# Patient Record
Sex: MALE | Race: WHITE | NOT HISPANIC OR LATINO | Employment: FULL TIME | URBAN - METROPOLITAN AREA
[De-identification: names, ages, dates, MRNs, and addresses within clinical notes are randomized per-mention and may not be internally consistent; named-entity substitution may affect disease eponyms.]

---

## 2017-02-22 ENCOUNTER — ALLSCRIPTS OFFICE VISIT (OUTPATIENT)
Dept: OTHER | Facility: OTHER | Age: 62
End: 2017-02-22

## 2017-02-22 DIAGNOSIS — I10 ESSENTIAL (PRIMARY) HYPERTENSION: ICD-10-CM

## 2017-02-22 DIAGNOSIS — Z12.11 ENCOUNTER FOR SCREENING FOR MALIGNANT NEOPLASM OF COLON: ICD-10-CM

## 2018-01-09 NOTE — PROGRESS NOTES
Assessment    1  Encounter for preventive health examination (V70 0) (Z00 00)   2  Encounter for FIT (fecal immunochemical test) screening (V76 51) (Z12 11)   3  Chronic obstructive lung disease (496) (J44 9)   · Moderate 2016   4  Aortic valve stenosis (424 1) (I35 0)   · mild-mod Echo 7/14   5  Coronary artery calcification (414 00,414 4) (I25 10,I25 84)   · on CT chest   6  Hypertension (401 9) (I10)   7  Shortness of breath on exertion (786 05) (R06 02)   8  Current smoker (305 1) (F17 200)   · 1 PPD   9  Pulmonary nodule seen on imaging study (793 11) (R91 1)   10  Hyperlipidemia (272 4) (E78 5)   11  Rotator cuff tendinitis (726 10) (M75 80)   12  Need for Tdap vaccination (V06 1) (Z23)    Plan  Aortic valve stenosis, Coronary artery calcification, Shortness of breath on exertion    · * NM MYOCARDIAL PERFUSION SPECT (STRESS AND/OR REST); Status:Need  Information - Financial Authorization; Requested for:[id number];   Chronic obstructive lung disease    · Anoro Ellipta 62 5-25 MCG/INH Inhalation Aerosol Powder Breath Activated; 1  PUFF DAILY  Encounter for FIT (fecal immunochemical test) screening    · (1) OCCULT BLOOD, FECAL IMMUNOCHEMICAL TEST; Status:Active - Retrospective By  Protocol Authorization; Requested for:[id number];   Hypertension    · (1) COMPREHENSIVE METABOLIC PANEL; Status:Active; Requested for:82Tjq6162;    · (1) LDL,DIRECT; Status:Active; Requested for:24Pnf1640;   Need for Tdap vaccination    · Adacel 5-2-15 5 LF-MCG/0 5 Intramuscular Suspension  Pulmonary nodule seen on imaging study    · * CT CHEST WO CONTRAST; Status:Need Information - Financial Authorization; Requested for:[id number];   Rotator cuff tendinitis    · 1 - Abby Arias MD, [facility] ([facility]th[facility]) Physician Referral  Consult Only: the  expectation is that the referring provider will communicate back to the patient on  treatment options   Evaluation and Treatment: the expectation is that the referred to  provider will communicate back to the patient on treatment options  Status: Active   Requested for: 32Guh4770  Care Summary provided  : Yes  SocHx: Current smoker    · We recommend you quit smoking  Time spent counseling today was greater than 3  minutes ; Status:Complete;   Done: 67JUX4109    Discussion/Summary    Patient presented today for an annual checkup  Overall, he is feeling relatively well  He does continue to smoke upwards of a pack a day  He was encouraged to quit smoking  He will be having a CT of his chest is here to follow-up on some previous small nodules  He declines Chantix or bupropion for the time being, but encouraged him to consider these or any other smoking cessation aids to help quit smoking  He has some coronary calcifications as well as some exertional shortness of breath which may be an anginal equivalent  He has chronic knee pain and would not be able to walk on a treadmill, so I'll set him up for perfusion study  Consider cardiology consultation as well  Continue with aggressive lipid and blood pressure management  Continue with his daily aspirin  He has very obvious bilateral rotator cuff issues and I'm going to have him see Dr Patricia Burgos may also be able to assess his knee as well  I will have him get some nonfasting lab work today as he finds fasting lab work for difficult with shift work  He will have an Adacel vaccine today as he is due for tetanus shot  He has had a flu shot as well as a Pneumovax  I'm going to try him on aoro one puff daily for his COPD and exertional shortness of breath  Chief Complaint  Physical  Fit test given today      History of Present Illness  HPI: Patient presents today for an annual physical  He has several concerns  He has been having some exertional shortness of breath  He has COPD by PFTs previously  He uses pro-air intermittently as needed and this has been somewhat helpful  He denies any chest of coughing or wheezing   He had significant coronary calcifications on his previous CAT scan as well and some aortic stenosis on previous echo  He has history of hypertension as well as hyperlipidemia  He has been tolerating his medications and has had no syncope excessive palpitations or chest pain  Has a history of erectile dysfunction which is controlled Cialis  He has a history of hyperlipidemia which is controlled with atorvastatin  Review of Systems    Constitutional: no fever, not feeling poorly, no recent weight gain, no chills, not feeling tired and no recent weight loss  Eyes: no eyesight problems  ENT: no earache, no nosebleeds, no nasal discharge and no hoarseness  Cardiovascular: the heart rate was not slow, no chest pain, no intermittent leg claudication, the heart rate was not fast, no palpitations and no extremity edema  Respiratory: shortness of breath during exertion, but no shortness of breath, no cough, no orthopnea and no wheezing  Gastrointestinal: no abdominal pain, no nausea, no vomiting, no constipation and no diarrhea  Genitourinary: no dysuria, no urinary hesitancy and no nocturia  Musculoskeletal: no arthralgias, no joint swelling and no myalgias  Integumentary: no rashes  Neurological: no headache, no numbness, no dizziness and no fainting  Psychiatric: no anxiety and no depression  Endocrine: erectile dysfunction  Hematologic/Lymphatic: no tendency for easy bleeding and no tendency for easy bruising  Active Problems    1  Aortic valve stenosis (424 1) (I35 0)   2  Chronic obstructive lung disease (496) (J44 9)   3  Coronary artery calcification (414 00,414 4) (I25 10,I25 84)   4  Current smoker (305 1) (F17 200)   5  Encounter for screening colonoscopy (V76 51) (Z12 11)   6  Erectile dysfunction of non-organic origin (302 72) (F52 21)   7  Hyperlipidemia (272 4) (E78 5)   8  Hypertension (401 9) (I10)   9  Left ventricular hypertrophy (429 3) (I51 7)   10   Need for pneumococcal vaccination (V03 82) (Z23)   11  Nephrolithiasis (592 0) (N20 0)   12  Right knee pain (719 46) (M25 561)    Family History  Father    · Family history of Prostate cancer    Social History    · Current smoker (305 1) (F17 200)   · 1 PPD   · Full-time employment   · 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   ·    · Social alcohol use (Z78 9)   · 1-2 /week    Current Meds   1  AmLODIPine Besylate 10 MG Oral Tablet; take 1 tablet by mouth every day; Therapy: 74AGK7668 to (Evaluate:38Amp3524)  Requested for: 37AWN4020; Last   Rx:02Jan2017 Ordered   2  Aspirin 81 MG Oral Tablet Delayed Release; TAKE 1 TABLET DAILY AS DIRECTED; Therapy: 99LGI7261 to (Evaluate:04Jbp2787); Last Rx:72Ots5524 Ordered   3  Atorvastatin Calcium 20 MG Oral Tablet; take 1 tablet by mouth every day; Therapy: 77XSV4620 to (Evaluate:99Ado6200)  Requested for: 43Qrl0633; Last   Rx:19Gfc2109 Ordered   4  Cialis 20 MG Oral Tablet; TAKE 1 TABLET  AS NEEDED; Therapy: 67FZU4370 to (Evaluate:18Mar2016)  Requested for: 78UCG0955; Last   Rx:65Cdv3568 Ordered   5  Losartan Potassium 100 MG Oral Tablet; TAKE 1 TABLET DAILY; Therapy: 12DLN0504 to (Evaluate:66Bzc4777)  Requested for: 48XIG7604; Last   Rx:01Nov2016 Ordered   6  Multivital TABS; TAKE 1 TABLET DAILY; Therapy: 39DEY4338 to Recorded   7  ProAir  (90 Base) MCG/ACT Inhalation Aerosol Solution; INHALE 1 TO 2 PUFFS   EVERY 4 TO 6 HOURS AS NEEDED; Therapy: 25MNK9061 to (Evaluate:10Mar2016)  Requested for: 51JTN0931; Last   Rx:68Cdx5697 Ordered    Allergies    1  No Known Drug Allergies    Vitals   Recorded: 20Dcq2166 11:01AM   Heart Rate 84   Respiration 16   Systolic 996   Diastolic 80   Height 6 ft 2 in   Weight 242 lb    BMI Calculated 31 07   BSA Calculated 2 36     Physical Exam    Constitutional   General appearance: No acute distress, well appearing and well nourished  Head and Face   Head and face: Normal     Palpation of the face and sinuses: No sinus tenderness      Eyes   Conjunctiva and lids: No erythema, swelling or discharge  Pupils and irises: Equal, round, reactive to light  Ophthalmoscopic examination: Normal fundi and optic discs  Ears, Nose, Mouth, and Throat   External inspection of ears and nose: Normal     Lips, teeth, and gums: Normal, good dentition  Oropharynx: Normal with no erythema, edema, exudate or lesions  Neck   Neck: Supple, symmetric, trachea midline, no masses  Thyroid: Normal, no thyromegaly  Pulmonary   Respiratory effort: No increased work of breathing or signs of respiratory distress  Auscultation of lungs: Clear to auscultation  Cardiovascular   Auscultation of heart: Normal rate and rhythm, normal S1 and S2, no murmurs  Carotid pulses: 2+ bilaterally  Examination of extremities for edema and/or varicosities: Normal     Abdomen   Abdomen: Non-tender, no masses  Liver and spleen: No hepatomegaly or splenomegaly  Anus, perineum, and rectum: Abnormal     Genitourinary   Digital rectal exam of prostate: Abnormal   pt declines  Lymphatic   Palpation of lymph nodes in neck: No lymphadenopathy  Musculoskeletal   Gait and station: Normal     Skin   Skin and subcutaneous tissue: Normal without rashes or lesions  Palpation of skin and subcutaneous tissue: Normal turgor  Neurologic   Cranial nerves: Cranial nerves 2-12 intact  Cortical function: Normal mental status  Psychiatric   Judgment and insight: Normal     Orientation to person, place and time: Normal     Recent and remote memory: Intact      Mood and affect: Normal        Future Appointments    Date/Time Provider Specialty Site   08/21/2017 11:15 AM Jillian Raines [place] [facility] [place] [id number]     Signatures   Electronically signed by : SAAD Hernández ; Feb 22 2017 12:37PM EST                       (Author)

## 2018-01-15 VITALS
WEIGHT: 242 LBS | BODY MASS INDEX: 31.06 KG/M2 | DIASTOLIC BLOOD PRESSURE: 80 MMHG | RESPIRATION RATE: 16 BRPM | SYSTOLIC BLOOD PRESSURE: 110 MMHG | HEIGHT: 74 IN | HEART RATE: 84 BPM

## 2018-01-17 NOTE — MISCELLANEOUS
Message   Recorded as Task   Date: 02/10/2016 02:10 PM, Created By: Gina Araujo   Task Name: Med Renewal Request   Assigned To: Gina Araujo   Regarding Patient: Janey Alfonso, Status: Active   Comment:    Gina Araujo - 10 Feb 2016 2:10 PM     TASK CREATED  PER CAREMARK VIAGRA NEEDS AUTH, PER CAREMARK CIALIS IS ACCEPTED ALTERNATIVE, MAY WE SWITCH TO CIALIS? Jose Grover Jr - 10 Feb 2016 6:00 PM     TASK REPLIED TO: Previously Assigned To Jose Grover Jr  cialis 20 mg QD PRN # 9/3   Nessa Kruger - 11 Feb 2016 1:02 PM     TASK EDITED  L/m for pt to call office  Nessa Kruger - 11 Feb 2016 1:03 PM     TASK EDITED  medlist updated   Gina Araujo - 11 Feb 2016 1:14 PM     TASK EDITED  Pt notified  Active Problems    1  Aortic stenosis (424 1) (I35 0)   2  Current smoker (305 1) (Z72 0)   3  Encounter for screening for malignant neoplasm of prostate (V76 44) (Z12 5)   4  Erectile dysfunction of non-organic origin (302 72) (F52 21)   5  Hyperlipidemia (272 4) (E78 5)   6  Hypertension (401 9) (I10)   7  Left ventricular hypertrophy (429 3) (I51 7)   8  Nephrolithiasis (592 0) (N20 0)   9  Right knee pain (719 46) (M25 561)   10  Shortness of breath (786 05) (R06 02)    Current Meds   1  AmLODIPine Besylate 5 MG Oral Tablet; TAKE 1 TABLET DAILY; Therapy: 06VBU2100 to (Evaluate:71Dcd2028)  Requested for: 46BBK7418; Last   Rx:53Oru8625 Ordered   2  Atorvastatin Calcium 10 MG Oral Tablet; TAKE ONE TABLET BY MOUTH ONCE DAILY; Therapy: 55TNO1191 to (Roosevelt Trevino)  Requested for: 03QLA9198; Last   Rx:81Jov0996 Ordered   3  Losartan Potassium 100 MG Oral Tablet; TAKE 1 TABLET DAILY; Therapy: 30OMY6138 to (Evaluate:08Jun2016)  Requested for: 71YJE0037; Last   Rx:00Uxc1870 Ordered   4  Multivital Oral Tablet; TAKE 1 TABLET DAILY; Therapy: 44QHK2848 to Recorded   5   ProAir  (90 Base) MCG/ACT Inhalation Aerosol Solution; INHALE 1 TO 2 PUFFS   EVERY 4 TO 6 HOURS AS NEEDED; Therapy: 59ZGI7550 to (Evaluate:10Mar2016)  Requested for: 36RZA4227; Last   Rx:08Pyo7281 Ordered   6  Viagra 100 MG Oral Tablet; Use 1/2 to 1 pill 1 hr before sexual activity as need; Therapy: 62RCM8162 to (Mecca Bassett)  Requested for: 95OMC9105; Last   Rx:54Kef7828 Ordered    Allergies    1   No Known Drug Allergies    Plan  Erectile dysfunction of non-organic origin    · Viagra 100 MG Oral Tablet   · Cialis 20 MG Oral Tablet; TAKE 1 TABLET  AS NEEDED    Signatures   Electronically signed by : Jevon Ellis, ; Feb 11 2016  1:14PM EST                       (Author)

## 2018-01-17 NOTE — RESULT NOTES
Verified Results  * CT LUNG SCREENING PROGRAM 70ZHF9973 12:02PM [facility]     Test Name Result Flag Reference   CT LUNG SCREENING PROGRAM (Report)     CT CHEST LUNG CANCER SCREENING WITHOUT IV CONTRAST     INDICATION: Long term smoking history  COMPARISON: CT chest from July 11, 2014  TECHNIQUE: Unenhanced CT examination of the chest was performed utilizing a low dose protocol  Axial, sagittal and coronal reformatted images were submitted for interpretation  This examination, like all CT scans performed in the [facility], was performed utilizing techniques to minimize radiation dose exposure, including the use of iterative reconstruction and automated exposure control  FINDINGS:     LUNGS: Two adjacent small nodules in the apex of the right upper lobe, each measuring 4 mm, demonstrated best on coronal MIP images (series 602, image 28), unchanged since the last study  These can be presumed to be benign  Calcified granuloma right    lower lobe  No new pulmonary nodules or masses  PLEURA: Unremarkable  HEART/GREAT VESSELS: Heart top normal in size  Extensive coronary artery calcification  Stable small pericardial effusion  Aorta and central pulmonary arteries normal in caliber  MEDIASTINUM AND MARCI: No lymphadenopathy or other mass  Calcified right hilar lymph nodes  Esophagus, trachea and mainstem bronchi unremarkable  CHEST WALL AND LOWER NECK: Unremarkable  VISUALIZED STRUCTURES IN THE UPPER ABDOMEN: Unremarkable  OSSEOUS STRUCTURES: Degenerative disease in the spine  No fracture or destructive lesion  IMPRESSION:     No suspicious pulmonary nodule or consolidation  Lung-RADS: Lung-RADS2, benign appearance or behavior  Continue annual screening with LDCT in 12 months         Workstation performed: [id number]     Signed by:   Ruby Loaiza MD   2/20/16

## 2018-02-20 DIAGNOSIS — I10 ESSENTIAL HYPERTENSION: Primary | ICD-10-CM

## 2018-02-20 RX ORDER — AMLODIPINE BESYLATE 10 MG/1
TABLET ORAL
Qty: 30 TABLET | Refills: 0 | Status: SHIPPED | OUTPATIENT
Start: 2018-02-20 | End: 2018-03-23 | Stop reason: ALTCHOICE

## 2018-02-20 NOTE — TELEPHONE ENCOUNTER
Call patient, I did give 1 month of amlodipine but he should set up  appointment with us as he is due

## 2018-03-23 RX ORDER — AMLODIPINE BESYLATE 10 MG/1
1 TABLET ORAL DAILY
COMMUNITY
Start: 2016-02-09 | End: 2018-03-26 | Stop reason: SDUPTHER

## 2018-03-23 RX ORDER — ASPIRIN 81 MG/1
1 TABLET ORAL DAILY
COMMUNITY
Start: 2016-05-24 | End: 2018-03-26

## 2018-03-23 RX ORDER — ALBUTEROL SULFATE 90 UG/1
1-2 AEROSOL, METERED RESPIRATORY (INHALATION)
COMMUNITY
Start: 2014-07-01 | End: 2020-03-31 | Stop reason: SDUPTHER

## 2018-03-23 RX ORDER — LOSARTAN POTASSIUM 100 MG/1
1 TABLET ORAL DAILY
COMMUNITY
Start: 2014-08-15 | End: 2018-07-23 | Stop reason: SDUPTHER

## 2018-03-23 RX ORDER — TADALAFIL 20 MG/1
1 TABLET ORAL AS NEEDED
COMMUNITY
Start: 2016-02-11 | End: 2019-04-16 | Stop reason: ALTCHOICE

## 2018-03-23 RX ORDER — ATORVASTATIN CALCIUM 20 MG/1
1 TABLET, FILM COATED ORAL DAILY
COMMUNITY
Start: 2014-08-15 | End: 2018-07-12 | Stop reason: SDUPTHER

## 2018-03-25 PROBLEM — M75.80 ROTATOR CUFF TENDINITIS: Status: ACTIVE | Noted: 2017-02-22

## 2018-03-25 RX ORDER — LEVOFLOXACIN 500 MG/1
TABLET, FILM COATED ORAL
COMMUNITY
Start: 2018-02-05 | End: 2018-03-26

## 2018-03-25 RX ORDER — PREDNISONE 20 MG/1
TABLET ORAL
COMMUNITY
Start: 2018-02-05 | End: 2018-03-26

## 2018-03-26 ENCOUNTER — OFFICE VISIT (OUTPATIENT)
Dept: FAMILY MEDICINE CLINIC | Facility: CLINIC | Age: 63
End: 2018-03-26
Payer: COMMERCIAL

## 2018-03-26 VITALS
DIASTOLIC BLOOD PRESSURE: 76 MMHG | BODY MASS INDEX: 31.57 KG/M2 | SYSTOLIC BLOOD PRESSURE: 112 MMHG | WEIGHT: 246 LBS | HEIGHT: 74 IN

## 2018-03-26 DIAGNOSIS — I35.0 NONRHEUMATIC AORTIC VALVE STENOSIS: ICD-10-CM

## 2018-03-26 DIAGNOSIS — J43.9 PULMONARY EMPHYSEMA, UNSPECIFIED EMPHYSEMA TYPE (HCC): ICD-10-CM

## 2018-03-26 DIAGNOSIS — Z00.00 WELL ADULT HEALTH CHECK: Primary | ICD-10-CM

## 2018-03-26 DIAGNOSIS — Z12.5 PROSTATE CANCER SCREENING: ICD-10-CM

## 2018-03-26 DIAGNOSIS — I25.84 CORONARY ARTERY CALCIFICATION: ICD-10-CM

## 2018-03-26 DIAGNOSIS — Z11.59 ENCOUNTER FOR HEPATITIS C SCREENING TEST FOR LOW RISK PATIENT: ICD-10-CM

## 2018-03-26 DIAGNOSIS — Z12.11 SCREENING FOR COLON CANCER: ICD-10-CM

## 2018-03-26 DIAGNOSIS — F17.200 CURRENT SMOKER: ICD-10-CM

## 2018-03-26 DIAGNOSIS — I10 ESSENTIAL HYPERTENSION: ICD-10-CM

## 2018-03-26 DIAGNOSIS — I25.10 CORONARY ARTERY CALCIFICATION: ICD-10-CM

## 2018-03-26 PROCEDURE — 99396 PREV VISIT EST AGE 40-64: CPT | Performed by: FAMILY MEDICINE

## 2018-03-26 RX ORDER — ASPIRIN 81 MG/1
81 TABLET ORAL DAILY
Refills: 0
Start: 2018-03-26 | End: 2021-05-26 | Stop reason: HOSPADM

## 2018-03-26 RX ORDER — AMLODIPINE BESYLATE 10 MG/1
10 TABLET ORAL DAILY
Qty: 30 TABLET | Refills: 6 | Status: SHIPPED | OUTPATIENT
Start: 2018-03-26 | End: 2018-10-22 | Stop reason: SDUPTHER

## 2018-03-26 NOTE — PROGRESS NOTES
FAMILY PRACTICE OFFICE VISIT  [facility]  [address]  [address]  [place], [place], [address]      NAME: Dakota Mcneil  AGE: 58 y o  SEX: male  : 1955   MRN: [id number]    DATE: 3/26/2018  TIME: 10:02 AM    Assessment and Plan     Problem List Items Addressed This Visit        Respiratory    Chronic obstructive lung disease (HCC)    Relevant Medications    albuterol (PROAIR HFA) 90 mcg/act inhaler    Umeclidinium-Vilanterol (ANORO ELLIPTA) 62 5-25 MCG/INH AEPB    Other Relevant Orders    CBC    Comprehensive metabolic panel       Cardiovascular and Mediastinum    Aortic valve stenosis    Relevant Medications    tadalafil (CIALIS) 20 MG tablet    amLODIPine (NORVASC) 10 mg tablet    Other Relevant Orders    Echo complete with contrast if indicated    Coronary artery calcification    Relevant Medications    tadalafil (CIALIS) 20 MG tablet    amLODIPine (NORVASC) 10 mg tablet    Other Relevant Orders    Lipid panel    Essential hypertension    Relevant Medications    losartan (COZAAR) 100 MG tablet    amLODIPine (NORVASC) 10 mg tablet    Other Relevant Orders    Comprehensive metabolic panel    Lipid panel    Urinalysis with microscopic       Other    Current smoker    Relevant Orders    CT lung screening program      Other Visit Diagnoses     Well adult health check    -  Primary    Relevant Medications    aspirin (ECOTRIN LOW STRENGTH) 81 mg EC tablet    Prostate cancer screening        Relevant Orders    PSA    Screening for colon cancer        Relevant Orders    Cologuard    Encounter for hepatitis C screening test for low risk patient        Relevant Orders    Hepatitis C antibody            Patient Instructions     He is in today for a physical exam/ regular check  his blood pressure is well controlled, he should continue on amlodipine as is along with losartan    I did give slip to do blood work along with urinalysis fasting  we did review his visits at Urgent Care, as per their reports chest x-ray showed early pneumonia left lower lobe, he is clinically improved but does have at least moderate emphysema, last PFT 2016  (  Future repeat pulmonary function testing )   I would like him to restart Anoro  Inhaler 1 puff every day, rinse after use, can use ProAir inhaler as needed for cough or wheezing  He has decreased from 1 pack per day to 1/2 pack per day, continue to try to quit  He is well aware of risks associated with smoking  I would like him to do screening CT of chest   Prior CT screening had shown coronary artery calcifications, I would like him to use aspirin at least 81 mg daily, stay on atorvastatin as is  If he would develop chest pain he needs seen at emergency room ASAP  He does have mild to moderate aortic stenosis, last echocardiogram 2014, slip given to redo echocardiogram also  if he is still noting significant chest congestion in 3-4 weeks I would like to see him back in the office sooner than planned  Immunization History   Administered Date(s) Administered    Influenza 10/13/2017    Influenza TIV (IM) 10/27/2014, 10/27/2015, 10/28/2016    Pneumococcal Polysaccharide PPV23 08/30/2016    Tdap 02/22/2017       He does do yearly Flu shot  Tdap/tetanus shot is up to date  (done every 10 yrs for superficial cuts, every 5 yrs for deep wounds)   Can also look into coverage for new shingles shot, Shingrix (indicated if over 48years of age ) Can do that at pharmacy  Is a current smoker  (1 ppd x 25 plus yrs)     Down to 1/2 PPD recently     [address] screening, we discussed Colonoscopy vs ColoGuard is indicated starting at age 48  Will do ColoGuard,  Screening is up to date      Discussed Prostate Cancer screening pros/cons starting at age 48  PSA blood test  ordered  Should do monthly testicular exam at home      Hepatis C Screening indicated for 'baby boomers' -  after discussion he will do Hepatitis C screen  low risk    We did review previous blood work,   He is not up to date with Lipid screening  He is not up to date with Diabetes screening  Discussed importance of routine exercise, healthy diet       Also - should see Dentist routinely, and also should see Eye Dr if any vision changes  We will see him again in 6 months, sooner as needed                Chief Complaint     Chief Complaint   Patient presents with    Physical Exam       History of Present Illness   Suzan Lundberg is a 58y o -year-old male who is in for reg check/ Pe  He continues to work full-time  He was seen [facility] in [facility] at [facility] with bronchitis, eventually diagnosed with left lower lobe pneumonia, was treated with prednisone along with Levaquin, is improving but still with chest congestion  Has not required ProAir rescue inhaler recently, is not on other inhalers, previous use he had noted no difference  Long-term 1 pack per day smoker, relates is down to 1/2 pack per day  He is using his blood pressure meds as directed  Never redid echocardiogram       Review of Systems   Review of Systems    Active Problem List     Patient Active Problem List   Diagnosis    Aortic valve stenosis    Chronic obstructive lung disease ([address] )    Coronary artery calcification    Erectile dysfunction of non-organic origin    Hyperlipidemia    Essential hypertension    Left ventricular hypertrophy    Nephrolithiasis    Right knee pain    Rotator cuff tendinitis    Current smoker       Past Medical History:  No past medical history on file  Past Surgical History:  No past surgical history on file      Family History:  Family History   Problem Relation Age of Onset    Prostate cancer Father        Social History:  Social History     Social History    Marital status: /Civil Union     Spouse name: N/A    Number of children: N/A    Years of education: N/A     Occupational History    Machinery Repair      packaging [employer]  - full time employment     Social History Main Topics    Smoking status: Current Every Day Smoker     Packs/day: 0 50    Smokeless tobacco: Never Used    Alcohol use Yes      Comment: 1-2/wk    Drug use: No    Sexual activity: Not on file     Other Topics Concern    Not on file     Social History Narrative    No narrative on file       Objective     Vitals:    03/26/18 0911   BP: 112/76   BP Location: Left arm   Patient Position: Sitting   Cuff Size: Large   Weight: 112 kg (246 lb)   Height: 6' 2" (1 88 m)     Body mass index is 31 58 kg/m²      BP Readings from Last 3 Encounters:   03/26/18 112/76   02/22/17 110/80   08/30/16 126/86       Wt Readings from Last 3 Encounters:   03/26/18 112 kg (246 lb)   02/22/17 110 kg (242 lb)   08/30/16 108 kg (239 lb 2 oz)       Physical Exam    Pertinent Laboratory/Diagnostic Studies:  Results for orders placed or performed during the hospital encounter of 02/23/16   Complete pulmonary function test   Result Value Ref Range    FEV1  liters    FVC  liters    FEV1/FVC  %    TLC  liters    DLCO  ml/mmHg sec         ALLERGIES:  No Known Allergies    Current Medications     Current Outpatient Prescriptions   Medication Sig Dispense Refill    albuterol (PROAIR HFA) 90 mcg/act inhaler Inhale 1-2 puffs Every 4 to 6 hours as needed      amLODIPine (NORVASC) 10 mg tablet Take 1 tablet (10 mg total) by mouth daily 30 tablet 6    aspirin (ECOTRIN LOW STRENGTH) 81 mg EC tablet Take 1 tablet (81 mg total) by mouth daily  0    atorvastatin (LIPITOR) 20 mg tablet Take 1 tablet by mouth daily      losartan (COZAAR) 100 MG tablet Take 1 tablet by mouth daily      Multiple Vitamin (MULTI-VITAMIN DAILY PO) Take 1 tablet by mouth daily      tadalafil (CIALIS) 20 MG tablet Take 1 tablet by mouth as needed      Umeclidinium-Vilanterol (ANORO ELLIPTA) 62 5-25 MCG/INH AEPB Inhale 1 puff daily Use every day 1 each 6     No current facility-administered medications for this visit            Health Maintenance     Orders Placed This Encounter   Procedures    Cologuard    CT lung screening program    CBC    Comprehensive metabolic panel    Lipid panel    Hepatitis C antibody    PSA    Urinalysis with microscopic    Echo complete with contrast if indicated         Jaylen Jackson DO

## 2018-03-26 NOTE — PATIENT INSTRUCTIONS
He is in today for a physical exam/ regular check  his blood pressure is well controlled, he should continue on amlodipine as is along with losartan  I did give slip to do blood work along with urinalysis fasting  we did review his visits at Urgent Care, as per their reports chest x-ray showed early pneumonia left lower lobe, he is clinically improved but does have at least moderate emphysema, last PFT 2016  (  Future repeat pulmonary function testing )   I would like him to restart Anoro  Inhaler 1 puff every day, rinse after use, can use ProAir inhaler as needed for cough or wheezing  He has decreased from 1 pack per day to 1/2 pack per day, continue to try to quit  He is well aware of risks associated with smoking  I would like him to do screening CT of chest   Prior CT screening had shown coronary artery calcifications, I would like him to use aspirin at least 81 mg daily, stay on atorvastatin as is  If he would develop chest pain he needs seen at emergency room ASAP  He does have mild to moderate aortic stenosis, last echocardiogram 2014, slip given to redo echocardiogram also  if he is still noting significant chest congestion in 3-4 weeks I would like to see him back in the office sooner than planned  Immunization History   Administered Date(s) Administered    Influenza 10/13/2017    Influenza TIV (IM) 10/27/2014, 10/27/2015, 10/28/2016    Pneumococcal Polysaccharide PPV23 08/30/2016    Tdap 02/22/2017       He does do yearly Flu shot  Tdap/tetanus shot is up to date  (done every 10 yrs for superficial cuts, every 5 yrs for deep wounds)   Can also look into coverage for new shingles shot, Shingrix (indicated if over 48years of age ) Can do that at pharmacy  Is a current smoker  (1 ppd x 25 plus yrs)     Down to 1/2 PPD recently     [address] screening, we discussed Colonoscopy vs ColoGuard is indicated starting at age 48     Will do ColoGuard,  Screening is up to date      Discussed Prostate Cancer screening pros/cons starting at age 48  PSA blood test  ordered  Should do monthly testicular exam at home  Hepatis C Screening indicated for 'baby boomers' -  after discussion he will do Hepatitis C screen  low risk    We did review previous blood work,   He is not up to date with Lipid screening  He is not up to date with Diabetes screening  Discussed importance of routine exercise, healthy diet       Also - should see Dentist routinely, and also should see Eye Dr if any vision changes      We will see him again in 6 months, sooner as needed

## 2018-07-12 DIAGNOSIS — E78.5 HYPERLIPIDEMIA, UNSPECIFIED HYPERLIPIDEMIA TYPE: Primary | ICD-10-CM

## 2018-07-12 RX ORDER — ATORVASTATIN CALCIUM 20 MG/1
TABLET, FILM COATED ORAL
Qty: 30 TABLET | Refills: 6 | Status: SHIPPED | OUTPATIENT
Start: 2018-07-12 | End: 2019-02-10 | Stop reason: SDUPTHER

## 2018-07-12 RX ORDER — PREDNISONE 20 MG/1
TABLET ORAL
COMMUNITY
Start: 2018-05-25 | End: 2018-05-16 | Stop reason: ALTCHOICE

## 2018-07-23 DIAGNOSIS — I10 ESSENTIAL HYPERTENSION: Primary | ICD-10-CM

## 2018-07-23 RX ORDER — LOSARTAN POTASSIUM 100 MG/1
TABLET ORAL
Qty: 30 TABLET | Refills: 6 | Status: SHIPPED | OUTPATIENT
Start: 2018-07-23 | End: 2019-03-11 | Stop reason: SDUPTHER

## 2018-10-22 DIAGNOSIS — I10 ESSENTIAL HYPERTENSION: ICD-10-CM

## 2018-10-22 RX ORDER — AMLODIPINE BESYLATE 10 MG/1
10 TABLET ORAL DAILY
Qty: 30 TABLET | Refills: 6 | Status: SHIPPED | OUTPATIENT
Start: 2018-10-22 | End: 2019-05-22 | Stop reason: SDUPTHER

## 2018-12-26 DIAGNOSIS — J43.9 PULMONARY EMPHYSEMA, UNSPECIFIED EMPHYSEMA TYPE (HCC): ICD-10-CM

## 2018-12-27 RX ORDER — UMECLIDINIUM BROMIDE AND VILANTEROL TRIFENATATE 62.5; 25 UG/1; UG/1
POWDER RESPIRATORY (INHALATION)
Qty: 1 INHALER | Refills: 6 | Status: SHIPPED | OUTPATIENT
Start: 2018-12-27 | End: 2019-10-31 | Stop reason: SDUPTHER

## 2019-02-10 DIAGNOSIS — E78.5 HYPERLIPIDEMIA, UNSPECIFIED HYPERLIPIDEMIA TYPE: ICD-10-CM

## 2019-02-10 RX ORDER — ATORVASTATIN CALCIUM 20 MG/1
TABLET, FILM COATED ORAL
Qty: 30 TABLET | Refills: 6 | Status: SHIPPED | OUTPATIENT
Start: 2019-02-10 | End: 2019-09-06 | Stop reason: SDUPTHER

## 2019-03-11 DIAGNOSIS — I10 ESSENTIAL HYPERTENSION: ICD-10-CM

## 2019-03-11 RX ORDER — LOSARTAN POTASSIUM 100 MG/1
TABLET ORAL
Qty: 30 TABLET | Refills: 1 | Status: SHIPPED | OUTPATIENT
Start: 2019-03-11 | End: 2019-05-09 | Stop reason: SDUPTHER

## 2019-04-16 ENCOUNTER — OFFICE VISIT (OUTPATIENT)
Dept: FAMILY MEDICINE CLINIC | Facility: CLINIC | Age: 64
End: 2019-04-16
Payer: COMMERCIAL

## 2019-04-16 VITALS
BODY MASS INDEX: 30.57 KG/M2 | DIASTOLIC BLOOD PRESSURE: 82 MMHG | OXYGEN SATURATION: 97 % | WEIGHT: 238.2 LBS | HEIGHT: 74 IN | HEART RATE: 94 BPM | SYSTOLIC BLOOD PRESSURE: 132 MMHG | RESPIRATION RATE: 20 BRPM | TEMPERATURE: 98 F

## 2019-04-16 DIAGNOSIS — J43.9 PULMONARY EMPHYSEMA, UNSPECIFIED EMPHYSEMA TYPE (HCC): ICD-10-CM

## 2019-04-16 DIAGNOSIS — Z23 NEED FOR PNEUMOCOCCAL VACCINATION: ICD-10-CM

## 2019-04-16 DIAGNOSIS — Z11.59 ENCOUNTER FOR HEPATITIS C SCREENING TEST FOR LOW RISK PATIENT: ICD-10-CM

## 2019-04-16 DIAGNOSIS — Z12.5 SCREENING PSA (PROSTATE SPECIFIC ANTIGEN): ICD-10-CM

## 2019-04-16 DIAGNOSIS — Z00.00 WELL ADULT HEALTH CHECK: Primary | ICD-10-CM

## 2019-04-16 DIAGNOSIS — I25.84 CORONARY ARTERY CALCIFICATION: ICD-10-CM

## 2019-04-16 DIAGNOSIS — I25.10 CORONARY ARTERY CALCIFICATION: ICD-10-CM

## 2019-04-16 DIAGNOSIS — I35.0 NONRHEUMATIC AORTIC VALVE STENOSIS: ICD-10-CM

## 2019-04-16 DIAGNOSIS — I10 ESSENTIAL HYPERTENSION: ICD-10-CM

## 2019-04-16 DIAGNOSIS — F52.21 ERECTILE DYSFUNCTION OF NON-ORGANIC ORIGIN: ICD-10-CM

## 2019-04-16 DIAGNOSIS — F17.200 CURRENT SMOKER: ICD-10-CM

## 2019-04-16 PROBLEM — S42.309A FRACTURE, HUMERUS: Status: ACTIVE | Noted: 2019-04-16

## 2019-04-16 PROCEDURE — 99397 PER PM REEVAL EST PAT 65+ YR: CPT | Performed by: FAMILY MEDICINE

## 2019-04-16 PROCEDURE — 90471 IMMUNIZATION ADMIN: CPT

## 2019-04-16 PROCEDURE — 90670 PCV13 VACCINE IM: CPT

## 2019-04-16 RX ORDER — AMOXICILLIN AND CLAVULANATE POTASSIUM 875; 125 MG/1; MG/1
1 TABLET, FILM COATED ORAL EVERY 12 HOURS
Refills: 0 | COMMUNITY
Start: 2019-03-15 | End: 2019-04-16 | Stop reason: ALTCHOICE

## 2019-04-16 RX ORDER — SILDENAFIL CITRATE 20 MG/1
TABLET ORAL
Qty: 30 TABLET | Refills: 2 | Status: ON HOLD | OUTPATIENT
Start: 2019-04-16 | End: 2021-05-11 | Stop reason: CLARIF

## 2019-04-16 RX ORDER — DOXYCYCLINE HYCLATE 100 MG/1
CAPSULE ORAL
COMMUNITY
Start: 2019-01-08 | End: 2019-04-16 | Stop reason: ALTCHOICE

## 2019-05-09 DIAGNOSIS — I10 ESSENTIAL HYPERTENSION: ICD-10-CM

## 2019-05-09 RX ORDER — LOSARTAN POTASSIUM 100 MG/1
TABLET ORAL
Qty: 30 TABLET | Refills: 5 | Status: SHIPPED | OUTPATIENT
Start: 2019-05-09 | End: 2019-11-05 | Stop reason: SDUPTHER

## 2019-05-22 DIAGNOSIS — I10 ESSENTIAL HYPERTENSION: ICD-10-CM

## 2019-05-22 RX ORDER — AMLODIPINE BESYLATE 10 MG/1
10 TABLET ORAL DAILY
Qty: 30 TABLET | Refills: 6 | Status: SHIPPED | OUTPATIENT
Start: 2019-05-22 | End: 2019-12-16 | Stop reason: SDUPTHER

## 2019-06-20 ENCOUNTER — TELEPHONE (OUTPATIENT)
Dept: PULMONOLOGY | Facility: CLINIC | Age: 64
End: 2019-06-20

## 2019-06-21 ENCOUNTER — HOSPITAL ENCOUNTER (OUTPATIENT)
Dept: NON INVASIVE DIAGNOSTICS | Facility: HOSPITAL | Age: 64
Discharge: HOME | End: 2019-06-21
Payer: COMMERCIAL

## 2019-06-21 DIAGNOSIS — I35.0 NONRHEUMATIC AORTIC VALVE STENOSIS: ICD-10-CM

## 2019-06-21 PROCEDURE — 93306 TTE W/DOPPLER COMPLETE: CPT

## 2019-06-24 PROCEDURE — 93306 TTE W/DOPPLER COMPLETE: CPT | Performed by: INTERNAL MEDICINE

## 2019-06-26 ENCOUNTER — TELEPHONE (OUTPATIENT)
Dept: FAMILY MEDICINE CLINIC | Facility: CLINIC | Age: 64
End: 2019-06-26

## 2019-07-12 ENCOUNTER — OFFICE VISIT (OUTPATIENT)
Dept: PULMONOLOGY | Facility: CLINIC | Age: 64
End: 2019-07-12

## 2019-07-12 VITALS
SYSTOLIC BLOOD PRESSURE: 102 MMHG | TEMPERATURE: 97.4 F | BODY MASS INDEX: 30.42 KG/M2 | HEART RATE: 78 BPM | OXYGEN SATURATION: 93 % | DIASTOLIC BLOOD PRESSURE: 60 MMHG | HEIGHT: 74 IN | WEIGHT: 237 LBS

## 2019-07-12 DIAGNOSIS — J43.9 PULMONARY EMPHYSEMA, UNSPECIFIED EMPHYSEMA TYPE (HCC): ICD-10-CM

## 2019-07-12 PROCEDURE — 99244 OFF/OP CNSLTJ NEW/EST MOD 40: CPT | Performed by: INTERNAL MEDICINE

## 2019-07-12 NOTE — PROGRESS NOTES
Pulmonary outpatient note   Mumtaz Tanner 71 y o  male MRN: [id number]  7/12/2019      Assessment and plan:  Mumtaz Tanner has the gold stage a/B COPD  He is taking Anoro on a daily basis with very good clinical response  He is not taking her rescue inhalers at home  His blood plan of cough or phlegm  He is reporting two events of bronchitis per year  He had screening of lung cancer by chest CT in 2016 that showed to 4 mm lung volumes  He still smokes half pack a day  I gave the patient nicotine patch edema try to use it  He wants to stop smoking  Is not interested in Chantix or Wellbutrin due to side effects  I also referred him to chest CT for screening of lung cancer given the previous findings in 2016  I offered pulmonary function tests compare to the previous 1 from 2016 with FEV1 61% predicted but is not interested at this point  Follow-up in 3 months with chest CT and smoking status  Ramirez Barrera MD/PhD,  [facility]      Return in about 3 months (around 10/12/2019)  History of Present Illness  This is 71y o  year old male with previous medical history of heavy smoking, 30 pack years who was diagnosed with COPD in taking Anoro on daily basis  He is complaining of 2 flare ups per year with bronchitis symptoms  He still smokes half pack a day  He is not complaining of cough or phlegm outside the flare ups  He is not complaining of significant shortness of breath only in significant physical exercise is working full-time job  He had screening of lung cancer by chest CT in 2016 that was normal besides two 4 mm nodules  Social history:   Smokes 30 years a pack a day  Recently dropped to half pack a day  Drinks alcohol socially  Occupational history:   Works as a machine     Review of Systems   Constitutional: Negative  HENT: Negative  Eyes: Negative  Respiratory: Positive for shortness of breath (Mild)  Cardiovascular: Negative  Gastrointestinal: Negative  Endocrine: Negative  Genitourinary: Negative  Musculoskeletal: Positive for arthralgias  Skin: Negative  Allergic/Immunologic: Negative  Neurological: Negative  Hematological: Negative  Psychiatric/Behavioral: Negative  Historical Information   Past Medical History:   Diagnosis Date    Broken humerus     COPD (chronic obstructive pulmonary disease) ([facility] [address] )     Pneumonia      History reviewed  No pertinent surgical history  Family History   Problem Relation Age of Onset    Prostate cancer Father        Meds/Allergies     Current Outpatient Medications:     albuterol (PROAIR HFA) 90 mcg/act inhaler, Inhale 1-2 puffs Every 4 to 6 hours as needed, Disp: , Rfl:     amLODIPine (NORVASC) 10 mg tablet, TAKE 1 TABLET (10 MG TOTAL) BY MOUTH DAILY, Disp: 30 tablet, Rfl: 6    ANORO ELLIPTA 62 5-25 MCG/INH inhaler, INHALE 1 PUFF BY MOUTH EVERY DAY, Disp: 1 Inhaler, Rfl: 6    aspirin (ECOTRIN LOW STRENGTH) 81 mg EC tablet, Take 1 tablet (81 mg total) by mouth daily, Disp: , Rfl: 0    atorvastatin (LIPITOR) 20 mg tablet, TAKE 1 TABLET BY MOUTH EVERY DAY, Disp: 30 tablet, Rfl: 6    losartan (COZAAR) 100 MG tablet, TAKE 1 TABLET DAILY  , Disp: 30 tablet, Rfl: 5    Multiple Vitamin (MULTI-VITAMIN DAILY PO), Take 1 tablet by mouth daily, Disp: , Rfl:     sildenafil (REVATIO) 20 mg tablet, 3-5 pills 1 hr before need, Disp: 30 tablet, Rfl: 2  No Known Allergies    Vitals: Blood pressure 102/60, pulse 78, temperature (!) 97 4 °F (36 3 °C), height 6' 1 5" (1 867 m), weight 108 kg (237 lb), SpO2 93 %  Body mass index is 30 84 kg/m²  Oxygen Therapy  SpO2: 93 %  Oxygen Therapy: None (Room air)    Physical Exam  Physical Exam   Constitutional: He is oriented to person, place, and time  He appears well-developed and well-nourished  No distress  HENT:   Head: Normocephalic and atraumatic  Eyes: Pupils are equal, round, and reactive to light  EOM are normal    Neck: Normal range of motion  Neck supple  No JVD present  Cardiovascular: Normal rate, regular rhythm and normal heart sounds  Exam reveals no friction rub  No murmur heard  Pulmonary/Chest: Effort normal  No stridor  No respiratory distress  He has no wheezes  He has no rales  Abdominal: Soft  He exhibits no distension  Musculoskeletal: Normal range of motion  He exhibits no edema or deformity  Neurological: He is alert and oriented to person, place, and time  Skin: Skin is warm  He is not diaphoretic  Psychiatric: He has a normal mood and affect  Labs: I have personally reviewed pertinent lab results  No results found for: WBC, HGB, HCT, MCV, PLT  Lab Results   Component Value Date    GLUCOSE 101 08/07/2014    CALCIUM 9 4 08/07/2014     08/07/2014    K 4 4 08/07/2014    CO2 28 08/07/2014     08/07/2014    BUN 17 08/07/2014    CREATININE 0 79 08/07/2014     No results found for: IGE  Lab Results   Component Value Date    ALT 26 08/07/2014    AST 18 08/07/2014    ALKPHOS 87 08/07/2014    BILITOT 0 75 08/07/2014       Imaging and other studies:   I have personally reviewed pertinent imaging studies in PACS  CT 2016  LUNGS: Two adjacent small nodules in the apex of the right upper lobe, each measuring 4 mm, demonstrated best on coronal MIP images (series 602, image 28), unchanged since the last study  These can be presumed to be benign  Calcified granuloma right lower lobe  No new pulmonary nodules or masses  Pulmonary function testing:   PFT 02/2016  Spirometry Interpretation:  Spirometry shows an obstructive ventilatory defect with reduced FEV1/FVC ratio of 68 % and reduced FEV1 of 61 % predicted  Flow Volume Loops:  Flow-Volume loops are normal   Lung Volumes:  Air trapping suggest by elevated RV at 128% predicted  Diffusing Capacity:  Diffusing capacity of the lung is reduced at 58 % predicted

## 2019-07-15 ENCOUNTER — OFFICE VISIT (OUTPATIENT)
Dept: FAMILY MEDICINE CLINIC | Facility: CLINIC | Age: 64
End: 2019-07-15
Payer: COMMERCIAL

## 2019-07-15 VITALS
DIASTOLIC BLOOD PRESSURE: 80 MMHG | WEIGHT: 238 LBS | SYSTOLIC BLOOD PRESSURE: 122 MMHG | BODY MASS INDEX: 30.54 KG/M2 | HEART RATE: 92 BPM | OXYGEN SATURATION: 97 % | HEIGHT: 74 IN

## 2019-07-15 DIAGNOSIS — I35.0 NONRHEUMATIC AORTIC VALVE STENOSIS: Primary | ICD-10-CM

## 2019-07-15 DIAGNOSIS — Z12.11 COLON CANCER SCREENING: ICD-10-CM

## 2019-07-15 PROCEDURE — 99212 OFFICE O/P EST SF 10 MIN: CPT | Performed by: FAMILY MEDICINE

## 2019-07-15 PROCEDURE — 3008F BODY MASS INDEX DOCD: CPT | Performed by: FAMILY MEDICINE

## 2019-07-15 NOTE — PATIENT INSTRUCTIONS
Reviewed recent echocardiogram, progression at aortic valve, reading severe stenosis, we will set him up with Cardiology for consultation  He will be doing CT scan for pulmonary, has follow-up set up with them  Also, as before he declines colonoscopy screening, he will consider doing Cologuard

## 2019-07-15 NOTE — PROGRESS NOTES
FAMILY PRACTICE OFFICE VISIT  [address]  [address], [address]      NAME: Damaris Barajas  AGE: 71 y o  SEX: male  : 1955   MRN: [id number]    DATE: 7/15/2019  TIME: 1:17 PM    Assessment and Plan     Problem List Items Addressed This Visit        Cardiovascular and Mediastinum    Aortic valve stenosis - severe - Primary    Relevant Orders    Ambulatory referral to Cardiology      Other Visit Diagnoses     Colon cancer screening        Relevant Orders    Cologuard          Patient Instructions   Reviewed recent echocardiogram, progression at aortic valve, reading severe stenosis, we will set him up with Cardiology for consultation  He will be doing CT scan for pulmonary, has follow-up set up with them  Also, as before he declines colonoscopy screening, he will consider doing Cologuard  Chief Complaint     Chief Complaint   Patient presents with    Follow-up     review ECHO results       History of Present Illness   Damaris Barajas is a 71y o -year-old male who is in today to review his recent echocardiogram, I had seen him with a full physical back in April  Echo shows significant progression aortic valve, now with severe aortic stenosis  He relates he has been feeling ok overall -  is able to do 1 flight of stairs without issue, at work does 3 flights in notes significant shortness of breath  Doing therapy, does treadmill there, is winded with treadmill  No syncope, angina     Did see Pulmonary earlier this month, smokes 1/2 pack per day  Anoro helps -  no recent need for rescue inhaler  Review of Systems   Review of Systems   Constitutional: Negative for chills and fever  Respiratory:        See HPI/ Pulm consult   Cardiovascular: Negative for chest pain, palpitations and leg swelling  Gastrointestinal: Negative for abdominal pain  Neurological: Negative for syncope  Active Problem List     Patient Active Problem List   Diagnosis    Aortic valve stenosis - severe    Chronic obstructive lung disease ([address] )    Coronary artery calcification    Erectile dysfunction of non-organic origin    Hyperlipidemia    Essential hypertension    Left ventricular hypertrophy    Nephrolithiasis    Right knee pain    Rotator cuff tendinitis Left    Current smoker    Fracture, humerus Right       Past Medical History:  Reviewed    Past Surgical History:  Reviewed    Family History:  Reviewed    Social History:  Reviewed    Objective     Vitals:    07/15/19 1258   BP: 122/80   BP Location: Left arm   Patient Position: Sitting   Cuff Size: Large   Pulse: 92   SpO2: 97%   Weight: 108 kg (238 lb)   Height: 6' 1 5" (1 867 m)     Body mass index is 30 97 kg/m²  BP Readings from Last 3 Encounters:   07/15/19 122/80   07/12/19 102/60   04/16/19 132/82       Wt Readings from Last 3 Encounters:   07/15/19 108 kg (238 lb)   07/12/19 108 kg (237 lb)   04/16/19 108 kg (238 lb 3 2 oz)       Physical Exam   Constitutional: He appears well-developed and well-nourished  No distress  Cardiovascular: Normal rate and regular rhythm  Murmur (3/6 TERESA R2ics) heard  Pulmonary/Chest: Effort normal and breath sounds normal    Musculoskeletal: He exhibits no edema  ALLERGIES:  No Known Allergies    Current Medications     Current Outpatient Medications   Medication Sig Dispense Refill    albuterol (PROAIR HFA) 90 mcg/act inhaler Inhale 1-2 puffs Every 4 to 6 hours as needed      amLODIPine (NORVASC) 10 mg tablet TAKE 1 TABLET (10 MG TOTAL) BY MOUTH DAILY 30 tablet 6    ANORO ELLIPTA 62 5-25 MCG/INH inhaler INHALE 1 PUFF BY MOUTH EVERY DAY 1 Inhaler 6    aspirin (ECOTRIN LOW STRENGTH) 81 mg EC tablet Take 1 tablet (81 mg total) by mouth daily  0    atorvastatin (LIPITOR) 20 mg tablet TAKE 1 TABLET BY MOUTH EVERY DAY 30 tablet 6    losartan (COZAAR) 100 MG tablet TAKE 1 TABLET DAILY   27 tablet 5    Multiple Vitamin (MULTI-VITAMIN DAILY PO) Take 1 tablet by mouth daily      sildenafil (REVATIO) 20 mg tablet 3-5 pills 1 hr before need 30 tablet 2     No current facility-administered medications for this visit               Orders Placed This Encounter   Procedures    Cologuard    Ambulatory referral to Cardiology         Terrea Goldmann, DO

## 2019-07-19 ENCOUNTER — HOSPITAL ENCOUNTER (OUTPATIENT)
Dept: CT IMAGING | Facility: HOSPITAL | Age: 64
Discharge: HOME | End: 2019-07-19
Attending: INTERNAL MEDICINE
Payer: COMMERCIAL

## 2019-07-19 DIAGNOSIS — J43.9 PULMONARY EMPHYSEMA, UNSPECIFIED EMPHYSEMA TYPE (HCC): ICD-10-CM

## 2019-08-16 ENCOUNTER — CONSULT (OUTPATIENT)
Dept: CARDIOLOGY CLINIC | Facility: CLINIC | Age: 64
End: 2019-08-16
Payer: COMMERCIAL

## 2019-08-16 VITALS
HEART RATE: 79 BPM | WEIGHT: 236 LBS | OXYGEN SATURATION: 94 % | HEIGHT: 74 IN | SYSTOLIC BLOOD PRESSURE: 112 MMHG | RESPIRATION RATE: 18 BRPM | DIASTOLIC BLOOD PRESSURE: 90 MMHG | BODY MASS INDEX: 30.29 KG/M2

## 2019-08-16 DIAGNOSIS — I51.7 LEFT VENTRICULAR HYPERTROPHY: ICD-10-CM

## 2019-08-16 DIAGNOSIS — I35.0 NONRHEUMATIC AORTIC VALVE STENOSIS: Primary | ICD-10-CM

## 2019-08-16 DIAGNOSIS — I10 ESSENTIAL HYPERTENSION: ICD-10-CM

## 2019-08-16 PROCEDURE — 93000 ELECTROCARDIOGRAM COMPLETE: CPT | Performed by: INTERNAL MEDICINE

## 2019-08-16 PROCEDURE — 99244 OFF/OP CNSLTJ NEW/EST MOD 40: CPT | Performed by: INTERNAL MEDICINE

## 2019-08-16 NOTE — PROGRESS NOTES
Cardiology Consultation     Eric Du  [id number]  1955  [address]  [address] [facility]      1  Nonrheumatic aortic valve stenosis  Ambulatory referral to Cardiology    POCT ECG    Echo complete with contrast if indicated   2  Essential hypertension     3  Left ventricular hypertrophy         Discussion/Summary:    71-year-old man with severe aortic stenosis  Progression was discovered on follow-up echocardiogram done after murmur was detected about 5 years ago  He currently reports minimal symptoms to me  We had a long discussion about aortic stenosis, natural history, symptomatology and treatment  Per patient's report, he is not very symptomatic, able to walk up 3 flights although he does get winded at the top at work  Currently doing rehab and exercising on the treadmill  LV function is preserved  He does have severe LVH, but his blood pressure is currently well controlled on his current regimen  We agreed to repeat echocardiogram in the short term  I reviewed indications for proceeding with surgical repair including development of symptoms, or any change in LV function on his echo, evidence of congestive heart failure, or if there was a significant increase in his gradients  He understands the surgical repair will become necessary at some point in his life, the timing to be determined by the above  He will contact me in the interim if he develops any new symptoms or changes  We discussed avoiding isometric exercise or heavy lifting  He says this is pretty well monitored at work as well  He is able to do lifting of lighter weights with repetitions  Aerobic activity is encouraged      He does not follow with a dentist, advised him to do so and reviewed risks of infection with any prosthetic valve in the future      History of Present Illness:    New patient, referred to the office by   Katja Kim for evaluation of severe aortic stenosis  He had an echocardiogram performed a few years back in July of 2014 where he had mild-to-moderate aortic stenosis  This was when a murmur was discovered  Patient denies ever being told he had a murmur before, and knowing of any other cardiac diagnoses other than hypertension  He had follow-up echocardiogram performed in June which showed progression of his AS to severe with a calculated valve area of 0 6  Peak gradient was 4 m/sec  He had severe LVH, but function was preserved  Otherwise no significant valvular disease  Indirect PA pressures were normal     Patient discussed these findings with his primary care physician, but is referred for further evaluation  He is currently out of work because of orthopedic issue after humerus fracture, but is doing work conditioning which is exercise to be able to get back to doing his job more regularly  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[address] [employer]  Currently, tells me he does 12 minutes on the treadmill four at an incline of 5%, four flat, four in between  He gets winded with this, but is able to carry on a conversation  No chest pain, no edema, no PND, no orthopnea, no presyncope or syncope  No family history of any valve disease  Patient does not have any children  Cutting back on smoking  Recent CT also with some coronary calcification      Patient Active Problem List   Diagnosis    Aortic valve stenosis - severe    Chronic obstructive lung disease ([address] )    Coronary artery calcification    Erectile dysfunction of non-organic origin    Hyperlipidemia    Essential hypertension    Left ventricular hypertrophy    Nephrolithiasis    Right knee pain    Rotator cuff tendinitis Left    Current smoker    Fracture, humerus Right     Past Medical History:   Diagnosis Date    Broken humerus     COPD (chronic obstructive pulmonary disease) ([address] )     Pneumonia      Social History Tobacco Use    Smoking status: Current Every Day Smoker     Packs/day: 0 50     Types: Cigarettes     Start date: 1989    Smokeless tobacco: Never Used   Substance Use Topics    Alcohol use: Yes     Frequency: 2-4 times a month     Comment: 1-2/wk    Drug use: No      Family History   Problem Relation Age of Onset    Prostate cancer Father      History reviewed  No pertinent surgical history  Current Outpatient Medications:     albuterol (PROAIR HFA) 90 mcg/act inhaler, Inhale 1-2 puffs Every 4 to 6 hours as needed, Disp: , Rfl:     amLODIPine (NORVASC) 10 mg tablet, TAKE 1 TABLET (10 MG TOTAL) BY MOUTH DAILY, Disp: 30 tablet, Rfl: 6    ANORO ELLIPTA 62 5-25 MCG/INH inhaler, INHALE 1 PUFF BY MOUTH EVERY DAY, Disp: 1 Inhaler, Rfl: 6    Aspirin-Acetaminophen-Caffeine (EXCEDRIN MIGRAINE PO), Take by mouth, Disp: , Rfl:     atorvastatin (LIPITOR) 20 mg tablet, TAKE 1 TABLET BY MOUTH EVERY DAY, Disp: 30 tablet, Rfl: 6    losartan (COZAAR) 100 MG tablet, TAKE 1 TABLET DAILY  , Disp: 30 tablet, Rfl: 5    Multiple Vitamin (MULTI-VITAMIN DAILY PO), Take 1 tablet by mouth daily, Disp: , Rfl:     sildenafil (REVATIO) 20 mg tablet, 3-5 pills 1 hr before need, Disp: 30 tablet, Rfl: 2    aspirin (ECOTRIN LOW STRENGTH) 81 mg EC tablet, Take 1 tablet (81 mg total) by mouth daily (Patient not taking: Reported on 8/16/2019), Disp: , Rfl: 0  No Known Allergies    Vitals:    08/16/19 0937   BP: 112/90   BP Location: Right arm   Patient Position: Sitting   Cuff Size: Standard   Pulse: 79   Resp: 18   SpO2: 94%   Weight: 107 kg (236 lb)   Height: 6' 1 5" (1 867 m)     Vitals:    08/16/19 0937   Weight: 107 kg (236 lb)      Height: 6' 1 5" (186 7 cm)   Body mass index is 30 71 kg/m²  Physical Exam:  GENERAL: Alert, well appearing, and in no distress  HEENT:  PERRL, EOMI, no scleral icterus, no conjunctival pallor  NECK:  Radiation of aortic murmur bilaterally  HEART:  Regular rate, 3/6 TERESA   Decreased S2  LUNGS: Clear to auscultation bilaterally  ABDOMEN:  Soft, non-tender, positive bowel sounds, no rebound or guarding  EXTREMITIES:  No edema  VASCULAR:  Normal pedal pulses   NEURO: No focal deficits,  SKIN: Normal without suspicious lesions on exposed skin      ROS:  Positive for shoulder pain, knee pain, difficulty sleeping, heartburn, arthritis, back pain, shortness of breath, hearing problems, kidney stones, erectile dysfunction  Except as noted in HPI, is otherwise reviewed in detail and a 12 point review of systems is negative  Labs:  Lab Results   Component Value Date     08/07/2014    K 4 4 08/07/2014     08/07/2014    CREATININE 0 79 08/07/2014    BUN 17 08/07/2014    CO2 28 08/07/2014    ALT 26 08/07/2014    AST 18 08/07/2014       Lab Results   Component Value Date    CHOL 209 08/07/2014     Lab Results   Component Value Date    HDL 39 08/07/2014     Lab Results   Component Value Date    LDLCALC 126 (H) 08/07/2014     Lab Results   Component Value Date    TRIG 220 08/07/2014       Testing:  Echo 6/2019:  1  Severe concentric left ventricular hypertrophy, normal left ventricular systolic function, grade 1 diastolic dysfunction  EF around 60 percent  2  Mild left atrial cavity enlargement  3  Severe aortic valve stenosis  Peak transaortic velocity is 4 2 meters/seconds, mean gradient is 45 mmHg and calculated aortic valve area is 0 0 6 cm2  No aortic valve regurgitation noted  4  Mitral valve leaflet sclerosis, trace to mild mitral valve regurgitation noted  5  Trace tricuspid valve regurgitation  6  No obvious pulmonary hypertension  7  No pericardial effusion  EKG:  Sinus rhythm with isolated PVC  HR 73 BPM  Borderline criteria for LVH    Nonspecific ST T wave abnormality

## 2019-09-06 DIAGNOSIS — E78.5 HYPERLIPIDEMIA, UNSPECIFIED HYPERLIPIDEMIA TYPE: ICD-10-CM

## 2019-09-06 RX ORDER — ATORVASTATIN CALCIUM 20 MG/1
TABLET, FILM COATED ORAL
Qty: 30 TABLET | Refills: 6 | Status: SHIPPED | OUTPATIENT
Start: 2019-09-06 | End: 2020-02-05

## 2019-10-15 ENCOUNTER — OFFICE VISIT (OUTPATIENT)
Dept: PULMONOLOGY | Facility: CLINIC | Age: 64
End: 2019-10-15
Payer: COMMERCIAL

## 2019-10-15 VITALS
OXYGEN SATURATION: 94 % | TEMPERATURE: 96.9 F | RESPIRATION RATE: 16 BRPM | HEIGHT: 74 IN | DIASTOLIC BLOOD PRESSURE: 80 MMHG | SYSTOLIC BLOOD PRESSURE: 140 MMHG | HEART RATE: 86 BPM | WEIGHT: 238.8 LBS | BODY MASS INDEX: 30.65 KG/M2

## 2019-10-15 DIAGNOSIS — J43.2 CENTRILOBULAR EMPHYSEMA (HCC): Primary | ICD-10-CM

## 2019-10-15 PROCEDURE — 99214 OFFICE O/P EST MOD 30 MIN: CPT | Performed by: INTERNAL MEDICINE

## 2019-10-15 NOTE — PROGRESS NOTES
Pulmonary outpatient note   Vinay Almaguer 71 y o  male MRN: [id number]  10/15/2019      Assessment and plan:  Vinay Almaguer has gold stage B COPD  He is taking Anoro on a daily basis with very good clinical response  He had screening of lung cancer by chest CT in 2016 that showed to 4 mm lung nodule and another chest CT in July 2019 that was normal without suspicious nodules  He still smokes half pack a day  He is not interested in Chantix or Wellbutrin due to side effects  I offered flu shot today but he will get it in his work place  Since he is stable we can continue with follow-up every 6 months  Nadeen Spain MD/PhD,  [facility]      No follow-ups on file  History of Present Illness  This is 71y o  year old male with previous medical history of heavy smoking, 30 pack years who was diagnosed with COPD and taking Anoro on daily basis  Since starting taking the Anoro he feels better, no flares and no need for albuterol besides few times in the summer  Decreased cough and phlegm and less short of breath  The he is trying to stop smoking but unsuccessfully so far  He had screening of lung cancer by chest CT in 2016 that was normal besides two 4 mm nodules  I referred the patient to another chest CT that he did on July 2019 and did not show any suspicious nodules  Social history:   Smokes 30 years a pack a day  Recently dropped to half pack a day  Drinks alcohol socially  Occupational history:   Works as a machine     Review of Systems   Constitutional: Negative  HENT: Negative  Eyes: Negative  Respiratory: Positive for shortness of breath (Mild improved)  Cardiovascular: Negative  Gastrointestinal: Negative  Endocrine: Negative  Genitourinary: Negative  Musculoskeletal: Positive for arthralgias  Skin: Negative  Allergic/Immunologic: Negative  Neurological: Negative  Hematological: Negative  Psychiatric/Behavioral: Negative  Historical Information   Past Medical History:   Diagnosis Date    Broken humerus     COPD (chronic obstructive pulmonary disease) ([facility] [address] )     Pneumonia      No past surgical history on file  Family History   Problem Relation Age of Onset    Prostate cancer Father        Meds/Allergies     Current Outpatient Medications:     albuterol (PROAIR HFA) 90 mcg/act inhaler, Inhale 1-2 puffs Every 4 to 6 hours as needed, Disp: , Rfl:     amLODIPine (NORVASC) 10 mg tablet, TAKE 1 TABLET (10 MG TOTAL) BY MOUTH DAILY, Disp: 30 tablet, Rfl: 6    ANORO ELLIPTA 62 5-25 MCG/INH inhaler, INHALE 1 PUFF BY MOUTH EVERY DAY, Disp: 1 Inhaler, Rfl: 6    Aspirin-Acetaminophen-Caffeine (EXCEDRIN MIGRAINE PO), Take by mouth, Disp: , Rfl:     atorvastatin (LIPITOR) 20 mg tablet, TAKE 1 TABLET BY MOUTH EVERY DAY, Disp: 30 tablet, Rfl: 6    losartan (COZAAR) 100 MG tablet, TAKE 1 TABLET DAILY  , Disp: 30 tablet, Rfl: 5    Multiple Vitamin (MULTI-VITAMIN DAILY PO), Take 1 tablet by mouth daily, Disp: , Rfl:     sildenafil (REVATIO) 20 mg tablet, 3-5 pills 1 hr before need, Disp: 30 tablet, Rfl: 2    aspirin (ECOTRIN LOW STRENGTH) 81 mg EC tablet, Take 1 tablet (81 mg total) by mouth daily (Patient not taking: Reported on 8/16/2019), Disp: , Rfl: 0  No Known Allergies    Vitals: Blood pressure 140/80, pulse 86, temperature (!) 96 9 °F (36 1 °C), resp  rate 16, height 6' 1 5" (1 867 m), weight 108 kg (238 lb 12 8 oz), SpO2 94 %  Body mass index is 31 08 kg/m²  Oxygen Therapy  SpO2: 94 %  Oxygen Therapy: None (Room air)    Physical Exam  Physical Exam   Constitutional: He is oriented to person, place, and time  He appears well-developed and well-nourished  No distress  HENT:   Head: Normocephalic and atraumatic  Eyes: Pupils are equal, round, and reactive to light  EOM are normal    Neck: Normal range of motion  Neck supple  No JVD present     Cardiovascular: Normal rate, regular rhythm and normal heart sounds  Exam reveals no friction rub  No murmur heard  Pulmonary/Chest: Effort normal  No stridor  No respiratory distress  He has no wheezes  He has no rales  Abdominal: Soft  He exhibits no distension  Musculoskeletal: Normal range of motion  He exhibits no edema or deformity  Neurological: He is alert and oriented to person, place, and time  Skin: Skin is warm  He is not diaphoretic  Psychiatric: He has a normal mood and affect  Labs: I have personally reviewed pertinent lab results  No results found for: WBC, HGB, HCT, MCV, PLT  Lab Results   Component Value Date    GLUCOSE 101 08/07/2014    CALCIUM 9 4 08/07/2014     08/07/2014    K 4 4 08/07/2014    CO2 28 08/07/2014     08/07/2014    BUN 17 08/07/2014    CREATININE 0 79 08/07/2014     No results found for: IGE  Lab Results   Component Value Date    ALT 26 08/07/2014    AST 18 08/07/2014    ALKPHOS 87 08/07/2014    BILITOT 0 75 08/07/2014       Imaging and other studies:   I have personally reviewed pertinent imaging studies in PACS  CT 2016  LUNGS: Two adjacent small nodules in the apex of the right upper lobe, each measuring 4 mm, demonstrated best on coronal MIP images (series 602, image 28), unchanged since the last study  These can be presumed to be benign  Calcified granuloma right lower lobe  No new pulmonary nodules or masses  Chest CT July 2019  LUNGS:  Lungs are clear  There is no tracheal or endobronchial lesion      PLEURA:  Unremarkable      HEART/GREAT VESSELS:  Normal heart size  Dense aortic valvular calcification again seen  Coronary artery calcifications also again noted  Normal caliber thoracic aorta with mild atherosclerotic calcifications      MEDIASTINUM AND MARCI:  Unremarkable      CHEST WALL AND LOWER NECK:   Unremarkable      VISUALIZED STRUCTURES IN THE UPPER ABDOMEN:  Unremarkable      OSSEOUS STRUCTURES:  No acute fracture or destructive osseous lesion    Pulmonary function testing:   PFT 02/2016  Spirometry Interpretation:  Spirometry shows an obstructive ventilatory defect with reduced FEV1/FVC ratio of 68 % and reduced FEV1 of 61 % predicted  Flow Volume Loops:  Flow-Volume loops are normal   Lung Volumes:  Air trapping suggest by elevated RV at 128% predicted  Diffusing Capacity:  Diffusing capacity of the lung is reduced at 58 % predicted

## 2019-10-31 DIAGNOSIS — J43.9 PULMONARY EMPHYSEMA, UNSPECIFIED EMPHYSEMA TYPE (HCC): ICD-10-CM

## 2019-10-31 RX ORDER — UMECLIDINIUM BROMIDE AND VILANTEROL TRIFENATATE 62.5; 25 UG/1; UG/1
POWDER RESPIRATORY (INHALATION)
Qty: 1 INHALER | Refills: 6 | Status: SHIPPED | OUTPATIENT
Start: 2019-10-31 | End: 2020-06-12

## 2019-11-05 DIAGNOSIS — I10 ESSENTIAL HYPERTENSION: ICD-10-CM

## 2019-11-05 RX ORDER — LOSARTAN POTASSIUM 100 MG/1
TABLET ORAL
Qty: 30 TABLET | Refills: 5 | Status: SHIPPED | OUTPATIENT
Start: 2019-11-05 | End: 2020-05-04

## 2019-12-16 DIAGNOSIS — I10 ESSENTIAL HYPERTENSION: ICD-10-CM

## 2019-12-16 RX ORDER — AMLODIPINE BESYLATE 10 MG/1
10 TABLET ORAL DAILY
Qty: 30 TABLET | Refills: 6 | Status: SHIPPED | OUTPATIENT
Start: 2019-12-16 | End: 2020-07-09

## 2020-02-05 DIAGNOSIS — E78.5 HYPERLIPIDEMIA, UNSPECIFIED HYPERLIPIDEMIA TYPE: ICD-10-CM

## 2020-02-05 RX ORDER — ATORVASTATIN CALCIUM 20 MG/1
TABLET, FILM COATED ORAL
Qty: 30 TABLET | Refills: 6 | Status: SHIPPED | OUTPATIENT
Start: 2020-02-05 | End: 2020-06-01

## 2020-03-31 ENCOUNTER — TELEMEDICINE (OUTPATIENT)
Dept: FAMILY MEDICINE CLINIC | Facility: CLINIC | Age: 65
End: 2020-03-31
Payer: COMMERCIAL

## 2020-03-31 VITALS — TEMPERATURE: 96.9 F | WEIGHT: 240 LBS | BODY MASS INDEX: 30.8 KG/M2 | HEIGHT: 74 IN

## 2020-03-31 DIAGNOSIS — J43.9 PULMONARY EMPHYSEMA, UNSPECIFIED EMPHYSEMA TYPE (HCC): Primary | ICD-10-CM

## 2020-03-31 DIAGNOSIS — J40 BRONCHITIS: ICD-10-CM

## 2020-03-31 PROCEDURE — 99213 OFFICE O/P EST LOW 20 MIN: CPT | Performed by: FAMILY MEDICINE

## 2020-03-31 RX ORDER — ALBUTEROL SULFATE 90 UG/1
1-2 AEROSOL, METERED RESPIRATORY (INHALATION) EVERY 4 HOURS PRN
Qty: 1 INHALER | Refills: 0 | Status: SHIPPED | OUTPATIENT
Start: 2020-03-31 | End: 2020-10-20

## 2020-03-31 RX ORDER — DOXYCYCLINE HYCLATE 100 MG/1
100 CAPSULE ORAL EVERY 12 HOURS SCHEDULED
Qty: 20 CAPSULE | Refills: 0 | Status: SHIPPED | OUTPATIENT
Start: 2020-03-31 | End: 2020-04-10

## 2020-03-31 NOTE — LETTER
March 31, 2020     Patient: Vinay Almaguer   YOB: 1955   Date of Visit: 3/31/2020       To Whom it May Concern:    Rishi Banks is under my professional care  He was seen via telemedicine in my office on 3/31/2020  He may return to work on Monday April 6th, 2020       If you have any questions or concerns, please don't hesitate to call           Sincerely,          Christina Ibarra,         CC: No Recipients

## 2020-03-31 NOTE — PROGRESS NOTES
Virtual Regular Visit    Problem List Items Addressed This Visit        Respiratory    Chronic obstructive lung disease ([facility] [address] ) - Primary    Relevant Medications    albuterol (ProAir HFA) 90 mcg/act inhaler      Other Visit Diagnoses     Bronchitis        Relevant Medications    doxycycline hyclate (VIBRAMYCIN) 100 mg capsule    albuterol (ProAir HFA) 90 mcg/act inhaler        Patient Instructions   Chronic bronchitis, ongoing cough with discolored mucus, increased symptoms past few days  We did review his visits with patient [address] [place] [address] January 24th along with February 23rd, had required prednisone/Medrol/azithromycin  Relates had chest x-ray there  Last CT scanning was back in July  I sent in prescription for doxycycline which he has tolerated in the past, 100 mg twice daily for 10 days  Can use albuterol inhaler as needed  Continue on Anoro inhaler  Still smoking, well aware of risks associated with same  If not improving in the next week or so I would like him to touch base with his lung doctor, last saw them in October  We also reviewed his cardiology notes, cardiology had wanted him to do another echocardiogram in February, he plans to set this up  Does have significant aortic stenosis, no chest pain, lightheadedness, palpitations  I did give out of work slip March 31st through now, return to work April 6th  Reason for visit is cough/congestion    Encounter provider Mahnaz Briceño DO    Provider located at [address] [place] [address] [place] [phone]      Recent Visits  No visits were found meeting these conditions     Showing recent visits within past 7 days and meeting all other requirements     Today's Visits  Date Type Provider Dept   03/31/20 Telemedicine Mahnaz Briceño DO [place] [address] [facility]   Showing today's visits and meeting all other requirements     Future Appointments  Date Type Provider Dept   03/31/20 Telemedicine DO [facility]   Showing future appointments within next 150 days and meeting all other requirements        The patient was identified by name and date of birth  Francie Ruiz was informed that this is a telemedicine visit and that the visit is being conducted through [employer] and patient was informed that this is not a secure, HIPAA-complaint platform  he agrees to proceed     My office door was closed  No one else was in the room  He acknowledged consent and understanding of privacy and security of the video platform  The patient has agreed to participate and understands they can discontinue the visit at any time  Patient is aware this is a billable service  Subjective  Francie Ruiz is a 76 y o  male who has coughing congestion, see above regarding history         Past Medical History:   Diagnosis Date    Broken humerus     COPD [facility]chronic obstructive pulmonary disease) ([facility] [address] )     Pneumonia        History reviewed  No pertinent surgical history  Current Outpatient Medications   Medication Sig Dispense Refill    amLODIPine (NORVASC) 10 mg tablet TAKE 1 TABLET (10 MG TOTAL) BY MOUTH DAILY 30 tablet 6    ANORO ELLIPTA 62 5-25 MCG/INH inhaler INHALE 1 PUFF BY MOUTH EVERY DAY 1 Inhaler 6    aspirin (ECOTRIN LOW STRENGTH) 81 mg EC tablet Take 1 tablet (81 mg total) by mouth daily  0    atorvastatin (LIPITOR) 20 mg tablet TAKE 1 TABLET BY MOUTH EVERY DAY 30 tablet 6    losartan (COZAAR) 100 MG tablet TAKE 1 TABLET DAILY   30 tablet 5    Multiple Vitamin (MULTI-VITAMIN DAILY PO) Take 1 tablet by mouth daily      albuterol (ProAir HFA) 90 mcg/act inhaler Inhale 1-2 puffs every 4 (four) hours as needed for wheezing 1 Inhaler 0    Aspirin-Acetaminophen-Caffeine (EXCEDRIN MIGRAINE PO) Take by mouth      doxycycline hyclate (VIBRAMYCIN) 100 mg capsule Take 1 capsule (100 mg total) by mouth every 12 (twelve) hours for 10 days 20 capsule 0    sildenafil (REVATIO) 20 mg tablet 3-5 pills 1 hr before need 30 tablet 2     No current facility-administered medications for this visit  No Known Allergies    Review of Systems   Constitutional: Negative for chills, diaphoresis, fatigue and fever  HENT: Positive for congestion, postnasal drip and sore throat (Mild)  Negative for ear pain, facial swelling, hearing loss, mouth sores, nosebleeds and trouble swallowing  Eyes: Negative for pain and discharge  Respiratory: Positive for cough and wheezing  Negative for shortness of breath  Cardiovascular: Negative for chest pain and palpitations  Gastrointestinal: Negative for abdominal pain, nausea and vomiting  Genitourinary: Negative for difficulty urinating  Skin: Negative for rash  Neurological: Negative for dizziness, syncope, weakness, light-headedness and headaches  Hematological: Negative for adenopathy  Does not bruise/bleed easily  Video Exam    Vitals:    03/31/20 1508   Temp: (!) 96 9 °F (36 1 °C)   Weight: 109 kg (240 lb)   Height: 6' 2" (1 88 m)       Physical Exam   Constitutional:   Appears well, no respiratory distress, afebrile        I spent 12 minutes with the patient during this visit

## 2020-03-31 NOTE — PATIENT INSTRUCTIONS
Chronic bronchitis, ongoing cough with discolored mucus, increased symptoms past few days  We did review his visits with [facility] January 24th along with February 23rd, had required prednisone/Medrol/azithromycin  Relates had chest x-ray there  Last CT scanning was back in July  I sent in prescription for doxycycline which he has tolerated in the past, 100 mg twice daily for 10 days  Can use albuterol inhaler as needed  Continue on Anoro inhaler  Still smoking, well aware of risks associated with same  If not improving in the next week or so I would like him to touch base with his lung doctor, last saw them in October  We also reviewed his cardiology notes, cardiology had wanted him to do another echocardiogram in February, he plans to set this up  Does have significant aortic stenosis, no chest pain, lightheadedness, palpitations  I did give out of work slip March 31st through now, return to work April 6th

## 2020-05-04 DIAGNOSIS — I10 ESSENTIAL HYPERTENSION: ICD-10-CM

## 2020-05-04 RX ORDER — LOSARTAN POTASSIUM 100 MG/1
TABLET ORAL
Qty: 90 TABLET | Refills: 1 | Status: SHIPPED | OUTPATIENT
Start: 2020-05-04 | End: 2020-10-20

## 2020-06-01 DIAGNOSIS — E78.5 HYPERLIPIDEMIA, UNSPECIFIED HYPERLIPIDEMIA TYPE: ICD-10-CM

## 2020-06-01 RX ORDER — ATORVASTATIN CALCIUM 20 MG/1
TABLET, FILM COATED ORAL
Qty: 90 TABLET | Refills: 2 | Status: SHIPPED | OUTPATIENT
Start: 2020-06-01 | End: 2021-03-25

## 2020-06-11 DIAGNOSIS — J43.9 PULMONARY EMPHYSEMA, UNSPECIFIED EMPHYSEMA TYPE (HCC): ICD-10-CM

## 2020-06-12 RX ORDER — UMECLIDINIUM BROMIDE AND VILANTEROL TRIFENATATE 62.5; 25 UG/1; UG/1
POWDER RESPIRATORY (INHALATION)
Qty: 1 INHALER | Refills: 6 | Status: SHIPPED | OUTPATIENT
Start: 2020-06-12 | End: 2021-01-19

## 2020-07-09 DIAGNOSIS — I10 ESSENTIAL HYPERTENSION: ICD-10-CM

## 2020-07-09 RX ORDER — AMLODIPINE BESYLATE 10 MG/1
10 TABLET ORAL DAILY
Qty: 90 TABLET | Refills: 3 | Status: SHIPPED | OUTPATIENT
Start: 2020-07-09 | End: 2021-05-26 | Stop reason: HOSPADM

## 2020-07-24 ENCOUNTER — OFFICE VISIT (OUTPATIENT)
Dept: FAMILY MEDICINE CLINIC | Facility: CLINIC | Age: 65
End: 2020-07-24
Payer: COMMERCIAL

## 2020-07-24 VITALS
HEART RATE: 78 BPM | BODY MASS INDEX: 30.93 KG/M2 | OXYGEN SATURATION: 96 % | DIASTOLIC BLOOD PRESSURE: 60 MMHG | TEMPERATURE: 97.8 F | HEIGHT: 74 IN | SYSTOLIC BLOOD PRESSURE: 90 MMHG | WEIGHT: 241 LBS

## 2020-07-24 DIAGNOSIS — F17.200 CURRENT SMOKER: ICD-10-CM

## 2020-07-24 DIAGNOSIS — K21.9 GASTROESOPHAGEAL REFLUX DISEASE WITHOUT ESOPHAGITIS: ICD-10-CM

## 2020-07-24 DIAGNOSIS — Z12.5 SCREENING PSA (PROSTATE SPECIFIC ANTIGEN): ICD-10-CM

## 2020-07-24 DIAGNOSIS — Z00.00 WELL ADULT HEALTH CHECK: Primary | ICD-10-CM

## 2020-07-24 DIAGNOSIS — Z12.11 COLON CANCER SCREENING: ICD-10-CM

## 2020-07-24 DIAGNOSIS — Z11.59 ENCOUNTER FOR HEPATITIS C SCREENING TEST FOR LOW RISK PATIENT: ICD-10-CM

## 2020-07-24 DIAGNOSIS — J43.9 PULMONARY EMPHYSEMA, UNSPECIFIED EMPHYSEMA TYPE (HCC): ICD-10-CM

## 2020-07-24 DIAGNOSIS — E78.5 HYPERLIPIDEMIA, UNSPECIFIED HYPERLIPIDEMIA TYPE: ICD-10-CM

## 2020-07-24 DIAGNOSIS — I35.0 NONRHEUMATIC AORTIC VALVE STENOSIS: ICD-10-CM

## 2020-07-24 PROCEDURE — 3078F DIAST BP <80 MM HG: CPT | Performed by: FAMILY MEDICINE

## 2020-07-24 PROCEDURE — 3008F BODY MASS INDEX DOCD: CPT | Performed by: FAMILY MEDICINE

## 2020-07-24 PROCEDURE — 1160F RVW MEDS BY RX/DR IN RCRD: CPT | Performed by: FAMILY MEDICINE

## 2020-07-24 PROCEDURE — 99397 PER PM REEVAL EST PAT 65+ YR: CPT | Performed by: FAMILY MEDICINE

## 2020-07-24 PROCEDURE — 3074F SYST BP LT 130 MM HG: CPT | Performed by: FAMILY MEDICINE

## 2020-07-24 RX ORDER — OMEPRAZOLE 20 MG/1
CAPSULE, DELAYED RELEASE ORAL
COMMUNITY
Start: 2020-05-27 | End: 2020-07-24 | Stop reason: ALTCHOICE

## 2020-07-24 RX ORDER — PANTOPRAZOLE SODIUM 40 MG/1
TABLET, DELAYED RELEASE ORAL
COMMUNITY
Start: 2020-07-19 | End: 2020-07-24 | Stop reason: SDUPTHER

## 2020-07-24 RX ORDER — PANTOPRAZOLE SODIUM 40 MG/1
40 TABLET, DELAYED RELEASE ORAL DAILY
Qty: 30 TABLET | Refills: 1
Start: 2020-07-24 | End: 2020-10-29 | Stop reason: SDUPTHER

## 2020-07-24 NOTE — PATIENT INSTRUCTIONS
Reviewed health history, medications  Blood pressure is low here today, overall has been feeling okay  He is overdue to redo echocardiogram, has known aortic stenosis, severe  I did reprint slip to do echocardiogram, placed order to follow-up with Cardiology  He does plan to follow-up with Pulmonary, has no increased shortness of breath, does continue to smoke, 1 pack per day  His last CT scanning was back in July of 2019, I did place order to redo screening CT of chest     He was seen twice at urgent care center with acid reflux, they placed him on pantoprazole 40 mg daily, since he is on this he has had no issues, he can continue on this, if develops any breakthrough symptoms he should do an EGD  We did reprinted order to do Cologuard regarding colon cancer screening, had not done previously  I placed order to do fasting blood work along with urinalysis, has not done blood work since 2018  Immunization History   Administered Date(s) Administered    INFLUENZA 10/13/2017    Influenza Quadrivalent 3 years and older 10/15/2018    Influenza TIV (IM) 10/27/2014, 10/27/2015, 10/28/2016    Pneumococcal Conjugate 13-Valent 04/16/2019    Pneumococcal Polysaccharide PPV23 08/30/2016    Tdap 02/22/2017       He does do yearly Flu shot  He has received both pneumococcal vaccines  Tdap/tetanus shot is up to date  (done every 10 yrs for superficial cuts, every 5 yrs for deep wounds)   Can also look into coverage for new shingles shot, Shingrix (indicated if over 48years of age ) Can do that at pharmacy  Discussed Prostate Cancer screening pros/cons starting at age 48  PSA blood test  ordered  Hepatitis C Screening indicated for 'baby boomers' -  after discussion he will do Hepatitis C screen  low risk    Continue to try to watch healthy diet, exercise routinely  We will see him again in 6 months, sooner as needed

## 2020-07-24 NOTE — PROGRESS NOTES
BMI Counseling: Body mass index is 30 94 kg/m²  The BMI is above normal  Nutrition recommendations include decreasing portion sizes, encouraging healthy choices of fruits and vegetables and moderation in carbohydrate intake  Exercise recommendations include exercising 3-5 times per week  Depression Screening and Follow-up Plan: Patient's depression screening was positive with a PHQ-2 score of 0  Clincally patient does not have depression  No treatment is required  Tobacco Cessation Counseling: Tobacco cessation counseling was provided  The patient is sincerely urged to quit consumption of tobacco  He is not ready to quit tobacco      FAMILY PRACTICE OFFICE VISIT  [facility]  [address] [facility] [address]  [place], [place], [facility]      NAME: Nory Goldman  AGE: 76 y o   SEX: male  : 1955   MRN: [id number]    DATE: 2020  TIME: 12:41 PM    Assessment and Plan     Problem List Items Addressed This Visit        Respiratory    Chronic obstructive lung disease ([address] )    Relevant Orders    CBC    Comprehensive metabolic panel       Cardiovascular and Mediastinum    Aortic valve stenosis - severe    Relevant Orders    CBC    Comprehensive metabolic panel    Urinalysis with microscopic    Ambulatory referral to Cardiology       Other    Hyperlipidemia    Relevant Orders    Lipid panel    Current smoker    Relevant Orders    CT lung screening program      Other Visit Diagnoses     Well adult health check    -  Primary    Encounter for hepatitis C screening test for low risk patient        Relevant Orders    Hepatitis C antibody    Colon cancer screening        Relevant Orders    Cologuard    Gastroesophageal reflux disease without esophagitis        Relevant Medications    pantoprazole (PROTONIX) 40 mg tablet    Screening PSA (prostate specific antigen)        Relevant Orders    PSA, Total Screen          Patient Instructions     Reviewed health history, medications  Blood pressure is low here today, overall has been feeling okay  He is overdue to redo echocardiogram, has known aortic stenosis, severe  I did reprint slip to do echocardiogram, placed order to follow-up with Cardiology  He does plan to follow-up with Pulmonary, has no increased shortness of breath, does continue to smoke, 1 pack per day  His last CT scanning was back in July of 2019, I did place order to redo screening CT of chest     He was seen twice at urgent care center with acid reflux, they placed him on pantoprazole 40 mg daily, since he is on this he has had no issues, he can continue on this, if develops any breakthrough symptoms he should do an EGD  We did reprinted order to do Cologuard regarding colon cancer screening, had not done previously  I placed order to do fasting blood work along with urinalysis, has not done blood work since 2018  Immunization History   Administered Date(s) Administered    INFLUENZA 10/13/2017    Influenza Quadrivalent 3 years and older 10/15/2018    Influenza TIV (IM) 10/27/2014, 10/27/2015, 10/28/2016    Pneumococcal Conjugate 13-Valent 04/16/2019    Pneumococcal Polysaccharide PPV23 08/30/2016    Tdap 02/22/2017       He does do yearly Flu shot  He has received both pneumococcal vaccines  Tdap/tetanus shot is up to date  (done every 10 yrs for superficial cuts, every 5 yrs for deep wounds)   Can also look into coverage for new shingles shot, Shingrix (indicated if over 48years of age ) Can do that at pharmacy  Discussed Prostate Cancer screening pros/cons starting at age 48  PSA blood test  ordered  Hepatitis C Screening indicated for 'baby boomers' -  after discussion he will do Hepatitis C screen  low risk    Continue to try to watch healthy diet, exercise routinely  We will see him again in 6 months, sooner as needed               Chief Complaint     Chief Complaint   Patient presents with    Physical Exam       History of Present Illness   Suzan Lundberg is a 76y o -year-old male who is in today for a checkup, he relates he has been feeling okay recently  He has had multiple visits with patient [facility], most recently had been going there in May/June with acid reflux symptoms, received prescription for Protonix  He relates since he started this he has had no reflux symptoms  Has known severe aortic stenosis, he was to redo echocardiogram back in February, has not redone echocardiogram   Denies chest pain, syncope, lightheadedness, increased palpitations  Does have chronic shortness of breath  Chronic bronchitis, had seen Pulmonary back in October, last CT of chest was in July 2019  No recent increased cough  He does smoke -currently 1pack per day    Has not done blood work since 2018, had slip for blood work but never did it  He also never did Cologuard  Review of Systems   Review of Systems   Constitutional: Negative for appetite change, fatigue, fever and unexpected weight change  HENT: Negative for sore throat and trouble swallowing  Respiratory: Negative for chest tightness  Some chronic dyspnea on exertion, that worsened baseline  No hemoptysis  Recently no increased cough versus baseline   Cardiovascular: Negative for chest pain, palpitations and leg swelling  Gastrointestinal: Negative for abdominal pain, blood in stool, nausea and vomiting  No acid reflux  since he was placed on pantoprazole by urgent care center   No change in bowel   Genitourinary: Negative for dysuria and hematuria  Neurological: Negative for dizziness, syncope, light-headedness and headaches  Psychiatric/Behavioral: Negative for behavioral problems and confusion         Active Problem List     Patient Active Problem List   Diagnosis    Aortic valve stenosis - severe    Chronic obstructive lung disease ([facility] [address] [facility]    Coronary artery calcification    Erectile dysfunction of non-organic origin    Hyperlipidemia    Essential hypertension    Left ventricular hypertrophy    Nephrolithiasis    Right knee pain    Rotator cuff tendinitis Left    Current smoker    Fracture, humerus Right       Past Medical History:  Reviewed    Past Surgical History:  Reviewed    Family History:  Reviewed    Social History:  Reviewed    Objective     Vitals:    07/24/20 1125   BP: 90/60   BP Location: Left arm   Patient Position: Sitting   Cuff Size: Large   Pulse: 78   Temp: 97 8 °F (36 6 °C)   SpO2: 96%   Weight: 109 kg (241 lb)   Height: 6' 2" (1 88 m)     Body mass index is 30 94 kg/m²  BP Readings from Last 3 Encounters:   07/24/20 90/60   10/15/19 140/80   08/16/19 112/90       Wt Readings from Last 3 Encounters:   07/24/20 109 kg (241 lb)   03/31/20 109 kg (240 lb)   10/15/19 108 kg (238 lb 12 8 oz)       Physical Exam   Constitutional: He is oriented to person, place, and time  He appears well-developed and well-nourished  No distress  HENT:   TM clear   Eyes: No scleral icterus  Cardiovascular: Normal rate and regular rhythm  Murmur (3/6 systolic ejection murmur right 2nd intercostal space) heard  No carotid bruit   Pulmonary/Chest: Effort normal  No respiratory distress  He has no wheezes  He has no rales  Abdominal: Soft  There is no tenderness  Musculoskeletal: He exhibits no edema  Lymphadenopathy:     He has no cervical adenopathy  Neurological: He is alert and oriented to person, place, and time  Psychiatric: He has a normal mood and affect   His behavior is normal        ALLERGIES:  No Known Allergies    Current Medications     Current Outpatient Medications   Medication Sig Dispense Refill    amLODIPine (NORVASC) 10 mg tablet TAKE 1 TABLET (10 MG TOTAL) BY MOUTH DAILY 90 tablet 3    ANORO ELLIPTA 62 5-25 MCG/INH inhaler INHALE 1 PUFF BY MOUTH EVERY DAY 1 Inhaler 6    aspirin (ECOTRIN LOW STRENGTH) 81 mg EC tablet Take 1 tablet (81 mg total) by mouth daily  0    Aspirin-Acetaminophen-Caffeine (EXCEDRIN MIGRAINE PO) Take by mouth      atorvastatin (LIPITOR) 20 mg tablet TAKE 1 TABLET BY MOUTH EVERY DAY 90 tablet 2    losartan (COZAAR) 100 MG tablet TAKE 1 TABLET DAILY  90 tablet 1    Multiple Vitamin (MULTI-VITAMIN DAILY PO) Take 1 tablet by mouth daily      albuterol (ProAir HFA) 90 mcg/act inhaler Inhale 1-2 puffs every 4 (four) hours as needed for wheezing (Patient not taking: Reported on 7/24/2020) 1 Inhaler 0    pantoprazole (PROTONIX) 40 mg tablet Take 1 tablet (40 mg total) by mouth daily 30 tablet 1    sildenafil (REVATIO) 20 mg tablet 3-5 pills 1 hr before need 30 tablet 2     No current facility-administered medications for this visit               Orders Placed This Encounter   Procedures    Cologuard    CT lung screening program    CBC    Comprehensive metabolic panel    Lipid panel    Urinalysis with microscopic    Hepatitis C antibody    PSA, Total Screen    Ambulatory referral to Cardiology         Jack Gomez DO

## 2020-10-20 DIAGNOSIS — J40 BRONCHITIS: ICD-10-CM

## 2020-10-20 DIAGNOSIS — I10 ESSENTIAL HYPERTENSION: ICD-10-CM

## 2020-10-20 DIAGNOSIS — J43.9 PULMONARY EMPHYSEMA, UNSPECIFIED EMPHYSEMA TYPE (HCC): ICD-10-CM

## 2020-10-20 RX ORDER — ALBUTEROL SULFATE 90 UG/1
1-2 AEROSOL, METERED RESPIRATORY (INHALATION) EVERY 4 HOURS PRN
Qty: 18 INHALER | Refills: 0 | Status: SHIPPED | OUTPATIENT
Start: 2020-10-20 | End: 2021-01-12

## 2020-10-20 RX ORDER — LOSARTAN POTASSIUM 100 MG/1
TABLET ORAL
Qty: 90 TABLET | Refills: 3 | Status: SHIPPED | OUTPATIENT
Start: 2020-10-20 | End: 2021-05-26 | Stop reason: HOSPADM

## 2020-10-29 DIAGNOSIS — K21.9 GASTROESOPHAGEAL REFLUX DISEASE WITHOUT ESOPHAGITIS: ICD-10-CM

## 2020-10-29 RX ORDER — PANTOPRAZOLE SODIUM 40 MG/1
40 TABLET, DELAYED RELEASE ORAL DAILY
Qty: 90 TABLET | Refills: 0 | Status: SHIPPED | OUTPATIENT
Start: 2020-10-29 | End: 2021-02-16

## 2021-01-12 DIAGNOSIS — J40 BRONCHITIS: ICD-10-CM

## 2021-01-12 DIAGNOSIS — J43.9 PULMONARY EMPHYSEMA, UNSPECIFIED EMPHYSEMA TYPE (HCC): ICD-10-CM

## 2021-01-12 RX ORDER — ALBUTEROL SULFATE 90 UG/1
1-2 AEROSOL, METERED RESPIRATORY (INHALATION) EVERY 4 HOURS PRN
Qty: 18 INHALER | Refills: 0 | Status: SHIPPED | OUTPATIENT
Start: 2021-01-12 | End: 2021-02-07

## 2021-01-19 DIAGNOSIS — J43.9 PULMONARY EMPHYSEMA, UNSPECIFIED EMPHYSEMA TYPE (HCC): ICD-10-CM

## 2021-01-19 RX ORDER — UMECLIDINIUM BROMIDE AND VILANTEROL TRIFENATATE 62.5; 25 UG/1; UG/1
POWDER RESPIRATORY (INHALATION)
Qty: 1 INHALER | Refills: 5 | Status: SHIPPED | OUTPATIENT
Start: 2021-01-19 | End: 2021-10-04

## 2021-01-22 ENCOUNTER — TELEMEDICINE (OUTPATIENT)
Dept: FAMILY MEDICINE CLINIC | Facility: CLINIC | Age: 66
End: 2021-01-22
Payer: COMMERCIAL

## 2021-01-22 DIAGNOSIS — B34.9 VIRAL INFECTION, UNSPECIFIED: ICD-10-CM

## 2021-01-22 DIAGNOSIS — B34.9 VIRAL INFECTION, UNSPECIFIED: Primary | ICD-10-CM

## 2021-01-22 DIAGNOSIS — F17.200 CURRENT SMOKER: ICD-10-CM

## 2021-01-22 DIAGNOSIS — J43.9 PULMONARY EMPHYSEMA, UNSPECIFIED EMPHYSEMA TYPE (HCC): ICD-10-CM

## 2021-01-22 PROCEDURE — U0003 INFECTIOUS AGENT DETECTION BY NUCLEIC ACID (DNA OR RNA); SEVERE ACUTE RESPIRATORY SYNDROME CORONAVIRUS 2 (SARS-COV-2) (CORONAVIRUS DISEASE [COVID-19]), AMPLIFIED PROBE TECHNIQUE, MAKING USE OF HIGH THROUGHPUT TECHNOLOGIES AS DESCRIBED BY CMS-2020-01-R: HCPCS | Performed by: FAMILY MEDICINE

## 2021-01-22 PROCEDURE — 99213 OFFICE O/P EST LOW 20 MIN: CPT | Performed by: FAMILY MEDICINE

## 2021-01-22 PROCEDURE — U0005 INFEC AGEN DETEC AMPLI PROBE: HCPCS | Performed by: FAMILY MEDICINE

## 2021-01-22 RX ORDER — METHYLPREDNISOLONE 4 MG/1
TABLET ORAL
Qty: 21 TABLET | Refills: 0 | Status: SHIPPED | OUTPATIENT
Start: 2021-01-22 | End: 2021-02-01 | Stop reason: ALTCHOICE

## 2021-01-22 NOTE — ASSESSMENT & PLAN NOTE
He appears to be having a COPD exacerbation  I placed him on a Medrol Dosepak  He will continue with his other inhalers  I will also have him tested for COVID  He will contact our office immediately for any worsening shortness of breath  Karen

## 2021-01-22 NOTE — PROGRESS NOTES
COVID-19 Virtual Visit     Assessment/Plan:    Problem List Items Addressed This Visit        Respiratory    Chronic obstructive lung disease ([address] )     He appears to be having a COPD exacerbation  I placed him on a Medrol Dosepak  He will continue with his other inhalers  I will also have him tested for COVID  He will contact our office immediately for any worsening shortness of breath  Relevant Medications    methylPREDNISolone 4 MG tablet therapy pack       Other    Current smoker     I recommend quitting smoking  1         Relevant Medications    methylPREDNISolone 4 MG tablet therapy pack      Other Visit Diagnoses     Viral infection, unspecified    -  Primary    Relevant Medications    methylPREDNISolone 4 MG tablet therapy pack    Other Relevant Orders    Novel Coronavirus (Covid-19),PCR [facility] - [facility]   [facility] or [facility]         Disposition:     I referred patient to one of our centralized sites for a COVID-19 swab  I have spent 15 minutes directly with the patient  Encounter provider Kyaw Corona MD    Provider located at [address] [place] [address] Dr [place] [id number]    Recent Visits  No visits were found meeting these conditions  Showing recent visits within past 7 days and meeting all other requirements     Today's Visits  Date Type Provider Dept   01/22/21 Telemedicine Kyaw Corona MD [facility]   Showing today's visits and meeting all other requirements     Future Appointments  No visits were found meeting these conditions  Showing future appointments within next 150 days and meeting all other requirements      This virtual check-in was done via [employer] Du[employer] and patient was informed that this is not a secure, HIPAA-compliant platform  He agrees to proceed      Patient agrees to participate in a virtual check in via telephone or video visit instead of presenting to the office to address urgent/immediate medical needs  Patient is aware this is a billable service  After connecting through [facility], the patient was identified by name and date of birth  Ilir Valdez was informed that this was a telemedicine visit and that the exam was being conducted confidentially over secure lines  No one else was in the room  Ilir Valdez acknowledged consent and understanding of privacy and security of the telemedicine visit  I informed the patient that I have reviewed his record in Epic and presented the opportunity for him to ask any questions regarding the visit today  The patient agreed to participate  Subjective:   Ilir Valdez is a 76 y o  male who is concerned about COVID-19  Patient's symptoms include fatigue, cough, shortness of breath, chest tightness and myalgias (chronic but worse today)  Patient denies fever, chills, malaise, congestion, rhinorrhea, sore throat, anosmia, loss of taste, abdominal pain, nausea, vomiting, diarrhea and headaches  Date of symptom onset: 1/8/2021    Exposure:   Contact with a person who is under investigation (PUI) for or who is positive for COVID-19 within the last 14 days?: No    Hospitalized recently for fever and/or lower respiratory symptoms?: No      Currently a healthcare worker that is involved in direct patient care?: No      Works in a special setting where the risk of COVID-19 transmission may be high? (this may include long-term care, correctional and FPC facilities; homeless shelters; assisted-living facilities and group homes ): No      Resident in a special setting where the risk of COVID-19 transmission may be high? (this may include long-term care, correctional and FPC facilities; homeless shelters; assisted-living facilities and group homes ): No      The patient notes 2 weeks of increasing cough  Over the past 48 hours he has had some increased phlegm and some shortness of breath  He denies fever or chills    He has some chronic shortness of breath which has been slightly worse over the past 48 hours  He has no fever or chills  He denies anosmia  He does admit to some increasing fatigue  Unfortunately, he still smokes at least 1/2 pack cigarettes per day  No results found for: Baldo Primrose, [address], [facility]  Past Medical History:   Diagnosis Date    Broken humerus     COPD (chronic obstructive pulmonary disease) ([facility] [address] )     Pneumonia      No past surgical history on file  Current Outpatient Medications   Medication Sig Dispense Refill    albuterol (PROVENTIL HFA,VENTOLIN HFA) 90 mcg/act inhaler INHALE 1-2 PUFFS EVERY 4 (FOUR) HOURS AS NEEDED FOR WHEEZING 18 Inhaler 0    amLODIPine (NORVASC) 10 mg tablet TAKE 1 TABLET (10 MG TOTAL) BY MOUTH DAILY 90 tablet 3    Anoro Ellipta 62 5-25 MCG/INH inhaler INHALE 1 PUFF BY MOUTH EVERY DAY 1 Inhaler 5    aspirin (ECOTRIN LOW STRENGTH) 81 mg EC tablet Take 1 tablet (81 mg total) by mouth daily  0    Aspirin-Acetaminophen-Caffeine (EXCEDRIN MIGRAINE PO) Take by mouth      atorvastatin (LIPITOR) 20 mg tablet TAKE 1 TABLET BY MOUTH EVERY DAY 90 tablet 2    losartan (COZAAR) 100 MG tablet TAKE 1 TABLET BY MOUTH EVERY DAY 90 tablet 3    methylPREDNISolone 4 MG tablet therapy pack Use as directed on package 21 tablet 0    Multiple Vitamin (MULTI-VITAMIN DAILY PO) Take 1 tablet by mouth daily      pantoprazole (PROTONIX) 40 mg tablet Take 1 tablet (40 mg total) by mouth daily 90 tablet 0    sildenafil (REVATIO) 20 mg tablet 3-5 pills 1 hr before need 30 tablet 2     No current facility-administered medications for this visit  No Known Allergies    Review of Systems   Constitutional: Positive for fatigue  Negative for chills and fever  HENT: Negative for congestion, rhinorrhea and sore throat  Respiratory: Positive for cough, chest tightness, shortness of breath and wheezing      Cardiovascular: Negative for chest pain, palpitations and leg swelling  Gastrointestinal: Negative for abdominal pain, diarrhea, nausea and vomiting  Musculoskeletal: Positive for myalgias (chronic but worse today)  Neurological: Negative for headaches  Objective: There were no vitals filed for this visit  Physical Exam  Constitutional:       Appearance: He is well-developed  HENT:      Head: Normocephalic  Eyes:      Pupils: Pupils are equal, round, and reactive to light  Pulmonary:      Effort: Pulmonary effort is normal    Skin:     Findings: No rash  Neurological:      Mental Status: He is alert  VIRTUAL VISIT DISCLAIMER    Bronson Santiago acknowledges that he has consented to an online visit or consultation  He understands that the online visit is based solely on information provided by him, and that, in the absence of a face-to-face physical evaluation by the physician, the diagnosis he receives is both limited and provisional in terms of accuracy and completeness  This is not intended to replace a full medical face-to-face evaluation by the physician  Bronson Santiago understands and accepts these terms

## 2021-01-23 LAB — SARS-COV-2 RNA RESP QL NAA+PROBE: NEGATIVE

## 2021-01-25 ENCOUNTER — TELEPHONE (OUTPATIENT)
Dept: FAMILY MEDICINE CLINIC | Facility: CLINIC | Age: 66
End: 2021-01-25

## 2021-01-25 NOTE — TELEPHONE ENCOUNTER
Pt called as per dr Osmani Faustin wanted him to let hm know how he is doing   Gala Cindy he feels a little better however his breathing is still not good  Cant sleep good at night  would like a call back

## 2021-01-26 ENCOUNTER — TELEPHONE (OUTPATIENT)
Dept: FAMILY MEDICINE CLINIC | Facility: CLINIC | Age: 66
End: 2021-01-26

## 2021-01-26 ENCOUNTER — OFFICE VISIT (OUTPATIENT)
Dept: FAMILY MEDICINE CLINIC | Facility: CLINIC | Age: 66
End: 2021-01-26
Payer: COMMERCIAL

## 2021-01-26 VITALS
OXYGEN SATURATION: 94 % | WEIGHT: 244 LBS | HEIGHT: 74 IN | BODY MASS INDEX: 31.32 KG/M2 | DIASTOLIC BLOOD PRESSURE: 80 MMHG | HEART RATE: 94 BPM | RESPIRATION RATE: 18 BRPM | SYSTOLIC BLOOD PRESSURE: 112 MMHG

## 2021-01-26 DIAGNOSIS — I35.0 NONRHEUMATIC AORTIC VALVE STENOSIS: ICD-10-CM

## 2021-01-26 DIAGNOSIS — I10 ESSENTIAL HYPERTENSION: ICD-10-CM

## 2021-01-26 DIAGNOSIS — Z11.59 ENCOUNTER FOR HEPATITIS C SCREENING TEST FOR LOW RISK PATIENT: ICD-10-CM

## 2021-01-26 DIAGNOSIS — I25.84 CORONARY ARTERY CALCIFICATION: ICD-10-CM

## 2021-01-26 DIAGNOSIS — R05.9 COUGH: ICD-10-CM

## 2021-01-26 DIAGNOSIS — E78.5 HYPERLIPIDEMIA, UNSPECIFIED HYPERLIPIDEMIA TYPE: ICD-10-CM

## 2021-01-26 DIAGNOSIS — R93.89 ABNORMAL CHEST X-RAY: ICD-10-CM

## 2021-01-26 DIAGNOSIS — Z12.5 SCREENING PSA (PROSTATE SPECIFIC ANTIGEN): ICD-10-CM

## 2021-01-26 DIAGNOSIS — F17.200 CURRENT SMOKER: ICD-10-CM

## 2021-01-26 DIAGNOSIS — I25.10 CORONARY ARTERY CALCIFICATION: ICD-10-CM

## 2021-01-26 DIAGNOSIS — J44.0 CHRONIC OBSTRUCTIVE PULMONARY DISEASE WITH ACUTE LOWER RESPIRATORY INFECTION (HCC): Primary | ICD-10-CM

## 2021-01-26 PROCEDURE — 1160F RVW MEDS BY RX/DR IN RCRD: CPT | Performed by: FAMILY MEDICINE

## 2021-01-26 PROCEDURE — 3725F SCREEN DEPRESSION PERFORMED: CPT | Performed by: FAMILY MEDICINE

## 2021-01-26 PROCEDURE — 3008F BODY MASS INDEX DOCD: CPT | Performed by: FAMILY MEDICINE

## 2021-01-26 PROCEDURE — 1101F PT FALLS ASSESS-DOCD LE1/YR: CPT | Performed by: FAMILY MEDICINE

## 2021-01-26 PROCEDURE — 3079F DIAST BP 80-89 MM HG: CPT | Performed by: FAMILY MEDICINE

## 2021-01-26 PROCEDURE — 4004F PT TOBACCO SCREEN RCVD TLK: CPT | Performed by: FAMILY MEDICINE

## 2021-01-26 PROCEDURE — 99214 OFFICE O/P EST MOD 30 MIN: CPT | Performed by: FAMILY MEDICINE

## 2021-01-26 PROCEDURE — 3074F SYST BP LT 130 MM HG: CPT | Performed by: FAMILY MEDICINE

## 2021-01-26 PROCEDURE — 3288F FALL RISK ASSESSMENT DOCD: CPT | Performed by: FAMILY MEDICINE

## 2021-01-26 RX ORDER — DOXYCYCLINE HYCLATE 100 MG/1
100 CAPSULE ORAL 2 TIMES DAILY
Qty: 20 CAPSULE | Refills: 0 | Status: SHIPPED | OUTPATIENT
Start: 2021-01-26 | End: 2021-02-05

## 2021-01-26 RX ORDER — DOXYCYCLINE HYCLATE 100 MG/1
1 CAPSULE ORAL 2 TIMES DAILY
COMMUNITY
Start: 2020-11-30 | End: 2021-01-26 | Stop reason: SDUPTHER

## 2021-01-26 NOTE — PATIENT INSTRUCTIONS
Has used few courses of steroid, used Prednisone in December, Medrol pack recently with Dr Deric Vallejo  Has noted some improvement but still has thick green mucus, he will use doxycycline 100 mg twice daily for 10 days  Can continue on Anoro inhaler, can use albuterol inhaler as needed  Consider future nebulizer  Out of work January 22nd, return to work February 1st   Call if worsens  I had seen him back in July with a full physical, he has not done any of the testing I had ordered yet  Has known severe aortic stenosis, I reordered echocardiogram, needs to see Cardiology in follow-up  I also ordered consultation with Pulmonology, had seen them in the past, has ongoing cough with wheezing  Had chest x-ray at [facility] back in early December, changes of chronic bronchitis, he needs a full CT scan of chest without dye  Still smokes -  Keep trying to cut down and quit  I did place order to do fasting blood work along with urinalysis  He will cancel his January 29th visit, I will see him again in 1 month    Sooner if needed

## 2021-01-26 NOTE — LETTER
January 26, 2021     Patient: John Record   YOB: 1955   Date of Visit: 1/26/2021       To Whom it May Concern:    Ramila Louis is under my professional care  He was seen in my office on 1/26/2021  He has been out of work since January 22nd, he will remain out of work, can return to full duty as of Monday February 1st     If you have any questions or concerns, please don't hesitate to call           Sincerely,          Vale Presley,         CC: No Recipients

## 2021-01-26 NOTE — PROGRESS NOTES
FAMILY PRACTICE OFFICE VISIT  [facility]  [address] [facility] [place], [place], [address]      NAME: Mary Malone  AGE: 76 y o  SEX: male  : 1955   MRN: [id number]    DATE: 2021  TIME: 2:27 PM    Assessment and Plan     Problem List Items Addressed This Visit        Respiratory    Chronic obstructive lung disease ([address] ) - Primary    Relevant Medications    doxycycline hyclate (VIBRAMYCIN) 100 mg capsule    Other Relevant Orders    CBC    CT chest wo contrast    Ambulatory referral to Pulmonology       Cardiovascular and Mediastinum    Aortic valve stenosis - severe    Relevant Orders    Comprehensive metabolic panel    CBC    Echo complete with contrast if indicated    Ambulatory referral to Cardiology    Coronary artery calcification    Relevant Orders    Lipid panel    Essential hypertension    Relevant Orders    Comprehensive metabolic panel    Urinalysis with microscopic       Other    Hyperlipidemia    Relevant Orders    Lipid panel    Current smoker    Relevant Orders    CT chest wo contrast      Other Visit Diagnoses     Cough        Relevant Orders    CT chest wo contrast    Ambulatory referral to Pulmonology    Abnormal chest x-ray        Relevant Orders    CT chest wo contrast    Screening PSA (prostate specific antigen)        Relevant Orders    PSA, Total Screen    Encounter for hepatitis C screening test for low risk patient        Relevant Orders    Hepatitis C antibody          Patient Instructions   Has used few courses of steroid, used Prednisone in December, Medrol pack recently with Dr Garcia Body  Has noted some improvement but still has thick green mucus, he will use doxycycline 100 mg twice daily for 10 days  Can continue on Anoro inhaler, can use albuterol inhaler as needed  Consider future nebulizer  Out of work , return to work    Call if worsens      I had seen him back in July with a full physical, he has not done any of the testing I had ordered yet  Has known severe aortic stenosis, I reordered echocardiogram, needs to see Cardiology in follow-up  I also ordered consultation with Pulmonology, had seen them in the past, has ongoing cough with wheezing  Had chest x-ray at [facility] back in early December, changes of chronic bronchitis, he needs a full CT scan of chest without dye  Still smokes -  Keep trying to cut down and quit  I did place order to do fasting blood work along with urinalysis  He will cancel his January 29th visit, I will see him again in 1 month  Sooner if needed      Chief Complaint     Chief Complaint   Patient presents with    Cough       History of Present Illness   Mayo Gutierrez is a 76y o -year-old male who is in today to re-evaluate ongoing cough with congestion, had telemedicine visit with Dr Deana Sutton the other day, is on Medrol, is anxious with this, keeping him awake but has noted some improvement  Does have thick green mucus yet  No fever, does have night sweats past few nights  Has been using albuterol rescue inhaler maximum once daily with work, other days none  Review of Systems   Review of Systems   Constitutional: Positive for chills (w/ night sweat past few nights)  Negative for appetite change, fatigue, fever and unexpected weight change  HENT: Positive for congestion  Negative for sore throat and trouble swallowing  Respiratory: Positive for cough, shortness of breath and wheezing  Negative for chest tightness  Cardiovascular: Negative for chest pain, palpitations and leg swelling  Gastrointestinal: Negative for abdominal pain, blood in stool, nausea and vomiting  No acid reflux     No change in bowel   Genitourinary: Negative for dysuria and hematuria  Neurological: Negative for dizziness, syncope, light-headedness and headaches     Psychiatric/Behavioral: Negative for behavioral problems and confusion  Anxious w steroid       Active Problem List     Patient Active Problem List   Diagnosis    Aortic valve stenosis - severe    Chronic obstructive lung disease ([facility] [address] )    Coronary artery calcification    Erectile dysfunction of non-organic origin    Hyperlipidemia    Essential hypertension    Left ventricular hypertrophy    Nephrolithiasis    Right knee pain    Rotator cuff tendinitis Left    Current smoker    Fracture, humerus Right       Past Medical History:  Reviewed    Past Surgical History:  Reviewed    Family History:  Reviewed    Social History:  Reviewed    Objective     Vitals:    01/26/21 1405   BP: 112/80   Pulse: 94   Resp: 18   SpO2: 94%   Weight: 111 kg (244 lb)   Height: 6' 2" (1 88 m)     Body mass index is 31 33 kg/m²      BP Readings from Last 3 Encounters:   01/26/21 112/80   07/24/20 90/60   10/15/19 140/80       Wt Readings from Last 3 Encounters:   01/26/21 111 kg (244 lb)   07/24/20 109 kg (241 lb)   03/31/20 109 kg (240 lb)       Physical Exam    ALLERGIES:  No Known Allergies    Current Medications     Current Outpatient Medications   Medication Sig Dispense Refill    albuterol (PROVENTIL HFA,VENTOLIN HFA) 90 mcg/act inhaler INHALE 1-2 PUFFS EVERY 4 (FOUR) HOURS AS NEEDED FOR WHEEZING 18 Inhaler 0    amLODIPine (NORVASC) 10 mg tablet TAKE 1 TABLET (10 MG TOTAL) BY MOUTH DAILY 90 tablet 3    Anoro Ellipta 62 5-25 MCG/INH inhaler INHALE 1 PUFF BY MOUTH EVERY DAY 1 Inhaler 5    aspirin (ECOTRIN LOW STRENGTH) 81 mg EC tablet Take 1 tablet (81 mg total) by mouth daily  0    Aspirin-Acetaminophen-Caffeine (EXCEDRIN MIGRAINE PO) Take by mouth      atorvastatin (LIPITOR) 20 mg tablet TAKE 1 TABLET BY MOUTH EVERY DAY 90 tablet 2    doxycycline hyclate (VIBRAMYCIN) 100 mg capsule Take 1 capsule (100 mg total) by mouth 2 (two) times a day for 10 days 20 capsule 0    losartan (COZAAR) 100 MG tablet TAKE 1 TABLET BY MOUTH EVERY DAY 90 tablet 3    methylPREDNISolone 4 MG tablet therapy pack Use as directed on package 21 tablet 0    Multiple Vitamin (MULTI-VITAMIN DAILY PO) Take 1 tablet by mouth daily      pantoprazole (PROTONIX) 40 mg tablet Take 1 tablet (40 mg total) by mouth daily 90 tablet 0    sildenafil (REVATIO) 20 mg tablet 3-5 pills 1 hr before need 30 tablet 2     No current facility-administered medications for this visit               Orders Placed This Encounter   Procedures    CT chest wo contrast    Comprehensive metabolic panel    CBC    Lipid panel    Urinalysis with microscopic    PSA, Total Screen    Hepatitis C antibody    Ambulatory referral to Pulmonology    Ambulatory referral to Cardiology    Echo complete with contrast if indicated         Ana María Friends, DO

## 2021-02-07 DIAGNOSIS — J43.9 PULMONARY EMPHYSEMA, UNSPECIFIED EMPHYSEMA TYPE (HCC): ICD-10-CM

## 2021-02-07 DIAGNOSIS — J40 BRONCHITIS: ICD-10-CM

## 2021-02-07 RX ORDER — ALBUTEROL SULFATE 90 UG/1
1-2 AEROSOL, METERED RESPIRATORY (INHALATION) EVERY 4 HOURS PRN
Qty: 18 INHALER | Refills: 0 | Status: SHIPPED | OUTPATIENT
Start: 2021-02-07 | End: 2021-03-08

## 2021-02-16 DIAGNOSIS — K21.9 GASTROESOPHAGEAL REFLUX DISEASE WITHOUT ESOPHAGITIS: ICD-10-CM

## 2021-02-16 RX ORDER — PANTOPRAZOLE SODIUM 40 MG/1
TABLET, DELAYED RELEASE ORAL
Qty: 90 TABLET | Refills: 0 | Status: SHIPPED | OUTPATIENT
Start: 2021-02-16 | End: 2021-05-30 | Stop reason: SDUPTHER

## 2021-02-23 ENCOUNTER — OFFICE VISIT (OUTPATIENT)
Dept: FAMILY MEDICINE CLINIC | Facility: CLINIC | Age: 66
End: 2021-02-23
Payer: COMMERCIAL

## 2021-02-23 VITALS
TEMPERATURE: 97.3 F | HEIGHT: 74 IN | WEIGHT: 248 LBS | HEART RATE: 60 BPM | BODY MASS INDEX: 31.83 KG/M2 | OXYGEN SATURATION: 93 % | SYSTOLIC BLOOD PRESSURE: 110 MMHG | DIASTOLIC BLOOD PRESSURE: 84 MMHG

## 2021-02-23 DIAGNOSIS — I35.0 NONRHEUMATIC AORTIC VALVE STENOSIS: Primary | ICD-10-CM

## 2021-02-23 DIAGNOSIS — I10 ESSENTIAL HYPERTENSION: ICD-10-CM

## 2021-02-23 DIAGNOSIS — F17.200 CURRENT SMOKER: ICD-10-CM

## 2021-02-23 DIAGNOSIS — J44.0 CHRONIC OBSTRUCTIVE PULMONARY DISEASE WITH ACUTE LOWER RESPIRATORY INFECTION (HCC): ICD-10-CM

## 2021-02-23 PROCEDURE — 1160F RVW MEDS BY RX/DR IN RCRD: CPT | Performed by: FAMILY MEDICINE

## 2021-02-23 PROCEDURE — 3008F BODY MASS INDEX DOCD: CPT | Performed by: FAMILY MEDICINE

## 2021-02-23 PROCEDURE — 3079F DIAST BP 80-89 MM HG: CPT | Performed by: FAMILY MEDICINE

## 2021-02-23 PROCEDURE — 99214 OFFICE O/P EST MOD 30 MIN: CPT | Performed by: FAMILY MEDICINE

## 2021-02-23 PROCEDURE — 3074F SYST BP LT 130 MM HG: CPT | Performed by: FAMILY MEDICINE

## 2021-02-23 PROCEDURE — 4004F PT TOBACCO SCREEN RCVD TLK: CPT | Performed by: FAMILY MEDICINE

## 2021-02-23 RX ORDER — AMOXICILLIN AND CLAVULANATE POTASSIUM 875; 125 MG/1; MG/1
1 TABLET, FILM COATED ORAL EVERY 12 HOURS
Qty: 14 TABLET | Refills: 0 | Status: SHIPPED | OUTPATIENT
Start: 2021-02-23 | End: 2021-03-02

## 2021-02-23 NOTE — PATIENT INSTRUCTIONS
He continues with lower respiratory symptoms, does have COPD, still with discolored mucus  He has not yet done CT scan of chest, he will set that up  Does plan to set up with Pulmonary for re-consultation  Still smoking but has cut down from 1 to 1/2 pack per day  Keep trying to cut down, quit  He will continue on Anoro, can use Albuterol as needed, he will use 1 week Augmentin  We did again discuss severe aortic stenosis and risks associated with this progressing, he has slip to set up again with Cardiology along with redo echocardiogram, he will do that  Also has slip to recheck blood work, urinalysis  Has required steroids multiple times, the last was in January  Try to avoid if can  Swelling right lower extremity appears related to wrap he uses on knees, observe  Use Aspirin daily as is

## 2021-02-23 NOTE — PROGRESS NOTES
FAMILY PRACTICE OFFICE VISIT  [facility]  [address], [address]      NAME: Mary Malone  AGE: 76 y o  SEX: male  : 1955   MRN: [id number]    DATE: 2021  TIME: 12:36 PM    Assessment and Plan     Problem List Items Addressed This Visit        Respiratory    Chronic obstructive lung disease ([address] )    Relevant Medications    amoxicillin-clavulanate (AUGMENTIN) 875-125 mg per tablet       Cardiovascular and Mediastinum    Aortic valve stenosis - severe - Primary    Essential hypertension       Other    Current smoker          Patient Instructions   He continues with lower respiratory symptoms, does have COPD, still with discolored mucus  He has not yet done CT scan of chest, he will set that up  Does plan to set up with Pulmonary for re-consultation  Still smoking but has cut down from 1 to 1/2 pack per day  Keep trying to cut down, quit  He will continue on Anoro, can use Albuterol as needed, he will use 1 week Augmentin  We did again discuss severe aortic stenosis and risks associated with this progressing, he has slip to set up again with Cardiology along with redo echocardiogram, he will do that  Also has slip to recheck blood work, urinalysis  Has required steroids multiple times, the last was in January  Try to avoid if can  Swelling right lower extremity appears related to wrap he uses on knees, observe  Use Aspirin daily as is  Chief Complaint     Chief Complaint   Patient presents with    Nasal Congestion     patient have  and his symptoms are worsening    Shortness of Breath       History of Present Illness   Mary Malone is a 76y o -year-old male who   Is in today for a re-evaluation, he is still complaining of chest congestion with discolored mucus, ongoing cough along with shortness of breath    I had seen him , unfortunately he still has not set up echocardiogram, CT of chest, consultation with Cardiology or consultation with Pulmonary  He does feel doxycycline helped a bit  Had used Medrol pack last month, has used multiple courses of prednisone  Continues to smoke but has cut down to 1/2 pack per day  Denies chest pain, lightheadedness or increased palpitations  Review of Systems   Review of Systems   Constitutional: Positive for fatigue  Negative for appetite change, fever and unexpected weight change  HENT: Negative for sore throat and trouble swallowing  Respiratory: Positive for cough, shortness of breath and wheezing  Negative for chest tightness  Cardiovascular: Positive for leg swelling (Right lower extremity, he relates to wearing tight wrap for knee arthritis)  Negative for chest pain and palpitations  Gastrointestinal: Negative for abdominal pain, blood in stool, nausea and vomiting  No acid reflux     No change in bowel   Genitourinary: Negative for dysuria and hematuria  Neurological: Negative for dizziness, syncope, light-headedness and headaches  Psychiatric/Behavioral: Negative for behavioral problems and confusion         Active Problem List     Patient Active Problem List   Diagnosis    Aortic valve stenosis - severe    Chronic obstructive lung disease ([facility] [address] )    Coronary artery calcification    Erectile dysfunction of non-organic origin    Hyperlipidemia    Essential hypertension    Left ventricular hypertrophy    Nephrolithiasis    Right knee pain    Rotator cuff tendinitis Left    Current smoker    Fracture, humerus Right       Past Medical History:  Reviewed    Past Surgical History:  Reviewed    Family History:  Reviewed    Social History:  Reviewed    Objective     Vitals:    02/23/21 1134   BP: 110/84   BP Location: Right arm   Patient Position: Sitting   Cuff Size: Standard   Pulse: 60   Temp: (!) 97 3 °F (36 3 °C)   SpO2: 93%   Weight: 112 kg (248 lb)   Height: 6' 2" (1 88 m)     Body mass index is 31 84 kg/m²  BP Readings from Last 3 Encounters:   02/23/21 110/84   01/26/21 112/80   07/24/20 90/60       Wt Readings from Last 3 Encounters:   02/23/21 112 kg (248 lb)   01/26/21 111 kg (244 lb)   07/24/20 109 kg (241 lb)       Physical Exam  Constitutional:       General: He is not in acute distress  Appearance: Normal appearance  He is not ill-appearing  Comments:  Seated on table, occasional cough, no respiratory distress  Eyes:      General: No scleral icterus  Cardiovascular:      Rate and Rhythm: Normal rate and regular rhythm  Heart sounds: Murmur ( 3/6 systolic ejection murmur right 2nd intercostal space) present  Pulmonary:      Effort: Pulmonary effort is normal  No respiratory distress  Comments: Diffuse rhonchi, some clearing with cough, no rales  Abdominal:      Palpations: Abdomen is soft  Tenderness: There is no abdominal tenderness  There is no guarding  Musculoskeletal:      Right lower leg: No edema (Slightly pitting edema right lower extremity at ankle, appears related to wrap on right knee  No calf tenderness)  Left lower leg: No edema  Lymphadenopathy:      Cervical: No cervical adenopathy  Skin:     Coloration: Skin is not jaundiced  Neurological:      Mental Status: He is oriented to person, place, and time     Psychiatric:         Mood and Affect: Mood normal          Behavior: Behavior normal          ALLERGIES:  No Known Allergies    Current Medications     Current Outpatient Medications   Medication Sig Dispense Refill    albuterol (PROVENTIL HFA,VENTOLIN HFA) 90 mcg/act inhaler INHALE 1-2 PUFFS EVERY 4 (FOUR) HOURS AS NEEDED FOR WHEEZING 18 Inhaler 0    amLODIPine (NORVASC) 10 mg tablet TAKE 1 TABLET (10 MG TOTAL) BY MOUTH DAILY 90 tablet 3    Anoro Ellipta 62 5-25 MCG/INH inhaler INHALE 1 PUFF BY MOUTH EVERY DAY 1 Inhaler 5    aspirin (ECOTRIN LOW STRENGTH) 81 mg EC tablet Take 1 tablet (81 mg total) by mouth daily  0    Aspirin-Acetaminophen-Caffeine (EXCEDRIN MIGRAINE PO) Take by mouth      atorvastatin (LIPITOR) 20 mg tablet TAKE 1 TABLET BY MOUTH EVERY DAY 90 tablet 2    losartan (COZAAR) 100 MG tablet TAKE 1 TABLET BY MOUTH EVERY DAY 90 tablet 3    Multiple Vitamin (MULTI-VITAMIN DAILY PO) Take 1 tablet by mouth daily      pantoprazole (PROTONIX) 40 mg tablet TAKE 1 TABLET BY MOUTH EVERY DAY 90 tablet 0    sildenafil (REVATIO) 20 mg tablet 3-5 pills 1 hr before need 30 tablet 2    amoxicillin-clavulanate (AUGMENTIN) 875-125 mg per tablet Take 1 tablet by mouth every 12 (twelve) hours for 7 days 14 tablet 0     No current facility-administered medications for this visit  No orders of the defined types were placed in this encounter          Chavez oFy DO

## 2021-03-08 DIAGNOSIS — J40 BRONCHITIS: ICD-10-CM

## 2021-03-08 DIAGNOSIS — J43.9 PULMONARY EMPHYSEMA, UNSPECIFIED EMPHYSEMA TYPE (HCC): ICD-10-CM

## 2021-03-08 RX ORDER — ALBUTEROL SULFATE 90 UG/1
1-2 AEROSOL, METERED RESPIRATORY (INHALATION) EVERY 4 HOURS PRN
Qty: 18 INHALER | Refills: 0 | Status: SHIPPED | OUTPATIENT
Start: 2021-03-08 | End: 2022-05-17 | Stop reason: SDUPTHER

## 2021-03-10 DIAGNOSIS — Z23 ENCOUNTER FOR IMMUNIZATION: ICD-10-CM

## 2021-03-25 DIAGNOSIS — E78.5 HYPERLIPIDEMIA, UNSPECIFIED HYPERLIPIDEMIA TYPE: ICD-10-CM

## 2021-03-25 RX ORDER — ATORVASTATIN CALCIUM 20 MG/1
TABLET, FILM COATED ORAL
Qty: 90 TABLET | Refills: 3 | Status: SHIPPED | OUTPATIENT
Start: 2021-03-25 | End: 2021-05-26 | Stop reason: HOSPADM

## 2021-03-29 ENCOUNTER — IMMUNIZATIONS (OUTPATIENT)
Dept: FAMILY MEDICINE CLINIC | Facility: HOSPITAL | Age: 66
End: 2021-03-29

## 2021-03-29 DIAGNOSIS — Z23 ENCOUNTER FOR IMMUNIZATION: Primary | ICD-10-CM

## 2021-03-29 PROCEDURE — 0011A SARS-COV-2 / COVID-19 MRNA VACCINE (MODERNA) 100 MCG: CPT

## 2021-03-29 PROCEDURE — 91301 SARS-COV-2 / COVID-19 MRNA VACCINE (MODERNA) 100 MCG: CPT

## 2021-04-05 ENCOUNTER — TELEMEDICINE (OUTPATIENT)
Dept: FAMILY MEDICINE CLINIC | Facility: CLINIC | Age: 66
End: 2021-04-05
Payer: COMMERCIAL

## 2021-04-05 DIAGNOSIS — B34.9 VIRAL INFECTION, UNSPECIFIED: ICD-10-CM

## 2021-04-05 DIAGNOSIS — B34.9 VIRAL INFECTION, UNSPECIFIED: Primary | ICD-10-CM

## 2021-04-05 PROCEDURE — U0003 INFECTIOUS AGENT DETECTION BY NUCLEIC ACID (DNA OR RNA); SEVERE ACUTE RESPIRATORY SYNDROME CORONAVIRUS 2 (SARS-COV-2) (CORONAVIRUS DISEASE [COVID-19]), AMPLIFIED PROBE TECHNIQUE, MAKING USE OF HIGH THROUGHPUT TECHNOLOGIES AS DESCRIBED BY CMS-2020-01-R: HCPCS | Performed by: INTERNAL MEDICINE

## 2021-04-05 PROCEDURE — U0005 INFEC AGEN DETEC AMPLI PROBE: HCPCS | Performed by: INTERNAL MEDICINE

## 2021-04-05 PROCEDURE — 99213 OFFICE O/P EST LOW 20 MIN: CPT | Performed by: INTERNAL MEDICINE

## 2021-04-05 NOTE — PROGRESS NOTES
COVID-19 Outpatient Progress Note    Assessment/Plan:    Problem List Items Addressed This Visit     None      Visit Diagnoses     Viral infection, unspecified    -  Primary    Relevant Orders    Novel Coronavirus (Covid-19),PCR [facility] - Collected at Mobile [facility] or [facility]         Disposition:     Patient called today with complaints of chills, fevers, body aches, congestion and shortness of breath  He got his COVID-19 vaccine on 03/29/2021  He started to develop symptoms on 03/31/2021  He will be referred for COVID-19 testing  He will be on isolation  I have spent 10 minutes directly with the patient  Encounter provider Chucho Ramos MD    Provider located at [address]  [facility] [address] [place] [id number] [phone]    Recent Visits  No visits were found meeting these conditions  Showing recent visits within past 7 days and meeting all other requirements     Today's Visits  Date Type Provider Dept   04/05/21 Nguyễn Hameed MD [place] [address] Primary Care   Showing today's visits and meeting all other requirements     Future Appointments  No visits were found meeting these conditions  Showing future appointments within next 150 days and meeting all other requirements        Patient agrees to participate in a virtual check in via telephone or video visit instead of presenting to the office to address urgent/immediate medical needs  Patient is aware this is a billable service  After connecting through Telephone, the patient was identified by name and date of birth  Serina Bills was informed that this was a telemedicine visit and that the exam was being conducted confidentially over secure lines  My office door was closed  Serina Bills acknowledged consent and understanding of privacy and security of the telemedicine visit   I informed the patient that I have reviewed his record in Epic and presented the opportunity for him to ask any questions regarding the visit today  The patient agreed to participate  Subjective:   Coretta Moody is a 78 y o  male who is concerned about COVID-19  Patient's symptoms include fever, chills, fatigue, nasal congestion, shortness of breath and myalgias  Patient denies rhinorrhea, sore throat, cough, chest tightness, nausea, vomiting and diarrhea  Lab Results   Component Value Date    SARSCOV2 Negative 01/22/2021     Past Medical History:   Diagnosis Date    Broken humerus     COPD (chronic obstructive pulmonary disease) ([facility] [address] )     Pneumonia      No past surgical history on file  Current Outpatient Medications   Medication Sig Dispense Refill    albuterol (PROVENTIL HFA,VENTOLIN HFA) 90 mcg/act inhaler INHALE 1-2 PUFFS EVERY 4 (FOUR) HOURS AS NEEDED FOR WHEEZING 18 Inhaler 0    amLODIPine (NORVASC) 10 mg tablet TAKE 1 TABLET (10 MG TOTAL) BY MOUTH DAILY 90 tablet 3    Anoro Ellipta 62 5-25 MCG/INH inhaler INHALE 1 PUFF BY MOUTH EVERY DAY 1 Inhaler 5    aspirin (ECOTRIN LOW STRENGTH) 81 mg EC tablet Take 1 tablet (81 mg total) by mouth daily  0    Aspirin-Acetaminophen-Caffeine (EXCEDRIN MIGRAINE PO) Take by mouth      atorvastatin (LIPITOR) 20 mg tablet TAKE 1 TABLET BY MOUTH EVERY DAY 90 tablet 3    losartan (COZAAR) 100 MG tablet TAKE 1 TABLET BY MOUTH EVERY DAY 90 tablet 3    Multiple Vitamin (MULTI-VITAMIN DAILY PO) Take 1 tablet by mouth daily      pantoprazole (PROTONIX) 40 mg tablet TAKE 1 TABLET BY MOUTH EVERY DAY 90 tablet 0    sildenafil (REVATIO) 20 mg tablet 3-5 pills 1 hr before need 30 tablet 2     No current facility-administered medications for this visit  No Known Allergies    Review of Systems   Constitutional: Positive for chills, fatigue and fever  Negative for appetite change  HENT: Positive for congestion  Negative for rhinorrhea, sinus pain and sore throat  Respiratory: Positive for shortness of breath   Negative for cough and chest tightness  Cardiovascular: Negative for chest pain  Gastrointestinal: Negative for diarrhea, nausea and vomiting  Musculoskeletal: Positive for arthralgias and myalgias  Objective: There were no vitals filed for this visit  Physical Exam  Neurological:      Mental Status: He is alert  VIRTUAL VISIT DISCLAIMER    Jan Winchester acknowledges that he has consented to an online visit or consultation  He understands that the online visit is based solely on information provided by him, and that, in the absence of a face-to-face physical evaluation by the physician, the diagnosis he receives is both limited and provisional in terms of accuracy and completeness  This is not intended to replace a full medical face-to-face evaluation by the physician  Jan Winchester understands and accepts these terms

## 2021-04-06 LAB — SARS-COV-2 RNA RESP QL NAA+PROBE: NEGATIVE

## 2021-04-07 ENCOUNTER — APPOINTMENT (OUTPATIENT)
Dept: RADIOLOGY | Facility: MEDICAL CENTER | Age: 66
End: 2021-04-07
Payer: COMMERCIAL

## 2021-04-07 ENCOUNTER — OFFICE VISIT (OUTPATIENT)
Dept: FAMILY MEDICINE CLINIC | Facility: CLINIC | Age: 66
End: 2021-04-07
Payer: COMMERCIAL

## 2021-04-07 VITALS
HEIGHT: 74 IN | HEART RATE: 103 BPM | SYSTOLIC BLOOD PRESSURE: 110 MMHG | BODY MASS INDEX: 31.13 KG/M2 | WEIGHT: 242.6 LBS | DIASTOLIC BLOOD PRESSURE: 84 MMHG | TEMPERATURE: 99.6 F | RESPIRATION RATE: 12 BRPM | OXYGEN SATURATION: 94 %

## 2021-04-07 DIAGNOSIS — R05.9 COUGH: ICD-10-CM

## 2021-04-07 DIAGNOSIS — R09.89 CHEST CONGESTION: ICD-10-CM

## 2021-04-07 DIAGNOSIS — Z20.822 LAB TEST NEGATIVE FOR COVID-19 VIRUS: Primary | ICD-10-CM

## 2021-04-07 PROCEDURE — 99213 OFFICE O/P EST LOW 20 MIN: CPT | Performed by: INTERNAL MEDICINE

## 2021-04-07 PROCEDURE — 71046 X-RAY EXAM CHEST 2 VIEWS: CPT

## 2021-04-07 RX ORDER — PREDNISONE 10 MG/1
TABLET ORAL
Qty: 42 TABLET | Refills: 0 | Status: SHIPPED | OUTPATIENT
Start: 2021-04-07 | End: 2021-04-29 | Stop reason: ALTCHOICE

## 2021-04-07 RX ORDER — AMOXICILLIN AND CLAVULANATE POTASSIUM 875; 125 MG/1; MG/1
1 TABLET, FILM COATED ORAL EVERY 12 HOURS SCHEDULED
Qty: 14 TABLET | Refills: 0 | Status: SHIPPED | OUTPATIENT
Start: 2021-04-07 | End: 2021-04-14

## 2021-04-07 NOTE — PROGRESS NOTES
Assessment/Plan:    No problem-specific Assessment & Plan notes found for this encounter  Diagnoses and all orders for this visit:    Lab test negative for COVID-19 virus    Cough  -     XR chest pa & lateral; Future  -     predniSONE 10 mg tablet; Please use 6 pills on day 1 and 2, 5 pills on day 3 and 4, 4 pills on day 5, 6 and 7, 3 pills on day 8 and 9, 2 pills on day 10  -     amoxicillin-clavulanate (Augmentin) 875-125 mg per tablet; Take 1 tablet by mouth every 12 (twelve) hours for 7 days  -     dextromethorphan-guaifenesin (MUCINEX DM)  MG per 12 hr tablet; Take 1 tablet by mouth every 12 (twelve) hours    Chest congestion          Clinically patient looks like COPD exacerbation most likely trigger was the viral infection  Negative for COVID-19 PCR  Will start him on prednisone taper and Augmentin  Will use Mucinex DM for cough  Will also do x-ray of the chest   He was instructed to call us in few days with his progress  Subjective:      Patient ID: August Irwin is a 78 y o  male  Patient came today with complaints of a chest congestion and cough that started about 10 days ago, he was tested for COVID-19 and the PCR was negative  He does not report any chills or fevers  He has history of COPD, he continues to smoke  He does not have any significant shortness of breath which is out of his baseline  He does not report any recent sick contacts  He said that it is all started after he got his COVID-19 shot  The following portions of the patient's history were reviewed and updated as appropriate: allergies, current medications, past family history, past medical history, past social history, past surgical history, and problem list     Review of Systems   Constitutional: Negative for appetite change, chills, fatigue and fever  HENT: Positive for congestion  Negative for rhinorrhea, sinus pain and sore throat  Respiratory: Positive for cough and chest tightness   Negative for shortness of breath  Cardiovascular: Negative for chest pain  Gastrointestinal: Negative for diarrhea, nausea and vomiting  Musculoskeletal: Negative for arthralgias and myalgias  Objective:      /84 (BP Location: Right arm, Patient Position: Sitting, Cuff Size: Standard)   Pulse 103   Temp 99 6 °F (37 6 °C)   Resp 12   Ht 6' 2" (1 88 m)   Wt 110 kg (242 lb 9 6 oz)   SpO2 94%   BMI 31 15 kg/m²          Physical Exam  Vitals signs reviewed  Constitutional:       General: He is not in acute distress  Appearance: He is not ill-appearing or toxic-appearing  HENT:      Head: Normocephalic  Cardiovascular:      Rate and Rhythm: Normal rate  Heart sounds: Murmur present  Pulmonary:      Effort: No respiratory distress  Breath sounds: No stridor  Wheezing and rales present  No rhonchi  Chest:      Chest wall: No tenderness  Skin:     General: Skin is warm  Neurological:      Mental Status: He is alert     Psychiatric:         Mood and Affect: Mood normal          Behavior: Behavior normal                Current Outpatient Medications:     albuterol (PROVENTIL HFA,VENTOLIN HFA) 90 mcg/act inhaler, INHALE 1-2 PUFFS EVERY 4 (FOUR) HOURS AS NEEDED FOR WHEEZING, Disp: 18 Inhaler, Rfl: 0    amLODIPine (NORVASC) 10 mg tablet, TAKE 1 TABLET (10 MG TOTAL) BY MOUTH DAILY, Disp: 90 tablet, Rfl: 3    Anoro Ellipta 62 5-25 MCG/INH inhaler, INHALE 1 PUFF BY MOUTH EVERY DAY, Disp: 1 Inhaler, Rfl: 5    aspirin (ECOTRIN LOW STRENGTH) 81 mg EC tablet, Take 1 tablet (81 mg total) by mouth daily, Disp: , Rfl: 0    Aspirin-Acetaminophen-Caffeine (EXCEDRIN MIGRAINE PO), Take by mouth, Disp: , Rfl:     atorvastatin (LIPITOR) 20 mg tablet, TAKE 1 TABLET BY MOUTH EVERY DAY, Disp: 90 tablet, Rfl: 3    losartan (COZAAR) 100 MG tablet, TAKE 1 TABLET BY MOUTH EVERY DAY, Disp: 90 tablet, Rfl: 3    Multiple Vitamin (MULTI-VITAMIN DAILY PO), Take 1 tablet by mouth daily, Disp: , Rfl:    pantoprazole (PROTONIX) 40 mg tablet, TAKE 1 TABLET BY MOUTH EVERY DAY, Disp: 90 tablet, Rfl: 0    sildenafil (REVATIO) 20 mg tablet, 3-5 pills 1 hr before need, Disp: 30 tablet, Rfl: 2    amoxicillin-clavulanate (Augmentin) 875-125 mg per tablet, Take 1 tablet by mouth every 12 (twelve) hours for 7 days, Disp: 14 tablet, Rfl: 0    dextromethorphan-guaifenesin (MUCINEX DM)  MG per 12 hr tablet, Take 1 tablet by mouth every 12 (twelve) hours, Disp: 20 tablet, Rfl: 0    predniSONE 10 mg tablet, Please use 6 pills on day 1 and 2, 5 pills on day 3 and 4, 4 pills on day 5, 6 and 7, 3 pills on day 8 and 9, 2 pills on day 10, Disp: 42 tablet, Rfl: 0

## 2021-04-07 NOTE — PATIENT INSTRUCTIONS
Please take prednisone as described on your bottle  Start to take Augmentin 1 pill twice a day  Please do x-ray of the chest today

## 2021-04-08 ENCOUNTER — TELEPHONE (OUTPATIENT)
Dept: FAMILY MEDICINE CLINIC | Facility: CLINIC | Age: 66
End: 2021-04-08

## 2021-04-08 NOTE — TELEPHONE ENCOUNTER
ERA FROM [facility] CALLED TO LET DR Chen Horan KNOW THAT THE CHEST X-RAY PATIENT HAD HAS FINDINGS

## 2021-04-12 ENCOUNTER — OFFICE VISIT (OUTPATIENT)
Dept: FAMILY MEDICINE CLINIC | Facility: CLINIC | Age: 66
End: 2021-04-12
Payer: COMMERCIAL

## 2021-04-12 VITALS
HEIGHT: 74 IN | BODY MASS INDEX: 31.32 KG/M2 | TEMPERATURE: 97.5 F | OXYGEN SATURATION: 93 % | WEIGHT: 244 LBS | DIASTOLIC BLOOD PRESSURE: 60 MMHG | HEART RATE: 93 BPM | RESPIRATION RATE: 18 BRPM | SYSTOLIC BLOOD PRESSURE: 96 MMHG

## 2021-04-12 DIAGNOSIS — J18.9 PNEUMONIA OF RIGHT MIDDLE LOBE DUE TO INFECTIOUS ORGANISM: Primary | ICD-10-CM

## 2021-04-12 PROCEDURE — 99213 OFFICE O/P EST LOW 20 MIN: CPT | Performed by: INTERNAL MEDICINE

## 2021-04-12 RX ORDER — AMOXICILLIN AND CLAVULANATE POTASSIUM 875; 125 MG/1; MG/1
1 TABLET, FILM COATED ORAL EVERY 12 HOURS SCHEDULED
Qty: 6 TABLET | Refills: 0 | Status: SHIPPED | OUTPATIENT
Start: 2021-04-12 | End: 2021-04-15

## 2021-04-12 NOTE — PROGRESS NOTES
Assessment/Plan:    No problem-specific Assessment & Plan notes found for this encounter  Diagnoses and all orders for this visit:    Pneumonia of right middle lobe due to infectious organism  -     amoxicillin-clavulanate (Augmentin) 875-125 mg per tablet; Take 1 tablet by mouth every 12 (twelve) hours for 3 days          Symptoms improved significantly  Will finish 10 days of Augmentin  Patient will follow-up with us p r n     Work excuse was given  Subjective:      Patient ID: Serina Bills is a 78 y o  male  Patient came today for follow-up on his shortness of breath that she developed recently, he was diagnosed with segmental right middle lobe pneumonia that was evident by chest x-ray  He was treated with Augmentin, today is his 6th day, he reported that his symptoms are improved significantly  He said that his shortness of breath is much better, he reports improvement of the cough  He does not report any chills or fevers  He was tested for COVID-19 and his PCR was negative  The following portions of the patient's history were reviewed and updated as appropriate: allergies, current medications, past family history, past medical history, past social history, past surgical history, and problem list     Review of Systems   Constitutional: Negative for appetite change, chills, fatigue and fever  HENT: Positive for congestion  Negative for rhinorrhea, sinus pain and sore throat  Respiratory: Positive for cough (Improved)  Negative for chest tightness and shortness of breath  Cardiovascular: Negative for chest pain  Gastrointestinal: Negative for diarrhea, nausea and vomiting  Musculoskeletal: Negative for arthralgias and myalgias           Objective:      BP 96/60 (BP Location: Left arm, Patient Position: Sitting, Cuff Size: Adult)   Pulse 93   Temp 97 5 °F (36 4 °C) (Temporal)   Resp 18   Ht 6' 2" (1 88 m)   Wt 111 kg (244 lb)   SpO2 93%   BMI 31 33 kg/m²          Physical Exam  Vitals signs reviewed  Constitutional:       General: He is not in acute distress  Appearance: He is not ill-appearing or toxic-appearing  HENT:      Head: Normocephalic  Cardiovascular:      Rate and Rhythm: Normal rate  Heart sounds: Murmur present  Pulmonary:      Effort: No respiratory distress  Breath sounds: No stridor  No wheezing, rhonchi or rales  Chest:      Chest wall: No tenderness  Skin:     General: Skin is warm  Neurological:      Mental Status: He is alert     Psychiatric:         Mood and Affect: Mood normal          Behavior: Behavior normal                Current Outpatient Medications:     albuterol (PROVENTIL HFA,VENTOLIN HFA) 90 mcg/act inhaler, INHALE 1-2 PUFFS EVERY 4 (FOUR) HOURS AS NEEDED FOR WHEEZING, Disp: 18 Inhaler, Rfl: 0    amLODIPine (NORVASC) 10 mg tablet, TAKE 1 TABLET (10 MG TOTAL) BY MOUTH DAILY, Disp: 90 tablet, Rfl: 3    amoxicillin-clavulanate (Augmentin) 875-125 mg per tablet, Take 1 tablet by mouth every 12 (twelve) hours for 7 days, Disp: 14 tablet, Rfl: 0    Anoro Ellipta 62 5-25 MCG/INH inhaler, INHALE 1 PUFF BY MOUTH EVERY DAY, Disp: 1 Inhaler, Rfl: 5    aspirin (ECOTRIN LOW STRENGTH) 81 mg EC tablet, Take 1 tablet (81 mg total) by mouth daily, Disp: , Rfl: 0    Aspirin-Acetaminophen-Caffeine (EXCEDRIN MIGRAINE PO), Take by mouth, Disp: , Rfl:     atorvastatin (LIPITOR) 20 mg tablet, TAKE 1 TABLET BY MOUTH EVERY DAY, Disp: 90 tablet, Rfl: 3    dextromethorphan-guaifenesin (MUCINEX DM)  MG per 12 hr tablet, Take 1 tablet by mouth every 12 (twelve) hours, Disp: 20 tablet, Rfl: 0    losartan (COZAAR) 100 MG tablet, TAKE 1 TABLET BY MOUTH EVERY DAY, Disp: 90 tablet, Rfl: 3    Multiple Vitamin (MULTI-VITAMIN DAILY PO), Take 1 tablet by mouth daily, Disp: , Rfl:     pantoprazole (PROTONIX) 40 mg tablet, TAKE 1 TABLET BY MOUTH EVERY DAY, Disp: 90 tablet, Rfl: 0    predniSONE 10 mg tablet, Please use 6 pills on day 1 and 2, 5 pills on day 3 and 4, 4 pills on day 5, 6 and 7, 3 pills on day 8 and 9, 2 pills on day 10, Disp: 42 tablet, Rfl: 0    sildenafil (REVATIO) 20 mg tablet, 3-5 pills 1 hr before need, Disp: 30 tablet, Rfl: 2    amoxicillin-clavulanate (Augmentin) 875-125 mg per tablet, Take 1 tablet by mouth every 12 (twelve) hours for 3 days, Disp: 6 tablet, Rfl: 0

## 2021-04-12 NOTE — LETTER
April 12, 2021     Patient: Velma Walls   YOB: 1955   Date of Visit: 4/12/2021       To Whom it May Concern:    Favian Guzman is under my professional care  He was seen in my office on 4/12/2021  He Will be excused from work until 04/18/2021  He may return back to his duties on 04/19/2021  If you have any questions or concerns, please don't hesitate to call           Sincerely,          Lionel Ramírez MD        CC: No Recipients

## 2021-04-19 ENCOUNTER — TELEPHONE (OUTPATIENT)
Dept: FAMILY MEDICINE CLINIC | Facility: CLINIC | Age: 66
End: 2021-04-19

## 2021-04-19 NOTE — TELEPHONE ENCOUNTER
Pt called in stating that he is now having diarrhea starting today  and is not sure if its the antibiotic because it ended Friday  Pt states hes in the bathroom every hour

## 2021-04-20 ENCOUNTER — TELEMEDICINE (OUTPATIENT)
Dept: FAMILY MEDICINE CLINIC | Facility: CLINIC | Age: 66
End: 2021-04-20
Payer: COMMERCIAL

## 2021-04-20 DIAGNOSIS — R19.7 DIARRHEA, UNSPECIFIED TYPE: Primary | ICD-10-CM

## 2021-04-20 PROCEDURE — 99213 OFFICE O/P EST LOW 20 MIN: CPT | Performed by: INTERNAL MEDICINE

## 2021-04-20 RX ORDER — SACCHAROMYCES BOULARDII 250 MG
250 CAPSULE ORAL 2 TIMES DAILY
Qty: 30 CAPSULE | Refills: 0 | Status: ON HOLD | OUTPATIENT
Start: 2021-04-20 | End: 2021-05-11 | Stop reason: CLARIF

## 2021-04-20 NOTE — PROGRESS NOTES
Virtual Brief Visit    Assessment/Plan:    Problem List Items Addressed This Visit     None      Visit Diagnoses     Diarrhea, unspecified type    -  Primary    Relevant Medications    saccharomyces boulardii (FLORASTOR) 250 mg capsule                Reason for visit is   Chief Complaint   Patient presents with    Virtual Brief Visit        Encounter provider Lux Valverde MD    Provider located at [address] [id number] [phone]    Recent Visits  Date Type Provider Dept   04/19/21 Telephone 1155 [facility] Primary Care   Showing recent visits within past 7 days and meeting all other requirements     Today's Visits  Date Type Provider Dept   04/20/21 Mike Oh MD [place] [id number] Primary Care   Showing today's visits and meeting all other requirements     Future Appointments  No visits were found meeting these conditions  Showing future appointments within next 150 days and meeting all other requirements        After connecting through telephone, the patient was identified by name and date of birth  Amy Bergman was informed that this is a telemedicine visit and that the visit is being conducted through telephone  My office door was closed  No one else was in the room  He acknowledged consent and understanding of privacy and security of the platform  The patient has agreed to participate and understands he can discontinue the visit at any time  Patient is aware this is a billable service  Subjective    Amy Bergman is a 78 y o  male  Patient called today with complaints of diarrhea that started few days after he finished antibiotics  He does not report any nausea or vomiting  No abdominal pain  He said that he was going every hour, he took Imodium today and looks like is getting better  Most likely these diarrhea is due to recent antibiotic use    He finished course already  He does not report that the stool is greenish, it is not foul smelling so the suspicion for C diff is very low  Will manage it for now with Imodium and probiotics  Patient was instructed to call us in few days with the update  Past Medical History:   Diagnosis Date    Broken humerus     COPD (chronic obstructive pulmonary disease) ([facility] [address] )     Pneumonia        No past surgical history on file  Current Outpatient Medications   Medication Sig Dispense Refill    albuterol (PROVENTIL HFA,VENTOLIN HFA) 90 mcg/act inhaler INHALE 1-2 PUFFS EVERY 4 (FOUR) HOURS AS NEEDED FOR WHEEZING 18 Inhaler 0    amLODIPine (NORVASC) 10 mg tablet TAKE 1 TABLET (10 MG TOTAL) BY MOUTH DAILY 90 tablet 3    Anoro Ellipta 62 5-25 MCG/INH inhaler INHALE 1 PUFF BY MOUTH EVERY DAY 1 Inhaler 5    aspirin (ECOTRIN LOW STRENGTH) 81 mg EC tablet Take 1 tablet (81 mg total) by mouth daily  0    Aspirin-Acetaminophen-Caffeine (EXCEDRIN MIGRAINE PO) Take by mouth      atorvastatin (LIPITOR) 20 mg tablet TAKE 1 TABLET BY MOUTH EVERY DAY 90 tablet 3    dextromethorphan-guaifenesin (MUCINEX DM)  MG per 12 hr tablet Take 1 tablet by mouth every 12 (twelve) hours 20 tablet 0    losartan (COZAAR) 100 MG tablet TAKE 1 TABLET BY MOUTH EVERY DAY 90 tablet 3    Multiple Vitamin (MULTI-VITAMIN DAILY PO) Take 1 tablet by mouth daily      pantoprazole (PROTONIX) 40 mg tablet TAKE 1 TABLET BY MOUTH EVERY DAY 90 tablet 0    predniSONE 10 mg tablet Please use 6 pills on day 1 and 2, 5 pills on day 3 and 4, 4 pills on day 5, 6 and 7, 3 pills on day 8 and 9, 2 pills on day 10 42 tablet 0    saccharomyces boulardii (FLORASTOR) 250 mg capsule Take 1 capsule (250 mg total) by mouth 2 (two) times a day 30 capsule 0    sildenafil (REVATIO) 20 mg tablet 3-5 pills 1 hr before need 30 tablet 2     No current facility-administered medications for this visit           No Known Allergies    Review of Systems   Constitutional: Negative for chills and fever  Gastrointestinal: Positive for diarrhea  Negative for abdominal pain, nausea and vomiting  Psychiatric/Behavioral: Negative for confusion  There were no vitals filed for this visit  I spent 10 minutes directly with the patient during this visit    VIRTUAL VISIT Lissett Ames [address] acknowledges that he has consented to an online visit or consultation  He understands that the online visit is based solely on information provided by him, and that, in the absence of a face-to-face physical evaluation by the physician, the diagnosis he receives is both limited and provisional in terms of accuracy and completeness  This is not intended to replace a full medical face-to-face evaluation by the physician  Osmin Merritt understands and accepts these terms

## 2021-04-20 NOTE — LETTER
April 20, 2021     Patient: Akshat Barahona   YOB: 1955   Date of Visit: 4/20/2021       To Whom it May Concern:    Dylon Giacomo is under my professional care  He was seen in my office on 4/20/2021  He will be excused from work from 04/19/2021 until 04/21/2021  Good to go back to work on 04/22/2021  If you have any questions or concerns, please don't hesitate to call           Sincerely,          Layne Hester MD        CC: No Recipients

## 2021-04-21 ENCOUNTER — PATIENT MESSAGE (OUTPATIENT)
Dept: FAMILY MEDICINE CLINIC | Facility: CLINIC | Age: 66
End: 2021-04-21

## 2021-04-21 NOTE — TELEPHONE ENCOUNTER
Spoke with patient who confirms that it is OK to complete paperwork as noted in his MyChart message  Will mail an official CLARISSE for his chart

## 2021-04-21 NOTE — TELEPHONE ENCOUNTER
Patient notified that rx is on hold at the pharmacy as his insurance does not cover it  Patient can pay cash price, but insurance considers it an OTC  Advised he could call around to see who might have it cheaper  He was ok with that

## 2021-04-21 NOTE — TELEPHONE ENCOUNTER
From: Veda Nieves  To: Adela Luciano MD  Sent: 4/21/2021 1:18 PM EDT  Subject: Visit Follow-Up Question    Some paperwork for short term disability was sent to office from [employer] [facility]  I give my permission for them to see pertinant medical records

## 2021-04-28 ENCOUNTER — APPOINTMENT (OUTPATIENT)
Dept: LAB | Facility: HOSPITAL | Age: 66
End: 2021-04-28
Payer: COMMERCIAL

## 2021-04-28 ENCOUNTER — OFFICE VISIT (OUTPATIENT)
Dept: FAMILY MEDICINE CLINIC | Facility: CLINIC | Age: 66
End: 2021-04-28
Payer: COMMERCIAL

## 2021-04-28 ENCOUNTER — HOSPITAL ENCOUNTER (OUTPATIENT)
Dept: CT IMAGING | Facility: HOSPITAL | Age: 66
Discharge: HOME | End: 2021-04-28
Payer: COMMERCIAL

## 2021-04-28 ENCOUNTER — APPOINTMENT (OUTPATIENT)
Dept: LAB | Facility: MEDICAL CENTER | Age: 66
End: 2021-04-28
Payer: COMMERCIAL

## 2021-04-28 VITALS
DIASTOLIC BLOOD PRESSURE: 90 MMHG | OXYGEN SATURATION: 95 % | HEART RATE: 91 BPM | RESPIRATION RATE: 12 BRPM | SYSTOLIC BLOOD PRESSURE: 118 MMHG | TEMPERATURE: 99.1 F | BODY MASS INDEX: 31.32 KG/M2 | HEIGHT: 74 IN | WEIGHT: 244 LBS

## 2021-04-28 DIAGNOSIS — J18.9 PNEUMONIA OF RIGHT MIDDLE LOBE DUE TO INFECTIOUS ORGANISM: Primary | ICD-10-CM

## 2021-04-28 DIAGNOSIS — J18.9 PNEUMONIA OF RIGHT MIDDLE LOBE DUE TO INFECTIOUS ORGANISM: ICD-10-CM

## 2021-04-28 LAB
BASOPHILS # BLD AUTO: 0 THOUSANDS/ΜL (ref 0–0.1)
BASOPHILS NFR BLD AUTO: 1 % (ref 0–1)
EOSINOPHIL # BLD AUTO: 0.2 THOUSAND/ΜL (ref 0–0.4)
EOSINOPHIL NFR BLD AUTO: 2 % (ref 0–6)
ERYTHROCYTE [DISTWIDTH] IN BLOOD BY AUTOMATED COUNT: 14.3 %
HCT VFR BLD AUTO: 40.9 % (ref 41–53)
HGB BLD-MCNC: 13.1 G/DL (ref 13.5–17.5)
LYMPHOCYTES # BLD AUTO: 1.5 THOUSANDS/ΜL (ref 0.5–4)
LYMPHOCYTES NFR BLD AUTO: 18 % (ref 25–45)
MCH RBC QN AUTO: 30.5 PG (ref 26–34)
MCHC RBC AUTO-ENTMCNC: 32.1 G/DL (ref 31–36)
MCV RBC AUTO: 95 FL (ref 80–100)
MONOCYTES # BLD AUTO: 0.8 THOUSAND/ΜL (ref 0.2–0.9)
MONOCYTES NFR BLD AUTO: 10 % (ref 1–10)
NEUTROPHILS # BLD AUTO: 5.8 THOUSANDS/ΜL (ref 1.8–7.8)
NEUTS SEG NFR BLD AUTO: 70 % (ref 45–65)
PLATELET # BLD AUTO: 267 THOUSANDS/UL (ref 150–450)
PMV BLD AUTO: 7.5 FL (ref 8.9–12.7)
PROCALCITONIN SERPL-MCNC: <0.05 NG/ML
RBC # BLD AUTO: 4.3 MILLION/UL (ref 4.5–5.9)
WBC # BLD AUTO: 8.3 THOUSAND/UL (ref 4.5–11)

## 2021-04-28 PROCEDURE — 84145 PROCALCITONIN (PCT): CPT

## 2021-04-28 PROCEDURE — 85025 COMPLETE CBC W/AUTO DIFF WBC: CPT

## 2021-04-28 PROCEDURE — 99213 OFFICE O/P EST LOW 20 MIN: CPT | Performed by: INTERNAL MEDICINE

## 2021-04-28 PROCEDURE — 3008F BODY MASS INDEX DOCD: CPT | Performed by: INTERNAL MEDICINE

## 2021-04-28 PROCEDURE — 3074F SYST BP LT 130 MM HG: CPT | Performed by: INTERNAL MEDICINE

## 2021-04-28 PROCEDURE — 36415 COLL VENOUS BLD VENIPUNCTURE: CPT

## 2021-04-28 PROCEDURE — 71250 CT THORAX DX C-: CPT

## 2021-04-28 PROCEDURE — 3080F DIAST BP >= 90 MM HG: CPT | Performed by: INTERNAL MEDICINE

## 2021-04-28 PROCEDURE — G1004 CDSM NDSC: HCPCS

## 2021-04-28 NOTE — LETTER
April 28, 2021     Patient: Veda Nieves   YOB: 1955   Date of Visit: 4/28/2021       To Whom it May Concern:    Isabelle Neville is under my professional care  He was seen in my office on 4/28/2021  He may return to work on 5/3/2021  If you have any questions or concerns, please don't hesitate to call           Sincerely,          Adela Luciano MD

## 2021-04-28 NOTE — PROGRESS NOTES
Assessment/Plan:    No problem-specific Assessment & Plan notes found for this encounter  Diagnoses and all orders for this visit:    Pneumonia of right middle lobe due to infectious organism  -     CBC and differential; Future  -     Procalcitonin; Future  -     CT chest wo contrast; Future        Patient with recent history of right middle lobe opacity likely due to pneumonia  Treated with 10 days of Augmentin having persistent cough and chills  Will do CBC and procalcitonin  Will send for CT scan of the chest without the contrast   He was recently tested for COVID-19 and test was negative  Will hold off on continuing antibiotics right now  Subjective:      Patient ID: Aliya Willard is a 78 y o  male  Patient came today for follow-up on his right middle lobe pneumonia that was found recently on the x-ray  He finished course of augmenting for 10 days, he said that he started to have an improvement and then his cough recurred again he said that right now he is producing green sputum  He reports that he feels some chills but he did not have a chance to check his temperature at home  He was tested for COVID-19 and his test was negative  He denies any significant shortness of breath  The following portions of the patient's history were reviewed and updated as appropriate: allergies, current medications, past family history, past medical history, past social history, past surgical history, and problem list     Review of Systems   Constitutional: Positive for chills  Negative for fever  Respiratory: Positive for cough and shortness of breath  Negative for wheezing  Cardiovascular: Negative for chest pain  Gastrointestinal: Negative for abdominal pain, diarrhea, nausea and vomiting  Psychiatric/Behavioral: Negative for confusion           Objective:      /90 (BP Location: Left arm, Patient Position: Sitting, Cuff Size: Standard)   Pulse 91   Temp 99 1 °F (37 3 °C)   Resp 12   Ht 6' 2" (1 88 m)   Wt 111 kg (244 lb)   SpO2 95%   BMI 31 33 kg/m²          Physical Exam  Vitals signs reviewed  Constitutional:       General: He is not in acute distress  Appearance: He is not toxic-appearing  Cardiovascular:      Pulses: Normal pulses  Heart sounds: No murmur  Pulmonary:      Effort: Pulmonary effort is normal  No respiratory distress  Breath sounds: No wheezing or rales  Abdominal:      General: Abdomen is flat  Skin:     General: Skin is warm  Neurological:      General: No focal deficit present  Mental Status: He is alert     Psychiatric:         Mood and Affect: Mood normal                Current Outpatient Medications:     albuterol (PROVENTIL HFA,VENTOLIN HFA) 90 mcg/act inhaler, INHALE 1-2 PUFFS EVERY 4 (FOUR) HOURS AS NEEDED FOR WHEEZING, Disp: 18 Inhaler, Rfl: 0    amLODIPine (NORVASC) 10 mg tablet, TAKE 1 TABLET (10 MG TOTAL) BY MOUTH DAILY, Disp: 90 tablet, Rfl: 3    Anoro Ellipta 62 5-25 MCG/INH inhaler, INHALE 1 PUFF BY MOUTH EVERY DAY, Disp: 1 Inhaler, Rfl: 5    aspirin (ECOTRIN LOW STRENGTH) 81 mg EC tablet, Take 1 tablet (81 mg total) by mouth daily, Disp: , Rfl: 0    Aspirin-Acetaminophen-Caffeine (EXCEDRIN MIGRAINE PO), Take by mouth, Disp: , Rfl:     atorvastatin (LIPITOR) 20 mg tablet, TAKE 1 TABLET BY MOUTH EVERY DAY, Disp: 90 tablet, Rfl: 3    dextromethorphan-guaifenesin (MUCINEX DM)  MG per 12 hr tablet, Take 1 tablet by mouth every 12 (twelve) hours, Disp: 20 tablet, Rfl: 0    losartan (COZAAR) 100 MG tablet, TAKE 1 TABLET BY MOUTH EVERY DAY, Disp: 90 tablet, Rfl: 3    Multiple Vitamin (MULTI-VITAMIN DAILY PO), Take 1 tablet by mouth daily, Disp: , Rfl:     pantoprazole (PROTONIX) 40 mg tablet, TAKE 1 TABLET BY MOUTH EVERY DAY, Disp: 90 tablet, Rfl: 0    predniSONE 10 mg tablet, Please use 6 pills on day 1 and 2, 5 pills on day 3 and 4, 4 pills on day 5, 6 and 7, 3 pills on day 8 and 9, 2 pills on day 10, Disp: 42 tablet, Rfl: 0    saccharomyces boulardii (FLORASTOR) 250 mg capsule, Take 1 capsule (250 mg total) by mouth 2 (two) times a day, Disp: 30 capsule, Rfl: 0    sildenafil (REVATIO) 20 mg tablet, 3-5 pills 1 hr before need, Disp: 30 tablet, Rfl: 2

## 2021-04-29 ENCOUNTER — IMMUNIZATIONS (OUTPATIENT)
Dept: FAMILY MEDICINE CLINIC | Facility: HOSPITAL | Age: 66
End: 2021-04-29

## 2021-04-29 DIAGNOSIS — Z23 ENCOUNTER FOR IMMUNIZATION: Primary | ICD-10-CM

## 2021-04-29 DIAGNOSIS — R05.9 COUGH: Primary | ICD-10-CM

## 2021-04-29 PROCEDURE — 91301 SARS-COV-2 / COVID-19 MRNA VACCINE (MODERNA) 100 MCG: CPT

## 2021-04-29 PROCEDURE — 0012A SARS-COV-2 / COVID-19 MRNA VACCINE (MODERNA) 100 MCG: CPT

## 2021-04-29 RX ORDER — LEVOFLOXACIN 500 MG/1
500 TABLET, FILM COATED ORAL EVERY 24 HOURS
Qty: 7 TABLET | Refills: 0 | Status: SHIPPED | OUTPATIENT
Start: 2021-04-29 | End: 2021-05-06

## 2021-04-29 NOTE — PROGRESS NOTES
CT scan revealed interstitial changes, can be a typical pneumonia,  procalcitonin less than 0 5  Patient was treated with Augmentin but I think it is reasonable to give 7 days of levofloxacin to cover for atypical etiology  Side effects discussed

## 2021-04-30 ENCOUNTER — TELEMEDICINE (OUTPATIENT)
Dept: FAMILY MEDICINE CLINIC | Facility: CLINIC | Age: 66
End: 2021-04-30
Payer: COMMERCIAL

## 2021-04-30 DIAGNOSIS — J18.9 PNEUMONIA OF RIGHT MIDDLE LOBE DUE TO INFECTIOUS ORGANISM: Primary | ICD-10-CM

## 2021-04-30 PROCEDURE — 4004F PT TOBACCO SCREEN RCVD TLK: CPT | Performed by: INTERNAL MEDICINE

## 2021-04-30 PROCEDURE — 1160F RVW MEDS BY RX/DR IN RCRD: CPT | Performed by: INTERNAL MEDICINE

## 2021-04-30 PROCEDURE — 99213 OFFICE O/P EST LOW 20 MIN: CPT | Performed by: INTERNAL MEDICINE

## 2021-04-30 NOTE — PROGRESS NOTES
Virtual Brief Visit    Assessment/Plan:    Problem List Items Addressed This Visit     None      Visit Diagnoses     Pneumonia of right middle lobe due to infectious organism    -  Primary        Finish course of antibiotics  May return back to work on 05/03/2021  Reason for visit is   Chief Complaint   Patient presents with    Virtual Brief Visit        Encounter provider Vanesa Roman MD    Provider located at [place] [address]  [address] [id number] [phone]    Recent Visits  Date Type Provider Dept   04/28/21 Office Visit MD Ver[facility]   Showing recent visits within past 7 days and meeting all other requirements     Today's Visits  Date Type Provider Dept   04/30/21 MD Veronica Barton [facility]   Showing today's visits and meeting all other requirements     Future Appointments  No visits were found meeting these conditions  Showing future appointments within next 150 days and meeting all other requirements        After connecting through telephone, the patient was identified by name and date of birth  Coretta Moody was informed that this is a telemedicine visit and that the visit is being conducted through telephone  My office door was closed  No one else was in the room  He acknowledged consent and understanding of privacy and security of the platform  The patient has agreed to participate and understands he can discontinue the visit at any time  Patient is aware this is a billable service  Subjective    Coretta Moody is a 78 y o  male  That was a fall call to follow-up on patient well-being after he was diagnosed with pneumonia, he is currently was started on levofloxacin because CT scan of the chest revealed finding consistent with a typical pneumonia, his procalcitonin was slightly on the higher side  Will finish total 7 days    Also worked on his forms for his employer  Past Medical History:   Diagnosis Date    Broken humerus     COPD (chronic obstructive pulmonary disease) ([facility] [address] )     Pneumonia        No past surgical history on file  Current Outpatient Medications   Medication Sig Dispense Refill    albuterol (PROVENTIL HFA,VENTOLIN HFA) 90 mcg/act inhaler INHALE 1-2 PUFFS EVERY 4 (FOUR) HOURS AS NEEDED FOR WHEEZING 18 Inhaler 0    amLODIPine (NORVASC) 10 mg tablet TAKE 1 TABLET (10 MG TOTAL) BY MOUTH DAILY 90 tablet 3    Anoro Ellipta 62 5-25 MCG/INH inhaler INHALE 1 PUFF BY MOUTH EVERY DAY 1 Inhaler 5    aspirin (ECOTRIN LOW STRENGTH) 81 mg EC tablet Take 1 tablet (81 mg total) by mouth daily  0    Aspirin-Acetaminophen-Caffeine (EXCEDRIN MIGRAINE PO) Take by mouth      atorvastatin (LIPITOR) 20 mg tablet TAKE 1 TABLET BY MOUTH EVERY DAY 90 tablet 3    dextromethorphan-guaifenesin (MUCINEX DM)  MG per 12 hr tablet Take 1 tablet by mouth every 12 (twelve) hours 20 tablet 0    levofloxacin (LEVAQUIN) 500 mg tablet Take 1 tablet (500 mg total) by mouth every 24 hours for 7 days 7 tablet 0    losartan (COZAAR) 100 MG tablet TAKE 1 TABLET BY MOUTH EVERY DAY 90 tablet 3    Multiple Vitamin (MULTI-VITAMIN DAILY PO) Take 1 tablet by mouth daily      pantoprazole (PROTONIX) 40 mg tablet TAKE 1 TABLET BY MOUTH EVERY DAY 90 tablet 0    saccharomyces boulardii (FLORASTOR) 250 mg capsule Take 1 capsule (250 mg total) by mouth 2 (two) times a day 30 capsule 0    sildenafil (REVATIO) 20 mg tablet 3-5 pills 1 hr before need 30 tablet 2     No current facility-administered medications for this visit  No Known Allergies    Review of Systems   Constitutional: Negative for chills and fever  Respiratory: Positive for cough  Negative for shortness of breath and wheezing  Cardiovascular: Negative for chest pain  Gastrointestinal: Negative for abdominal pain, diarrhea, nausea and vomiting     Psychiatric/Behavioral: Negative for confusion  There were no vitals filed for this visit  I spent 10 minutes directly with the patient during this visit    VIRTUAL VISIT Lissett Ames 1824 acknowledges that he has consented to an online visit or consultation  He understands that the online visit is based solely on information provided by him, and that, in the absence of a face-to-face physical evaluation by the physician, the diagnosis he receives is both limited and provisional in terms of accuracy and completeness  This is not intended to replace a full medical face-to-face evaluation by the physician  Jag Phipps understands and accepts these terms

## 2021-05-03 ENCOUNTER — PATIENT MESSAGE (OUTPATIENT)
Dept: FAMILY MEDICINE CLINIC | Facility: CLINIC | Age: 66
End: 2021-05-03

## 2021-05-10 ENCOUNTER — LAB (OUTPATIENT)
Dept: LAB | Facility: MEDICAL CENTER | Age: 66
End: 2021-05-10
Payer: COMMERCIAL

## 2021-05-10 ENCOUNTER — OFFICE VISIT (OUTPATIENT)
Dept: FAMILY MEDICINE CLINIC | Facility: CLINIC | Age: 66
End: 2021-05-10
Payer: COMMERCIAL

## 2021-05-10 VITALS
TEMPERATURE: 98.6 F | DIASTOLIC BLOOD PRESSURE: 68 MMHG | WEIGHT: 246.2 LBS | OXYGEN SATURATION: 93 % | RESPIRATION RATE: 18 BRPM | HEIGHT: 74 IN | SYSTOLIC BLOOD PRESSURE: 110 MMHG | HEART RATE: 92 BPM | BODY MASS INDEX: 31.6 KG/M2

## 2021-05-10 DIAGNOSIS — R06.02 SOB (SHORTNESS OF BREATH): ICD-10-CM

## 2021-05-10 DIAGNOSIS — R06.02 SOB (SHORTNESS OF BREATH): Primary | ICD-10-CM

## 2021-05-10 LAB — NT-PROBNP SERPL-MCNC: 3228 PG/ML

## 2021-05-10 PROCEDURE — 83880 ASSAY OF NATRIURETIC PEPTIDE: CPT

## 2021-05-10 PROCEDURE — 99213 OFFICE O/P EST LOW 20 MIN: CPT | Performed by: INTERNAL MEDICINE

## 2021-05-10 PROCEDURE — 36415 COLL VENOUS BLD VENIPUNCTURE: CPT

## 2021-05-10 RX ORDER — PREDNISONE 20 MG/1
TABLET ORAL
Qty: 16 TABLET | Refills: 0 | Status: SHIPPED | OUTPATIENT
Start: 2021-05-10 | End: 2021-05-26 | Stop reason: HOSPADM

## 2021-05-10 NOTE — PROGRESS NOTES
Assessment/Plan:    No problem-specific Assessment & Plan notes found for this encounter  Diagnoses and all orders for this visit:    SOB (shortness of breath)  -     Echo complete with contrast if indicated; Future  -     predniSONE 20 mg tablet; Please take 3 pills on day 1, 2, 3,  Then 2 pills on day 4, 5 and 6, 1 pill on day 7  -     NT-BNP PRO; Future          Is hard to tell for me right now in terms of the etiology of his shortness of breath  Recently he had URI, he was treated with levofloxacin with some improvement of his symptoms, CT scan of the chest revealed interstitial changes that might be due to pulmonary edema versus atypical pneumonia  On physical exam he has bilateral rales  He does not look volume overloaded  He has history of COPD, severe aortic stenosis  Cannot exclude that etiology can be cardiac  I will put order for stat echo of the heart  Will run proBNP today  Will start prednisone today as well  Will hold off on diuretics as for now will consider initiation of diuretics if proBNP will be elevated  Subjective:      Patient ID: Estrella North is a 78 y o  male  Patient came today again with worsening of shortness of breath, he said that he was doing okay for the few days but after he went to work his shortness of breath returned, he was checking his saturation at home and once it showed 90  He has history of COPD, during his exacerbated shin this time he was treated with prednisone with some relief of his symptoms  He also was treated with levofloxacin because of slightly elevated procalcitonin  CT scan of the chest was done that showed possible pulmonary edema  He has history of severe aortic stenosis  Echo of the heart was ordered for him in the few months  He does not report any substernal chest pain or palpitations        The following portions of the patient's history were reviewed and updated as appropriate: allergies, current medications, past family history, past medical history, past social history, past surgical history, and problem list     Review of Systems   Constitutional: Negative for chills and fever  Respiratory: Positive for cough (Improved) and shortness of breath  Negative for wheezing  Cardiovascular: Negative for chest pain  Gastrointestinal: Negative for abdominal pain, diarrhea, nausea and vomiting  Psychiatric/Behavioral: Negative for confusion  Objective:      /68 (BP Location: Left arm, Patient Position: Sitting, Cuff Size: Large)   Pulse 92   Temp 98 6 °F (37 °C) (Tympanic)   Resp 18   Ht 6' 2" (1 88 m)   Wt 112 kg (246 lb 3 2 oz)   SpO2 93%   BMI 31 61 kg/m²          Physical Exam  Vitals signs reviewed  Constitutional:       General: He is not in acute distress  Appearance: He is not ill-appearing or toxic-appearing  HENT:      Head: Normocephalic  Cardiovascular:      Rate and Rhythm: Normal rate  Heart sounds: Murmur present  Pulmonary:      Effort: No respiratory distress  Breath sounds: No stridor  Rhonchi and rales present  No wheezing  Chest:      Chest wall: No tenderness  Skin:     General: Skin is warm  Neurological:      Mental Status: He is alert     Psychiatric:         Mood and Affect: Mood normal          Behavior: Behavior normal                Current Outpatient Medications:     albuterol (PROVENTIL HFA,VENTOLIN HFA) 90 mcg/act inhaler, INHALE 1-2 PUFFS EVERY 4 (FOUR) HOURS AS NEEDED FOR WHEEZING, Disp: 18 Inhaler, Rfl: 0    amLODIPine (NORVASC) 10 mg tablet, TAKE 1 TABLET (10 MG TOTAL) BY MOUTH DAILY, Disp: 90 tablet, Rfl: 3    Anoro Ellipta 62 5-25 MCG/INH inhaler, INHALE 1 PUFF BY MOUTH EVERY DAY, Disp: 1 Inhaler, Rfl: 5    aspirin (ECOTRIN LOW STRENGTH) 81 mg EC tablet, Take 1 tablet (81 mg total) by mouth daily, Disp: , Rfl: 0    Aspirin-Acetaminophen-Caffeine (EXCEDRIN MIGRAINE PO), Take by mouth, Disp: , Rfl:     atorvastatin (LIPITOR) 20 mg tablet, TAKE 1 TABLET BY MOUTH EVERY DAY, Disp: 90 tablet, Rfl: 3    dextromethorphan-guaifenesin (MUCINEX DM)  MG per 12 hr tablet, Take 1 tablet by mouth every 12 (twelve) hours, Disp: 20 tablet, Rfl: 0    losartan (COZAAR) 100 MG tablet, TAKE 1 TABLET BY MOUTH EVERY DAY, Disp: 90 tablet, Rfl: 3    Multiple Vitamin (MULTI-VITAMIN DAILY PO), Take 1 tablet by mouth daily, Disp: , Rfl:     pantoprazole (PROTONIX) 40 mg tablet, TAKE 1 TABLET BY MOUTH EVERY DAY, Disp: 90 tablet, Rfl: 0    saccharomyces boulardii (FLORASTOR) 250 mg capsule, Take 1 capsule (250 mg total) by mouth 2 (two) times a day, Disp: 30 capsule, Rfl: 0    sildenafil (REVATIO) 20 mg tablet, 3-5 pills 1 hr before need, Disp: 30 tablet, Rfl: 2    predniSONE 20 mg tablet, Please take 3 pills on day 1, 2, 3,  Then 2 pills on day 4, 5 and 6, 1 pill on day 7, Disp: 16 tablet, Rfl: 0

## 2021-05-11 ENCOUNTER — OFFICE VISIT (OUTPATIENT)
Dept: FAMILY MEDICINE CLINIC | Facility: CLINIC | Age: 66
End: 2021-05-11
Payer: COMMERCIAL

## 2021-05-11 ENCOUNTER — APPOINTMENT (EMERGENCY)
Dept: RADIOLOGY | Facility: HOSPITAL | Age: 66
End: 2021-05-11
Payer: COMMERCIAL

## 2021-05-11 ENCOUNTER — HOSPITAL ENCOUNTER (OUTPATIENT)
Facility: HOSPITAL | Age: 66
Setting detail: OBSERVATION
End: 2021-05-12
Attending: EMERGENCY MEDICINE | Admitting: INTERNAL MEDICINE
Payer: COMMERCIAL

## 2021-05-11 VITALS
TEMPERATURE: 97.6 F | SYSTOLIC BLOOD PRESSURE: 102 MMHG | DIASTOLIC BLOOD PRESSURE: 70 MMHG | OXYGEN SATURATION: 92 % | HEART RATE: 108 BPM | HEIGHT: 74 IN | WEIGHT: 250 LBS | BODY MASS INDEX: 32.08 KG/M2 | RESPIRATION RATE: 20 BRPM

## 2021-05-11 DIAGNOSIS — R06.02 SHORTNESS OF BREATH: ICD-10-CM

## 2021-05-11 DIAGNOSIS — R77.8 ELEVATED TROPONIN: ICD-10-CM

## 2021-05-11 DIAGNOSIS — R06.00 DYSPNEA: ICD-10-CM

## 2021-05-11 DIAGNOSIS — R07.9 CHEST PAIN, UNSPECIFIED TYPE: Primary | ICD-10-CM

## 2021-05-11 DIAGNOSIS — R79.89 ELEVATED BRAIN NATRIURETIC PEPTIDE (BNP) LEVEL: ICD-10-CM

## 2021-05-11 DIAGNOSIS — R07.9 CHEST PAIN: Primary | ICD-10-CM

## 2021-05-11 DIAGNOSIS — I35.0 AORTIC STENOSIS: ICD-10-CM

## 2021-05-11 PROBLEM — K21.9 GERD (GASTROESOPHAGEAL REFLUX DISEASE): Status: ACTIVE | Noted: 2021-05-11

## 2021-05-11 LAB
ALBUMIN SERPL BCP-MCNC: 3.9 G/DL (ref 3.5–5)
ALP SERPL-CCNC: 73 U/L (ref 46–116)
ALT SERPL W P-5'-P-CCNC: 23 U/L (ref 12–78)
ANION GAP SERPL CALCULATED.3IONS-SCNC: 7 MMOL/L (ref 4–13)
ANISOCYTOSIS BLD QL SMEAR: PRESENT
APTT PPP: 28 SECONDS (ref 23–37)
AST SERPL W P-5'-P-CCNC: 14 U/L (ref 5–45)
BASOPHILS # BLD MANUAL: 0 THOUSAND/UL (ref 0–0.1)
BASOPHILS NFR MAR MANUAL: 0 % (ref 0–1)
BILIRUB SERPL-MCNC: 0.78 MG/DL (ref 0.2–1)
BUN SERPL-MCNC: 14 MG/DL (ref 5–25)
CALCIUM SERPL-MCNC: 9.4 MG/DL (ref 8.3–10.1)
CHLORIDE SERPL-SCNC: 106 MMOL/L (ref 100–108)
CO2 SERPL-SCNC: 27 MMOL/L (ref 21–32)
CREAT SERPL-MCNC: 0.84 MG/DL (ref 0.6–1.3)
EOSINOPHIL # BLD MANUAL: 0 THOUSAND/UL (ref 0–0.4)
EOSINOPHIL NFR BLD MANUAL: 0 % (ref 0–6)
ERYTHROCYTE [DISTWIDTH] IN BLOOD BY AUTOMATED COUNT: 13.3 % (ref 11.6–15.1)
GFR SERPL CREATININE-BSD FRML MDRD: 91 ML/MIN/1.73SQ M
GLUCOSE SERPL-MCNC: 131 MG/DL (ref 65–140)
HCT VFR BLD AUTO: 41.5 % (ref 36.5–49.3)
HGB BLD-MCNC: 13.8 G/DL (ref 12–17)
INR PPP: 1.05 (ref 0.84–1.19)
LYMPHOCYTES # BLD AUTO: 0.51 THOUSAND/UL (ref 0.6–4.47)
LYMPHOCYTES # BLD AUTO: 4 % (ref 14–44)
MCH RBC QN AUTO: 31.5 PG (ref 26.8–34.3)
MCHC RBC AUTO-ENTMCNC: 33.3 G/DL (ref 31.4–37.4)
MCV RBC AUTO: 95 FL (ref 82–98)
MONOCYTES # BLD AUTO: 0.51 THOUSAND/UL (ref 0–1.22)
MONOCYTES NFR BLD: 4 % (ref 4–12)
NEUTROPHILS # BLD MANUAL: 11.74 THOUSAND/UL (ref 1.85–7.62)
NEUTS SEG NFR BLD AUTO: 92 % (ref 43–75)
NRBC BLD AUTO-RTO: 0 /100 WBCS
NT-PROBNP SERPL-MCNC: 4493 PG/ML
PLATELET # BLD AUTO: 252 THOUSANDS/UL (ref 149–390)
PLATELET BLD QL SMEAR: ADEQUATE
PMV BLD AUTO: 9.1 FL (ref 8.9–12.7)
POTASSIUM SERPL-SCNC: 4.3 MMOL/L (ref 3.5–5.3)
PROT SERPL-MCNC: 7.1 G/DL (ref 6.4–8.2)
PROTHROMBIN TIME: 13.5 SECONDS (ref 11.6–14.5)
RBC # BLD AUTO: 4.38 MILLION/UL (ref 3.88–5.62)
SODIUM SERPL-SCNC: 140 MMOL/L (ref 136–145)
TOTAL CELLS COUNTED SPEC: 100
TROPONIN I SERPL-MCNC: 0.06 NG/ML
TROPONIN I SERPL-MCNC: 0.16 NG/ML
WBC # BLD AUTO: 12.76 THOUSAND/UL (ref 4.31–10.16)

## 2021-05-11 PROCEDURE — 84484 ASSAY OF TROPONIN QUANT: CPT | Performed by: EMERGENCY MEDICINE

## 2021-05-11 PROCEDURE — 93005 ELECTROCARDIOGRAM TRACING: CPT

## 2021-05-11 PROCEDURE — 85610 PROTHROMBIN TIME: CPT | Performed by: EMERGENCY MEDICINE

## 2021-05-11 PROCEDURE — 99285 EMERGENCY DEPT VISIT HI MDM: CPT

## 2021-05-11 PROCEDURE — 99285 EMERGENCY DEPT VISIT HI MDM: CPT | Performed by: EMERGENCY MEDICINE

## 2021-05-11 PROCEDURE — 84484 ASSAY OF TROPONIN QUANT: CPT | Performed by: INTERNAL MEDICINE

## 2021-05-11 PROCEDURE — 85007 BL SMEAR W/DIFF WBC COUNT: CPT | Performed by: EMERGENCY MEDICINE

## 2021-05-11 PROCEDURE — 99220 PR INITIAL OBSERVATION CARE/DAY 70 MINUTES: CPT | Performed by: INTERNAL MEDICINE

## 2021-05-11 PROCEDURE — 83880 ASSAY OF NATRIURETIC PEPTIDE: CPT | Performed by: EMERGENCY MEDICINE

## 2021-05-11 PROCEDURE — 85730 THROMBOPLASTIN TIME PARTIAL: CPT | Performed by: EMERGENCY MEDICINE

## 2021-05-11 PROCEDURE — 36415 COLL VENOUS BLD VENIPUNCTURE: CPT | Performed by: EMERGENCY MEDICINE

## 2021-05-11 PROCEDURE — 80053 COMPREHEN METABOLIC PANEL: CPT | Performed by: EMERGENCY MEDICINE

## 2021-05-11 PROCEDURE — 71046 X-RAY EXAM CHEST 2 VIEWS: CPT

## 2021-05-11 PROCEDURE — 85027 COMPLETE CBC AUTOMATED: CPT | Performed by: EMERGENCY MEDICINE

## 2021-05-11 PROCEDURE — 99215 OFFICE O/P EST HI 40 MIN: CPT | Performed by: FAMILY MEDICINE

## 2021-05-11 RX ORDER — PANTOPRAZOLE SODIUM 40 MG/1
40 TABLET, DELAYED RELEASE ORAL
Status: DISCONTINUED | OUTPATIENT
Start: 2021-05-12 | End: 2021-05-12 | Stop reason: HOSPADM

## 2021-05-11 RX ORDER — NITROGLYCERIN 0.4 MG/1
0.4 TABLET SUBLINGUAL
Status: DISCONTINUED | OUTPATIENT
Start: 2021-05-11 | End: 2021-05-12 | Stop reason: HOSPADM

## 2021-05-11 RX ORDER — ASPIRIN 81 MG/1
81 TABLET ORAL DAILY
Status: DISCONTINUED | OUTPATIENT
Start: 2021-05-12 | End: 2021-05-12 | Stop reason: HOSPADM

## 2021-05-11 RX ORDER — HEPARIN SODIUM 1000 [USP'U]/ML
2000 INJECTION, SOLUTION INTRAVENOUS; SUBCUTANEOUS
Status: DISCONTINUED | OUTPATIENT
Start: 2021-05-11 | End: 2021-05-12 | Stop reason: HOSPADM

## 2021-05-11 RX ORDER — FUROSEMIDE 10 MG/ML
20 INJECTION INTRAMUSCULAR; INTRAVENOUS ONCE
Status: DISCONTINUED | OUTPATIENT
Start: 2021-05-11 | End: 2021-05-12

## 2021-05-11 RX ORDER — HEPARIN SODIUM 1000 [USP'U]/ML
4000 INJECTION, SOLUTION INTRAVENOUS; SUBCUTANEOUS ONCE
Status: COMPLETED | OUTPATIENT
Start: 2021-05-11 | End: 2021-05-11

## 2021-05-11 RX ORDER — HEPARIN SODIUM 10000 [USP'U]/100ML
3-20 INJECTION, SOLUTION INTRAVENOUS
Status: DISCONTINUED | OUTPATIENT
Start: 2021-05-11 | End: 2021-05-12 | Stop reason: HOSPADM

## 2021-05-11 RX ORDER — NITROGLYCERIN 0.4 MG/1
0.4 TABLET SUBLINGUAL ONCE
Status: DISCONTINUED | OUTPATIENT
Start: 2021-05-11 | End: 2021-05-11 | Stop reason: HOSPADM

## 2021-05-11 RX ORDER — FLUTICASONE FUROATE AND VILANTEROL 100; 25 UG/1; UG/1
1 POWDER RESPIRATORY (INHALATION) DAILY
Status: DISCONTINUED | OUTPATIENT
Start: 2021-05-12 | End: 2021-05-12 | Stop reason: HOSPADM

## 2021-05-11 RX ORDER — NICOTINE 21 MG/24HR
1 PATCH, TRANSDERMAL 24 HOURS TRANSDERMAL DAILY
Status: DISCONTINUED | OUTPATIENT
Start: 2021-05-12 | End: 2021-05-12 | Stop reason: HOSPADM

## 2021-05-11 RX ORDER — ALBUTEROL SULFATE 90 UG/1
2 AEROSOL, METERED RESPIRATORY (INHALATION) EVERY 4 HOURS PRN
Status: DISCONTINUED | OUTPATIENT
Start: 2021-05-11 | End: 2021-05-12 | Stop reason: HOSPADM

## 2021-05-11 RX ORDER — ONDANSETRON 2 MG/ML
4 INJECTION INTRAMUSCULAR; INTRAVENOUS EVERY 6 HOURS PRN
Status: DISCONTINUED | OUTPATIENT
Start: 2021-05-11 | End: 2021-05-12 | Stop reason: HOSPADM

## 2021-05-11 RX ORDER — ATORVASTATIN CALCIUM 40 MG/1
40 TABLET, FILM COATED ORAL EVERY EVENING
Status: DISCONTINUED | OUTPATIENT
Start: 2021-05-11 | End: 2021-05-12 | Stop reason: HOSPADM

## 2021-05-11 RX ORDER — HEPARIN SODIUM 1000 [USP'U]/ML
4000 INJECTION, SOLUTION INTRAVENOUS; SUBCUTANEOUS
Status: DISCONTINUED | OUTPATIENT
Start: 2021-05-11 | End: 2021-05-12 | Stop reason: HOSPADM

## 2021-05-11 RX ADMIN — NITROGLYCERIN 0.4 MG: 0.4 TABLET SUBLINGUAL at 15:14

## 2021-05-11 RX ADMIN — HEPARIN SODIUM 4000 UNITS: 1000 INJECTION INTRAVENOUS; SUBCUTANEOUS at 20:50

## 2021-05-11 RX ADMIN — HEPARIN SODIUM 11.1 UNITS/KG/HR: 10000 INJECTION, SOLUTION INTRAVENOUS at 20:55

## 2021-05-11 NOTE — ASSESSMENT & PLAN NOTE
Continue with Anoro ellipta daily  Has been using Albuterol frequently since end of March  Will continue with Albuterol PRN  Currently on Prednisone for recent bronchitis  Last Pulm visit was 10/2019  Current smoker - less than half a pack a day

## 2021-05-11 NOTE — ASSESSMENT & PLAN NOTE
Pt presented to [facility] via EMS from his PCP office  Pt notes 2 episodes of substernal burning non-radiating CP, episodes last 5 minutes each and then CP subsided  Associated SOB and frontal HA that also subside after 5 minutes  Given aspirin and nitro during event unclear if resolved due to this or spontaneously per patient  Currently asymptomatic and without chest pain  Prior diagnosis of coronary artery disease  Initial Troponin elevated at 0 06   BNP - 4,439   EKG with ST depressions   ECHO 6/2019 reviewed - EF 60%, severe AS   XIN score - 6  Concerning for unstable angina verses symptomatic severe aortic stenosis  Continue aspirin, statin  Start heparin infusion  P r n   Nitro for chest pain  Consult Cardiology  Obtain echocardiogram  Will make NPO at midnight with plan for catheterization in the morning

## 2021-05-11 NOTE — H&P
Kaylene 47  H&P- Velma Walls 1955, 78 y o  male MRN: [id number]  Unit/Bed#: E4 -01 Encounter: [id number]  Primary Care Provider: Joseline Rojas DO   Date and time admitted to hospital: 5/11/2021  5:24 PM    * Chest pain  Assessment & Plan  Pt presented to [facility] via EMS from his PCP office  Pt notes 2 episodes of substernal burning non-radiating CP, episodes last 5 minutes each and then CP subsided  Associated SOB and frontal HA that also subside after 5 minutes  Given aspirin and nitro during event unclear if resolved due to this or spontaneously per patient  Currently asymptomatic and without chest pain  Prior diagnosis of coronary artery disease  Initial Troponin elevated at 0 06   BNP - 4,439   EKG with ST depressions   ECHO 6/2019 reviewed - EF 60%, severe AS   XIN score - 6  Concerning for unstable angina verses symptomatic severe aortic stenosis  Continue aspirin, statin  Start heparin infusion  P r n  Nitro for chest pain  Consult Cardiology  Obtain echocardiogram  Will make NPO at midnight as to not delay potential cardiac cath       GERD (gastroesophageal reflux disease)  Assessment & Plan  Continue protonix     SOB (shortness of breath)  Assessment & Plan  Pt with worsening SOB ongoing for months  Began with MATTHEW and leg swelling 6 months ago  Has had multiple episodes of bronchitis and PNA since 1/2021  Multiple ABX and steroid courses since January  Most recently dx with R middle lobe PNA on 4/12/2021 and 4/28/2021 - treated with Augmentin initially and then with Levaquin  Previously followed with Pulm for COPD  Last visit was 10/2019   BNP elevated at 4,439   ECHO 2019 - EF 60%, severe AS   ECHO ordered   Consult Cardiology   Will give low-dose Lasix x1    Current smoker  Assessment & Plan  Counseled on smoking cessation  Smokes 1/2 ppd     Nicotine patch ordered    Essential hypertension  Assessment & Plan  Blood pressure low normal   Will hold amlodipine and losartan      Hyperlipidemia  Assessment & Plan  Increase Lipitor to 40 mg    Chronic obstructive lung disease ([facility] [address] )  Assessment & Plan  Continue with Anoro ellipta daily  Has been using Albuterol frequently since end of March  Will continue with Albuterol PRN  Currently on Prednisone for recent bronchitis  Last Pulm visit was 10/2019  Current smoker - less than half a pack a day  Aortic valve stenosis - severe  Assessment & Plan  Pt with hx of severe AS   ECHO 6/2019 - Severe AS  EF - 60%   Previously followed with Cardiology - last visit was 8/2019   Consult Cardiology  ECHO ordered     VTE Prophylaxis:  Heparin drip  Code Status:  Level 1 full code    Anticipated Length of Stay:  Patient will be admitted on an Observation basis with an anticipated length of stay of  Less than 2 midnights  Justification for Hospital Stay: Need for IV medications, further monitoring of chest pain     Total Time for Visit, including Counseling / Coordination of Care: 60 minutes  Greater than 50% of this total time spent on direct patient counseling and coordination of care  Chief Complaint:   Chest pain    History of Present Illness:    Davon Puente is a 78 y o  male With past medical history of severe aortic stenosis, hypertension, COPD who presents to the hospital with chest pain  Patient was seen and PCP office is complaining of substernal burning nonradiating chest pain  This was associated with shortness of breath and frontal headache which will decide about after 5 minutes  No radiation  Patient does report he has had intermittent chest discomfort for some time  Typically associated with exertion  He an additional episode of chest pain on presentation emergency department was given aspirin and nitro  The chest pain again resolved after 5 minutes and it is unclear to him if the medications improved his symptoms    In ED initial troponin elevated 0 06, EKG with ST depressions, BNP elevated to 4400  Patient has XIN score of 6  Patient is half pack per day smoker, social alcohol use, no drug use  Symptoms of chest pain have currently resolved  Review of Systems:    Review of Systems   Constitutional: Negative for chills and fever  HENT: Negative for sore throat and trouble swallowing  Eyes: Negative for photophobia and visual disturbance  Respiratory: Negative for shortness of breath and wheezing  Cardiovascular: Positive for chest pain  Negative for palpitations  Gastrointestinal: Negative for constipation, diarrhea, nausea and vomiting  Genitourinary: Negative for difficulty urinating and dysuria  Musculoskeletal: Negative for arthralgias and myalgias  Skin: Negative for rash and wound  Neurological: Negative for dizziness and light-headedness  Past Medical and Surgical History:     Past Medical History:   Diagnosis Date    Broken humerus     COPD (chronic obstructive pulmonary disease) ([facility] )     Pneumonia        History reviewed  No pertinent surgical history  Meds/Allergies:    Prior to Admission medications    Medication Sig Start Date End Date Taking?  Authorizing Provider   albuterol (PROVENTIL HFA,VENTOLIN HFA) 90 mcg/act inhaler INHALE 1-2 PUFFS EVERY 4 (FOUR) HOURS AS NEEDED FOR WHEEZING 3/8/21  Yes Mayra Sotelo, DO   amLODIPine (NORVASC) 10 mg tablet TAKE 1 TABLET (10 MG TOTAL) BY MOUTH DAILY 7/9/20  Yes Mayra Sotelo DO   Anoro Ellipta 62 5-25 MCG/INH inhaler INHALE 1 PUFF BY MOUTH EVERY DAY 1/19/21  Yes Mayra Sotelo DO   aspirin (ECOTRIN LOW STRENGTH) 81 mg EC tablet Take 1 tablet (81 mg total) by mouth daily 3/26/18  Yes Mayra Sotelo DO   Aspirin-Acetaminophen-Caffeine (EXCEDRIN MIGRAINE PO) Take by mouth   Yes Historical Provider, MD   atorvastatin (LIPITOR) 20 mg tablet TAKE 1 TABLET BY MOUTH EVERY DAY 3/25/21  Yes Mayra Sotelo, DO   dextromethorphan-guaifenesin (MUCINEX DM)  MG per 12 hr tablet Take 1 tablet by mouth every 12 (twelve) hours 4/7/21  Yes Wood Foster MD   losartan (COZAAR) 100 MG tablet TAKE 1 TABLET BY MOUTH EVERY DAY 10/20/20  Yes Lary Xiao DO   Multiple Vitamin (MULTI-VITAMIN DAILY PO) Take 1 tablet by mouth daily 7/1/14  Yes Historical Provider, MD   pantoprazole (PROTONIX) 40 mg tablet TAKE 1 TABLET BY MOUTH EVERY DAY 2/16/21  Yes Lary Xiao DO   predniSONE 20 mg tablet Please take 3 pills on day 1, 2, 3,  Then 2 pills on day 4, 5 and 6, 1 pill on day 7 5/10/21  Yes Wood Foster MD   saccharomyces boulardii (FLORASTOR) 250 mg capsule Take 1 capsule (250 mg total) by mouth 2 (two) times a day  Patient not taking: Reported on 5/11/2021 4/20/21 5/11/21  Wood Foster MD   sildenafil (REVATIO) 20 mg tablet 3-5 pills 1 hr before need  Patient not taking: Reported on 5/11/2021 4/16/19 5/11/21  Lary Xiao DO       Allergies: No Known Allergies    Social History:     Marital Status: /Civil Union   Substance Use History:   Social History     Substance and Sexual Activity   Alcohol Use Yes    Frequency: 2-4 times a month    Comment: 1-2/wk     Social History     Tobacco Use   Smoking Status Current Some Day Smoker    Packs/day: 0 50    Types: Cigarettes    Start date: 1989   Smokeless Tobacco Never Used     Social History     Substance and Sexual Activity   Drug Use No       Family History:    Family History   Problem Relation Age of Onset    Prostate cancer Father        Physical Exam:     Vitals:   Blood Pressure: 97/53 (05/11/21 2343)  Pulse: 80 (05/11/21 2343)  Temperature: (!) 97 3 °F (36 3 °C) (05/11/21 2343)  Temp Source: Temporal (05/11/21 2343)  Respirations: 18 (05/11/21 2343)  Height: 6' 2" (188 cm) (05/11/21 1949)  Weight - Scale: 112 kg (246 lb 14 6 oz) (05/11/21 1949)  SpO2: 96 % (05/11/21 2343)    Physical Exam  Vitals signs reviewed  Constitutional:       General: He is not in acute distress  Appearance: He is well-developed  He is not ill-appearing, toxic-appearing or diaphoretic     HENT: Head: Normocephalic and atraumatic  Mouth/Throat:      Mouth: Mucous membranes are moist       Pharynx: No oropharyngeal exudate  Eyes:      General: No scleral icterus  Extraocular Movements: Extraocular movements intact  Conjunctiva/sclera: Conjunctivae normal    Neck:      Musculoskeletal: Normal range of motion  Cardiovascular:      Rate and Rhythm: Normal rate and regular rhythm  Heart sounds: Murmur present  Pulmonary:      Effort: Pulmonary effort is normal  No respiratory distress  Breath sounds: Rales present  No wheezing  Abdominal:      General: There is no distension  Palpations: Abdomen is soft  Tenderness: There is no abdominal tenderness  There is no guarding or rebound  Musculoskeletal:         General: No swelling, tenderness or deformity  Skin:     General: Skin is warm and dry  Neurological:      General: No focal deficit present  Mental Status: He is alert  Mental status is at baseline  Psychiatric:         Mood and Affect: Mood normal          Behavior: Behavior normal          Thought Content:  Thought content normal          Judgment: Judgment normal           Additional Data:     Lab Results: I have reviewed pertinent results     Results from last 7 days   Lab Units 05/11/21  1742   WBC Thousand/uL 12 76*   HEMOGLOBIN g/dL 13 8   HEMATOCRIT % 41 5   PLATELETS Thousands/uL 252   LYMPHO PCT % 4*   MONO PCT % 4   EOS PCT % 0     Results from last 7 days   Lab Units 05/11/21  1742   SODIUM mmol/L 140   POTASSIUM mmol/L 4 3   CHLORIDE mmol/L 106   CO2 mmol/L 27   BUN mg/dL 14   CREATININE mg/dL 0 84   ANION GAP mmol/L 7   CALCIUM mg/dL 9 4   ALBUMIN g/dL 3 9   TOTAL BILIRUBIN mg/dL 0 78   ALK PHOS U/L 73   ALT U/L 23   AST U/L 14   GLUCOSE RANDOM mg/dL 131     Results from last 7 days   Lab Units 05/11/21  1742   INR  1 05                   Imaging: I have reviewed pertinent imaging     XR chest 2 views    (Results Pending)       EKG, Pathology, and Other Studies Reviewed on Admission:   · EKG: Reviewed     Allscripts / Epic Records Reviewed    ** Please Note: This note has been constructed using a voice recognition system   **

## 2021-05-11 NOTE — ASSESSMENT & PLAN NOTE
Pt with hx of severe AS   ECHO 6/2019 - Severe AS   EF - 60%   Previously followed with Cardiology - last visit was 8/2019   Consult Cardiology  ECHO ordered

## 2021-05-11 NOTE — PROGRESS NOTES
FAMILY PRACTICE OFFICE VISIT    NAME: Amy Bergman    AGE: 78 y o  SEX: male  : 1955   MRN: [id number]    DATE: 2021  TIME: 4:24 PM    Assessment and Plan   1  Chest pain, unspecified type  Pt with active CP during encounter  1 SL nitro 0 4 mg administered  81 mg asa administered as well  2L via [place] oxygen placed  911 called  And pt stable for transport  ADT-21 completed  - POCT ECG  - Transfer to other facility  - nitroglycerin (NITROSTAT) SL tablet 0 4 mg    2  Shortness of breath  Cannot r/o as secondary to acute MI, CHF, aortic stenosis  Transport to hospital   Note - wife came into room and situation explained to her as well  - Transfer to other facility  - nitroglycerin (NITROSTAT) SL tablet 0 4 mg    Echo   Severe AS  Was seen by cardio but is overdue  Chief Complaint     Chief Complaint   Patient presents with    Results     Labs       History of Present Illness   Amy Bergman is a 78y o -year-old male who presents today to discuss abnormal labs ord'd by dr Mary oTrres  He was initially dx with pneumonia over a mo ago  And Is still feeling some SOB - unsure if related to pneumonia  Took 2 courses of antibiotics and prednisone  No known h/o CHF  Pt did not have CXR but did have pro-bnp yesterday  Has echo scheduled for tomorrow  Pt is a smoker but is cutting down  Having chest pains - can occur at rest; feels like heartburn (pt is taking meds for GERD)  Pain may last for 5-10 min  Pt does get SOB at time of Chest discomfort but no nausea/vomiting or sweating  No radiation of pain into left arm or jaw  Continues to have problems catching his breath  Also gets a headache  Review of Systems   Review of Systems   Constitutional: Negative for chills, diaphoresis and fever  Gained 4 # since yesterday  Respiratory: Positive for shortness of breath  Negative for cough  Cardiovascular: Positive for chest pain and leg swelling          No new leg swelling  Has knee problems and wears knee braces  Gastrointestinal: Negative for abdominal distention  Denies abdominal bloating  Genitourinary:        Urine output unchanged  Active Problem List     Patient Active Problem List   Diagnosis    Aortic valve stenosis - severe    [facility] obstructive lung disease ([facility] )    Coronary artery calcification    Erectile dysfunction of non-organic origin    Hyperlipidemia    Essential hypertension    Left ventricular hypertrophy    Nephrolithiasis    Right knee pain    Rotator cuff tendinitis Left    Current smoker    Fracture, humerus Right         Past Medical History:  Past Medical History:   Diagnosis Date    Broken humerus     COPD (chronic obstructive pulmonary disease) ([facility] )     Pneumonia        Past Surgical History:  History reviewed  No pertinent surgical history      Family History:  Family History   Problem Relation Age of Onset    Prostate cancer Father        Social History:  Social History     Socioeconomic History    Marital status: /Civil Union     Spouse name: Not on file    Number of children: Not on file    Years of education: Not on file    Highest education level: Not on file   Occupational History    Occupation: Machinery Repair     Comment: packaging raheel  - full time employment   Social Needs    Financial resource strain: Not on file    Food insecurity     Worry: Not on file     Inability: Not on file   [employer] needs     Medical: Not on file     Non-medical: Not on file   Tobacco Use    Smoking status: Current Some Day Smoker     Packs/day: 0 50     Types: Cigarettes     Start date: 1989    Smokeless tobacco: Never Used   Substance and Sexual Activity    Alcohol use: Yes     Frequency: 2-4 times a month     Comment: 1-2/wk    Drug use: No    Sexual activity: Not on file   Lifestyle    Physical activity     Days per week: Not on file     Minutes per session: Not on file    Stress: Not on file   Relationships    Social connections     Talks on phone: Not on file     Gets together: Not on file     Attends Hindu service: Not on file     Active member of club or organization: Not on file     Attends meetings of clubs or organizations: Not on file     Relationship status: Not on file    Intimate partner violence     Fear of current or ex partner: Not on file     Emotionally abused: Not on file     Physically abused: Not on file     Forced sexual activity: Not on file   Other Topics Concern    Not on file   Social History Narrative    Not on file       Objective     Vitals:    05/11/21 1613   BP: 102/70   Pulse: (!) 108   Resp: 20   Temp: 97 6 °F (36 4 °C)   SpO2: 92%     Wt Readings from Last 3 Encounters:   05/11/21 113 kg (250 lb)   05/10/21 112 kg (246 lb 3 2 oz)   04/28/21 111 kg (244 lb)       Physical Exam  Vitals signs and nursing note reviewed  Constitutional:       General: He is in acute distress  Appearance: He is ill-appearing and diaphoretic  Comments: Facial pallor  Is sweating during visit  Became more uncomfortable as encounter progressed  particuarly after walking short distance from bathroom       Eyes:      General: No scleral icterus  Cardiovascular:      Rate and Rhythm: Regular rhythm  Tachycardia present  Heart sounds: Murmur present  Comments: Systolic vs diastolic murmur  [place] across the precordium  Pt with known AS  No ectopy  tachycardia    Pulmonary:      Effort: Respiratory distress present  Breath sounds: No wheezing or rhonchi  Comments: Pt appaers to be breathing a little heavy and he agrees  Breathing became much more labored when he came back from a  Short walk from the bathroom  Decreased breath sounds bases - unsure if due to fluid vs body habitus  Abdominal:      General: There is no distension  Tenderness: There is no abdominal tenderness  Musculoskeletal:         General: Swelling present        Right lower leg: Edema present  Left lower leg: Edema present  Skin:     General: Skin is warm  Coloration: Skin is not jaundiced  Neurological:      General: No focal deficit present  Mental Status: He is alert and oriented to person, place, and time  Psychiatric:         Mood and Affect: Mood normal          Behavior: Behavior normal          Thought Content: Thought content normal          Judgment: Judgment normal          Pertinent Laboratory/Diagnostic Studies:  Lab Results   Component Value Date    GLUCOSE 101 08/07/2014    BUN 17 08/07/2014    CREATININE 0 79 08/07/2014    CALCIUM 9 4 08/07/2014     08/07/2014    K 4 4 08/07/2014    CO2 28 08/07/2014     08/07/2014     Lab Results   Component Value Date    ALT 26 08/07/2014    AST 18 08/07/2014    ALKPHOS 87 08/07/2014    BILITOT 0 75 08/07/2014       Lab Results   Component Value Date    WBC 8 30 04/28/2021    HGB 13 1 (L) 04/28/2021    HCT 40 9 (L) 04/28/2021    MCV 95 04/28/2021     04/28/2021       No results found for: TSH    Lab Results   Component Value Date    CHOL 209 08/07/2014     Lab Results   Component Value Date    TRIG 220 08/07/2014     Lab Results   Component Value Date    HDL 39 08/07/2014     Lab Results   Component Value Date    LDLCALC 126 (H) 08/07/2014     No results found for: HGBA1C    Results for orders placed or performed in visit on 05/10/21   NT-BNP PRO   Result Value Ref Range    NT-proBNP 3,228 (H) <125 pg/mL       No orders of the defined types were placed in this encounter        ALLERGIES:  No Known Allergies    Current Medications     Current Outpatient Medications   Medication Sig Dispense Refill    albuterol (PROVENTIL HFA,VENTOLIN HFA) 90 mcg/act inhaler INHALE 1-2 PUFFS EVERY 4 (FOUR) HOURS AS NEEDED FOR WHEEZING 18 Inhaler 0    amLODIPine (NORVASC) 10 mg tablet TAKE 1 TABLET (10 MG TOTAL) BY MOUTH DAILY 90 tablet 3    Anoro Ellipta 62 5-25 MCG/INH inhaler INHALE 1 PUFF BY MOUTH EVERY DAY 1 Inhaler 5    Aspirin-Acetaminophen-Caffeine (EXCEDRIN MIGRAINE PO) Take by mouth      atorvastatin (LIPITOR) 20 mg tablet TAKE 1 TABLET BY MOUTH EVERY DAY 90 tablet 3    dextromethorphan-guaifenesin (MUCINEX DM)  MG per 12 hr tablet Take 1 tablet by mouth every 12 (twelve) hours 20 tablet 0    losartan (COZAAR) 100 MG tablet TAKE 1 TABLET BY MOUTH EVERY DAY 90 tablet 3    Multiple Vitamin (MULTI-VITAMIN DAILY PO) Take 1 tablet by mouth daily      pantoprazole (PROTONIX) 40 mg tablet TAKE 1 TABLET BY MOUTH EVERY DAY 90 tablet 0    predniSONE 20 mg tablet Please take 3 pills on day 1, 2, 3,  Then 2 pills on day 4, 5 and 6, 1 pill on day 7 16 tablet 0    saccharomyces boulardii (FLORASTOR) 250 mg capsule Take 1 capsule (250 mg total) by mouth 2 (two) times a day 30 capsule 0    sildenafil (REVATIO) 20 mg tablet 3-5 pills 1 hr before need 30 tablet 2    aspirin (ECOTRIN LOW STRENGTH) 81 mg EC tablet Take 1 tablet (81 mg total) by mouth daily (Patient not taking: Reported on 5/11/2021)  0     No current facility-administered medications for this visit            Health Maintenance     Health Maintenance   Topic Date Due    Hepatitis C Screening  Never done    Colorectal Cancer Screening  Never done    BMI: Followup Plan  07/24/2021    Annual Physical  07/24/2021    Pneumococcal Vaccine: 65+ Years (2 of 2 - PPSV23) 08/30/2021    Fall Risk  01/26/2022    Depression Screening PHQ  01/26/2022    BMI: Adult  05/11/2022    DTaP,Tdap,and Td Vaccines (2 - Td) 02/22/2027    Influenza Vaccine  Completed    COVID-19 Vaccine  Completed    HIB Vaccine  Aged Out    Hepatitis B Vaccine  Aged Out    IPV Vaccine  Aged Out    Hepatitis A Vaccine  Aged Out    Meningococcal ACWY Vaccine  Aged Out    HPV Vaccine  Aged Out     Immunization History   Administered Date(s) Administered    INFLUENZA 10/13/2017, 10/14/2020    Influenza Quadrivalent 3 years and older 10/15/2018    Influenza, injectable, quadrivalent, preservative free 0 5 mL 10/25/2019    Influenza, seasonal, injectable 10/27/2014, 10/27/2015, 10/28/2016    Pneumococcal Conjugate 13-Valent 04/16/2019    Pneumococcal Polysaccharide PPV23 08/30/2016    SARS-CoV-2 / COVID-19 mRNA IM (Moderna) 03/29/2021, 04/29/2021    Tdap 02/22/2017          Mary Jane Puente DO

## 2021-05-11 NOTE — RESULT ENCOUNTER NOTE
Please call pt - his lab done yesterday is ABNORMAL  There is no history of CHF in chart  I would like to check pt personally to determine best course of action  Please schedule visit today

## 2021-05-11 NOTE — ASSESSMENT & PLAN NOTE
Pt with worsening SOB ongoing for months  Began with MATTHEW and leg swelling 6 months ago  Has had multiple episodes of bronchitis and PNA since 1/2021  Multiple ABX and steroid courses since January  Most recently dx with R middle lobe PNA on 4/12/2021 and 4/28/2021 - treated with Augmentin initially and then with Levaquin  Previously followed with Pulm for COPD   Last visit was 10/2019   BNP elevated at 4,439   ECHO 2019 - EF 60%, severe AS   ECHO ordered   Consult Cardiology   Will give low-dose Lasix x1

## 2021-05-11 NOTE — ED PROVIDER NOTES
History  Chief Complaint   Patient presents with    Chest Pain     Pt reports SOB for "couple days" reports went to PCP for evaluation and upon arrival experienced episode of chest pain  Pt reports lasted about 5 minutes  History provided by:  Patient   used: No    Chest Pain  Pain location:  Substernal area  Pain quality: pressure    Pain radiates to:  Does not radiate  Pain radiates to the back: no    Pain severity:  Moderate  Onset quality:  Gradual  Duration:  1 day  Timing:  Intermittent  Progression:  Waxing and waning  Chronicity:  New  Context: at rest    Relieved by:  Nothing  Worsened by:  Nothing tried  Ineffective treatments:  None tried  Associated symptoms: shortness of breath    Associated symptoms: no abdominal pain, no altered mental status, no back pain, no cough, no dizziness, no dysphagia, no fever, no headache, no nausea, no near-syncope, no syncope and not vomiting    Shortness of breath:     Severity:  Moderate    Onset quality:  Gradual    Duration:  2 weeks    Timing:  Intermittent    Progression:  Waxing and waning  Risk factors: hypertension and smoking    Risk factors comment:  Aortic stenosis      Prior to Admission Medications   Prescriptions Last Dose Informant Patient Reported? Taking?    Anoro Ellipta 62 5-25 MCG/INH inhaler 5/10/2021 at Unknown time Self No Yes   Sig: INHALE 1 PUFF BY MOUTH EVERY DAY   Aspirin-Acetaminophen-Caffeine (EXCEDRIN MIGRAINE PO) 5/11/2021 at Unknown time Self Yes Yes   Sig: Take by mouth   Multiple Vitamin (MULTI-VITAMIN DAILY PO) 5/11/2021 at Unknown time Self Yes Yes   Sig: Take 1 tablet by mouth daily   albuterol (PROVENTIL HFA,VENTOLIN HFA) 90 mcg/act inhaler 5/10/2021 at Unknown time Self No Yes   Sig: INHALE 1-2 PUFFS EVERY 4 (FOUR) HOURS AS NEEDED FOR WHEEZING   amLODIPine (NORVASC) 10 mg tablet 5/11/2021 at Unknown time Self No Yes   Sig: TAKE 1 TABLET (10 MG TOTAL) BY MOUTH DAILY   aspirin (ECOTRIN LOW STRENGTH) 81 mg EC tablet 5/11/2021 at Unknown time Self No Yes   Sig: Take 1 tablet (81 mg total) by mouth daily   atorvastatin (LIPITOR) 20 mg tablet 5/11/2021 at Unknown time Self No Yes   Sig: TAKE 1 TABLET BY MOUTH EVERY DAY   dextromethorphan-guaifenesin (MUCINEX DM)  MG per 12 hr tablet 5/10/2021 at Unknown time Self No Yes   Sig: Take 1 tablet by mouth every 12 (twelve) hours   losartan (COZAAR) 100 MG tablet 5/11/2021 at Unknown time Self No Yes   Sig: TAKE 1 TABLET BY MOUTH EVERY DAY   pantoprazole (PROTONIX) 40 mg tablet 5/11/2021 at Unknown time Self No Yes   Sig: TAKE 1 TABLET BY MOUTH EVERY DAY   predniSONE 20 mg tablet 5/11/2021 at Unknown time  No Yes   Sig: Please take 3 pills on day 1, 2, 3,  Then 2 pills on day 4, 5 and 6, 1 pill on day 7      Facility-Administered Medications Last Administration Doses Remaining   nitroglycerin (NITROSTAT) SL tablet 0 4 mg None recorded 1          Past Medical History:   Diagnosis Date    Broken humerus     COPD (chronic obstructive pulmonary disease) ([facility] [address] )     Pneumonia        History reviewed  No pertinent surgical history  Family History   Problem Relation Age of Onset    Prostate cancer Father      I have reviewed and agree with the history as documented  E-Cigarette/Vaping     E-Cigarette/Vaping Substances     Social History     Tobacco Use    Smoking status: Current Some Day Smoker     Packs/day: 0 50     Types: Cigarettes     Start date: 1989    Smokeless tobacco: Never Used   Substance Use Topics    Alcohol use: Yes     Frequency: 2-4 times a month     Comment: 1-2/wk    Drug use: No       Review of Systems   Constitutional: Negative for chills and fever  HENT: Negative for facial swelling, sore throat and trouble swallowing  Eyes: Negative for pain and visual disturbance  Respiratory: Positive for shortness of breath  Negative for cough  Cardiovascular: Positive for chest pain  Negative for leg swelling, syncope and near-syncope  Gastrointestinal: Negative for abdominal pain, diarrhea, nausea and vomiting  Genitourinary: Negative for dysuria and flank pain  Musculoskeletal: Negative for back pain, neck pain and neck stiffness  Skin: Negative for pallor and rash  Allergic/Immunologic: Negative for environmental allergies and immunocompromised state  Neurological: Negative for dizziness and headaches  Hematological: Negative for adenopathy  Does not bruise/bleed easily  Psychiatric/Behavioral: Negative for agitation and behavioral problems  All other systems reviewed and are negative  Physical Exam  Physical Exam  Vitals signs and nursing note reviewed  Constitutional:       General: He is not in acute distress  Appearance: He is well-developed  HENT:      Head: Normocephalic and atraumatic  Eyes:      Extraocular Movements: Extraocular movements intact  Neck:      Musculoskeletal: Normal range of motion and neck supple  Cardiovascular:      Rate and Rhythm: Normal rate and regular rhythm  Pulmonary:      Effort: Pulmonary effort is normal  No respiratory distress  Abdominal:      Palpations: Abdomen is soft  Tenderness: There is no abdominal tenderness  There is no guarding or rebound  Musculoskeletal: Normal range of motion  Skin:     General: Skin is warm and dry  Neurological:      General: No focal deficit present  Mental Status: He is alert and oriented to person, place, and time     Psychiatric:         Mood and Affect: Mood normal          Behavior: Behavior normal          Vital Signs  ED Triage Vitals   Temperature Pulse Respirations Blood Pressure SpO2   05/11/21 1735 05/11/21 1735 05/11/21 1735 05/11/21 1735 05/11/21 1735   98 4 °F (36 9 °C) 101 20 106/74 94 %      Temp Source Heart Rate Source Patient Position - Orthostatic VS BP Location FiO2 (%)   05/11/21 1735 05/11/21 1735 05/11/21 1735 05/11/21 1735 --   Oral Monitor Lying Right arm       Pain Score       05/11/21 8671 No Pain           Vitals:    05/11/21 1735 05/11/21 1830   BP: 106/74 116/82   Pulse: 101 100   Patient Position - Orthostatic VS: Lying Lying         Visual Acuity      ED Medications  Medications - No data to display    Diagnostic Studies  Results Reviewed     Procedure Component Value Units Date/Time    CBC and differential [[id number]]  (Abnormal) Collected: 05/11/21 1742    Lab Status: Final result Specimen: Blood from Arm, Left Updated: 05/11/21 1850     WBC 12 76 Thousand/uL      RBC 4 38 Million/uL      Hemoglobin 13 8 g/dL      Hematocrit 41 5 %      MCV 95 fL      MCH 31 5 pg      MCHC 33 3 g/dL      RDW 13 3 %      MPV 9 1 fL      Platelets 599 Thousands/uL      nRBC 0 /100 WBCs     Narrative: This is an appended report  These results have been appended to a previously verified report      Manual Differential(PHLEBS Do Not Order) [[id number]]  (Abnormal) Collected: 05/11/21 1742    Lab Status: Final result Specimen: Blood from Arm, Left Updated: 05/11/21 1850     Segmented % 92 %      Lymphocytes % 4 %      Monocytes % 4 %      Eosinophils, % 0 %      Basophils % 0 %      Absolute Neutrophils 11 74 Thousand/uL      Lymphocytes Absolute 0 51 Thousand/uL      Monocytes Absolute 0 51 Thousand/uL      Eosinophils Absolute 0 00 Thousand/uL      Basophils Absolute 0 00 Thousand/uL      Total Counted 100     Anisocytosis Present     Platelet Estimate Adequate    NT-BNP PRO [[id number]]  (Abnormal) Collected: 05/11/21 1742    Lab Status: Final result Specimen: Blood from Arm, Left Updated: 05/11/21 1819     NT-proBNP 4,493 pg/mL     Troponin I [[id number]]  (Abnormal) Collected: 05/11/21 1742    Lab Status: Final result Specimen: Blood from Arm, Left Updated: 05/11/21 1813     Troponin I 0 06 ng/mL     Protime-INR [[id number]]  (Normal) Collected: 05/11/21 1742    Lab Status: Final result Specimen: Blood from Arm, Left Updated: 05/11/21 1812     Protime 13 5 seconds      INR 1 05    APTT [[id number]]  (Normal) Collected: 05/11/21 1742    Lab Status: Final result Specimen: Blood from Arm, Left Updated: 05/11/21 1812     PTT 28 seconds     Comprehensive metabolic panel [[id number]] Collected: 05/11/21 1742    Lab Status: Final result Specimen: Blood from Arm, Left Updated: 05/11/21 1811     Sodium 140 mmol/L      Potassium 4 3 mmol/L      Chloride 106 mmol/L      CO2 27 mmol/L      ANION GAP 7 mmol/L      BUN 14 mg/dL      Creatinine 0 84 mg/dL      Glucose 131 mg/dL      Calcium 9 4 mg/dL      AST 14 U/L      ALT 23 U/L      Alkaline Phosphatase 73 U/L      Total Protein 7 1 g/dL      Albumin 3 9 g/dL      Total Bilirubin 0 78 mg/dL      eGFR 91 ml/min/1 73sq m     Narrative:      [place] guidelines for Chronic Kidney Disease (CKD):     Stage 1 with normal or high GFR (GFR > 90 mL/min/1 73 square meters)    Stage 2 Mild CKD (GFR = 60-89 mL/min/1 73 square meters)    Stage 3A Moderate CKD (GFR = 45-59 mL/min/1 73 square meters)    Stage 3B Moderate CKD (GFR = 30-44 mL/min/1 73 square meters)    Stage 4 Severe CKD (GFR = 15-29 mL/min/1 73 square meters)    Stage 5 End Stage CKD (GFR <15 mL/min/1 73 square meters)  Note: GFR calculation is accurate only with a steady state creatinine                 XR chest 2 views    (Results Pending)              Procedures  ECG 12 Lead Documentation Only    Date/Time: 5/11/2021 6:26 PM  Performed by: Noah Rowland MD  Authorized by: Noah Rowland MD     Indications / Diagnosis:  Chest pain, Dyspnea  ECG reviewed by me, the ED Provider: yes    Patient location:  ED  Interpretation:     Interpretation: normal    Rate:     ECG rate assessment: normal    Rhythm:     Rhythm: sinus rhythm    Ectopy:     Ectopy: none    QRS:     QRS axis:  Normal  Conduction:     Conduction: normal    ST segments:     ST segments:  Non-specific    Depression:  V5, V6 and V4  T waves:     T waves: normal               ED Course  ED Course as of May 11 1939   Tue May 11, 2021   1759 WBC(!): 12 76   1756 Hemoglobin: 13 8   1756 CBC noted  Platelet Count: 882   9900 Troponin I(!): 0 06   1829 Elevated troponin and BNP noted  NT-proBNP(!): 4,493   1829 Case discussed with Dr Katja Verduzco, will admit under observation/Tele, cardiology consult placed  MDM  Number of Diagnoses or Management Options  Aortic stenosis: new and requires workup  Chest pain: new and requires workup  Dyspnea: new and requires workup  Elevated brain natriuretic peptide (BNP) level: new and requires workup  Elevated troponin: new and requires workup  Diagnosis management comments: Patient is a 79-year-old male, history of hypertension, aortic stenosis, smoker, comes in with complaints of chest pain, dyspnea, states that he has been having shortness of breath for past couple of months, started with chest pain associated with shortness of breath and mild headache, was seen at outpatient facility, sent to the ER for evaluation  On exam, patient is conscious, alert, vital signs noted, requiring small amount nasal cannula oxygen, no acute respiratory distress, no significant peripheral edema, no calf tenderness swelling, abdomen soft nontender  Differential diagnosis:  ACS, congestive heart failure, worsening aortic stenosis, will check labs, EKG, chest x-ray, admit for further evaluation         Amount and/or Complexity of Data Reviewed  Clinical lab tests: reviewed and ordered  Tests in the radiology section of CPT®: ordered and reviewed  Tests in the medicine section of CPT®: ordered and reviewed  Discuss the patient with other providers: yes  Independent visualization of images, tracings, or specimens: yes        Disposition  Final diagnoses:   Chest pain   Dyspnea   Elevated troponin   Elevated brain natriuretic peptide (BNP) level   Aortic stenosis     Time reflects when diagnosis was documented in both MDM as applicable and the Disposition within this note     Time User Action Codes Description Comment    5/11/2021  6:27 PM Eri Singh Add [R07 9] Chest pain     5/11/2021  6:27 PM Eri Singh Add [R06 00] Dyspnea     5/11/2021  6:27 PM Eri Singh Add [R77 8] Elevated troponin     5/11/2021  6:27 PM Eri Singh Add [R79 89] Elevated brain natriuretic peptide (BNP) level     5/11/2021  6:30 PM Eri Singh Add [I35 0] Aortic stenosis       ED Disposition     ED Disposition Condition Date/Time Comment    Admit Stable Tue May 11, 2021  6:27 PM Case was discussed with Dr Amber Kennedy and the patient's admission status was agreed to be Admission Status: observation status to the service of Dr Amber Kennedy  Follow-up Information    None         Current Discharge Medication List      CONTINUE these medications which have NOT CHANGED    Details   albuterol (PROVENTIL HFA,VENTOLIN HFA) 90 mcg/act inhaler INHALE 1-2 PUFFS EVERY 4 (FOUR) HOURS AS NEEDED FOR WHEEZING  Qty: 18 Inhaler, Refills: 0    Associated Diagnoses: Pulmonary emphysema, unspecified emphysema type ([address] ); Bronchitis      amLODIPine (NORVASC) 10 mg tablet TAKE 1 TABLET (10 MG TOTAL) BY MOUTH DAILY  Qty: 90 tablet, Refills: 3    Associated Diagnoses: Essential hypertension      Anoro Ellipta 62 5-25 MCG/INH inhaler INHALE 1 PUFF BY MOUTH EVERY DAY  Qty: 1 Inhaler, Refills: 5    Associated Diagnoses: Pulmonary emphysema, unspecified emphysema type (H[facility])      aspirin (ECOTRIN LOW STRENGTH) 81 mg EC tablet Take 1 tablet (81 mg total) by mouth daily  Refills: 0    Associated Diagnoses:  Well adult health check      Aspirin-Acetaminophen-Caffeine (EXCEDRIN MIGRAINE PO) Take by mouth      atorvastatin (LIPITOR) 20 mg tablet TAKE 1 TABLET BY MOUTH EVERY DAY  Qty: 90 tablet, Refills: 3    Associated Diagnoses: Hyperlipidemia, unspecified hyperlipidemia type      dextromethorphan-guaifenesin (MUCINEX DM)  MG per 12 hr tablet Take 1 tablet by mouth every 12 (twelve) hours  Qty: 20 tablet, Refills: 0    Associated Diagnoses: Cough      losartan (COZAAR) 100 MG tablet TAKE 1 TABLET BY MOUTH EVERY DAY  Qty: 90 tablet, Refills: 3    Associated Diagnoses: Essential hypertension      Multiple Vitamin (MULTI-VITAMIN DAILY PO) Take 1 tablet by mouth daily      pantoprazole (PROTONIX) 40 mg tablet TAKE 1 TABLET BY MOUTH EVERY DAY  Qty: 90 tablet, Refills: 0    Associated Diagnoses: Gastroesophageal reflux disease without esophagitis      predniSONE 20 mg tablet Please take 3 pills on day 1, 2, 3,  Then 2 pills on day 4, 5 and 6, 1 pill on day 7  Qty: 16 tablet, Refills: 0    Associated Diagnoses: SOB (shortness of breath)           No discharge procedures on file      PDMP Review     None          ED Provider  Electronically Signed by           Manoj Bahena MD  05/11/21 9112

## 2021-05-12 ENCOUNTER — APPOINTMENT (OUTPATIENT)
Dept: NON INVASIVE DIAGNOSTICS | Facility: HOSPITAL | Age: 66
End: 2021-05-12
Payer: COMMERCIAL

## 2021-05-12 ENCOUNTER — HOSPITAL ENCOUNTER (INPATIENT)
Facility: HOSPITAL | Age: 66
LOS: 14 days | Discharge: HOME HEALTH | DRG: 216 | End: 2021-05-26
Attending: HOSPITALIST | Admitting: THORACIC SURGERY (CARDIOTHORACIC VASCULAR SURGERY)
Payer: COMMERCIAL

## 2021-05-12 VITALS
OXYGEN SATURATION: 95 % | RESPIRATION RATE: 18 BRPM | HEART RATE: 96 BPM | SYSTOLIC BLOOD PRESSURE: 100 MMHG | BODY MASS INDEX: 31.69 KG/M2 | DIASTOLIC BLOOD PRESSURE: 79 MMHG | HEIGHT: 74 IN | TEMPERATURE: 97.6 F | WEIGHT: 246.91 LBS

## 2021-05-12 DIAGNOSIS — I35.0 AORTIC VALVE STENOSIS, ETIOLOGY OF CARDIAC VALVE DISEASE UNSPECIFIED: ICD-10-CM

## 2021-05-12 DIAGNOSIS — K08.9 POOR DENTITION: ICD-10-CM

## 2021-05-12 DIAGNOSIS — I25.84 CORONARY ARTERY CALCIFICATION: ICD-10-CM

## 2021-05-12 DIAGNOSIS — Q21.1 PFO (PATENT FORAMEN OVALE): ICD-10-CM

## 2021-05-12 DIAGNOSIS — I25.10 CORONARY ARTERY CALCIFICATION: ICD-10-CM

## 2021-05-12 DIAGNOSIS — I35.0 NONRHEUMATIC AORTIC VALVE STENOSIS: Primary | ICD-10-CM

## 2021-05-12 DIAGNOSIS — Z95.2 S/P MVR (MITRAL VALVE REPLACEMENT): ICD-10-CM

## 2021-05-12 DIAGNOSIS — Z95.2 S/P AVR: ICD-10-CM

## 2021-05-12 PROBLEM — I50.41 ACUTE COMBINED SYSTOLIC AND DIASTOLIC CHF, NYHA CLASS 1 (HCC): Status: ACTIVE | Noted: 2021-05-12

## 2021-05-12 PROBLEM — I21.4 NSTEMI (NON-ST ELEVATED MYOCARDIAL INFARCTION) (HCC): Status: ACTIVE | Noted: 2021-05-11

## 2021-05-12 LAB
ANION GAP SERPL CALCULATED.3IONS-SCNC: 6 MMOL/L (ref 4–13)
APTT PPP: 37 SECONDS (ref 23–37)
APTT PPP: 47 SECONDS (ref 23–37)
APTT PPP: 49 SECONDS (ref 23–37)
ATRIAL RATE: 100 BPM
ATRIAL RATE: 115 BPM
ATRIAL RATE: 83 BPM
BUN SERPL-MCNC: 13 MG/DL (ref 5–25)
CALCIUM SERPL-MCNC: 8.9 MG/DL (ref 8.3–10.1)
CHLORIDE SERPL-SCNC: 108 MMOL/L (ref 100–108)
CO2 SERPL-SCNC: 28 MMOL/L (ref 21–32)
CREAT SERPL-MCNC: 0.8 MG/DL (ref 0.6–1.3)
ERYTHROCYTE [DISTWIDTH] IN BLOOD BY AUTOMATED COUNT: 13.7 % (ref 11.6–15.1)
GFR SERPL CREATININE-BSD FRML MDRD: 92 ML/MIN/1.73SQ M
GLUCOSE P FAST SERPL-MCNC: 116 MG/DL (ref 65–99)
GLUCOSE SERPL-MCNC: 116 MG/DL (ref 65–140)
HCT VFR BLD AUTO: 40.3 % (ref 36.5–49.3)
HGB BLD-MCNC: 12.7 G/DL (ref 12–17)
INR PPP: 1.13 (ref 0.84–1.19)
MCH RBC QN AUTO: 31.4 PG (ref 26.8–34.3)
MCHC RBC AUTO-ENTMCNC: 31.5 G/DL (ref 31.4–37.4)
MCV RBC AUTO: 100 FL (ref 82–98)
P AXIS: 14 DEGREES
P AXIS: 66 DEGREES
P AXIS: 67 DEGREES
PLATELET # BLD AUTO: 223 THOUSANDS/UL (ref 149–390)
PMV BLD AUTO: 9.4 FL (ref 8.9–12.7)
POTASSIUM SERPL-SCNC: 4.4 MMOL/L (ref 3.5–5.3)
PR INTERVAL: 164 MS
PR INTERVAL: 168 MS
PR INTERVAL: 168 MS
PROTHROMBIN TIME: 14.3 SECONDS (ref 11.6–14.5)
QRS AXIS: -49 DEGREES
QRS AXIS: -57 DEGREES
QRS AXIS: -78 DEGREES
QRSD INTERVAL: 100 MS
QRSD INTERVAL: 100 MS
QRSD INTERVAL: 96 MS
QT INTERVAL: 312 MS
QT INTERVAL: 314 MS
QT INTERVAL: 386 MS
QTC INTERVAL: 402 MS
QTC INTERVAL: 434 MS
QTC INTERVAL: 453 MS
RBC # BLD AUTO: 4.05 MILLION/UL (ref 3.88–5.62)
SODIUM SERPL-SCNC: 142 MMOL/L (ref 136–145)
T WAVE AXIS: 147 DEGREES
T WAVE AXIS: 156 DEGREES
T WAVE AXIS: 174 DEGREES
TROPONIN I SERPL-MCNC: 0.24 NG/ML
TROPONIN I SERPL-MCNC: 0.39 NG/ML
VENTRICULAR RATE: 100 BPM
VENTRICULAR RATE: 115 BPM
VENTRICULAR RATE: 83 BPM
WBC # BLD AUTO: 12.66 THOUSAND/UL (ref 4.31–10.16)

## 2021-05-12 PROCEDURE — 93306 TTE W/DOPPLER COMPLETE: CPT | Performed by: INTERNAL MEDICINE

## 2021-05-12 PROCEDURE — 99255 IP/OBS CONSLTJ NEW/EST HI 80: CPT | Performed by: INTERNAL MEDICINE

## 2021-05-12 PROCEDURE — 85027 COMPLETE CBC AUTOMATED: CPT | Performed by: INTERNAL MEDICINE

## 2021-05-12 PROCEDURE — 85730 THROMBOPLASTIN TIME PARTIAL: CPT | Performed by: INTERNAL MEDICINE

## 2021-05-12 PROCEDURE — 93306 TTE W/DOPPLER COMPLETE: CPT

## 2021-05-12 PROCEDURE — 99217 PR OBSERVATION CARE DISCHARGE MANAGEMENT: CPT | Performed by: INTERNAL MEDICINE

## 2021-05-12 PROCEDURE — 84484 ASSAY OF TROPONIN QUANT: CPT | Performed by: INTERNAL MEDICINE

## 2021-05-12 PROCEDURE — 93010 ELECTROCARDIOGRAM REPORT: CPT | Performed by: INTERNAL MEDICINE

## 2021-05-12 PROCEDURE — 99223 1ST HOSP IP/OBS HIGH 75: CPT | Performed by: INTERNAL MEDICINE

## 2021-05-12 PROCEDURE — 93005 ELECTROCARDIOGRAM TRACING: CPT

## 2021-05-12 PROCEDURE — 93356 MYOCRD STRAIN IMG SPCKL TRCK: CPT

## 2021-05-12 PROCEDURE — 80048 BASIC METABOLIC PNL TOTAL CA: CPT | Performed by: INTERNAL MEDICINE

## 2021-05-12 PROCEDURE — 85610 PROTHROMBIN TIME: CPT | Performed by: INTERNAL MEDICINE

## 2021-05-12 RX ORDER — FUROSEMIDE 10 MG/ML
20 INJECTION INTRAMUSCULAR; INTRAVENOUS ONCE
Status: COMPLETED | OUTPATIENT
Start: 2021-05-12 | End: 2021-05-12

## 2021-05-12 RX ORDER — HEPARIN SODIUM 1000 [USP'U]/ML
4000 INJECTION, SOLUTION INTRAVENOUS; SUBCUTANEOUS
Status: CANCELLED | OUTPATIENT
Start: 2021-05-12

## 2021-05-12 RX ORDER — HEPARIN SODIUM 1000 [USP'U]/ML
2000 INJECTION, SOLUTION INTRAVENOUS; SUBCUTANEOUS
Status: DISCONTINUED | OUTPATIENT
Start: 2021-05-12 | End: 2021-05-13

## 2021-05-12 RX ORDER — ALBUTEROL SULFATE 90 UG/1
2 AEROSOL, METERED RESPIRATORY (INHALATION) EVERY 4 HOURS PRN
Status: DISCONTINUED | OUTPATIENT
Start: 2021-05-12 | End: 2021-05-26 | Stop reason: HOSPADM

## 2021-05-12 RX ORDER — ASPIRIN 81 MG/1
81 TABLET ORAL DAILY
Status: CANCELLED | OUTPATIENT
Start: 2021-05-13

## 2021-05-12 RX ORDER — FLUTICASONE FUROATE AND VILANTEROL 100; 25 UG/1; UG/1
1 POWDER RESPIRATORY (INHALATION) DAILY
Status: CANCELLED | OUTPATIENT
Start: 2021-05-13

## 2021-05-12 RX ORDER — NICOTINE 21 MG/24HR
1 PATCH, TRANSDERMAL 24 HOURS TRANSDERMAL DAILY
Status: DISCONTINUED | OUTPATIENT
Start: 2021-05-13 | End: 2021-05-20

## 2021-05-12 RX ORDER — ATORVASTATIN CALCIUM 40 MG/1
40 TABLET, FILM COATED ORAL EVERY EVENING
Status: DISCONTINUED | OUTPATIENT
Start: 2021-05-13 | End: 2021-05-26 | Stop reason: HOSPADM

## 2021-05-12 RX ORDER — ONDANSETRON 2 MG/ML
4 INJECTION INTRAMUSCULAR; INTRAVENOUS EVERY 6 HOURS PRN
Status: DISCONTINUED | OUTPATIENT
Start: 2021-05-12 | End: 2021-05-20

## 2021-05-12 RX ORDER — ONDANSETRON 2 MG/ML
4 INJECTION INTRAMUSCULAR; INTRAVENOUS EVERY 6 HOURS PRN
Status: CANCELLED | OUTPATIENT
Start: 2021-05-12

## 2021-05-12 RX ORDER — NICOTINE 21 MG/24HR
1 PATCH, TRANSDERMAL 24 HOURS TRANSDERMAL DAILY
Status: CANCELLED | OUTPATIENT
Start: 2021-05-13

## 2021-05-12 RX ORDER — PANTOPRAZOLE SODIUM 40 MG/1
40 TABLET, DELAYED RELEASE ORAL
Status: CANCELLED | OUTPATIENT
Start: 2021-05-13

## 2021-05-12 RX ORDER — ASPIRIN 81 MG/1
81 TABLET ORAL DAILY
Status: DISCONTINUED | OUTPATIENT
Start: 2021-05-13 | End: 2021-05-20

## 2021-05-12 RX ORDER — FLUTICASONE FUROATE AND VILANTEROL 100; 25 UG/1; UG/1
1 POWDER RESPIRATORY (INHALATION) DAILY
Status: DISCONTINUED | OUTPATIENT
Start: 2021-05-13 | End: 2021-05-26 | Stop reason: HOSPADM

## 2021-05-12 RX ORDER — HEPARIN SODIUM 1000 [USP'U]/ML
2000 INJECTION, SOLUTION INTRAVENOUS; SUBCUTANEOUS
Status: CANCELLED | OUTPATIENT
Start: 2021-05-12

## 2021-05-12 RX ORDER — HEPARIN SODIUM 10000 [USP'U]/100ML
3-20 INJECTION, SOLUTION INTRAVENOUS
Status: CANCELLED | OUTPATIENT
Start: 2021-05-12

## 2021-05-12 RX ORDER — NITROGLYCERIN 0.4 MG/1
0.4 TABLET SUBLINGUAL
Status: CANCELLED | OUTPATIENT
Start: 2021-05-12

## 2021-05-12 RX ORDER — HEPARIN SODIUM 10000 [USP'U]/100ML
3-20 INJECTION, SOLUTION INTRAVENOUS
Status: DISCONTINUED | OUTPATIENT
Start: 2021-05-12 | End: 2021-05-13

## 2021-05-12 RX ORDER — ALBUTEROL SULFATE 90 UG/1
2 AEROSOL, METERED RESPIRATORY (INHALATION) EVERY 4 HOURS PRN
Status: CANCELLED | OUTPATIENT
Start: 2021-05-12

## 2021-05-12 RX ORDER — PANTOPRAZOLE SODIUM 40 MG/1
40 TABLET, DELAYED RELEASE ORAL
Status: DISCONTINUED | OUTPATIENT
Start: 2021-05-13 | End: 2021-05-20

## 2021-05-12 RX ORDER — ATORVASTATIN CALCIUM 40 MG/1
40 TABLET, FILM COATED ORAL EVERY EVENING
Status: CANCELLED | OUTPATIENT
Start: 2021-05-12

## 2021-05-12 RX ORDER — NITROGLYCERIN 0.4 MG/1
0.4 TABLET SUBLINGUAL
Status: DISCONTINUED | OUTPATIENT
Start: 2021-05-12 | End: 2021-05-20

## 2021-05-12 RX ORDER — HEPARIN SODIUM 1000 [USP'U]/ML
4000 INJECTION, SOLUTION INTRAVENOUS; SUBCUTANEOUS
Status: DISCONTINUED | OUTPATIENT
Start: 2021-05-12 | End: 2021-05-13

## 2021-05-12 RX ADMIN — METOPROLOL TARTRATE 25 MG: 25 TABLET, FILM COATED ORAL at 23:19

## 2021-05-12 RX ADMIN — PANTOPRAZOLE SODIUM 40 MG: 40 TABLET, DELAYED RELEASE ORAL at 05:32

## 2021-05-12 RX ADMIN — FUROSEMIDE 20 MG: 10 INJECTION, SOLUTION INTRAMUSCULAR; INTRAVENOUS at 23:19

## 2021-05-12 RX ADMIN — HEPARIN SODIUM 17.1 UNITS/KG/HR: 10000 INJECTION, SOLUTION INTRAVENOUS at 17:23

## 2021-05-12 RX ADMIN — HEPARIN SODIUM 4000 UNITS: 1000 INJECTION INTRAVENOUS; SUBCUTANEOUS at 04:20

## 2021-05-12 RX ADMIN — FLUTICASONE FUROATE AND VILANTEROL TRIFENATATE 1 PUFF: 100; 25 POWDER RESPIRATORY (INHALATION) at 10:32

## 2021-05-12 RX ADMIN — ATORVASTATIN CALCIUM 40 MG: 40 TABLET, FILM COATED ORAL at 17:23

## 2021-05-12 RX ADMIN — ASPIRIN 81 MG: 81 TABLET ORAL at 10:32

## 2021-05-12 RX ADMIN — HEPARIN SODIUM 2000 UNITS: 1000 INJECTION INTRAVENOUS; SUBCUTANEOUS at 13:47

## 2021-05-12 NOTE — TRANSPORTATION MEDICAL NECESSITY
Section I - General Information    Name of Patient: Osmin Merritt                 : 1955    Medicare #: [id number]  Transport Date: 21 (PCS is valid for round trips on this date and for all repetitive trips in the 60-day range as noted below )  Origin: [address]                                                         Destination: [facility]  Is the pt's stay covered under Medicare Part A (PPS/DRG)   []     Closest appropriate facility? If no, why is transport to more distant facility required? Yes  If hospice pt, is this transport related to pt's terminal illness? NA       Section II - Medical Necessity Questionnaire  Ambulance transportation is medically necessary only if other means of transport are contraindicated or would be potentially harmful to the patient  To meet this requirement, the patient must either be "bed confined" or suffer from a condition such that transport by means other than ambulance is contraindicated by the patient's condition  The following questions must be answered by the medical professional signing below for this form to be valid:    1)  Describe the MEDICAL CONDITION (physical and/or mental) of this patient AT [address] [place] [address] that requires the patient to be transported in an ambulance and why transport by other means is contraindicated by the patient's condition:  Patient needs to be transfer to higher level of care for cardiac cath and CT surgery evaluation  that can not be completed at this hospital  Patient is also on 2 L oxygen, HT 6' 2" and  lbs  Patient will cardiac monitor and heparin drip  2) Is the patient "bed confined" as defined below? No  To be "be confined" the patient must satisfy all three of the following conditions: (1) unable to get up from bed without Assistance; AND (2) unable to ambulate; AND (3) unable to sit in a chair or wheelchair      3) Can this patient safely be transported by car or wheelchair van (i e , seated during transport without a medical attendant or monitoring)? No    4) In addition to completing questions 1-3 above, please check any of the following conditions that apply*:   *Note: supporting documentation for any boxes checked must be maintained in the patient's medical records  If hosp-hosp transfer, describe services needed at 2nd facility not available at 1st facility? Cardiac cath & CT surgery evaluation  IV meds/fluids required   Medical attendant required   Requires oxygen-unable to self administer  Unable to tolerate seated position for time needed to transport   Cardiac monitoring required en route       Section III - Signature of Physician or Healthcare Professional  I certify that the above information is true and correct based on my evaluation of this patient, and represent that the patient requires transport by ambulance and that other forms of transport are contraindicated  I understand that this information will be used by the Centers for Medicare and Medicaid Services (CMS) to support the determination of medical necessity for ambulance services, and I represent that I have personal knowledge of the patient's condition at time of transport  []  If this box is checked, I also certify that the patient is physically or mentally incapable of signing the ambulance service's claim and that the institution with which I am affiliated has furnished care, services, or assistance to the patient  My signature below is made on behalf of the patient pursuant to 42 CFR §424 36(b)(4)  In accordance with 42 CFR §424 37, the specific reason(s) that the patient is physically or mentally incapable of signing the claim form is as follows        Signature of Physician* or Healthcare Professional__MAGO Funk, MYRIAM____________________________________________________________  Signature Date 05/12/21 (For scheduled repetitive transports, this form is not valid for transports performed more than 60 days after this date)    Printed Name & Credentials of Physician or Healthcare Professional (MD, DO, RN, etc )__Carolin Martinez, MAGO ,POLLOW______________________________  *Form must be signed by patient's attending physician for scheduled, repetitive transports   For non-repetitive, unscheduled ambulance transports, if unable to obtain the signature of the attending physician, any of the following may sign (choose appropriate option below)  [] Physician Assistant []  Clinical Nurse Specialist []  Registered Nurse  []  Nurse Practitioner  [x] Discharge Planner

## 2021-05-12 NOTE — DISCHARGE SUMMARY
[address] [facility]  Discharge- Jc Better 1955, 78 y o  male MRN: [id number]  Unit/Bed#: E4 -01 Encounter: [id number]  Primary Care Provider: Megan Gonsalez DO   Date and time admitted to hospital: 5/11/2021  5:24 PM        Admission Date: 5/11/2021       Discharge Date:  05/12/2021    Primary Diagnoses  Principal Problem:    NSTEMI (non-ST elevated myocardial infarction) [facility])  Active Problems: Aortic valve stenosis - severe    SOB (shortness of breath)    Chronic obstructive lung disease (HCC)    Hyperlipidemia    Essential hypertension    Current smoker    GERD (gastroesophageal reflux disease)  Resolved Problems:    * No resolved hospital problems  [facility] CL[facility] Course by problem:  * NSTEMI (non-ST elevated myocardial infarction) [facility])  Assessment & Plan  -Pt presented to [facility] via EMS from his PCP office  Pt noted 2 episodes of substernal burning non-radiating CP, episodes last 5 minutes each and then CP subsided  Associated SOB and frontal HA that also subside after 5 minutes  Given aspirin and nitro in ER  -patient was admitted to the hospital   -was noted to have positive troponin that peaked at 0 39  -associated EKG with anterior lateral ST depressions  -see patient was started on aspirin, IV heparin ACS protocol    Due to relative hypotension did not tolerate beta-blocker  -patient seen in consultation by the cardiology team who noted patient's presentation was consistent with a non STEMI possibly type 1, however more probably type 2 due to severe aortic stenosis  -cardiology team recommends transfer to [facility] for cardiac catheterization as well as concurrent CT surgery evaluation for aortic valve replacement  -patient currently hemodynamically stable, and chest pain-free  -he is in agreement for transfer to [facility]    SOB (shortness of breath)  Assessment & Plan  -Pt has been evaluated as an outpatient with worsening dyspnea, progressive over the past several months     -Was noted to have concurrent dyspnea on exertion as well as lower extremity edema   -Has had multiple outpatient encounters where he was diagnosed with bronchitis and PNA since 1/2021  -Multiple ABX and steroid courses since January  Most recently dx with R middle lobe PNA on 4/12/2021 and 4/28/2021 - treated with Augmentin initially and then with Levaquin    -outpt CT without infiltrate and procal 4/28 neg  -Previously followed with Pulm for COPD  Last visit was 10/2019   -patient's dyspnea is likely multifactorial, secondary to his severe aortic stenosis, as well as non STEMI  -not appear to have acute COPD exacerbation  -initially there were concerns for possible acute diastolic congestive heart failure as his BMP was elevated at 4,439:  Patient was given 1 dose of Lasix 20 mg IV, does not appear volume overloaded will add hold additional diuretics, especially due to borderline hypotension   -repeat echocardiogram pending  -patient's case was discussed with the cardiology team:  They recommend transfer to [facility] for cardiac catheterization and CT surgery evaluation for aortic valve replacement      Aortic valve stenosis - severe  Assessment & Plan  -Patient has a history of severe aortic stenosis  -Most recent 2D echocardiogram 06/2019 with severe aortic valvular stenosis  Peak transaortic velocity 4 2, gradient 45, aortic valve area 0 6  -Patient presented with significant dyspnea on exertion, as well as non STEMI  -Was evaluated by the cardiology team who recommended transfer to [facility] for cardiac catheterization and CT surgery evaluation    GERD (gastroesophageal reflux disease)  Assessment & Plan  Continue protonix     Current smoker  Assessment & Plan  Counseled on smoking cessation  Smokes 1/2 ppd     Nicotine patch ordered    Essential hypertension  Assessment & Plan  Patient has a history of essential hypertension  Was noted to have borderline hypotension on admission and home amlodipine and losartan are currently on hold  Systolic blood pressure currently ranging 90s-100s  Continue to hold medications and monitor  Hold additional diuresis      Hyperlipidemia  Assessment & Plan  Continue statin    Chronic obstructive lung disease ([address] )  Assessment & Plan  Pt has a h/o COPD  No acute exacerbation  Continue with Anoro ellipta daily, and prn albuterol  Has been recently on outpt prednisone taper for cough and dyspnea      Service:  [facility], [facility]  Consulting Providers   Cardiology: Dr Primo Gamble    Procedures Performed   None    Hospital Studies:  5/11 chest x-ray:  Cardiomegaly and mild pulmonary vascular congestion  Lingular subsegmental atelectasis  Results from last 7 days   Lab Units 05/12/21  0335 05/11/21  1742   WBC Thousand/uL 12 66* 12 76*   HEMOGLOBIN g/dL 12 7 13 8   HEMATOCRIT % 40 3 41 5   PLATELETS Thousands/uL 223 252     Results from last 7 days   Lab Units 05/12/21  0327 05/11/21  1742   POTASSIUM mmol/L 4 4 4 3   CHLORIDE mmol/L 108 106   CO2 mmol/L 28 27   BUN mg/dL 13 14   CREATININE mg/dL 0 80 0 84   CALCIUM mg/dL 8 9 9 4       History and Physical Exam:  Please refer to the Admission H&P note    Discharge Condition: Improved  Discharge Disposition:  [facility]    Discharge Note and Physical Exam:   Patient denies any current chest pain or chest discomfort at this time  He denies any shortness of breath at rest   He denies any orthopnea  He notes dyspnea with minimal exertion  He denies any abdominal pain, nausea, vomiting, diarrhea  He is tolerating p o  Rukhsana Cruel Vitals:    05/12/21 1142   BP: 102/52   Pulse: 86   Resp: 18   Temp: 97 9 °F (36 6 °C)   SpO2: 95%     General:  Pleasant, non-tachypnic, non-dyspnic  Conversant  Able to speak in complete sentences without having to stop for dyspnea  Heart: Regular rate and rhythm, S1S2 present   + 2/6 systolic ejection murmur    Loudest at the right upper sternal border  Noted across the precordium  No  rub or gallop  Lungs:  Coarse breath sounds bilaterally  No wheezes, crackles, rhonchi  Decreased air movement  No accessory muscle use or respiratory distress  Abdomen: soft, non-tender, non-distended, NABS  No rebound or guarding  No mass or peritoneal signs  Extremities: no clubbing, cyanosis or edema  2+ pedal pulses bilaterally  Neurologic:  Awake and alert  Fluent and goal directed speech  Interactive  Moving all 4 extremities  Skin: warm and dry  No petechiae, purpura or rash  Discharge Medications   Please see Medical Reconciliation Discharge Form    Discharge Follow Up Appointments: Will be arranged at discharge from [facility]          Discussed with patient's nurse  Discussed with   Reuben/ Dr Flakita Alvarez and Umair Wiseman PA-C, cardiology team: Recommend transfer to [facility]  Discussed with transfer center and accepting service SLIM at [facility]        Family:  Called pts' wife Kadi Ernst and LM to call my cell phone for update    This note has been constructed using a voice recognition system

## 2021-05-12 NOTE — ASSESSMENT & PLAN NOTE
-Pt has been evaluated as an outpatient with worsening dyspnea, progressive over the past several months   -Was noted to have concurrent dyspnea on exertion as well as lower extremity edema   -Has had multiple outpatient encounters where he was diagnosed with bronchitis and PNA since 1/2021  -Multiple ABX and steroid courses since January  Most recently dx with R middle lobe PNA on 4/12/2021 and 4/28/2021 - treated with Augmentin initially and then with Levaquin    -Previously followed with Pulm for COPD   Last visit was 10/2019   -patient's dyspnea is likely multifactorial, secondary to his severe aortic stenosis, as well as non STEMI  -not appear to have acute COPD exacerbation  -initially there were concerns for possible acute diastolic congestive heart failure as his BMP was elevated at 4,439:  Patient was given 1 dose of Lasix 20 mg IV, does not appear volume overloaded will add hold additional diuretics, especially due to borderline hypotension   -repeat echocardiogram pending  -patient's case was discussed with the cardiology team:  They recommend transfer to [facility] for cardiac catheterization and CT surgery evaluation for aortic valve replacement

## 2021-05-12 NOTE — ASSESSMENT & PLAN NOTE
Patient has a history of essential hypertension  Was noted to have borderline hypotension on admission and home amlodipine and losartan are currently on hold  Systolic blood pressure currently ranging 90s-100s  Continue to hold medications and monitor  Hold additional diuresis

## 2021-05-12 NOTE — UTILIZATION REVIEW
Initial Clinical Review    Admission: Date/Time/Statement:   Admission Orders (From admission, onward)     Ordered        05/11/21 1828  Place in Observation  Once                   Orders Placed This Encounter   Procedures    Place in Observation     Standing Status:   Standing     Number of Occurrences:   1     Order Specific Question:   Level of Care     Answer:   Med Surg [16]     ED Arrival Information     Expected Arrival [address] Dasilva of Arrival Escorted By Service Admission Type    5/11/2021 5/11/2021 17:24 [facility]    Arrival Complaint    chest pain        Chief Complaint   Patient presents with    Chest Pain     Pt reports SOB for "couple days" reports went to PCP for evaluation and upon arrival experienced episode of chest pain  Pt reports lasted about 5 minutes  Initial Presentation: 78 y o  male w/ PMH of severe aortic stenosis, HTN, COPD who presents to ED from PCP office with substernal, non radiating chest pain associated w/ SOB &  frontal headache  - lasted about5 minutes  He reports he has had intermittent chest discomfort for some time, typically associated with exertion  In ED initial troponin elevated 0 06, EKG with ST depressions, BNP elevated to 4493  XIN score of 6  WBC's 12 76    Admitted 5/11 to Observation with Chest Pain Concerning for unstable angina verses symptomatic severe aortic stenosis, SOB  Plan is for Telemetry, Cardio consult, continue asa, statin, start Heparin drip, prn ntg, ECHO, Lasix x 1      Day 2: Pending    ED Triage Vitals   Temperature Pulse Respirations Blood Pressure SpO2   05/11/21 1735 05/11/21 1735 05/11/21 1735 05/11/21 1735 05/11/21 1735   98 4 °F (36 9 °C) 101 20 106/74 94 %      Temp Source Heart Rate Source Patient Position - Orthostatic VS BP Location FiO2 (%)   05/11/21 1735 05/11/21 1735 05/11/21 1735 05/11/21 1735 --   Oral Monitor Lying Right arm       Pain Score       05/11/21 6535 No Pain          Wt Readings from Last 1 Encounters:   05/11/21 112 kg (246 lb 14 6 oz)     Additional Vital Signs:   05/12/21 0745  97 6 °F (36 4 °C)  73  18  100/68  80  96 %  --  --  Nasal cannula  Lying   05/12/21 0541  --  --  --  102/70  --  --  --  --  --  Lying - Orthostatic VS   05/11/21 2343  97 3 °F (36 3 °C)Abnormal   80  18  97/53  71  96 %  --  --  Nasal cannula  Lying   05/11/21 2049  --  --  --  96/62  --  --  --  --  --  Lying   05/11/21 1949  97 5 °F (36 4 °C)  86  18  102/78  --  94 %  --  --  Nasal cannula  Lying   05/11/21 1830  --  100  16  116/82  95  94 %  28  2 L/min  Nasal cannula  Lying       Pertinent Labs/Diagnostic Test Results:   Results from last 7 days   Lab Units 05/12/21  0335 05/11/21  1742   WBC Thousand/uL 12 66* 12 76*   HEMOGLOBIN g/dL 12 7 13 8   HEMATOCRIT % 40 3 41 5   PLATELETS Thousands/uL 223 252     Results from last 7 days   Lab Units 05/12/21  0327 05/11/21  1742   SODIUM mmol/L 142 140   POTASSIUM mmol/L 4 4 4 3   CHLORIDE mmol/L 108 106   CO2 mmol/L 28 27   ANION GAP mmol/L 6 7   BUN mg/dL 13 14   CREATININE mg/dL 0 80 0 84   EGFR ml/min/1 73sq m 92 91   CALCIUM mg/dL 8 9 9 4     Results from last 7 days   Lab Units 05/11/21  1742   AST U/L 14   ALT U/L 23   ALK PHOS U/L 73   TOTAL PROTEIN g/dL 7 1   ALBUMIN g/dL 3 9   TOTAL BILIRUBIN mg/dL 0 78     Results from last 7 days   Lab Units 05/12/21  0327 05/11/21  1742   GLUCOSE RANDOM mg/dL 116 131     Results from last 7 days   Lab Units 05/12/21  0327 05/12/21  0016 05/11/21 2059 05/11/21  1742   TROPONIN I ng/mL 0 39* 0 24* 0 16* 0 06*     Results from last 7 days   Lab Units 05/12/21  0327 05/11/21  1742   PROTIME seconds 14 3 13 5   INR  1 13 1 05   PTT seconds 37 28     Results from last 7 days   Lab Units 05/11/21  1742 05/10/21  1622   NT-PRO BNP pg/mL 4,493* 3,228*     5/11 EKG: ECG rate assessment: normal      Rhythm: sinus rhythm      Ectopy: none      QRS axis:  Normal    Conduction: normal      ST segments:  Non-specific    Depression:  V5, V6 and V4    T waves: normal    5/11 CXR:  Cardiomegaly and mild pulmonary vascular congestion   Lingular subsegmental atelectasis    ECHO READ Pending    ED Treatment:   Medication Administration from 05/11/2021 1651 to 05/11/2021 1914     None        Past Medical History:   Diagnosis Date    Broken humerus     COPD (chronic obstructive pulmonary disease) ([facility] [address] )     Pneumonia      Present on Admission:   Hyperlipidemia   Aortic valve stenosis - severe   Chronic obstructive lung disease (HCC)   Current smoker   Essential hypertension      Admitting Diagnosis: Aortic stenosis [I35 0]  Chest pain [R07 9]  Dyspnea [R06 00]  Elevated troponin [R77 8]  Elevated brain natriuretic peptide (BNP) level [R79 89]  Age/Sex: 78 y o  male     Admission Orders:  Scheduled Medications:  aspirin, 81 mg, Oral, Daily  atorvastatin, 40 mg, Oral, QPM  fluticasone-vilanterol, 1 puff, Inhalation, Daily  furosemide, 20 mg, Intravenous, Once  nicotine, 1 patch, Transdermal, Daily  pantoprazole, 40 mg, Oral, Early Morning    Continuous IV Infusions:  heparin (porcine), 3-20 Units/kg/hr (Order-Specific), Intravenous, Titrated    PRN Meds:  albuterol, 2 puff, Inhalation, Q4H PRN  heparin (porcine), 2,000 Units, Intravenous, Q1H PRN  heparin (porcine), 4,000 Units, Intravenous, Q1H PRN  nitroglycerin, 0 4 mg, Sublingual, Q5 Min PRN  ondansetron, 4 mg, Intravenous, Q6H PRN    Telemetry  SCDs  IP CONSULT TO CARDIOLOGY  IP CONSULT TO CASE MANAGEMENT    Network Utilization Review Department  ATTENTION: Please call with any questions or concerns to [phone] and carefully listen to the prompts so that you are directed to the right person  All voicemails are confidential   Candia Siemens all requests for admission clinical reviews, approved or denied determinations and any other requests to dedicated fax number below belonging to the campus where the patient is receiving treatment   List of dedicated fax numbers for the Facilities:  [address] DENIALS (Administrative/Medical Necessity) [phone]   [address]  (Maternity/NICU/Pediatrics) 261 [facility] Floor [facility] [address] [place] [address] Dr Danny Gonzalez [phone] [address] [place] [address] [address] [facility] [address] [facility] [phone]

## 2021-05-12 NOTE — ASSESSMENT & PLAN NOTE
Pt has a h/o COPD  No acute exacerbation  Continue with Anoro ellipta daily, and prn albuterol  Has been recently on outpt prednisone taper for cough and dyspnea

## 2021-05-12 NOTE — ASSESSMENT & PLAN NOTE
-Pt presented to [facility] via EMS from his PCP office  Pt noted 2 episodes of substernal burning non-radiating CP, episodes last 5 minutes each and then CP subsided  Associated SOB and frontal HA that also subside after 5 minutes  Given aspirin and nitro in ER  -patient was admitted to the hospital   -was noted to have positive troponin that peaked at 0 39  -associated EKG with anterior lateral ST depressions  -see patient was started on aspirin, IV heparin ACS protocol  Due to relative hypotension did not tolerate beta-blocker  -patient seen in consultation by the cardiology team who noted patient's presentation was consistent with a non STEMI possibly type 1, however more probably type 2 due to severe aortic stenosis  -cardiology team recommends transfer to [facility] for cardiac catheterization as well as concurrent CT surgery evaluation for aortic valve replacement  -patient currently hemodynamically stable, and chest pain-free  -he is in agreement for transfer to [place]        Currently asymptomatic and without chest pain  Prior diagnosis of coronary artery disease  Initial Troponin elevated at 0 06   BNP - 4,439   EKG with ST depressions   ECHO 6/2019 reviewed - EF 60%, severe AS   XIN score - 6  Concerning for unstable angina verses symptomatic severe aortic stenosis  Continue aspirin, statin  Start heparin infusion  P r n   Nitro for chest pain  Consult Cardiology  Obtain echocardiogram  Will make NPO at midnight as to not delay potential cardiac cath

## 2021-05-12 NOTE — CONSULTS
Consult - Cardiology   Neris Weiss 78 y o  male MRN: [id number]  Unit/Bed#: E4 -01 Encounter: [id number]        Reason For Consult:  Dyspnea, chest pain with abnormal troponin and proBNP                 Assessment:  Abnormal troponin - probable non-STEMI:  Possible type 1 versus more probable type 2 due to severe AS  Severe symptomatic aortic stenosis  Possible mild congestive heart failure-unspecified type  Dyslipidemia:   Lipitor 20 mg  Hypertension:  Controlled with low normal SBP trend   O/p Rx:  Norvasc 10 mg, Cozaar 100 mg  Active tobacco abuse, COPD    Discussion / Plan:  #  patient with known severe aortic stenosis (HUMBERTO 0 6 cm2 -2019) with approximately a 1 year history of waning function capacities now symptomatic with intermittent recurrent MATTHEW and chest pain suspecting current presentation of chest pain and dyspnea may be due to progression of valvular heart disease though a component of coronary insufficiency is also plausible    #  suspect this patient probably presented with some component of mild CHF as suggested by radiograph, proBNP, and initial exam which in the aftermath of diuretic seems improved at the time of my examination    · Echocardiogram completed this morning with review forthcoming with expectation of progression of aortic stenosis and LVH with concern for the possibility of new cardiomyopathy  · The patient is currently oxygenating well with exam that does not endorse significant volume overload ~~> given his severe AS and concerns for low EF with low normal BP and preload dependence will not give further IV diuretic at this point  · With near future need for aortic valvular intervention as well as the possibility of intercurrent obstructive CAD and his current symptoms, catheterization was advised and agreed to by the patient ~~> in light of his current issues this may be best completed at the [facility] (will feed patient)  · For now continue heparin infusion, statin, and aspirin  · Check lipid profile                History Of Present Illness:  Fanny Narayan is a 79-year-old patient of Dr Ariel Samson (PCP)  In 2014 because of her murmur the patient was sent for an echocardiogram which at that time reported mild-moderate aortic stenosis  He was not routinely cared for by a cardiologist though in 2019, after another echocardiogram reported progression of disease with then severe AS (HUMBERTO 0 6 centimeters squared with LVH and normal EF), he was referred for cardiology consultation and seen by Dr Shena Hanson in August of that year  At that time the patient suggested lack of symptoms indicating that he could ascend 3 flight of steps with only mild dyspnea at the top with notation of good blood pressure control  It was planned that the patient would have a repeat echocardiogram in the near future with the understanding that he should be vigilant for, and report any change in his functional abilities or other symptoms - this did not happen and he did not return for follow-up  Other known problems include hypertension, dyslipidemia, and ongoing tobacco abuse  During the last year the patient has noticed worsening effort tolerance with progressive MATTHEW and some feelings of chest discomfort  In the last few months he began to experience effort related chest pain which he states occurs only a few times per month and is described as a left precordial pressure occurring with effort and resolving within 5 minutes of rest     In early April this patient developed a cough with expectoration and worsened dyspnea with chest x-ray 4/7/2021 reporting probable right middle lobe infiltrate  He was initiated on level FLOX of some by his PCP with improvement  Though patient's cough and expectoration improved he continued to be bothered by symptoms of dyspnea with a CT scan on 4/28/2021 reporting signs of mild pulmonary edema    He returned to the office of his PCP on 5/10 with continued complaints of dyspnea with exam reporting bilateral rales without other signs of peripheral edema  With uncertainty as to the root cause of the patient's symptoms he was initiated on prednisone with order placed for proBNP and echocardiogram which was scheduled for 5/12  He returned to the office in next day (5/11) for re-evaluation noting that proBNP the day prior was abnormal at 3228  After walking through the office the patient experienced dyspnea and chest pain which she reported as a precordial pressure  In light of his cardiac history and recent problems he was brought to the hospital by ambulance further evaluation  Patient presented with normal O2 saturation on 2 L laser cannula with low normal SBP  ProBNP was repeated and was 4493 with chest x-ray reporting mild pulmonary vascular congestion  With these and some peripheral edema he was given Lasix  Initial troponin was 0 06 and he was initiated on a heparin drip  Subsequent levels have been 0 16, 0 24, and 0 39  At the time my visit the patient is comfortable at rest denying any recurrent chest pain, or recent symptoms of orthopnea or PND  Past Medical History:        Past Medical History:   Diagnosis Date    Broken humerus     COPD (chronic obstructive pulmonary disease) ([facility] [address] )     Pneumonia     History reviewed  No pertinent surgical history  Allergy:        No Known Allergies    Medications:       Prior to Admission medications    Medication Sig Start Date End Date Taking?  Authorizing Provider   albuterol (PROVENTIL HFA,VENTOLIN HFA) 90 mcg/act inhaler INHALE 1-2 PUFFS EVERY 4 (FOUR) HOURS AS NEEDED FOR WHEEZING 3/8/21  Yes Megan Gonsalez DO   amLODIPine (NORVASC) 10 mg tablet TAKE 1 TABLET (10 MG TOTAL) BY MOUTH DAILY 7/9/20  Yes Megan Gonsalez DO   Anoro Ellipta 62 5-25 MCG/INH inhaler INHALE 1 PUFF BY MOUTH EVERY DAY 1/19/21  Yes Megan Gonsalez DO   aspirin (ECOTRIN LOW STRENGTH) 81 mg EC tablet Take 1 tablet (81 mg total) by mouth daily 3/26/18  Yes Marlon Austin DO   Aspirin-Acetaminophen-Caffeine (EXCEDRIN MIGRAINE PO) Take by mouth   Yes Historical Provider, MD   atorvastatin (LIPITOR) 20 mg tablet TAKE 1 TABLET BY MOUTH EVERY DAY 3/25/21  Yes Marlon Austin DO   dextromethorphan-guaifenesin [facility] DM)  MG per 12 hr tablet Take 1 tablet by mouth every 12 (twelve) hours 4/7/21  Yes Michell Arizmendi MD   losartan (COZAAR) 100 MG tablet TAKE 1 TABLET BY MOUTH EVERY DAY 10/20/20  Yes Marlon Austin DO   Multiple Vitamin (MULTI-VITAMIN DAILY PO) Take 1 tablet by mouth daily 7/1/14  Yes Historical Provider, MD   pantoprazole (PROTONIX) 40 mg tablet TAKE 1 TABLET BY MOUTH EVERY DAY 2/16/21  Yes Marlon Austin DO   predniSONE 20 mg tablet Please take 3 pills on day 1, 2, 3,  Then 2 pills on day 4, 5 and 6, 1 pill on day 7 5/10/21  Yes Michell Arizmendi MD       Family History:     Family History   Problem Relation Age of Onset    Prostate cancer Father         Social History:       Social History     Socioeconomic History    Marital status: /Civil Union     Spouse name: None    Number of children: None    Years of education: None    Highest education level: None   Occupational History    Occupation: Machinery Repair     Comment: packaging raheel  - full time employment   Social Needs    Financial resource strain: None    Food insecurity     Worry: None     Inability: None    Transportation needs     Medical: None     Non-medical: None   Tobacco Use    Smoking status: Current Some Day Smoker     Packs/day: 0 50     Types: Cigarettes     Start date: 1989    Smokeless tobacco: Never Used   Substance and Sexual Activity    Alcohol use: Yes     Frequency: 2-4 times a month     Comment: 1-2/wk    Drug use: No    Sexual activity: None   Lifestyle    Physical activity     Days per week: None     Minutes per session: None    Stress: None   Relationships    Social connections     Talks on phone: None     Gets together: None     Attends Mosque service: None     Active member of club or organization: None     Attends meetings of clubs or organizations: None     Relationship status: None    Intimate partner violence     Fear of current or ex partner: None     Emotionally abused: None     Physically abused: None     Forced sexual activity: None   Other Topics Concern    None   Social History Narrative    None       ROS:  Symptoms per HPI  Currently smoking approximately 1/2 pack of cigarettes daily  Patient has for long time slept on 2 pillows denying any symptoms of orthopnea or PND  Possible insidious and unquantified weight gain over the last year with no acute worsening  The remainder of the review of systems is negative    Exam:  General:  Alert, normally conversant, comfortable appearing  Head: Normocephalic, atraumatic  Eyes:  EOMI  Pupils - equal, round, reactive to accomodation  No icterus  Normal Conjunctiva  Oropharynx: Moist without lesion  Neck:  No JVD, thyromegaly, or lymphadenopathy  Heart:  Regular with controlled rate  Basilar TERESA transmitted over the precordium with absent S2  Lungs:  Few scattered end-expiratory wheezes and few coarse rhonchi without rales  Abdomen:  Soft and nontender with normal bowel sounds  No organomegaly or mass  Lower Limbs:  Trivial edema of the distal pretibial region  Pulses[de-identified]  RLE - DP:  1-2+                 LLE - DP:  1-2+  Musculoskeletal: Independent movement of limbs observed, Formal ROM and strength eval not performed  Neurologic:    Oriented to: person, place, situation  Cranial Nerves: grossly intact - vision, smell, taste, and hearing were not tested       Motor function: grossly normal, symmetric   Sensation: Was not tested    Vitals:    05/11/21 2049 05/11/21 2343 05/12/21 0541 05/12/21 0745   BP: 96/62 97/53 102/70 100/68   BP Location: Right arm Right arm Right arm Right arm   Pulse:  80  73   Resp:  18  18   Temp:  (!) 97 3 °F (36 3 °C)  97 6 °F (36 4 °C)   TempSrc:  Temporal Temporal   SpO2:  96%  96%   Weight:       Height:               DATA:      ECG:   Normal sinus rhythm  Possible Left atrial enlargement  Left axis deviation  Incomplete right bundle branch block  Septal infarct , age undetermined  ST & T wave abnormality, consider inferolateral ischemia  Abnormal ECG  No previous ECGs available  Confirmed by Priscilla Maldonado ([id number]) on 5/12/2021 8:54:36 AM                  -----------------------------------------------------------------------------------------------------------------------------------------------  Telemetry:   Normal sinus rhythm with ventricular rate        -----------------------------------------------------------------------------------------------------------------------------------------------  Weights: Wt Readings from Last 20 Encounters:   05/11/21 112 kg (246 lb 14 6 oz)   05/11/21 113 kg (250 lb)   05/10/21 112 kg (246 lb 3 2 oz)   04/28/21 111 kg (244 lb)   04/12/21 111 kg (244 lb)   04/07/21 110 kg (242 lb 9 6 oz)   02/23/21 112 kg (248 lb)   01/26/21 111 kg (244 lb)   07/24/20 109 kg (241 lb)   03/31/20 109 kg (240 lb)   10/15/19 108 kg (238 lb 12 8 oz)   08/16/19 107 kg (236 lb)   07/15/19 108 kg (238 lb)   07/12/19 108 kg (237 lb)   04/16/19 108 kg (238 lb 3 2 oz)   03/26/18 112 kg (246 lb)   02/22/17 110 kg (242 lb)   08/30/16 108 kg (239 lb 2 oz)   05/24/16 110 kg (242 lb)   02/09/16 111 kg (244 lb 2 oz)   , Body mass index is 31 7 kg/m²           Lab Studies:    Results from last 7 days   Lab Units 05/12/21  0327 05/12/21  0016 05/11/21  2059   TROPONIN I ng/mL 0 39* 0 24* 0 16*            Results from last 7 days   Lab Units 05/12/21  0335 05/11/21  1742   WBC Thousand/uL 12 66* 12 76*   HEMOGLOBIN g/dL 12 7 13 8   HEMATOCRIT % 40 3 41 5   PLATELETS Thousands/uL 223 252   ,   Results from last 7 days   Lab Units 05/12/21  0327 05/11/21  1742   POTASSIUM mmol/L 4 4 4 3   CHLORIDE mmol/L 108 106   CO2 mmol/L 28 27   BUN mg/dL 13 14 CREATININE mg/dL 0 80 0 84   CALCIUM mg/dL 8 9 9 4   ALK PHOS U/L  --  73   ALT U/L  --  23   AST U/L  --  14

## 2021-05-12 NOTE — CASE MANAGEMENT
Patient needs to be transfer to higher level of care for cardiac cath and CT surgery evaluation that can not be completed at this hospital  Completed Medical Necessity form, copy was sent to [place] and on put in binder

## 2021-05-12 NOTE — ASSESSMENT & PLAN NOTE
-Patient has a history of severe aortic stenosis  -Most recent 2D echocardiogram 06/2019 with severe aortic valvular stenosis    Peak transaortic velocity 4 2, gradient 45, aortic valve area 0 6  -Patient presented with significant dyspnea on exertion, as well as non STEMI  -Was evaluated by the cardiology team who recommended transfer to [facility] for cardiac catheterization and CT surgery evaluation

## 2021-05-12 NOTE — PLAN OF CARE
Problem: Potential for Falls  Goal: Patient will remain free of falls  Description: INTERVENTIONS:  - Assess patient frequently for physical needs  -  Identify cognitive and physical deficits and behaviors that affect risk of falls    -  [place] fall precautions as indicated by assessment   - Educate patient/family on patient safety including physical limitations  - Instruct patient to call for assistance with activity based on assessment  - Modify environment to reduce risk of injury  - Consider OT/PT consult to assist with strengthening/mobility  Outcome: Progressing     Problem: CARDIOVASCULAR - ADULT  Goal: Maintains optimal cardiac output and hemodynamic stability  Description: INTERVENTIONS:  - Monitor I/O, vital signs and rhythm  - Monitor for S/S and trends of decreased cardiac output  - Administer and titrate ordered vasoactive medications to optimize hemodynamic stability  - Assess quality of pulses, skin color and temperature  - Assess for signs of decreased coronary artery perfusion  - Instruct patient to report change in severity of symptoms  Outcome: Progressing  Goal: Absence of cardiac dysrhythmias or at baseline rhythm  Description: INTERVENTIONS:  - Continuous cardiac monitoring, vital signs, obtain 12 lead EKG if ordered  - Administer antiarrhythmic and heart rate control medications as ordered  - Monitor electrolytes and administer replacement therapy as ordered  Outcome: Progressing     Problem: RESPIRATORY - ADULT  Goal: Achieves optimal ventilation and oxygenation  Description: INTERVENTIONS:  - Assess for changes in respiratory status  - Assess for changes in mentation and behavior  - Position to facilitate oxygenation and minimize respiratory effort  - Oxygen administered by appropriate delivery if ordered  - Initiate smoking cessation education as indicated  - Encourage broncho-pulmonary hygiene including cough, deep breathe, Incentive Spirometry  - Assess the need for suctioning and aspirate as needed  - Assess and instruct to report SOB or any respiratory difficulty  - Respiratory Therapy support as indicated  Outcome: Progressing     Problem: PAIN - ADULT  Goal: Verbalizes/displays adequate comfort level or baseline comfort level  Description: Interventions:  - Encourage patient to monitor pain and request assistance  - Assess pain using appropriate pain scale  - Administer analgesics based on type and severity of pain and evaluate response  - Implement non-pharmacological measures as appropriate and evaluate response  - Consider cultural and social influences on pain and pain management  - Notify physician/advanced practitioner if interventions unsuccessful or patient reports new pain  Outcome: Progressing     Problem: INFECTION - ADULT  Goal: Absence or prevention of progression during hospitalization  Description: INTERVENTIONS:  - Assess and monitor for signs and symptoms of infection  - Monitor lab/diagnostic results  - Monitor all insertion sites, i e  indwelling lines, tubes, and drains  - Monitor endotracheal if appropriate and nasal secretions for changes in amount and color  - [place] appropriate cooling/warming therapies per order  - Administer medications as ordered  - Instruct and encourage patient and family to use good hand hygiene technique  - Identify and instruct in appropriate isolation precautions for identified infection/condition  Outcome: Progressing  Goal: Absence of fever/infection during neutropenic period  Description: INTERVENTIONS:  - Monitor WBC    Outcome: Progressing     Problem: SAFETY ADULT  Goal: Patient will remain free of falls  Description: INTERVENTIONS:  - Assess patient frequently for physical needs  -  Identify cognitive and physical deficits and behaviors that affect risk of falls    -  [place] fall precautions as indicated by assessment   - Educate patient/family on patient safety including physical limitations  - Instruct patient to call for assistance with activity based on assessment  - Modify environment to reduce risk of injury  - Consider OT/PT consult to assist with strengthening/mobility  Outcome: Progressing  Goal: Maintain or return to baseline ADL function  Description: INTERVENTIONS:  -  Assess patient's ability to carry out ADLs; assess patient's baseline for ADL function and identify physical deficits which impact ability to perform ADLs (bathing, care of mouth/teeth, toileting, grooming, dressing, etc )  - Assess/evaluate cause of self-care deficits   - Assess range of motion  - Assess patient's mobility; develop plan if impaired  - Assess patient's need for assistive devices and provide as appropriate  - Encourage maximum independence but intervene and supervise when necessary  - Involve family in performance of ADLs  - Assess for home care needs following discharge   - Consider OT consult to assist with ADL evaluation and planning for discharge  - Provide patient education as appropriate  Outcome: Progressing  Goal: Maintain or return mobility status to optimal level  Description: INTERVENTIONS:  - Assess patient's baseline mobility status (ambulation, transfers, stairs, etc )    - Identify cognitive and physical deficits and behaviors that affect mobility  - Identify mobility aids required to assist with transfers and/or ambulation (gait belt, sit-to-stand, lift, walker, cane, etc )  - [place] fall precautions as indicated by assessment  - Record patient progress and toleration of activity level on Mobility SBAR; progress patient to next Phase/Stage  - Instruct patient to call for assistance with activity based on assessment  - Consider rehabilitation consult to assist with strengthening/weightbearing, etc   Outcome: Progressing     Problem: DISCHARGE PLANNING  Goal: Discharge to home or other facility with appropriate resources  Description: INTERVENTIONS:  - Identify barriers to discharge w/patient and caregiver  - Arrange for needed discharge resources and transportation as appropriate  - Identify discharge learning needs (meds, wound care, etc )  - Arrange for interpretive services to assist at discharge as needed  - Refer to Case Management Department for coordinating discharge planning if the patient needs post-hospital services based on physician/advanced practitioner order or complex needs related to functional status, cognitive ability, or social support system  Outcome: Progressing     Problem: Knowledge Deficit  Goal: Patient/family/caregiver demonstrates understanding of disease process, treatment plan, medications, and discharge instructions  Description: Complete learning assessment and assess knowledge base    Interventions:  - Provide teaching at level of understanding  - Provide teaching via preferred learning methods  Outcome: Progressing

## 2021-05-13 ENCOUNTER — APPOINTMENT (INPATIENT)
Dept: NON INVASIVE DIAGNOSTICS | Facility: HOSPITAL | Age: 66
DRG: 216 | End: 2021-05-13
Attending: INTERNAL MEDICINE
Payer: COMMERCIAL

## 2021-05-13 ENCOUNTER — APPOINTMENT (INPATIENT)
Dept: RADIOLOGY | Facility: HOSPITAL | Age: 66
DRG: 216 | End: 2021-05-13
Payer: COMMERCIAL

## 2021-05-13 ENCOUNTER — APPOINTMENT (INPATIENT)
Dept: NON INVASIVE DIAGNOSTICS | Facility: HOSPITAL | Age: 66
DRG: 216 | End: 2021-05-13
Payer: COMMERCIAL

## 2021-05-13 LAB
ANION GAP SERPL CALCULATED.3IONS-SCNC: 5 MMOL/L (ref 4–13)
APTT PPP: 53 SECONDS (ref 23–37)
APTT PPP: 68 SECONDS (ref 23–37)
BASOPHILS # BLD AUTO: 0.04 THOUSANDS/ΜL (ref 0–0.1)
BASOPHILS NFR BLD AUTO: 0 % (ref 0–1)
BILIRUB UR QL STRIP: NEGATIVE
BUN SERPL-MCNC: 16 MG/DL (ref 5–25)
CALCIUM SERPL-MCNC: 8.8 MG/DL (ref 8.3–10.1)
CHLORIDE SERPL-SCNC: 108 MMOL/L (ref 100–108)
CHOLEST SERPL-MCNC: 103 MG/DL (ref 50–200)
CLARITY UR: CLEAR
CO2 SERPL-SCNC: 26 MMOL/L (ref 21–32)
COLOR UR: ABNORMAL
CREAT SERPL-MCNC: 0.76 MG/DL (ref 0.6–1.3)
EOSINOPHIL # BLD AUTO: 0.19 THOUSAND/ΜL (ref 0–0.61)
EOSINOPHIL NFR BLD AUTO: 2 % (ref 0–6)
ERYTHROCYTE [DISTWIDTH] IN BLOOD BY AUTOMATED COUNT: 13.9 % (ref 11.6–15.1)
EST. AVERAGE GLUCOSE BLD GHB EST-MCNC: 120 MG/DL
GFR SERPL CREATININE-BSD FRML MDRD: 94 ML/MIN/1.73SQ M
GLUCOSE SERPL-MCNC: 86 MG/DL (ref 65–140)
GLUCOSE UR STRIP-MCNC: NEGATIVE MG/DL
HBA1C MFR BLD: 5.8 %
HCT VFR BLD AUTO: 41.3 % (ref 36.5–49.3)
HDLC SERPL-MCNC: 49 MG/DL
HGB BLD-MCNC: 13 G/DL (ref 12–17)
HGB UR QL STRIP.AUTO: NEGATIVE
IMM GRANULOCYTES # BLD AUTO: 0.03 THOUSAND/UL (ref 0–0.2)
IMM GRANULOCYTES NFR BLD AUTO: 0 % (ref 0–2)
KETONES UR STRIP-MCNC: NEGATIVE MG/DL
LDLC SERPL CALC-MCNC: 28 MG/DL (ref 0–100)
LEUKOCYTE ESTERASE UR QL STRIP: NEGATIVE
LYMPHOCYTES # BLD AUTO: 3.03 THOUSANDS/ΜL (ref 0.6–4.47)
LYMPHOCYTES NFR BLD AUTO: 33 % (ref 14–44)
MCH RBC QN AUTO: 30.3 PG (ref 26.8–34.3)
MCHC RBC AUTO-ENTMCNC: 31.5 G/DL (ref 31.4–37.4)
MCV RBC AUTO: 96 FL (ref 82–98)
MONOCYTES # BLD AUTO: 1.12 THOUSAND/ΜL (ref 0.17–1.22)
MONOCYTES NFR BLD AUTO: 12 % (ref 4–12)
NEUTROPHILS # BLD AUTO: 4.92 THOUSANDS/ΜL (ref 1.85–7.62)
NEUTS SEG NFR BLD AUTO: 53 % (ref 43–75)
NITRITE UR QL STRIP: NEGATIVE
NONHDLC SERPL-MCNC: 54 MG/DL
NRBC BLD AUTO-RTO: 0 /100 WBCS
PH UR STRIP.AUTO: 6 [PH]
PLATELET # BLD AUTO: 227 THOUSANDS/UL (ref 149–390)
PMV BLD AUTO: 9.1 FL (ref 8.9–12.7)
POTASSIUM SERPL-SCNC: 4.1 MMOL/L (ref 3.5–5.3)
PROT UR STRIP-MCNC: NEGATIVE MG/DL
RBC # BLD AUTO: 4.29 MILLION/UL (ref 3.88–5.62)
SARS-COV-2 RNA RESP QL NAA+PROBE: NEGATIVE
SODIUM SERPL-SCNC: 139 MMOL/L (ref 136–145)
SP GR UR STRIP.AUTO: 1.04 (ref 1–1.03)
TRIGL SERPL-MCNC: 129 MG/DL
UROBILINOGEN UR QL STRIP.AUTO: 0.2 E.U./DL
WBC # BLD AUTO: 9.33 THOUSAND/UL (ref 4.31–10.16)

## 2021-05-13 PROCEDURE — C1894 INTRO/SHEATH, NON-LASER: HCPCS | Performed by: INTERNAL MEDICINE

## 2021-05-13 PROCEDURE — 93454 CORONARY ARTERY ANGIO S&I: CPT | Performed by: INTERNAL MEDICINE

## 2021-05-13 PROCEDURE — 99152 MOD SED SAME PHYS/QHP 5/>YRS: CPT | Performed by: INTERNAL MEDICINE

## 2021-05-13 PROCEDURE — C1769 GUIDE WIRE: HCPCS | Performed by: INTERNAL MEDICINE

## 2021-05-13 PROCEDURE — G1004 CDSM NDSC: HCPCS

## 2021-05-13 PROCEDURE — 93880 EXTRACRANIAL BILAT STUDY: CPT | Performed by: SURGERY

## 2021-05-13 PROCEDURE — 81003 URINALYSIS AUTO W/O SCOPE: CPT | Performed by: PHYSICIAN ASSISTANT

## 2021-05-13 PROCEDURE — 80048 BASIC METABOLIC PNL TOTAL CA: CPT | Performed by: INTERNAL MEDICINE

## 2021-05-13 PROCEDURE — 85730 THROMBOPLASTIN TIME PARTIAL: CPT | Performed by: HOSPITALIST

## 2021-05-13 PROCEDURE — 99232 SBSQ HOSP IP/OBS MODERATE 35: CPT | Performed by: PHYSICIAN ASSISTANT

## 2021-05-13 PROCEDURE — 99153 MOD SED SAME PHYS/QHP EA: CPT | Performed by: INTERNAL MEDICINE

## 2021-05-13 PROCEDURE — 70486 CT MAXILLOFACIAL W/O DYE: CPT

## 2021-05-13 PROCEDURE — 99232 SBSQ HOSP IP/OBS MODERATE 35: CPT | Performed by: INTERNAL MEDICINE

## 2021-05-13 PROCEDURE — 85025 COMPLETE CBC W/AUTO DIFF WBC: CPT | Performed by: INTERNAL MEDICINE

## 2021-05-13 PROCEDURE — 83036 HEMOGLOBIN GLYCOSYLATED A1C: CPT | Performed by: PHYSICIAN ASSISTANT

## 2021-05-13 PROCEDURE — U0003 INFECTIOUS AGENT DETECTION BY NUCLEIC ACID (DNA OR RNA); SEVERE ACUTE RESPIRATORY SYNDROME CORONAVIRUS 2 (SARS-COV-2) (CORONAVIRUS DISEASE [COVID-19]), AMPLIFIED PROBE TECHNIQUE, MAKING USE OF HIGH THROUGHPUT TECHNOLOGIES AS DESCRIBED BY CMS-2020-01-R: HCPCS | Performed by: PHYSICIAN ASSISTANT

## 2021-05-13 PROCEDURE — 93880 EXTRACRANIAL BILAT STUDY: CPT

## 2021-05-13 PROCEDURE — 80061 LIPID PANEL: CPT | Performed by: INTERNAL MEDICINE

## 2021-05-13 PROCEDURE — 99255 IP/OBS CONSLTJ NEW/EST HI 80: CPT | Performed by: THORACIC SURGERY (CARDIOTHORACIC VASCULAR SURGERY)

## 2021-05-13 PROCEDURE — 87081 CULTURE SCREEN ONLY: CPT | Performed by: PHYSICIAN ASSISTANT

## 2021-05-13 PROCEDURE — U0005 INFEC AGEN DETEC AMPLI PROBE: HCPCS | Performed by: PHYSICIAN ASSISTANT

## 2021-05-13 PROCEDURE — B2111ZZ FLUOROSCOPY OF MULTIPLE CORONARY ARTERIES USING LOW OSMOLAR CONTRAST: ICD-10-PCS | Performed by: INTERNAL MEDICINE

## 2021-05-13 RX ORDER — NITROGLYCERIN 20 MG/100ML
INJECTION INTRAVENOUS CODE/TRAUMA/SEDATION MEDICATION
Status: COMPLETED | OUTPATIENT
Start: 2021-05-13 | End: 2021-05-13

## 2021-05-13 RX ORDER — MIDAZOLAM HYDROCHLORIDE 2 MG/2ML
INJECTION, SOLUTION INTRAMUSCULAR; INTRAVENOUS CODE/TRAUMA/SEDATION MEDICATION
Status: COMPLETED | OUTPATIENT
Start: 2021-05-13 | End: 2021-05-13

## 2021-05-13 RX ORDER — VERAPAMIL HCL 2.5 MG/ML
AMPUL (ML) INTRAVENOUS CODE/TRAUMA/SEDATION MEDICATION
Status: COMPLETED | OUTPATIENT
Start: 2021-05-13 | End: 2021-05-13

## 2021-05-13 RX ORDER — HEPARIN SODIUM 5000 [USP'U]/ML
5000 INJECTION, SOLUTION INTRAVENOUS; SUBCUTANEOUS EVERY 8 HOURS SCHEDULED
Status: DISCONTINUED | OUTPATIENT
Start: 2021-05-13 | End: 2021-05-20

## 2021-05-13 RX ORDER — SODIUM CHLORIDE 9 MG/ML
50 INJECTION, SOLUTION INTRAVENOUS CONTINUOUS
Status: DISPENSED | OUTPATIENT
Start: 2021-05-13 | End: 2021-05-13

## 2021-05-13 RX ORDER — ACETAMINOPHEN 325 MG/1
650 TABLET ORAL EVERY 4 HOURS PRN
Status: DISCONTINUED | OUTPATIENT
Start: 2021-05-13 | End: 2021-05-16

## 2021-05-13 RX ORDER — CHLORHEXIDINE GLUCONATE 0.12 MG/ML
15 RINSE ORAL EVERY 12 HOURS SCHEDULED
Status: DISCONTINUED | OUTPATIENT
Start: 2021-05-13 | End: 2021-05-17 | Stop reason: HOSPADM

## 2021-05-13 RX ORDER — FENTANYL CITRATE 50 UG/ML
INJECTION, SOLUTION INTRAMUSCULAR; INTRAVENOUS CODE/TRAUMA/SEDATION MEDICATION
Status: COMPLETED | OUTPATIENT
Start: 2021-05-13 | End: 2021-05-13

## 2021-05-13 RX ORDER — LIDOCAINE HYDROCHLORIDE 10 MG/ML
INJECTION, SOLUTION EPIDURAL; INFILTRATION; INTRACAUDAL; PERINEURAL CODE/TRAUMA/SEDATION MEDICATION
Status: COMPLETED | OUTPATIENT
Start: 2021-05-13 | End: 2021-05-13

## 2021-05-13 RX ORDER — SODIUM CHLORIDE 9 MG/ML
50 INJECTION, SOLUTION INTRAVENOUS CONTINUOUS
Status: DISCONTINUED | OUTPATIENT
Start: 2021-05-13 | End: 2021-05-13

## 2021-05-13 RX ORDER — HEPARIN SODIUM 1000 [USP'U]/ML
INJECTION, SOLUTION INTRAVENOUS; SUBCUTANEOUS CODE/TRAUMA/SEDATION MEDICATION
Status: COMPLETED | OUTPATIENT
Start: 2021-05-13 | End: 2021-05-13

## 2021-05-13 RX ADMIN — FLUTICASONE FUROATE AND VILANTEROL TRIFENATATE 1 PUFF: 100; 25 POWDER RESPIRATORY (INHALATION) at 08:54

## 2021-05-13 RX ADMIN — VERAPAMIL HYDROCHLORIDE 1.25 MG: 2.5 INJECTION, SOLUTION INTRAVENOUS at 08:09

## 2021-05-13 RX ADMIN — METOPROLOL TARTRATE 25 MG: 25 TABLET, FILM COATED ORAL at 21:54

## 2021-05-13 RX ADMIN — LIDOCAINE HYDROCHLORIDE 0.1 ML: 10 INJECTION, SOLUTION EPIDURAL; INFILTRATION; INTRACAUDAL; PERINEURAL at 08:07

## 2021-05-13 RX ADMIN — FENTANYL CITRATE 50 MCG: 50 INJECTION INTRAMUSCULAR; INTRAVENOUS at 07:56

## 2021-05-13 RX ADMIN — CHLORHEXIDINE GLUCONATE 0.12% ORAL RINSE 15 ML: 1.2 LIQUID ORAL at 11:16

## 2021-05-13 RX ADMIN — IOHEXOL 80 ML: 350 INJECTION, SOLUTION INTRAVENOUS at 08:18

## 2021-05-13 RX ADMIN — HEPARIN SODIUM 5000 UNITS: 5000 INJECTION INTRAVENOUS; SUBCUTANEOUS at 17:07

## 2021-05-13 RX ADMIN — MUPIROCIN 1 APPLICATION: 20 OINTMENT TOPICAL at 21:54

## 2021-05-13 RX ADMIN — CHLORHEXIDINE GLUCONATE 0.12% ORAL RINSE 15 ML: 1.2 LIQUID ORAL at 21:54

## 2021-05-13 RX ADMIN — MIDAZOLAM 2 MG: 1 INJECTION INTRAMUSCULAR; INTRAVENOUS at 07:56

## 2021-05-13 RX ADMIN — HEPARIN SODIUM 4000 UNITS: 1000 INJECTION INTRAVENOUS; SUBCUTANEOUS at 08:09

## 2021-05-13 RX ADMIN — HEPARIN SODIUM 2000 UNITS: 1000 INJECTION INTRAVENOUS; SUBCUTANEOUS at 00:35

## 2021-05-13 RX ADMIN — PANTOPRAZOLE SODIUM 40 MG: 40 TABLET, DELAYED RELEASE ORAL at 06:06

## 2021-05-13 RX ADMIN — SODIUM CHLORIDE 50 ML/HR: 0.9 INJECTION, SOLUTION INTRAVENOUS at 08:53

## 2021-05-13 RX ADMIN — NITROGLYCERIN 200 MCG: 20 INJECTION INTRAVENOUS at 08:09

## 2021-05-13 RX ADMIN — MUPIROCIN 1 APPLICATION: 20 OINTMENT TOPICAL at 11:16

## 2021-05-13 RX ADMIN — ATORVASTATIN CALCIUM 40 MG: 40 TABLET, FILM COATED ORAL at 17:08

## 2021-05-13 RX ADMIN — ASPIRIN 81 MG: 81 TABLET, COATED ORAL at 07:40

## 2021-05-13 NOTE — ASSESSMENT & PLAN NOTE
· Severe-critical on current echo with valve area of 0 3cm2  · Consult with cardiac surgery for possible AVR

## 2021-05-13 NOTE — ASSESSMENT & PLAN NOTE
· Severe-critical AS on current echo with valve area of 0 3cm2  · Also with significant mitral valve disease  · Consult with cardiac surgery for possible TAVR vs double valve repair

## 2021-05-13 NOTE — PROGRESS NOTES
[address] [facility]  Progress Note - Layne Joseph 1955, 78 y o  male MRN: [id number]  Unit/Bed#: -01 Encounter: [id number]  Primary Care Provider: Marlis Nissen, DO   Date and time admitted to hospital: 5/12/2021  9:34 PM    * Aortic valve stenosis - severe  Assessment & Plan  · Severe-critical AS on current echo with valve area of 0 3cm2  · Also with significant mitral valve disease  · Consult with cardiac surgery for possible TAVR vs double valve repair    Acute combined systolic and diastolic CHF, NYHA class 1 (HCC)  Assessment & Plan  Wt Readings from Last 3 Encounters:   05/13/21 106 kg (234 lb 5 6 oz)   05/11/21 112 kg (246 lb 14 6 oz)   05/11/21 113 kg (250 lb)     Monitor intake and output  Monitor daily weights  EF of 42% with grade 3 diastolic dysfunction, severe AS and Mod-severe MR  IV lasix 20mg x1, with approximately 800 cc urine output  Supplemental oxygen weaned  Low Na diet        NSTEMI (non-ST elevated myocardial infarction) [facility])  Assessment & Plan  · Transferred from [facility] for a cardiac catheterization for presumed NSTEMI, also with severe valve stenosis  · Echocardiogram with EF of 42%  · Continue aspirin, atorvastatin  · Patient was started on metoprolol 25mg BID  · S/P cardiac catheterization which showed patent coronaries  D/C'd heparin gtt  Essential hypertension  Assessment & Plan  · bp is acceptable  · Losartan held for cardiac catheterization, check BMP tomorrow  · Continue metoprolol  · Restart amlodipine and losartan as BP warrants  Hyperlipidemia  Assessment & Plan  · FLP WNL  · Continuing patient's atorvastatin 40mg    Chronic obstructive lung disease ([address] )  Assessment & Plan  · No wheezing at this time  · Continue breo      VTE Pharmacologic Prophylaxis:   Pharmacologic: Heparin  Mechanical VTE Prophylaxis in Place: Yes    Patient Centered Rounds: I have performed bedside rounds with nursing staff today      Discussions with Specialists or Other Care Team Provider:  Nursing, case management, will d/w CT surg    Education and Discussions with Family / Patient:  I have offered to update patient's family however he declines the need for this  Time Spent for Care: 30 minutes  More than 50% of total time spent on counseling and coordination of care as described above  Current Length of Stay: 1 day(s)    Current Patient Status: Inpatient   Certification Statement: The patient will continue to require additional inpatient hospital stay due to S/P clean cath, awaiting CT surgery recs    Discharge Plan:  Pending, not ready    Code Status: Level 1 - Full Code      Subjective:   Patient without acute complaints or concerns  No changes overnight  Objective:     Vitals:   Temp (24hrs), Av 9 °F (36 6 °C), Min:97 6 °F (36 4 °C), Max:98 8 °F (37 1 °C)    Temp:  [97 6 °F (36 4 °C)-98 8 °F (37 1 °C)] 97 8 °F (36 6 °C)  HR:  [68-97] 68  Resp:  [16-20] 18  BP: ()/(52-79) 109/79  SpO2:  [94 %-96 %] 96 %  Body mass index is 30 09 kg/m²  Input and Output Summary (last 24 hours): Intake/Output Summary (Last 24 hours) at 2021 1006  Last data filed at 2021 0001  Gross per 24 hour   Intake --   Output 800 ml   Net -800 ml       Physical Exam:     Physical Exam  Vitals signs and nursing note reviewed  Exam conducted with a chaperone present  Constitutional:       Appearance: He is well-developed  HENT:      Head: Normocephalic and atraumatic  Eyes:      Conjunctiva/sclera: Conjunctivae normal    Neck:      Musculoskeletal: Neck supple  Cardiovascular:      Rate and Rhythm: Normal rate and regular rhythm  Heart sounds: No murmur  Pulmonary:      Effort: Pulmonary effort is normal  No respiratory distress  Breath sounds: Normal breath sounds  Abdominal:      Palpations: Abdomen is soft  Tenderness: There is no abdominal tenderness  Skin:     General: Skin is warm and dry     Neurological: Mental Status: He is alert  Additional Data:     Labs:    Results from last 7 days   Lab Units 05/13/21  0605   WBC Thousand/uL 9 33   HEMOGLOBIN g/dL 13 0   HEMATOCRIT % 41 3   PLATELETS Thousands/uL 227   NEUTROS PCT % 53   LYMPHS PCT % 33   MONOS PCT % 12   EOS PCT % 2     Results from last 7 days   Lab Units 05/13/21  0605  05/11/21  1742   SODIUM mmol/L 139   < > 140   POTASSIUM mmol/L 4 1   < > 4 3   CHLORIDE mmol/L 108   < > 106   CO2 mmol/L 26   < > 27   BUN mg/dL 16   < > 14   CREATININE mg/dL 0 76   < > 0 84   ANION GAP mmol/L 5   < > 7   CALCIUM mg/dL 8 8   < > 9 4   ALBUMIN g/dL  --   --  3 9   TOTAL BILIRUBIN mg/dL  --   --  0 78   ALK PHOS U/L  --   --  73   ALT U/L  --   --  23   AST U/L  --   --  14   GLUCOSE RANDOM mg/dL 86   < > 131    < > = values in this interval not displayed  Results from last 7 days   Lab Units 05/12/21  0327   INR  1 13                       * I Have Reviewed All Lab Data Listed Above  * Additional Pertinent Lab Tests Reviewed:  All Labs Within Last 24 Hours Reviewed      Recent Cultures (last 7 days):           Last 24 Hours Medication List:   Current Facility-Administered Medications   Medication Dose Route Frequency Provider Last Rate    acetaminophen  650 mg Oral Q4H PRN Levonia Money, CRNP      albuterol  2 puff Inhalation Q4H PRN Maribel Noel MD      aspirin  81 mg Oral Daily Maribel Noel MD      atorvastatin  40 mg Oral QPM Maribel Noel MD      fluticasone-vilanterol  1 puff Inhalation Daily Maribel Noel MD      metoprolol tartrate  25 mg Oral Q12H [address] Maribel Noel MD      nicotine  1 patch Transdermal Daily Maribel Noel MD      nitroglycerin  0 4 mg Sublingual Q5 Min PRN Maribel Noel MD      ondansetron  4 mg Intravenous Q6H PRN Maribel Noel MD      pantoprazole  40 mg Oral Early Morning Maribel Noel MD      sodium chloride  50 mL/hr Intravenous Continuous Levonia Money, CRNP 50 mL/hr (05/13/21 6358)        Today, Patient Was Seen By: David Medina PA-C    ** Please Note: Dictation voice to text software may have been used in the creation of this document   **

## 2021-05-13 NOTE — ASSESSMENT & PLAN NOTE
· Transferred from [address] for a cardiac catheterization for presumed NSTEMI, also with severe valve stenosis  · Echocardiogram with EF of 42%  · Continue aspirin, atorvastatin  · Patient was started on metoprolol 25mg BID  · S/P cardiac catheterization which showed patent coronaries  D/C'd heparin gtt

## 2021-05-13 NOTE — PLAN OF CARE
Problem: Potential for Falls  Goal: Patient will remain free of falls  Description: INTERVENTIONS:  - Assess patient frequently for physical needs  -  Identify cognitive and physical deficits and behaviors that affect risk of falls    -  [place] fall precautions as indicated by assessment   - Educate patient/family on patient safety including physical limitations  - Instruct patient to call for assistance with activity based on assessment  - Modify environment to reduce risk of injury  - Consider OT/PT consult to assist with strengthening/mobility  Outcome: Progressing     Problem: CARDIOVASCULAR - ADULT  Goal: Maintains optimal cardiac output and hemodynamic stability  Description: INTERVENTIONS:  - Monitor I/O, vital signs and rhythm  - Monitor for S/S and trends of decreased cardiac output  - Administer and titrate ordered vasoactive medications to optimize hemodynamic stability  - Assess quality of pulses, skin color and temperature  - Assess for signs of decreased coronary artery perfusion  - Instruct patient to report change in severity of symptoms  Outcome: Progressing  Goal: Absence of cardiac dysrhythmias or at baseline rhythm  Description: INTERVENTIONS:  - Continuous cardiac monitoring, vital signs, obtain 12 lead EKG if ordered  - Administer antiarrhythmic and heart rate control medications as ordered  - Monitor electrolytes and administer replacement therapy as ordered  Outcome: Progressing     Problem: RESPIRATORY - ADULT  Goal: Achieves optimal ventilation and oxygenation  Description: INTERVENTIONS:  - Assess for changes in respiratory status  - Assess for changes in mentation and behavior  - Position to facilitate oxygenation and minimize respiratory effort  - Oxygen administered by appropriate delivery if ordered  - Initiate smoking cessation education as indicated  - Encourage broncho-pulmonary hygiene including cough, deep breathe, Incentive Spirometry  - Assess the need for suctioning and aspirate as needed  - Assess and instruct to report SOB or any respiratory difficulty  - Respiratory Therapy support as indicated  Outcome: Progressing     Problem: PAIN - ADULT  Goal: Verbalizes/displays adequate comfort level or baseline comfort level  Description: Interventions:  - Encourage patient to monitor pain and request assistance  - Assess pain using appropriate pain scale  - Administer analgesics based on type and severity of pain and evaluate response  - Implement non-pharmacological measures as appropriate and evaluate response  - Consider cultural and social influences on pain and pain management  - Notify physician/advanced practitioner if interventions unsuccessful or patient reports new pain  Outcome: Progressing     Problem: DISCHARGE PLANNING  Goal: Discharge to home or other facility with appropriate resources  Description: INTERVENTIONS:  - Identify barriers to discharge w/patient and caregiver  - Arrange for needed discharge resources and transportation as appropriate  - Identify discharge learning needs (meds, wound care, etc )  - Arrange for interpretive services to assist at discharge as needed  - Refer to Case Management Department for coordinating discharge planning if the patient needs post-hospital services based on physician/advanced practitioner order or complex needs related to functional status, cognitive ability, or social support system  Outcome: Progressing     Problem: Knowledge Deficit  Goal: Patient/family/caregiver demonstrates understanding of disease process, treatment plan, medications, and discharge instructions  Description: Complete learning assessment and assess knowledge base    Interventions:  - Provide teaching at level of understanding  - Provide teaching via preferred learning methods  Outcome: Progressing

## 2021-05-13 NOTE — ASSESSMENT & PLAN NOTE
Wt Readings from Last 3 Encounters:   05/13/21 106 kg (234 lb 5 6 oz)   05/11/21 112 kg (246 lb 14 6 oz)   05/11/21 113 kg (250 lb)     Monitor intake and output   Monitor daily weights  EF of 42% with grade 3 diastolic dysfunction, severe AS and Mod-severe MR  IV lasix 20mg x1, with approximately 800 cc urine output  Supplemental oxygen weaned  Low Na diet

## 2021-05-13 NOTE — ASSESSMENT & PLAN NOTE
· Transferred from [address] for a cardiac catheterization    · Continue IV heparin  · Echocardiogram with EF of 42%  · Continue aspirin, atorvastatin  · Start metoprolol 25mg BID  · Check lipid panel in AM  · Consult cardiology for cardiac catheteriazion

## 2021-05-13 NOTE — H&P
[address] [facility]  H&P- Velma Walls 1955, 78 y o  male MRN: [id number]  Unit/Bed#: -01 Encounter: [id number]  Primary Care Provider: Joseline Rojas DO   Date and time admitted to hospital: 5/12/2021  9:34 PM    * NSTEMI (non-ST elevated myocardial infarction) [facility])  Assessment & Plan  · Transferred from [facility] for a cardiac catheterization  · Continue IV heparin  · Echocardiogram with EF of 42%  · Continue aspirin, atorvastatin  · Start metoprolol 25mg BID  · Check lipid panel in AM  · Consult cardiology for cardiac catheteriazion    Acute combined systolic and diastolic CHF, NYHA class 1 (HCC)  Assessment & Plan  Wt Readings from Last 3 Encounters:   05/11/21 112 kg (246 lb 14 6 oz)   05/11/21 113 kg (250 lb)   05/10/21 112 kg (246 lb 3 2 oz)      Monitor intake and output  Monitor daily weights  EF of 42% with grade 3 diastolic dysfunction, severe AS and Mod-severe MR  IV lasix 20mg x1, monitor for response  Supplemental oxygen  Low Na diet        Aortic valve stenosis - severe  Assessment & Plan  · Severe-critical on current echo with valve area of 0 3cm2  · Consult with cardiac surgery for possible AVR    Chronic obstructive lung disease ([facility] [address] )  Assessment & Plan  · No wheezing at this time  · Continue breo    Essential hypertension  Assessment & Plan  · bp is acceptable  · Hold losartan for cardiac cath  · Continue metoprolol  · Restart amlodipine and losartan as BP warrants  Hyperlipidemia  Assessment & Plan  · Awaiting lipid panel  · Continue atorvastatin 40mg for now        History of Present Illness     HPI:  Velma Walls is a 78 y o  male who presents with NSTEMI and severe AS  We was seen at [facility] for CP and shortness of breath  Was found to have a non STEMI with severe aortic stenosis decreased ejection fraction his echocardiogram   He was transferred to [facility] for a cardiac catheterization cardiac surgery evaluation    This time he does reports some chest congestion, denies any chest pain or pressure  Review of Systems   All other systems reviewed and are negative  Historical Information   Past Medical History:   Diagnosis Date    Broken humerus     COPD (chronic obstructive pulmonary disease) [address] [address]     Pneumonia      No past surgical history on file  Social History   Social History     Substance and Sexual Activity   Alcohol Use Yes    Frequency: 2-4 times a month    Comment: 1-2/wk     Social History     Substance and Sexual Activity   Drug Use No     Social History     Tobacco Use   Smoking Status Current Some Day Smoker    Packs/day: 0 50    Types: Cigarettes    Start date: 1989   Smokeless Tobacco Never Used     Family History: non-contributory    Meds/Allergies   all medications and allergies reviewed  No Known Allergies    Objective   Vitals: Blood pressure 128/79, temperature 98 8 °F (37 1 °C), resp  rate 20  No intake or output data in the 24 hours ending 05/12/21 2258    Invasive Devices     Peripheral Intravenous Line            Peripheral IV 05/11/21 Left Antecubital 1 day                Physical Exam  Constitutional:       General: He is not in acute distress  Appearance: Normal appearance  He is not toxic-appearing or diaphoretic  HENT:      Head: Normocephalic and atraumatic  Nose: Nose normal       Mouth/Throat:      Mouth: Mucous membranes are moist       Pharynx: Oropharynx is clear  Eyes:      Extraocular Movements: Extraocular movements intact  Neck:      Comments: JVD is present  Cardiovascular:      Rate and Rhythm: Normal rate and regular rhythm  Heart sounds: Murmur present  Pulmonary:      Breath sounds: Rales present  No wheezing  Abdominal:      General: There is no distension  Palpations: Abdomen is soft  Musculoskeletal:      Right lower leg: No edema  Left lower leg: No edema  Neurological:      General: No focal deficit present        Mental Status: He is alert and oriented to person, place, and time  Psychiatric:         Mood and Affect: Mood normal          Behavior: Behavior normal          Lab Results: I have personally reviewed pertinent reports  Imaging: I have personally reviewed pertinent films in PACS  EKG, Pathology, and Other Studies: I have personally reviewed pertinent reports  Code Status: Level 1 - Full Code  Advance Directive and Living Will:      Power of :    POLST:      Counseling / Coordination of Care  Total floor / unit time spent today 40 minutes  Greater than 50% of total time was spent with the patient and / or family counseling and / or coordination of care  A description of the counseling / coordination of care:  Discussed plan of care with the patient the bedside

## 2021-05-13 NOTE — CONSULTS
Consultation - Cardiothoracic Surgery   Estrella North 78 y o  male MRN: [id number]  Unit/Bed#: -01 Encounter: [id number]    Physician Requesting Consult: Rosanne Collins MD    Reason for Consult / Principal Problem: Severe AS    Inpatient consult to Cardiothoracic Surgery  Consult performed by: Keegan Keith PA-C  Consult ordered by: Oscar Orr MD        History of Present Illness: Estrella North is a 78y o  year old male with complex cardiovascular past medical history significant for severe aortic stenosis  This was initially identified in 2014  At that time it was in the moderate range  Repeat echocardiogram in 2019 did demonstrate progression into the severe range with the valve area estimated 0 6 centimeter squared  However, surgical workup was deferred as patient was not symptomatic  Committee now presents to [facility] with complaint of progressive exertional angina, dyspnea, and fatigability  Repeat echocardiogram was again performed  Severe aortic stenosis now is demonstrated with a valve area of 0 4 cm2 and a mean gradient of 81  Ejection fraction was also diminished at 42%  Also noted on study was significant mitral regurgitation  Patient was transferred to [facility] for cardiac catheterization cardiac surgery consultation  Cardiac catheterization was completed without complications  No significant obstructive coronary disease was identified  During interview today he does admit to progressive fatigability, dyspnea, and intermittent angina  Symptoms have abated since admission to the hospital   His past medical history is otherwise significant for COPD, tobacco abuse, and hypertension  Past Medical History:  Past Medical History:   Diagnosis Date    Broken humerus     COPD (chronic obstructive pulmonary disease) ([facility] [address] )     Pneumonia          Past Surgical History:   No past surgical history on file        Family History:  Family History Problem Relation Age of Onset    Prostate cancer Father          Social History:  Social History     Substance and Sexual Activity   Alcohol Use Yes    Frequency: 2-4 times a month    Comment: 1-2/wk     Social History     Substance and Sexual Activity   Drug Use No     Social History     Tobacco Use   Smoking Status Current Some Day Smoker    Packs/day: 0 50    Types: Cigarettes    Start date: 1989   Smokeless Tobacco Never Used     Marital Status: /Civil Union      Home Medications:   Prior to Admission medications    Medication Sig Start Date End Date Taking?  Authorizing Provider   amLODIPine (NORVASC) 10 mg tablet TAKE 1 TABLET (10 MG TOTAL) BY MOUTH DAILY 7/9/20  Yes Dave Cano DO   Anoro Ellipta 62 5-25 MCG/INH inhaler INHALE 1 PUFF BY MOUTH EVERY DAY 1/19/21  Yes Dave Cano DO   aspirin (ECOTRIN LOW STRENGTH) 81 mg EC tablet Take 1 tablet (81 mg total) by mouth daily 3/26/18  Yes Dave Cano DO   atorvastatin (LIPITOR) 20 mg tablet TAKE 1 TABLET BY MOUTH EVERY DAY 3/25/21  Yes Dave Cano DO   losartan (COZAAR) 100 MG tablet TAKE 1 TABLET BY MOUTH EVERY DAY 10/20/20  Yes Dave Cano DO   Multiple Vitamin (MULTI-VITAMIN DAILY PO) Take 1 tablet by mouth daily 7/1/14  Yes Historical Provider, MD   pantoprazole (PROTONIX) 40 mg tablet TAKE 1 TABLET BY MOUTH EVERY DAY 2/16/21  Yes Dave Cano DO   albuterol (PROVENTIL HFA,VENTOLIN HFA) 90 mcg/act inhaler INHALE 1-2 PUFFS EVERY 4 (FOUR) HOURS AS NEEDED FOR WHEEZING 3/8/21   Dave Cano DO   Aspirin-Acetaminophen-Caffeine (EXCEDRIN MIGRAINE PO) Take by mouth    Historical Provider, MD   dextromethorphan-guaifenesin (MUCINEX DM)  MG per 12 hr tablet Take 1 tablet by mouth every 12 (twelve) hours 4/7/21   Radhika Shearer MD   predniSONE 20 mg tablet Please take 3 pills on day 1, 2, 3,  Then 2 pills on day 4, 5 and 6, 1 pill on day 7  Patient not taking: Reported on 5/12/2021 5/10/21   Radhika Shearer MD       Inpatient Medications:  Scheduled Meds:   Current Facility-Administered Medications   Medication Dose Route Frequency Provider Last Rate    acetaminophen  650 mg Oral Q4H PRN OdeaBITA Mckenna      albuterol  2 puff Inhalation Q4H PRN Lonnie Lozoya MD      aspirin  81 mg Oral Daily Lonnie oLzoya MD      atorvastatin  40 mg Oral QPM Lonnie Lozoya MD      fluticasone-vilanterol  1 puff Inhalation Daily Lonnie Lozoya MD      heparin (porcine)  5,000 Units Subcutaneous [facility] Suzi Miller PA-C      metoprolol tartrate  25 mg Oral Q12H [facility] Lonnie Lozoya MD      nicotine  1 patch Transdermal Daily Lonnie Lozoya MD      nitroglycerin  0 4 mg Sublingual Q5 Min PRN Lonnie Lozoya MD      ondansetron  4 mg Intravenous Q6H PRN Lonnie Lozoya MD      pantoprazole  40 mg Oral Early Morning Lonnie Lozoya MD      sodium chloride  50 mL/hr Intravenous Continuous Odean HIGINIO MistryNP 50 mL/hr (05/13/21 0853)     Continuous Infusions: sodium chloride, 50 mL/hr, Last Rate: 50 mL/hr (05/13/21 0853)      PRN Meds:  acetaminophen, 650 mg, Q4H PRN  albuterol, 2 puff, Q4H PRN  nitroglycerin, 0 4 mg, Q5 Min PRN  ondansetron, 4 mg, Q6H PRN        Allergies:  No Known Allergies    Review of Systems:  Review of Systems   Constitutional: Positive for activity change and fatigue  HENT: Negative  Eyes: Negative  Respiratory: Positive for chest tightness and shortness of breath  Cardiovascular: Positive for chest pain, palpitations and leg swelling  Endocrine: Negative  Genitourinary: Negative  Musculoskeletal: Negative  Skin: Negative  Neurological: Negative  Psychiatric/Behavioral: Negative          Vital Signs:     Vitals:    05/12/21 2333 05/13/21 0600 05/13/21 0843 05/13/21 0926   BP: 120/77  (!) 83/58 109/79   BP Location: Right arm      Pulse: 97   68   Resp: 16  18    Temp: 97 8 °F (36 6 °C)  97 8 °F (36 6 °C)    TempSrc: Oral      SpO2: 96%      Weight:  106 kg (234 lb 5 6 oz)       Invasive Devices     Peripheral Intravenous Line            Peripheral IV 05/13/21 Left;Ventral (anterior) Hand less than 1 day                Physical Exam:  Physical Exam  Constitutional:       Appearance: Normal appearance  He is well-developed  HENT:      Head: Normocephalic and atraumatic  Eyes:      Conjunctiva/sclera: Conjunctivae normal    Neck:      Musculoskeletal: Full passive range of motion without pain and normal range of motion  Thyroid: No thyromegaly  Vascular: No carotid bruit or JVD  Trachea: No tracheal deviation  Cardiovascular:      Rate and Rhythm: Normal rate and regular rhythm  Pulses:           Carotid pulses are 2+ on the right side and 2+ on the left side  Dorsalis pedis pulses are 2+ on the right side and 2+ on the left side  Posterior tibial pulses are 2+ on the right side and 2+ on the left side  Heart sounds: S1 normal and S2 normal  Murmur present  Pulmonary:      Effort: No accessory muscle usage or respiratory distress  Breath sounds: No wheezing or rales  Chest:      Chest wall: No tenderness  Abdominal:      General: Bowel sounds are normal       Palpations: Abdomen is soft  Tenderness: There is no abdominal tenderness  Musculoskeletal: Normal range of motion  Skin:     General: Skin is warm and dry  Neurological:      Mental Status: He is alert and oriented to person, place, and time  Cranial Nerves: No cranial nerve deficit  Sensory: No sensory deficit     Psychiatric:         Speech: Speech normal          Behavior: Behavior normal          Lab Results:     Results from last 7 days   Lab Units 05/13/21  0605 05/12/21  0335 05/11/21  1742   WBC Thousand/uL 9 33 12 66* 12 76*   HEMOGLOBIN g/dL 13 0 12 7 13 8   HEMATOCRIT % 41 3 40 3 41 5   PLATELETS Thousands/uL 227 223 252     Results from last 7 days   Lab Units 05/13/21  0605 05/12/21  0327 05/11/21  1742   POTASSIUM mmol/L 4 1 4 4 4 3   CHLORIDE mmol/L 108 108 106   CO2 mmol/L 26 28 27   BUN mg/dL 16 13 14   CREATININE mg/dL 0 76 0 80 0 84   CALCIUM mg/dL 8 8 8 9 9 4     Results from last 7 days   Lab Units 05/13/21  0605 05/12/21  2302 05/12/21 2010 05/12/21 0327 05/11/21  1742   INR   --   --   --   --  1 13 1 05   PTT seconds 68* 49* 53*   < > 37 28    < > = values in this interval not displayed  No results found for: HGBA1C  Lab Results   Component Value Date    TROPONINI 0 39 (H) 05/12/2021       Imaging Studies:     Cardiac Catheterization: No significant obstructive CAD     Echocardiogram:  EF 42%  Severe AS with Marcello 0 4 cm2  mean gradient of 81 mm Hg  Moderate possibly severe mitral regurgitation  No mitral stenosis  Severe MAC  Mild TR  I have personally reviewed pertinent reports  and I have personally reviewed pertinent films in PACS    Assessment:  Principal Problem: Aortic valve stenosis - severe  Active Problems:    Chronic obstructive lung disease ([facility])    Hyperlipidemia    Essential hypertension    NSTEMI (non-ST elevated myocardial infarction) ([facility])    Acute combined systolic and diastolic CHF, NYHA class 1 ([facility])    Severe aortic stenosis; Ongoing AVR workup  Severe aortic stenosis; Ongoing TAVR workup  Severe mitral regurgitation; Ongoing MVR workup    Plan:  Risks and benefits of AVR/MVR vs TAVR were discussed in detail today with the patient  They understand and wish to proceed with further workup and ultimately surgical intervention  We have ordered routine preoperative laboratory and vascular studies  Pending the results of these tests, they will be scheduled for surgery this admisison with SAAD Neal was comfortable with our recommendations, and their questions were answered to their satisfaction  We will continue to evaluate the patient daily with further recommendations as work up is completed  Thank you for allowing us to participate in the care of this patient       SIGNATURE: Lg Carl TIFF Bedoya  DATE: May 13, 2021  TIME: 10:08 AM    * This note was completed in part utilizing Gogo fluency direct voice recognition software  Grammatical errors, random word insertion, spelling mistakes, and incomplete sentences may be an occasional consequence of the system secondary to software limitations, ambient noise and hardware issues  At the time of dictation, efforts were made to edit, clarify and /or correct errors  Please read the chart carefully and recognize, using context, where substitutions have occurred  If you have any questions or concerns about the context, text or information contained within the body of this dictation, please contact myself, the provider, for further clarification

## 2021-05-13 NOTE — UTILIZATION REVIEW
Initial Clinical Review    Admission: Date/Time/Statement:   Admission Orders (From admission, onward)     Ordered        05/12/21 2227  Inpatient Admission  Once                   Orders Placed This Encounter   Procedures    Inpatient Admission     Standing Status:   Standing     Number of Occurrences:   1     Order Specific Question:   Level of Care     Answer:   Med Surg [16]     Order Specific Question:   Estimated length of stay     Answer:   More than 2 Midnights     Order Specific Question:   Certification     Answer:   I certify that inpatient services are medically necessary for this patient for a duration of greater than two midnights  See H&P and MD Progress Notes for additional information about the patient's course of treatment  ED Arrival Information     Patient not seen in ED -- tx from [address] [place] [address] with NSTEMI for cardiac cath and need for CTSx consult                    Initial Presentation:  77 y/o male with PMhx of severe AS, COPD, HLD [facility] a tx from [address] [place] [address] where he presented with c/o cp and SOB,  found to have a NSTEMI with severe aortic stenosis with decreased EF on echo  Tx'd to [facility] for cardiac cath and CTSx eval   On exam pt with JVD, (+) heart murmur, rales present  Troponin 0 06-->0 16-->0 24-->0  39  Admit inpatient to M/S/Tele unit with NSTEMI, severe aortic stenosis, acute combined HF  -- Telem monitoring  Hep gtt  Asa, atorvastatin  Start metoprolol  Check lipids panel in AM  O2 prn  Low sodium diet  I/o's daily wts  Continue home po meds and inh  Hold losartan for cath  Restart amlodipine and losartan as BP warrants  Continue atorvastatin  Lasix IV  Cardiology and CTSx consulted  CTSx consult 5/13 -- A: Aortic valve stenosis - severe  Risks and benefits of AVR/MVR vs TAVR were discussed in detail today with the patient  They understand and wish to proceed with further workup and ultimately surgical intervention    Routine preoperative laboratory and vascular studies ordered  Pending the results of these tests, [facility]     Cardiology note 5/13 -- Good response to IV Lasix yesterday  Cardiac cath today- moderate non obstructive atherosclerosis of LAD, diffuse atherosclerosis of the branch vessels of the circumflex  Continue po meds  Vtal Signs:   Date/Time  Temp  Pulse  Resp  BP  MAP (mmHg)  SpO2  Calculated FIO2 (%) - Nasal Cannula  Nasal Cannula O2 Flow Rate (L/min)  O2 Device  Patient Position - Orthostatic VS   05/13/21 11:44:08  97 9 °F (36 6 °C)  --  18  87/59Abnormal   68  --  --  --  --  --   05/13/21 0926  --  68  --  109/79  --  --  --  --  --  --   05/13/21 08:43:40  97 8 °F (36 6 °C)  --  18  83/58Abnormal   66  --  --  --  --  --   05/13/21 0728  --  --  --  --  --  --  28  2 L/min  Nasal cannula  --   05/12/21 23:33:43  97 8 °F (36 6 °C)  97  16  120/77  91  96 %  --  --  Nasal cannula  Lying   05/12/21 2215  --  --  --  --  --  --  28  2 L/min  Nasal cannula  --   05/12/21 21:40:19  98 8 °F (37 1 °C)  --  20  128/79  95  --  --  --  --  --       Pertinent Labs/Diagnostic Test Results:   CXR 5/12 -- Cardiomegaly and mild pulmonary vascular congestion   Lingular subsegmental atelectasis  Cardiac cath 5/13 -- Summary: Moderate plaque  Diffuse disease of circumflex branch vessels  Plan: medical therapy of coronary atherosclerosis   Aortic valve workup      Results from last 7 days   Lab Units 05/13/21  0605 05/12/21  0335 05/11/21  1742   WBC Thousand/uL 9 33 12 66* 12 76*   HEMOGLOBIN g/dL 13 0 12 7 13 8   HEMATOCRIT % 41 3 40 3 41 5   PLATELETS Thousands/uL 227 223 252   NEUTROS ABS Thousands/µL 4 92  --   --      Results from last 7 days   Lab Units 05/13/21  0605 05/12/21  0327 05/11/21  1742   SODIUM mmol/L 139 142 140   POTASSIUM mmol/L 4 1 4 4 4 3   CHLORIDE mmol/L 108 108 106   CO2 mmol/L 26 28 27   ANION GAP mmol/L 5 6 7   BUN mg/dL 16 13 14   CREATININE mg/dL 0 76 0 80 0 84 EGFR ml/min/1 73sq m 94 92 91   CALCIUM mg/dL 8 8 8 9 9 4     Results from last 7 days   Lab Units 05/11/21  1742   AST U/L 14   ALT U/L 23   ALK PHOS U/L 73   TOTAL PROTEIN g/dL 7 1   ALBUMIN g/dL 3 9   TOTAL BILIRUBIN mg/dL 0 78     Results from last 7 days   Lab Units 05/13/21  0605 05/12/21  0327 05/11/21  1742   GLUCOSE RANDOM mg/dL 86 116 131     Results from last 7 days   Lab Units 05/12/21 0327 05/12/21  0016 05/11/21 2059 05/11/21  1742   TROPONIN I ng/mL 0 39* 0 24* 0 16* 0 06*     Results from last 7 days   Lab Units 05/13/21  0605 05/12/21  2302 05/12/21 2010 05/12/21 0327 05/11/21  1742   PROTIME seconds  --   --   --   --  14 3 13 5   INR   --   --   --   --  1 13 1 05   PTT seconds 68* 49* 53*   < > 37 28    < > = values in this interval not displayed       Results from last 7 days   Lab Units 05/11/21  1742 05/10/21  1622   NT-PRO BNP pg/mL 4,493* 3,228*     Results from last 7 days   Lab Units 05/11/21  1742   TOTAL COUNTED  100       Past Medical History:   Diagnosis Date    Broken humerus     [facility]ch[facility] ([facility] )     Pneumonia      Present on Admission:   Aortic valve stenosis - severe   [facility]   Essential hypertension   NSTEMI (non-ST elevated myocardial infarction) [facility]   Hyperlipidemia      Admitting Diagnosis: NSTEMI (non-ST elevated myocardial infarction) ([facility] ) [I21 4]  Age/Sex: 78 y o  male  Admission Orders:  Scheduled Medications:  aspirin, 81 mg, Oral, Daily  atorvastatin, 40 mg, Oral, QPM  chlorhexidine, 15 mL, Mouth/Throat, Q12H [facility]  fluticasone-vilanterol, 1 puff, Inhalation, Daily  heparin (porcine), 5,000 Units, Subcutaneous, Q8H [facility]  metoprolol tartrate, 25 mg, Oral, Q12H [facility]  mupirocin, 1 application, Nasal, V13B [facility]  nicotine, 1 patch, Transdermal, Daily  pantoprazole, 40 mg, Oral, Early Morning    Continuous IV Infusions:  sodium chloride, 50 mL/hr, Intravenous, Continuous    heparin (porcine) 25,000 units in 0 45% NaCl 250 mL infusion (premix)   Rate: 2 7-18 mL/hr Dose: 3-20 Units/kg/hr  Weight Dosing Info: 90 kg (Order-Specific)  Freq: Titrated Route: IV  Start: 05/12/21 2230 End: 05/13/21 0818    PRN Meds:  acetaminophen, 650 mg, Oral, Q4H PRN  albuterol, 2 puff, Inhalation, Q4H PRN  nitroglycerin, 0 4 mg, Sublingual, Q5 Min PRN 5/13 x1  ondansetron, 4 mg, Intravenous, Q6H PRN        Network Utilization Review Department  ATTENTION: Please call with any questions or concerns to [phone] and carefully listen to the prompts so that you are directed to the right person  All voicemails are confidential   [facility] all requests for admission clinical reviews, approved or denied determinations and any other requests to dedicated fax number below belonging to the campus where the patient is receiving treatment   List of dedicated fax numbers for the Facilities:  [address] DENIALS (Administrative/Medical Necessity) [phone]   [address] (Maternity/NICU/Pediatrics) [phone]   [address] [place] [address] [address] [place] [address] [address] [phone]

## 2021-05-13 NOTE — CASE MANAGEMENT
Pt is not a <30 day readmission or current bundle  Risk of unplanned readmission score is 10 (green)  Pt was transferred from [facility] for cardiac cath at CT surgery evaluation  Pt documented as alert and oriented x4  CM spoke with pt to introduce CM role and begin discharge planning  Pt lives with his wife, Idania Brown ([phone]) in a 2 story house that has 4 XAVIER with railing  Pt reports being independent with ADLs PTA, no use of DME  Pt reports no history of VNA, STR, inpatient MH treatment or D/A treatment  Pt drives and works  PCP is Dr Aman Izaguirre ([phone]) and pt uses [facility] pharmacy in [facility] for prescriptions  Pt's wife to assist with transportation at time of discharge  CM department to remain available for discharge concerns or needs  CM reviewed d/c planning process including the following: identifying help at home, patient preference for d/c planning needs, Discharge Lounge, [facility] Meds to Bed program, availability of treatment team to discuss questions or concerns patient and/or family may have regarding understanding medications and recognizing signs and symptoms once discharged  CM also encouraged patient to follow up with all recommended appointments after discharge  Patient advised of importance for patient and family to participate in managing patients medical well being

## 2021-05-13 NOTE — ASSESSMENT & PLAN NOTE
Wt Readings from Last 3 Encounters:   05/11/21 112 kg (246 lb 14 6 oz)   05/11/21 113 kg (250 lb)   05/10/21 112 kg (246 lb 3 2 oz)      Monitor intake and output  Monitor daily weights  EF of 42% with grade 3 diastolic dysfunction, severe AS and Mod-severe MR  IV lasix 20mg x1, monitor for response    Supplemental oxygen  Low Na diet

## 2021-05-13 NOTE — ASSESSMENT & PLAN NOTE
· bp is acceptable  · Hold losartan for cardiac cath  · Continue metoprolol  · Restart amlodipine and losartan as BP warrants

## 2021-05-13 NOTE — H&P (VIEW-ONLY)
Consultation - Cardiothoracic Surgery   Layne Joseph 78 y o  male MRN: [id number]  Unit/Bed#: -01 Encounter: [id number]    Physician Requesting Consult: John Amezcua MD    Reason for Consult / Principal Problem: Severe AS    Inpatient consult to Cardiothoracic Surgery  Consult performed by: Cody Schmidt PA-C  Consult ordered by: Alesia Ha MD        History of Present Illness: Layne Joseph is a 78y o  year old male with complex cardiovascular past medical history significant for severe aortic stenosis  This was initially identified in 2014  At that time it was in the moderate range  Repeat echocardiogram in 2019 did demonstrate progression into the severe range with the valve area estimated 0 6 centimeter squared  However, surgical workup was deferred as patient was not symptomatic  Committee now presents to [place] [id number] with complaint of progressive exertional angina, dyspnea, and fatigability  Repeat echocardiogram was again performed  Severe aortic stenosis now is demonstrated with a valve area of 0 4 cm2 and a mean gradient of 81  Ejection fraction was also diminished at 42%  Also noted on study was significant mitral regurgitation  Patient was transferred to [facility] for cardiac catheterization cardiac surgery consultation  Cardiac catheterization was completed without complications  No significant obstructive coronary disease was identified  During interview today he does admit to progressive fatigability, dyspnea, and intermittent angina  Symptoms have abated since admission to the hospital   His past medical history is otherwise significant for COPD, tobacco abuse, and hypertension  Past Medical History:  Past Medical History:   Diagnosis Date    Broken humerus     COPD (chronic obstructive pulmonary disease) ([facility] [address] )     Pneumonia          Past Surgical History:   No past surgical history on file        Family History:  Family History Problem Relation Age of Onset    Prostate cancer Father          Social History:  Social History     Substance and Sexual Activity   Alcohol Use Yes    Frequency: 2-4 times a month    Comment: 1-2/wk     Social History     Substance and Sexual Activity   Drug Use No     Social History     Tobacco Use   Smoking Status Current Some Day Smoker    Packs/day: 0 50    Types: Cigarettes    Start date: 1989   Smokeless Tobacco Never Used     Marital Status: /Civil Union      Home Medications:   Prior to Admission medications    Medication Sig Start Date End Date Taking?  Authorizing Provider   amLODIPine (NORVASC) 10 mg tablet TAKE 1 TABLET (10 MG TOTAL) BY MOUTH DAILY 7/9/20  Yes Dave Cano DO   Anoro Ellipta 62 5-25 MCG/INH inhaler INHALE 1 PUFF BY MOUTH EVERY DAY 1/19/21  Yes Dave Cano DO   aspirin (ECOTRIN LOW STRENGTH) 81 mg EC tablet Take 1 tablet (81 mg total) by mouth daily 3/26/18  Yes Dave aCno DO   atorvastatin (LIPITOR) 20 mg tablet TAKE 1 TABLET BY MOUTH EVERY DAY 3/25/21  Yes Dave Cano DO   losartan (COZAAR) 100 MG tablet TAKE 1 TABLET BY MOUTH EVERY DAY 10/20/20  Yes Dave Cano DO   Multiple Vitamin (MULTI-VITAMIN DAILY PO) Take 1 tablet by mouth daily 7/1/14  Yes Historical Provider, MD   pantoprazole (PROTONIX) 40 mg tablet TAKE 1 TABLET BY MOUTH EVERY DAY 2/16/21  Yes Dave Cano DO   albuterol (PROVENTIL HFA,VENTOLIN HFA) 90 mcg/act inhaler INHALE 1-2 PUFFS EVERY 4 (FOUR) HOURS AS NEEDED FOR WHEEZING 3/8/21   Dave Cano DO   Aspirin-Acetaminophen-Caffeine (EXCEDRIN MIGRAINE PO) Take by mouth    Historical Provider, MD   dextromethorphan-guaifenesin (MUCINEX DM)  MG per 12 hr tablet Take 1 tablet by mouth every 12 (twelve) hours 4/7/21   Radhika Shearer MD   predniSONE 20 mg tablet Please take 3 pills on day 1, 2, 3,  Then 2 pills on day 4, 5 and 6, 1 pill on day 7  Patient not taking: Reported on 5/12/2021 5/10/21   Radhika Shearer MD       Inpatient Medications:  Scheduled Meds:   Current Facility-Administered Medications   Medication Dose Route Frequency Provider Last Rate    acetaminophen  650 mg Oral Q4H PRN Leslye Goodell, CRNP      albuterol  2 puff Inhalation Q4H PRN Sarika Trotter MD      aspirin  81 mg Oral Daily Sarika Trotter MD      atorvastatin  40 mg Oral QPM Sarika Trotter MD      fluticasone-vilanterol  1 puff Inhalation Daily Sarika Trotter MD      heparin (porcine)  5,000 Units Subcutaneous [facility] Suzi Miller PA-C      metoprolol tartrate  25 mg Oral Q12H [address] Sarika Trotter MD      nicotine  1 patch Transdermal Daily Sarika Trotter MD      nitroglycerin  0 4 mg Sublingual Q5 Min PRN Sarika Trotter MD      ondansetron  4 mg Intravenous Q6H PRN Sarika Trotter MD      pantoprazole  40 mg Oral Early Morning Sarika Trotter MD      sodium chloride  50 mL/hr Intravenous Continuous Leslye Goodell, CRNP 50 mL/hr (05/13/21 0853)     Continuous Infusions: sodium chloride, 50 mL/hr, Last Rate: 50 mL/hr (05/13/21 0853)      PRN Meds:  acetaminophen, 650 mg, Q4H PRN  albuterol, 2 puff, Q4H PRN  nitroglycerin, 0 4 mg, Q5 Min PRN  ondansetron, 4 mg, Q6H PRN        Allergies:  No Known Allergies    Review of Systems:  Review of Systems   Constitutional: Positive for activity change and fatigue  HENT: Negative  Eyes: Negative  Respiratory: Positive for chest tightness and shortness of breath  Cardiovascular: Positive for chest pain, palpitations and leg swelling  Endocrine: Negative  Genitourinary: Negative  Musculoskeletal: Negative  Skin: Negative  Neurological: Negative  Psychiatric/Behavioral: Negative          Vital Signs:     Vitals:    05/12/21 2333 05/13/21 0600 05/13/21 0843 05/13/21 0926   BP: 120/77  (!) 83/58 109/79   BP Location: Right arm      Pulse: 97   68   Resp: 16  18    Temp: 97 8 °F (36 6 °C)  97 8 °F (36 6 °C)    TempSrc: Oral      SpO2: 96%      Weight:  106 kg (234 lb 5 6 oz)       Invasive Devices     Peripheral Intravenous Line            Peripheral IV 05/13/21 Left;Ventral (anterior) Hand less than 1 day                Physical Exam:  Physical Exam  Constitutional:       Appearance: Normal appearance  He is well-developed  HENT:      Head: Normocephalic and atraumatic  Eyes:      Conjunctiva/sclera: Conjunctivae normal    Neck:      Musculoskeletal: Full passive range of motion without pain and normal range of motion  Thyroid: No thyromegaly  Vascular: No carotid bruit or JVD  Trachea: No tracheal deviation  Cardiovascular:      Rate and Rhythm: Normal rate and regular rhythm  Pulses:           Carotid pulses are 2+ on the right side and 2+ on the left side  Dorsalis pedis pulses are 2+ on the right side and 2+ on the left side  Posterior tibial pulses are 2+ on the right side and 2+ on the left side  Heart sounds: S1 normal and S2 normal  Murmur present  Pulmonary:      Effort: No accessory muscle usage or respiratory distress  Breath sounds: No wheezing or rales  Chest:      Chest wall: No tenderness  Abdominal:      General: Bowel sounds are normal       Palpations: Abdomen is soft  Tenderness: There is no abdominal tenderness  Musculoskeletal: Normal range of motion  Skin:     General: Skin is warm and dry  Neurological:      Mental Status: He is alert and oriented to person, place, and time  Cranial Nerves: No cranial nerve deficit  Sensory: No sensory deficit     Psychiatric:         Speech: Speech normal          Behavior: Behavior normal          Lab Results:     Results from last 7 days   Lab Units 05/13/21  0605 05/12/21  0335 05/11/21  1742   WBC Thousand/uL 9 33 12 66* 12 76*   HEMOGLOBIN g/dL 13 0 12 7 13 8   HEMATOCRIT % 41 3 40 3 41 5   PLATELETS Thousands/uL 227 223 252     Results from last 7 days   Lab Units 05/13/21  0605 05/12/21  0327 05/11/21  1742   POTASSIUM mmol/L 4 1 4 4 4 3   CHLORIDE mmol/L 108 108 106   CO2 mmol/L 26 28 27   BUN mg/dL 16 13 14   CREATININE mg/dL 0 76 0 80 0 84   CALCIUM mg/dL 8 8 8 9 9 4     Results from last 7 days   Lab Units 05/13/21  0605 05/12/21  2302 05/12/21 2010 05/12/21 0327 05/11/21  1742   INR   --   --   --   --  1 13 1 05   PTT seconds 68* 49* 53*   < > 37 28    < > = values in this interval not displayed  No results found for: HGBA1C  Lab Results   Component Value Date    TROPONINI 0 39 (H) 05/12/2021       Imaging Studies:     Cardiac Catheterization: No significant obstructive CAD     Echocardiogram:  EF 42%  Severe AS with Marcello 0 4 cm2  mean gradient of 81 mm Hg  Moderate possibly severe mitral regurgitation  No mitral stenosis  Severe MAC  Mild TR  I have personally reviewed pertinent reports  and I have personally reviewed pertinent films in PACS    Assessment:  Principal Problem: Aortic valve stenosis - severe  Active Problems:    Chronic obstructive lung disease ([facility])    Hyperlipidemia    Essential hypertension    NSTEMI (non-ST elevated myocardial infarction) ([facility])    Acute combined systolic and diastolic CHF, NYHA class 1 ([facility])    Severe aortic stenosis; Ongoing AVR workup  Severe aortic stenosis; Ongoing TAVR workup  Severe mitral regurgitation; Ongoing MVR workup    Plan:  Risks and benefits of AVR/MVR vs TAVR were discussed in detail today with the patient  They understand and wish to proceed with further workup and ultimately surgical intervention  We have ordered routine preoperative laboratory and vascular studies  Pending the results of these tests, they will be scheduled for surgery this admisison with Dr Carlson NurseSAAD was comfortable with our recommendations, and their questions were answered to their satisfaction  We will continue to evaluate the patient daily with further recommendations as work up is completed  Thank you for allowing us to participate in the care of this patient       SIGNATURE: Jenifer Russell TIFF Bedoya  DATE: May 13, 2021  TIME: 10:08 AM    * This note was completed in part utilizing [employer] fluency direct voice recognition software  Grammatical errors, random word insertion, spelling mistakes, and incomplete sentences may be an occasional consequence of the system secondary to software limitations, ambient noise and hardware issues  At the time of dictation, efforts were made to edit, clarify and /or correct errors  Please read the chart carefully and recognize, using context, where substitutions have occurred  If you have any questions or concerns about the context, text or information contained within the body of this dictation, please contact myself, the provider, for further clarification

## 2021-05-13 NOTE — PROGRESS NOTES
General Cardiology   Progress Note -  Team One   Kim Tyson 78 y o  male MRN: [id number]    Unit/Bed#: -01 Encounter: [id number]    Assessment/ Plan    1  Valvular heart disease- severe aortic stenosis and moderate to severe MR  Presented with progressive exertional chest pain, dyspnea, and fatigability   Echocardiogram showed progression of aortic valve stenosis to severe with HUMBERTO 0 3 cm2 with MG 81 mmHg as well as moderate to severe MR with heavily calcified leaflets  Transferred to [facility] for cardiac cath and CT surgery evaluation  2  Non MI troponin elevation in setting of aortic valve disease  Troponin 0 06-->0 16-->0 24-->0  39     3  Nonischemic cardiomyopathy, LVEF 42%  Non-obstructive CAD by cath today  GDMT- metoprolol tartrate 25 mg BID  No ACE/ARB in setting of severe/critical AS undergoing CT surgery evaluation    4  Non-obstructive CAD  Cardiac cath today- moderate non obstructive atherosclerosis of LAD, diffuse atherosclerosis of the branch vessels of the circumflex  On ASA, statin, and beta blocker  5  HTN- Brief hypotension this morning, otherwise stable with average /71  Only on beta blocker right now  At home was on amlodipine 10 mg daily and losartan 100 mg daily  6  HLD- , , HDL 49, LDL 28  Continue statin  7  COPD- per primary team     8  Tobacco use- continues to smoke about 1/2 ppd or less  Encouraged complete cessation  Subjective  States he had good response to IV Lasix yesterday and filled 2 urinals  Feeling better than when he came in to the hospital  No episodes of chest pain/dyspnea overnight or this morning  Walking around room without difficulty  Review of Systems   Constitution: Negative for chills, diaphoresis, malaise/fatigue and weight gain  Cardiovascular: Positive for dyspnea on exertion  Negative for chest pain, leg swelling, orthopnea, palpitations and syncope  Respiratory: Positive for cough and sputum production   Negative for shortness of breath  Gastrointestinal: Negative for bloating, nausea and vomiting  Neurological: Positive for dizziness (rare)  Negative for light-headedness and weakness  Psychiatric/Behavioral: Negative for altered mental status  All other systems reviewed and are negative  Objective:   Vitals: Blood pressure 109/79, pulse 68, temperature 97 8 °F (36 6 °C), resp  rate 18, weight 106 kg (234 lb 5 6 oz), SpO2 96 %  , Body mass index is 30 09 kg/m²  ,     Systolic (44XPR), IAT:885 , Min:83 , YQH:937     Diastolic (25ODM), LFQ:06, Min:52, Max:79      Intake/Output Summary (Last 24 hours) at 5/13/2021 1012  Last data filed at 5/13/2021 0001  Gross per 24 hour   Intake --   Output 800 ml   Net -800 ml     Wt Readings from Last 3 Encounters:   05/13/21 106 kg (234 lb 5 6 oz)   05/11/21 112 kg (246 lb 14 6 oz)   05/11/21 113 kg (250 lb)     Telemetry Review: No significant arrhythmias seen on telemetry review  NSR    EKG personally reviewed by BITA Roldan  5/12 0010 NSR with PACs, marked ST/T wave abnormality    Physical Exam  Vitals signs reviewed  Constitutional:       General: He is not in acute distress  Appearance: Normal appearance  Neck:      Musculoskeletal: Neck supple  Vascular: No hepatojugular reflux or JVD  Cardiovascular:      Rate and Rhythm: Normal rate and regular rhythm  Pulses: Normal pulses  Heart sounds: Murmur present  Systolic murmur present  No friction rub  No gallop  Pulmonary:      Effort: Pulmonary effort is normal  No respiratory distress  Breath sounds: No rales  Comments: Decreased at bases with a few scattered crackles, room air  Abdominal:      General: Bowel sounds are normal  There is no distension  Palpations: Abdomen is soft  Tenderness: There is no abdominal tenderness  Musculoskeletal: Normal range of motion  Right lower leg: No edema  Left lower leg: No edema  Skin:     General: Skin is warm and dry  Capillary Refill: Capillary refill takes less than 2 seconds  Findings: No erythema  Comments: Right radial cath site- TR band in place, no active bleeding   Neurological:      Mental Status: He is alert and oriented to person, place, and time     Psychiatric:         Mood and Affect: Mood normal      LABORATORY RESULTS  Results from last 7 days   Lab Units 05/12/21  0327 05/12/21  0016 05/11/21  2059   TROPONIN I ng/mL 0 39* 0 24* 0 16*     CBC with diff:   Results from last 7 days   Lab Units 05/13/21  0605 05/12/21  0335 05/11/21  1742   WBC Thousand/uL 9 33 12 66* 12 76*   HEMOGLOBIN g/dL 13 0 12 7 13 8   HEMATOCRIT % 41 3 40 3 41 5   MCV fL 96 100* 95   PLATELETS Thousands/uL 227 223 252   MCH pg 30 3 31 4 31 5   MCHC g/dL 31 5 31 5 33 3   RDW % 13 9 13 7 13 3   MPV fL 9 1 9 4 9 1   NRBC AUTO /100 WBCs 0  --  0     CMP:  Results from last 7 days   Lab Units 05/13/21  0605 05/12/21 0327 05/11/21  1742   POTASSIUM mmol/L 4 1 4 4 4 3   CHLORIDE mmol/L 108 108 106   CO2 mmol/L 26 28 27   BUN mg/dL 16 13 14   CREATININE mg/dL 0 76 0 80 0 84   CALCIUM mg/dL 8 8 8 9 9 4   AST U/L  --   --  14   ALT U/L  --   --  23   ALK PHOS U/L  --   --  73   EGFR ml/min/1 73sq m 94 92 91     BMP:  Results from last 7 days   Lab Units 05/13/21  0605 05/12/21 0327 05/11/21  1742   POTASSIUM mmol/L 4 1 4 4 4 3   CHLORIDE mmol/L 108 108 106   CO2 mmol/L 26 28 27   BUN mg/dL 16 13 14   CREATININE mg/dL 0 76 0 80 0 84   CALCIUM mg/dL 8 8 8 9 9 4     Lab Results   Component Value Date    CREATININE 0 76 05/13/2021    CREATININE 0 80 05/12/2021    CREATININE 0 84 05/11/2021     Lab Results   Component Value Date    NTBNP 4,493 (H) 05/11/2021    NTBNP 3,228 (H) 05/10/2021     Results from last 7 days   Lab Units 05/12/21 0327 05/11/21  1742   INR  1 13 1 05     Lipid Profile:   Lab Results   Component Value Date    CHOL 209 08/07/2014     Lab Results   Component Value Date    HDL 49 05/13/2021    HDL 39 08/07/2014     Lab Results   Component Value Date    LDLCALC 28 2021    LDLCALC 126 (H) 2014     Lab Results   Component Value Date    TRIG 129 2021    TRIG 220 2014     Cardiac testing:   Results for orders placed during the hospital encounter of 21   Echo complete with contrast if indicated    Narrative [facility]  [place], [address]  [phone]    Transthoracic Echocardiogram  2D, M-mode, Doppler, and Color Doppler    Study date:  12-May-2021    Patient: Pancho Banegas  MR number: [id number]  Account number: [de-identified]  : 1955  Age: 78 years  Gender: Male  Status: Outpatient  Location: Bedside  Height: 74 in  Weight: 245 5 lb  BP: 102/ 70 mmHg    Indications: Chest pain  Diagnoses: R07 9 - Chest pain, unspecified    Sonographer:  Rose Peña RDCS  Primary Physician:  Crys Willis DO  Referring Physician:  Eileen Leon DO  Group:  [id number]  Interpreting Physician:  Ondina Jaeger MD    SUMMARY    LEFT VENTRICLE:  Moderate decreased left ventricular systolic function, EF 89%  Significantly decreased left ventricular global longitudinal strain, minus 7 4%  Severe concentric left ventricular hypertrophy, wall thickness 21 mm  Mildly increased left  ventricular cavity size  Grade 3 left ventricular diastolic dysfunction  Severely elevated left ventricular filling pressures  RIGHT VENTRICLE:  Mild right ventricular enlargement with well preserved right ventricular systolic function  LEFT ATRIUM:  Moderate left atrial enlargement  RIGHT ATRIUM:  Mild right atrial enlargement  MITRAL VALVE:  Moderate possibly severe mitral regurgitation  No mitral stenosis  Mitral ERO 0 5 cm2  Mitral valve regurgitant volume 85 mL  The mitral valve annulus was heavily calcified  The mitral valve leaflets were calcified but have well preserved  mobility  There was some calcification in the chordae      AORTIC VALVE:  Heavily calcified aortic valve with severe, probably critical aortic stenosis  No significant aortic regurgitation  Aortic valve maximum velocity 5 3 M/S  Aortic valve mean gradient 81 mmHg  Aortic valve area 0 3 cm2  TRICUSPID VALVE:  There was mild regurgitation  AORTA:  Aortic root at the upper limits of normal to mildly enlarged at 3 8 cm    PULMONARY ARTERIES:  Moderate pulmonary hypertension  Pulmonary artery systolic pressures estimated 54 mmHg  PERICARDIUM:  Mild pericardial effusion without hemodynamic significance  SUMMARY MEASUREMENTS  2D measurements:  Unspecified Anatomy:   %FS was 25 4 %  AA PSSL Full was -8 3 %  AAS PSSL Full was -15 1 %  AI PSSL Full was -12 5 %  AL PSSL Full was -5 3 %  AP PSSL Full was -5 4 %  AS PSSL Full was -20 9 %  AVC was 378 3 ms  Ao Diam was 3 4 cm  Ao asc was 3 8 cm  BA PSSL Full was -2 8 %  BAS PSSL Full was -2 6 %  BI PSSL Full was -13 5 %  BL PSSL Full was -6 9 %  BP PSSL Full was -4 2 %  BS PSSL Full was -4 1 %  EDV(Teich) was 136 1 ml   EF(Teich) was 49 7 %  ESV(Teich)  was 68 5 ml   G peak SL Full(A2C) was -7 5 %  G peak SL Full(A4C) was -8 5 %  G peak SL Full(APLAX) was -6 1 %  G peak SL Full(Avg) was -7 4 %  IVSd was 2 1 cm  LA Area was 31 1 cm2  LA Diam was 6 3 cm  LAAs A4C was 30 2 cm2  LAESV  A-L A4C was 122 9 ml  LAESV MOD A4C was 116 1 ml  LALs A4C was 6 3 cm  LVEDV MOD A4C was 217 9 ml  LVEF MOD A4C was 42 2 %  LVESV MOD A4C was 126 ml  LVIDd was 5 3 cm  LVIDs was 4 cm  LVLd A4C was 10 9 cm  LVLs A4C was 10 cm  LVOT Diam was 2 cm  LVPWd was 2 1 cm  MA PSSL Full was -4 3 %  MAS PSSL Full was -10 5 %  MI PSSL Full was -5 %  ML PSSL Full was -3 1 %  MP PSSL Full was 3 2 %  MS PSSL Full was -10 8 %  Peak SL Dispersion Full was 61 5 ms  RA  Area was 21 1 cm2  RVIDd was 5 cm  RWT was 0 8   SV MOD A4C was 91 9 ml   SV(Teich) was 67 6 ml   CF measurements:  Unspecified Anatomy:   MR Als  Marito was 0 4 m/s   MR Flow was 255 2 ml/s  MR Rad was 1 1 cm  MR Trace was 8 9 cm2   CW measurements:  Unspecified Anatomy:   AV Env  Ti was 287 3 ms   AV VTI was 125 6 cm   AV Vmax was 5 3 m/s  AV Vmean was 4 4 m/s  AV maxPG was 114 3 mmHg  AV meanPG was 80 6 mmHg  MR VTI was 162 5 cm  MR Vmax was 4 9 m/s   TR Vmax was 3 1 m/s  TR  maxPG was 39 3 mmHg  MM measurements:  Unspecified Anatomy:   TAPSE was 2 cm  PW measurements:  Unspecified Anatomy:   HUMBERTO (VTI) was 0 3 cm2  HUMBERTO Vmax was 0 3 cm2  AVAI (VTI) was 0 cm2/m2  AVAI Vmax was 0 cm2/m2  DVI was 0 1   E' Sept was 0 m/s  E/E' Sept was 44 1   LVOT Env  Ti was 303 2 ms  LVOT VTI was 13 7 cm  LVOT Vmax  was 0 6 m/s  LVOT Vmean was 0 5 m/s  LVOT maxPG was 1 4 mmHg  LVOT meanPG was 0 9 mmHg  LVSI Dopp was 17 8 ml/m2  LVSV Dopp was 42 3 ml   MR ERO was 0 5 cm2  MR RV was 85 2 ml   MV A Marito was 0 6 m/s  MV Dec [place] was 11 4 m/s2  MV  DecT was 136 5 ms   MV E Marito was 1 6 m/s  MV E/A Ratio was 2 4   MV PHT was 39 6 ms  MVA By PHT was 5 6 cm2  HISTORY: PRIOR HISTORY: Severe AS  COPD  Hyperlipidemia  Hypertension  Shortness of breath  Chest pain  GERD  Smoker  LVH  PROCEDURE: The procedure was performed at the bedside  This was a routine study  The transthoracic approach was used  The study included complete 2D imaging, M-mode, complete spectral Doppler, and color Doppler  The heart rate was 80 bpm,  at the start of the study  Images were obtained from the parasternal, apical, subcostal, and suprasternal notch acoustic windows  Image quality was adequate  LEFT VENTRICLE: Moderate decreased left ventricular systolic function, EF 32%  Significantly decreased left ventricular global longitudinal strain, minus 7 4%  Severe concentric left ventricular hypertrophy, wall thickness 21 mm  Mildly  increased left ventricular cavity size  Grade 3 left ventricular diastolic dysfunction  Severely elevated left ventricular filling pressures      RIGHT VENTRICLE: Mild right ventricular enlargement with well preserved right ventricular systolic function  LEFT ATRIUM: Moderate left atrial enlargement  RIGHT ATRIUM: Mild right atrial enlargement  MITRAL VALVE: Moderate possibly severe mitral regurgitation  No mitral stenosis  Mitral ERO 0 5 cm2  Mitral valve regurgitant volume 85 mL  The mitral valve annulus was heavily calcified  The mitral valve leaflets were calcified but have  well preserved mobility  There was some calcification in the chordae  AORTIC VALVE: Heavily calcified aortic valve with severe, probably critical aortic stenosis  No significant aortic regurgitation  Aortic valve maximum velocity 5 3 M/S  Aortic valve mean gradient 81 mmHg  Aortic valve area 0 3 cm2  TRICUSPID VALVE: The valve structure was normal  There was normal leaflet separation  DOPPLER: The transtricuspid velocity was within the normal range  There was no evidence for stenosis  There was mild regurgitation  PULMONIC VALVE: DOPPLER: The transpulmonic velocity was within the normal range  There was no regurgitation  PERICARDIUM: Mild pericardial effusion without hemodynamic significance  AORTA: Aortic root at the upper limits of normal to mildly enlarged at 3 8 cm    PULMONARY ARTERY: Moderate pulmonary hypertension  Pulmonary artery systolic pressures estimated 54 mmHg      SYSTEM MEASUREMENT TABLES    2D  %FS: 25 4 %  AA PSSL Full: -8 3 %  AAS PSSL Full: -15 1 %  AI PSSL Full: -12 5 %  AL PSSL Full: -5 3 %  AP PSSL Full: -5 4 %  AS PSSL Full: -20 9 %  AVC: 378 3 ms  Ao Diam: 3 4 cm  Ao asc: 3 8 cm  BA PSSL Full: -2 8 %  BAS PSSL Full: -2 6 %  BI PSSL Full: -13 5 %  BL PSSL Full: -6 9 %  BP PSSL Full: -4 2 %  BS PSSL Full: -4 1 %  EDV(Teich): 136 1 ml  EF(Teich): 49 7 %  ESV(Teich): 68 5 ml  G peak SL Full(A2C): -7 5 %  G peak SL Full(A4C): -8 5 %  G peak SL Full(APLAX): -6 1 %  G peak SL Full(Avg): -7 4 %  IVSd: 2 1 cm  LA Area: 31 1 cm2  LA Diam: 6 3 cm  LAAs A4C: 30 2 cm2  LAESV A-L A4C: 122 9 ml  LAESV MOD A4C: 116 1 ml  LALs A4C: 6 3 cm  LVEDV MOD A4C: 217 9 ml  LVEF MOD A4C: 42 2 %  LVESV MOD A4C: 126 ml  LVIDd: 5 3 cm  LVIDs: 4 cm  LVLd A4C: 10 9 cm  LVLs A4C: 10 cm  LVOT Diam: 2 cm  LVPWd: 2 1 cm  LVd Mass: 718 1 g  LVd Mass Index: 303 g/m2  MA PSSL Full: -4 3 %  MAS PSSL Full: -10 5 %  MI PSSL Full: -5 %  ML PSSL Full: -3 1 %  MP PSSL Full: 3 2 %  MS PSSL Full: -10 8 %  Peak SL Dispersion Full: 61 5 ms  RA Area: 21 1 cm2  RVIDd: 5 cm  RWT: 0 8  SV MOD A4C: 91 9 ml  SV(Teich): 67 6 ml    CF  MR Als  Marito: 0 4 m/s  MR Flow: 255 2 ml/s  MR Rad: 1 1 cm  MR Trace: 8 9 cm2    CW  AV Env  Ti: 287 3 ms  AV VTI: 125 6 cm  AV Vmax: 5 3 m/s  AV Vmean: 4 4 m/s  AV maxP 3 mmHg  AV meanP 6 mmHg  MR VTI: 162 5 cm  MR Vmax: 4 9 m/s  TR Vmax: 3 1 m/s  TR maxP 3 mmHg    MM  TAPSE: 2 cm    PW  HUMBERTO (VTI): 0 3 cm2  HUMBERTO Vmax: 0 3 cm2  AVAI (VTI): 0 cm2/m2  AVAI Vmax: 0 cm2/m2  DVI: 0 1  E' Sept: 0 m/s  E/E' Sept: 44 1  LVOT Env  Ti: 303 2 ms  LVOT VTI: 13 7 cm  LVOT Vmax: 0 6 m/s  LVOT Vmean: 0 5 m/s  LVOT maxP 4 mmHg  LVOT meanP 9 mmHg  LVSI Dopp: 17 8 ml/m2  LVSV Dopp: 42 3 ml  MR ERO: 0 5 cm2  MR RV: 85 2 ml  MV A Marito: 0 6 m/s  MV Dec [place]: 11 4 m/s2  MV DecT: 136 5 ms  MV E Marito: 1 6 m/s  MV E/A Ratio: 2 4  MV PHT: 39 6 ms  MVA By PHT: 5 6 cm2    [facility] Accredited Echocardiography Laboratory    Prepared and electronically signed by    Eddie Rios MD  Signed 12-May-2021 12:22:39       Meds/Allergies   all current active meds have been reviewed and current meds:   Current Facility-Administered Medications   Medication Dose Route Frequency    acetaminophen (TYLENOL) tablet 650 mg  650 mg Oral Q4H PRN    albuterol (PROVENTIL HFA,VENTOLIN HFA) inhaler 2 puff  2 puff Inhalation Q4H PRN    aspirin (ECOTRIN LOW STRENGTH) EC tablet 81 mg  81 mg Oral Daily    atorvastatin (LIPITOR) tablet 40 mg  40 mg Oral QPM    fluticasone-vilanterol (BREO ELLIPTA) 100-25 mcg/inh inhaler 1 puff  1 puff Inhalation Daily    heparin (porcine) subcutaneous injection 5,000 Units  5,000 Units Subcutaneous Q8H [address]    metoprolol tartrate (LOPRESSOR) tablet 25 mg  25 mg Oral Q12H [address]    nicotine (NICODERM CQ) 21 mg/24 hr TD 24 hr patch 1 patch  1 patch Transdermal Daily    nitroglycerin (NITROSTAT) SL tablet 0 4 mg  0 4 mg Sublingual Q5 Min PRN    ondansetron (ZOFRAN) injection 4 mg  4 mg Intravenous Q6H PRN    pantoprazole (PROTONIX) EC tablet 40 mg  40 mg Oral Early Morning    sodium chloride 0 9 % infusion  50 mL/hr Intravenous Continuous     Facility-Administered Medications Prior to Admission   Medication    nitroglycerin (NITROSTAT) SL tablet 0 4 mg     Medications Prior to Admission   Medication    amLODIPine (NORVASC) 10 mg tablet    Anoro Ellipta 62 5-25 MCG/INH inhaler    aspirin (ECOTRIN LOW STRENGTH) 81 mg EC tablet    atorvastatin (LIPITOR) 20 mg tablet    losartan (COZAAR) 100 MG tablet    Multiple Vitamin (MULTI-VITAMIN DAILY PO)    pantoprazole (PROTONIX) 40 mg tablet    albuterol (PROVENTIL HFA,VENTOLIN HFA) 90 mcg/act inhaler    Aspirin-Acetaminophen-Caffeine (EXCEDRIN MIGRAINE PO)    dextromethorphan-guaifenesin (MUCINEX DM)  MG per 12 hr tablet    predniSONE 20 mg tablet     sodium chloride, 50 mL/hr, Last Rate: 50 mL/hr (05/13/21 9419)    Counseling / Coordination of Care  Total floor / unit time spent today 20 minutes  Greater than 50% of total time was spent with the patient and / or family counseling and / or coordination of care  ** Please Note: Dragon 360 Dictation voice to text software may have been used in the creation of this document   **

## 2021-05-13 NOTE — UTILIZATION REVIEW
Inpatient Admission Authorization Request   NOTIFICATION OF INPATIENT ADMISSION/INPATIENT AUTHORIZATION REQUEST   SERVICING FACILITY:   [address] [place] [address]  Address: [address] [place] [address], [address] [place] [address]  Tax ID: [id number]  NPI: [id number]  Place of Service: Inpatient [address] [facility] Code: 24     ATTENDING PROVIDER:  Attending Name and NPI#: Ivy Maurice Md [[id number]]  Address: [address] [place] [address], [address] [place] [address]  Phone: [phone]     UTILIZATION REVIEW CONTACT:  Palmira Quintanilla, Utilization   Network Utilization Review Department  Phone: [phone]  Fax: [phone]  Email: [email or web address]     PHYSICIAN ADVISORY SERVICES:  FOR QUQI-SQ-OYSB REVIEW - MEDICAL NECESSITY DENIAL  Phone: [phone]  Fax: [phone]  Email: [email or web address]     TYPE OF REQUEST:  Inpatient Status     ADMISSION INFORMATION:  ADMISSION DATE/TIME: 5/12/21 2134  PATIENT DIAGNOSIS CODE/DESCRIPTION:  NSTEMI (non-ST elevated myocardial infarction) ([facility] [address] ) [I21 4]  DISCHARGE DATE/TIME: No discharge date for patient encounter  DISCHARGE DISPOSITION (IF DISCHARGED): [address] [facility]     IMPORTANT INFORMATION:  Please contact the Palmira Quintanilla directly with any questions or concerns regarding this request  Department voicemails are confidential     Send requests for admission clinical reviews, concurrent reviews, approvals, and administrative denials due to lack of clinical to fax [phone]

## 2021-05-13 NOTE — ASSESSMENT & PLAN NOTE
· bp is acceptable  · Losartan held for cardiac catheterization, check BMP tomorrow  · Continue metoprolol  · Restart amlodipine and losartan as BP warrants

## 2021-05-14 ENCOUNTER — APPOINTMENT (OUTPATIENT)
Dept: PULMONOLOGY | Facility: HOSPITAL | Age: 66
DRG: 216 | End: 2021-05-14
Payer: COMMERCIAL

## 2021-05-14 ENCOUNTER — ANESTHESIA EVENT (INPATIENT)
Dept: NON INVASIVE DIAGNOSTICS | Facility: HOSPITAL | Age: 66
DRG: 216 | End: 2021-05-14
Payer: COMMERCIAL

## 2021-05-14 ENCOUNTER — PREP FOR PROCEDURE (OUTPATIENT)
Dept: CARDIAC SURGERY | Facility: CLINIC | Age: 66
End: 2021-05-14

## 2021-05-14 DIAGNOSIS — I35.0 AORTIC VALVE STENOSIS, ETIOLOGY OF CARDIAC VALVE DISEASE UNSPECIFIED: Primary | ICD-10-CM

## 2021-05-14 PROBLEM — I25.10 NONOBSTRUCTIVE ATHEROSCLEROSIS OF CORONARY ARTERY: Status: ACTIVE | Noted: 2021-05-14

## 2021-05-14 PROBLEM — M19.90 ARTHRITIS: Status: ACTIVE | Noted: 2021-05-14

## 2021-05-14 LAB
ANION GAP SERPL CALCULATED.3IONS-SCNC: 4 MMOL/L (ref 4–13)
BUN SERPL-MCNC: 19 MG/DL (ref 5–25)
CALCIUM SERPL-MCNC: 9.1 MG/DL (ref 8.3–10.1)
CHLORIDE SERPL-SCNC: 104 MMOL/L (ref 100–108)
CO2 SERPL-SCNC: 29 MMOL/L (ref 21–32)
CREAT SERPL-MCNC: 0.66 MG/DL (ref 0.6–1.3)
GFR SERPL CREATININE-BSD FRML MDRD: 100 ML/MIN/1.73SQ M
GLUCOSE SERPL-MCNC: 89 MG/DL (ref 65–140)
MRSA NOSE QL CULT: NORMAL
POTASSIUM SERPL-SCNC: 4.2 MMOL/L (ref 3.5–5.3)
SODIUM SERPL-SCNC: 137 MMOL/L (ref 136–145)

## 2021-05-14 PROCEDURE — 80048 BASIC METABOLIC PNL TOTAL CA: CPT | Performed by: INTERNAL MEDICINE

## 2021-05-14 PROCEDURE — 93312 ECHO TRANSESOPHAGEAL: CPT

## 2021-05-14 PROCEDURE — 99231 SBSQ HOSP IP/OBS SF/LOW 25: CPT | Performed by: THORACIC SURGERY (CARDIOTHORACIC VASCULAR SURGERY)

## 2021-05-14 PROCEDURE — 94726 PLETHYSMOGRAPHY LUNG VOLUMES: CPT | Performed by: INTERNAL MEDICINE

## 2021-05-14 PROCEDURE — 93312 ECHO TRANSESOPHAGEAL: CPT | Performed by: INTERNAL MEDICINE

## 2021-05-14 PROCEDURE — 99232 SBSQ HOSP IP/OBS MODERATE 35: CPT | Performed by: INTERNAL MEDICINE

## 2021-05-14 PROCEDURE — 94729 DIFFUSING CAPACITY: CPT | Performed by: INTERNAL MEDICINE

## 2021-05-14 PROCEDURE — 94729 DIFFUSING CAPACITY: CPT

## 2021-05-14 PROCEDURE — 93325 DOPPLER ECHO COLOR FLOW MAPG: CPT | Performed by: INTERNAL MEDICINE

## 2021-05-14 PROCEDURE — 94060 EVALUATION OF WHEEZING: CPT | Performed by: INTERNAL MEDICINE

## 2021-05-14 PROCEDURE — 93320 DOPPLER ECHO COMPLETE: CPT | Performed by: INTERNAL MEDICINE

## 2021-05-14 PROCEDURE — 94060 EVALUATION OF WHEEZING: CPT

## 2021-05-14 PROCEDURE — 94760 N-INVAS EAR/PLS OXIMETRY 1: CPT

## 2021-05-14 PROCEDURE — 94726 PLETHYSMOGRAPHY LUNG VOLUMES: CPT

## 2021-05-14 RX ORDER — ALBUTEROL SULFATE 2.5 MG/3ML
2.5 SOLUTION RESPIRATORY (INHALATION) ONCE
Status: COMPLETED | OUTPATIENT
Start: 2021-05-14 | End: 2021-05-14

## 2021-05-14 RX ORDER — GLYCOPYRROLATE 0.2 MG/ML
INJECTION INTRAMUSCULAR; INTRAVENOUS AS NEEDED
Status: DISCONTINUED | OUTPATIENT
Start: 2021-05-14 | End: 2021-05-14

## 2021-05-14 RX ORDER — KETAMINE HCL IN NACL, ISO-OSM 100MG/10ML
SYRINGE (ML) INJECTION AS NEEDED
Status: DISCONTINUED | OUTPATIENT
Start: 2021-05-14 | End: 2021-05-14

## 2021-05-14 RX ORDER — PROPOFOL 10 MG/ML
INJECTION, EMULSION INTRAVENOUS AS NEEDED
Status: DISCONTINUED | OUTPATIENT
Start: 2021-05-14 | End: 2021-05-14

## 2021-05-14 RX ORDER — SODIUM CHLORIDE 9 MG/ML
INJECTION, SOLUTION INTRAVENOUS CONTINUOUS PRN
Status: DISCONTINUED | OUTPATIENT
Start: 2021-05-14 | End: 2021-05-14

## 2021-05-14 RX ORDER — LIDOCAINE HYDROCHLORIDE 10 MG/ML
INJECTION, SOLUTION EPIDURAL; INFILTRATION; INTRACAUDAL; PERINEURAL AS NEEDED
Status: DISCONTINUED | OUTPATIENT
Start: 2021-05-14 | End: 2021-05-14

## 2021-05-14 RX ADMIN — ALBUTEROL SULFATE 2.5 MG: 2.5 SOLUTION RESPIRATORY (INHALATION) at 15:44

## 2021-05-14 RX ADMIN — PHENYLEPHRINE HYDROCHLORIDE 100 MCG: 10 INJECTION INTRAVENOUS at 13:17

## 2021-05-14 RX ADMIN — METOPROLOL TARTRATE 25 MG: 25 TABLET, FILM COATED ORAL at 11:26

## 2021-05-14 RX ADMIN — PROPOFOL 30 MG: 10 INJECTION, EMULSION INTRAVENOUS at 13:09

## 2021-05-14 RX ADMIN — PHENYLEPHRINE HYDROCHLORIDE 100 MCG: 10 INJECTION INTRAVENOUS at 13:14

## 2021-05-14 RX ADMIN — PROPOFOL 90 MG: 10 INJECTION, EMULSION INTRAVENOUS at 13:03

## 2021-05-14 RX ADMIN — HEPARIN SODIUM 5000 UNITS: 5000 INJECTION INTRAVENOUS; SUBCUTANEOUS at 05:15

## 2021-05-14 RX ADMIN — PROPOFOL 30 MG: 10 INJECTION, EMULSION INTRAVENOUS at 13:38

## 2021-05-14 RX ADMIN — PROPOFOL 20 MG: 10 INJECTION, EMULSION INTRAVENOUS at 13:33

## 2021-05-14 RX ADMIN — ATORVASTATIN CALCIUM 40 MG: 40 TABLET, FILM COATED ORAL at 17:33

## 2021-05-14 RX ADMIN — SODIUM CHLORIDE: 0.9 INJECTION, SOLUTION INTRAVENOUS at 12:49

## 2021-05-14 RX ADMIN — HEPARIN SODIUM 5000 UNITS: 5000 INJECTION INTRAVENOUS; SUBCUTANEOUS at 14:42

## 2021-05-14 RX ADMIN — PANTOPRAZOLE SODIUM 40 MG: 40 TABLET, DELAYED RELEASE ORAL at 05:15

## 2021-05-14 RX ADMIN — PROPOFOL 40 MG: 10 INJECTION, EMULSION INTRAVENOUS at 13:23

## 2021-05-14 RX ADMIN — MUPIROCIN 1 APPLICATION: 20 OINTMENT TOPICAL at 08:42

## 2021-05-14 RX ADMIN — GLYCOPYRROLATE 0.2 MG: 0.2 INJECTION, SOLUTION INTRAMUSCULAR; INTRAVENOUS at 12:58

## 2021-05-14 RX ADMIN — PROPOFOL 50 MG: 10 INJECTION, EMULSION INTRAVENOUS at 13:19

## 2021-05-14 RX ADMIN — MUPIROCIN 1 APPLICATION: 20 OINTMENT TOPICAL at 21:30

## 2021-05-14 RX ADMIN — PROPOFOL 30 MG: 10 INJECTION, EMULSION INTRAVENOUS at 13:27

## 2021-05-14 RX ADMIN — FLUTICASONE FUROATE AND VILANTEROL TRIFENATATE 1 PUFF: 100; 25 POWDER RESPIRATORY (INHALATION) at 08:40

## 2021-05-14 RX ADMIN — PROPOFOL 20 MG: 10 INJECTION, EMULSION INTRAVENOUS at 13:42

## 2021-05-14 RX ADMIN — HEPARIN SODIUM 5000 UNITS: 5000 INJECTION INTRAVENOUS; SUBCUTANEOUS at 21:30

## 2021-05-14 RX ADMIN — PROPOFOL 30 MG: 10 INJECTION, EMULSION INTRAVENOUS at 13:31

## 2021-05-14 RX ADMIN — Medication 30 MG: at 13:03

## 2021-05-14 RX ADMIN — LIDOCAINE HYDROCHLORIDE 100 MG: 10 INJECTION, SOLUTION EPIDURAL; INFILTRATION; INTRACAUDAL; PERINEURAL at 13:03

## 2021-05-14 RX ADMIN — CHLORHEXIDINE GLUCONATE 0.12% ORAL RINSE 15 ML: 1.2 LIQUID ORAL at 21:30

## 2021-05-14 RX ADMIN — CHLORHEXIDINE GLUCONATE 0.12% ORAL RINSE 15 ML: 1.2 LIQUID ORAL at 08:40

## 2021-05-14 RX ADMIN — ASPIRIN 81 MG: 81 TABLET, COATED ORAL at 08:41

## 2021-05-14 NOTE — ASSESSMENT & PLAN NOTE
· bp is acceptable  · Continue metoprolol  · Restart amlodipine and losartan as BP warrants    Still with soft BP's

## 2021-05-14 NOTE — ASSESSMENT & PLAN NOTE
· Transferred from [facility] for a cardiac catheterization for presumed NSTEMI, also with severe valve stenosis  · Echocardiogram with EF of 42%  · Continue aspirin, atorvastatin  · Patient was started on metoprolol 25mg BID  · S/P cardiac catheterization which showed patent coronaries  D/C'd heparin gtt

## 2021-05-14 NOTE — ANESTHESIA POSTPROCEDURE EVALUATION
Post-Op Assessment Note    CV Status:  Stable  Pain Score: 0    Pain management: adequate     Mental Status:  Alert and awake   Hydration Status:  Euvolemic   PONV Controlled:  Controlled   Airway Patency:  Patent   Two or more mitigation strategies used for obstructive sleep apnea   Post Op Vitals Reviewed: Yes      Staff: CRNA         No complications documented      BP      Temp      Pulse     Resp      SpO2

## 2021-05-14 NOTE — ASSESSMENT & PLAN NOTE
Wt Readings from Last 3 Encounters:   05/14/21 106 kg (233 lb 7 5 oz)   05/11/21 112 kg (246 lb 14 6 oz)   05/11/21 113 kg (250 lb)     Monitor intake and output   Monitor daily weights  EF of 42% with grade 3 diastolic dysfunction, severe AS and Mod-severe MR  IV lasix 20mg x1, with approximately 800 cc urine output  Supplemental oxygen weaned  Low Na diet

## 2021-05-14 NOTE — CASE MANAGEMENT
Patient reviewed during care coordination rounds with TIFF Ortiz who informed that discharge is pending CT surgery  CM department to follow for discharge concerns or needs

## 2021-05-14 NOTE — ASSESSMENT & PLAN NOTE
· Severe-critical AS on current echo with valve area of 0 3cm2  · Also with significant mitral valve disease  · Cardiac surgery completing pre-surgical workup, considering TAVR vs double valve repair    · KATIA scheduled 5/14  · PFTs scheduled 5/14

## 2021-05-14 NOTE — PROGRESS NOTES
Progress Note - Cardiothoracic Surgery   Nory Goldman 78 y o  male MRN: [id number]  Unit/Bed#: -01 Encounter: [id number]      24 Hour Events: Carotid U/S complete  KATIA complete w/ report pending  PFTs for today  CT facial bones complete  No other events per charting      Medications:   Scheduled Meds:  Current Facility-Administered Medications   Medication Dose Route Frequency Provider Last Rate    acetaminophen  650 mg Oral Q4H PRN BITA Pires      albuterol  2 puff Inhalation Q4H PRN Shayy Bliss MD      albuterol  2 5 mg Nebulization Once Rai Moreno MD      aspirin  81 mg Oral Daily Shayy Bliss MD      atorvastatin  40 mg Oral QPM Shayy Bliss MD      chlorhexidine  15 mL Mouth/Throat Q12H [address] Claude Apley, PA-C      fluticasone-vilanterol  1 puff Inhalation Daily Shayy Bliss MD      heparin (porcine)  5,000 Units Subcutaneous Q8H [address] Suzi Miller PA-C      metoprolol tartrate  25 mg Oral Q12H [address] BITA Holt      mupirocin  1 application Nasal [id number] [address] Claude Apley, PA-C      nicotine  1 patch Transdermal Daily Shayy Bliss MD      nitroglycerin  0 4 mg Sublingual Q5 Min PRN Shayy Bliss MD      ondansetron  4 mg Intravenous Q6H PRN Shayy Bliss MD      pantoprazole  40 mg Oral Early Morning Shayy Bliss MD       Facility-Administered Medications Ordered in Other Encounters   Medication Dose Route Frequency Provider Last Rate    glycopyrrolate    PRN Shar Gails, CRNA      Ketamine HCl   Intravenous PRN Shar Gails, CRNA      lidocaine (PF)    PRN Shar Gails, CRNA      norepinephrine    PRN Shar Gails, CRNA      phenylephrine    PRN Shar Gails, CRNA      propofol   Intravenous PRN Shar Gails, CRNA      sodium chloride    Continuous PRN Shar Gails, CRNA       Continuous Infusions:     Results:   Results from last 7 days   Lab Units 05/13/21  0605 05/12/21  0335 05/11/21  1742   WBC Thousand/uL 9 33 12 66* 12 76* HEMOGLOBIN g/dL 13 0 12 7 13 8   HEMATOCRIT % 41 3 40 3 41 5   PLATELETS Thousands/uL 227 223 252     Results from last 7 days   Lab Units 05/14/21  0451 05/13/21  0605 05/12/21  0327   POTASSIUM mmol/L 4 2 4 1 4 4   CHLORIDE mmol/L 104 108 108   CO2 mmol/L 29 26 28   BUN mg/dL 19 16 13   CREATININE mg/dL 0 66 0 76 0 80   CALCIUM mg/dL 9 1 8 8 8 9     Results from last 7 days   Lab Units 05/13/21  0605 05/12/21  2302 05/12/21 2010 05/12/21 0327 05/11/21  1742   INR   --   --   --   --  1 13 1 05   PTT seconds 68* 49* 53*   < > 37 28    < > = values in this interval not displayed  Studies:     Cardiac Cath 5/13: No significant obstructive CAD     TTE 5/12: EF 42%  Severe AS with Marcello 0 4 cm2  mean gradient of 81 mm Hg  Moderate possibly severe mitral regurgitation  No mitral stenosis  Severe MAC  Mild TR  KATIA 5/14: complete, report pending     Carotid US 5/13: <50% ICA stenosis b/l, antegrade flow b/l, no subclavian disease b/l     CXR 5/11: cardiomegaly and atelectasis     EKG 5/12: done     CT chest w/o 4/28: Smooth septal line thickening, most likely mild pulmonary edema  Lingular atelectasis     PFT: scheduled 5/14 1500     Dental: CT facial bones w/o 5/13 carious left mandibular second molar tooth, clear paranasal sinuses    Vitals:   Vitals:    05/14/21 0631 05/14/21 0711 05/14/21 0841 05/14/21 1030   BP:  102/68 98/65 98/70   BP Location:       Pulse:    77   Resp:    18   Temp:  98 1 °F (36 7 °C)  98 2 °F (36 8 °C)   TempSrc:       SpO2:       Weight: 106 kg (233 lb 7 5 oz)        Assessment:    Severe aortic stenosis; Ongoing AVR vs TAVR workup  Severe mitral regurgitation;  Ongoing MVR workup    Plan:  Pre-op testing underway   KATIA w/ report pending   PFTs for today   OMFS consult ordered    Will assess patient candidacy & share once above testing complete    Case discussed with SAAD Escamilla [place]: Mellissa Mcintosh PA-C  DATE: May 14, 2021  TIME: 1:56 PM    * This note was completed in part utilizing [employer] direct voice recognition software  Grammatical errors, random word insertion, spelling mistakes, and incomplete sentences may be an occasional consequence of the system secondary to software limitations, ambient noise and hardware issues  At the time of dictation, efforts were made to edit, clarify and /or correct errors  Please read the chart carefully and recognize, using context, where substitutions have occurred  If you have any questions or concerns about the context, text or information contained within the body of this dictation, please contact myself, the provider, for further clarification

## 2021-05-14 NOTE — PROGRESS NOTES
General Cardiology   Progress Note -  Team One   Layne Joseph 78 y o  male MRN: [id number]    Unit/Bed#: -01 Encounter: [id number]    Assessment/ Plan    1  Valvular heart disease- severe aortic stenosis and moderate to severe MR  Presented with progressive exertional chest pain, dyspnea, and fatigability   Echocardiogram showed progression of aortic valve stenosis to severe with HUMBERTO 0 3 cm2 with MG 81 mmHg as well as moderate to severe MR with heavily calcified leaflets  Undergoing pre-operative work up, CT surgery following  2  Non MI troponin elevation in setting of aortic valve disease  Troponin 0 06-->0 16-->0 24-->0  39     3  Nonischemic cardiomyopathy, LVEF 42%  Non-obstructive CAD by cath  GDMT- metoprolol tartrate 25 mg BID  No ACE/ARB in setting of severe/critical AS undergoing CT surgery evaluation    4  Non-obstructive CAD  Cardiac cath yesterday- moderate non obstructive atherosclerosis of LAD, diffuse atherosclerosis of the branch vessels of the circumflex  On ASA, statin, and beta blocker  5  HTN- BP low normal, occasionally systolic into 58R but patient asymptomatic  Continue beta blocker  6  HLD- , , HDL 49, LDL 28  Continue statin  7  COPD- per primary team     8  Tobacco use- continues to smoke about 1/2 ppd or less  Encouraged complete cessation  Subjective  No complaints offered this morning, wanted to know when his KATIA will be done  Review of Systems   Constitution: Negative for diaphoresis, malaise/fatigue and weight gain  Cardiovascular: Positive for dyspnea on exertion  Negative for chest pain, leg swelling, orthopnea, palpitations and syncope  Respiratory: Positive for cough and sputum production  Negative for shortness of breath  Gastrointestinal: Negative for bloating, nausea and vomiting  Neurological: Negative for dizziness, light-headedness and weakness  Psychiatric/Behavioral: Negative for altered mental status     All other systems reviewed and are negative  Objective:   Vitals: Blood pressure 98/70, pulse 66, temperature 98 2 °F (36 8 °C), resp  rate 18, weight 106 kg (233 lb 7 5 oz), SpO2 94 %  , Body mass index is 29 98 kg/m²  ,     Systolic (32EHW), ABC:64 , Min:87 , OCV:403     Diastolic (23IPP), YLD:25, Min:59, Max:74      Intake/Output Summary (Last 24 hours) at 5/14/2021 1105  Last data filed at 5/14/2021 0900  Gross per 24 hour   Intake 480 ml   Output 200 ml   Net 280 ml     Wt Readings from Last 3 Encounters:   05/14/21 106 kg (233 lb 7 5 oz)   05/11/21 112 kg (246 lb 14 6 oz)   05/11/21 113 kg (250 lb)     Telemetry Review: NSR with PVCs, 2 episodes of NSVT this am      EKG personally reviewed by BITA Huang  5/12 0010 NSR with PACs, marked ST/T wave abnormality    Physical Exam  Vitals signs reviewed  Constitutional:       General: He is not in acute distress  Appearance: Normal appearance  Neck:      Musculoskeletal: Neck supple  Vascular: No hepatojugular reflux or JVD  Cardiovascular:      Rate and Rhythm: Normal rate and regular rhythm  Pulses: Normal pulses  Heart sounds: Murmur present  Systolic murmur present  No friction rub  No gallop  Pulmonary:      Effort: Pulmonary effort is normal  No respiratory distress  Breath sounds: Wheezing (  occasional expiratory wheeze) present  No rales  Comments: Room air  Abdominal:      General: Bowel sounds are normal  There is no distension  Palpations: Abdomen is soft  Tenderness: There is no abdominal tenderness  Musculoskeletal: Normal range of motion  Right lower leg: No edema  Left lower leg: No edema  Skin:     General: Skin is warm and dry  Capillary Refill: Capillary refill takes less than 2 seconds  Findings: No erythema  Comments: Right radial cath site- C/D/I, no active bleeding or hematoma  Neurological:      Mental Status: He is alert and oriented to person, place, and time  Psychiatric:         Mood and Affect: Mood normal      LABORATORY RESULTS  Results from last 7 days   Lab Units 05/12/21 0327 05/12/21  0016 05/11/21 2059   TROPONIN I ng/mL 0 39* 0 24* 0 16*     CBC with diff:   Results from last 7 days   Lab Units 05/13/21  0605 05/12/21  0335 05/11/21  1742   WBC Thousand/uL 9 33 12 66* 12 76*   HEMOGLOBIN g/dL 13 0 12 7 13 8   HEMATOCRIT % 41 3 40 3 41 5   MCV fL 96 100* 95   PLATELETS Thousands/uL 227 223 252   MCH pg 30 3 31 4 31 5   MCHC g/dL 31 5 31 5 33 3   RDW % 13 9 13 7 13 3   MPV fL 9 1 9 4 9 1   NRBC AUTO /100 WBCs 0  --  0     CMP:  Results from last 7 days   Lab Units 05/14/21  0451 05/13/21 0605 05/12/21 0327 05/11/21  1742   POTASSIUM mmol/L 4 2 4 1 4 4 4 3   CHLORIDE mmol/L 104 108 108 106   CO2 mmol/L 29 26 28 27   BUN mg/dL 19 16 13 14   CREATININE mg/dL 0 66 0 76 0 80 0 84   CALCIUM mg/dL 9 1 8 8 8 9 9 4   AST U/L  --   --   --  14   ALT U/L  --   --   --  23   ALK PHOS U/L  --   --   --  73   EGFR ml/min/1 73sq m 100 94 92 91     BMP:  Results from last 7 days   Lab Units 05/14/21  0451 05/13/21  0605 05/12/21 0327 05/11/21  1742   POTASSIUM mmol/L 4 2 4 1 4 4 4 3   CHLORIDE mmol/L 104 108 108 106   CO2 mmol/L 29 26 28 27   BUN mg/dL 19 16 13 14   CREATININE mg/dL 0 66 0 76 0 80 0 84   CALCIUM mg/dL 9 1 8 8 8 9 9 4     Lab Results   Component Value Date    CREATININE 0 66 05/14/2021    CREATININE 0 76 05/13/2021    CREATININE 0 80 05/12/2021     Lab Results   Component Value Date    NTBNP 4,493 (H) 05/11/2021    NTBNP 3,228 (H) 05/10/2021     Results from last 7 days   Lab Units 05/12/21 0327 05/11/21  1742   INR  1 13 1 05     Lipid Profile:   Lab Results   Component Value Date    CHOL 209 08/07/2014     Lab Results   Component Value Date    HDL 49 05/13/2021    HDL 39 08/07/2014     Lab Results   Component Value Date    LDLCALC 28 05/13/2021    LDLCALC 126 (H) 08/07/2014     Lab Results   Component Value Date    TRIG 129 05/13/2021    TRIG 220 2014     Cardiac testing:   Results for orders placed during the hospital encounter of 21   Echo complete with contrast if indicated    Narrative [facility]  [place], [address]  [phone]    Transthoracic Echocardiogram  2D, M-mode, Doppler, and Color Doppler    Study date:  12-May-2021    Patient: Erinn Desir  MR number: [id number]  Account number: [de-identified]  : 1955  Age: 78 years  Gender: Male  Status: Outpatient  Location: Bedside  Height: 74 in  Weight: 245 5 lb  BP: 102/ 70 mmHg    Indications: Chest pain  Diagnoses: R07 9 - Chest pain, unspecified    Sonographer:  Lionel Cantor RDCS  Primary Physician:  Ban Ma DO  Referring Physician:  Elizabeth Hernandez DO  Group:  [id number]  Interpreting Physician:  Karyle Lime, MD    SUMMARY    LEFT VENTRICLE:  Moderate decreased left ventricular systolic function, EF 13%  Significantly decreased left ventricular global longitudinal strain, minus 7 4%  Severe concentric left ventricular hypertrophy, wall thickness 21 mm  Mildly increased left  ventricular cavity size  Grade 3 left ventricular diastolic dysfunction  Severely elevated left ventricular filling pressures  RIGHT VENTRICLE:  Mild right ventricular enlargement with well preserved right ventricular systolic function  LEFT ATRIUM:  Moderate left atrial enlargement  RIGHT ATRIUM:  Mild right atrial enlargement  MITRAL VALVE:  Moderate possibly severe mitral regurgitation  No mitral stenosis  Mitral ERO 0 5 cm2  Mitral valve regurgitant volume 85 mL  The mitral valve annulus was heavily calcified  The mitral valve leaflets were calcified but have well preserved  mobility  There was some calcification in the chordae  AORTIC VALVE:  Heavily calcified aortic valve with severe, probably critical aortic stenosis  No significant aortic regurgitation  Aortic valve maximum velocity 5 3 M/S  Aortic valve mean gradient 81 mmHg  Aortic valve area 0 3 cm2  TRICUSPID VALVE:  There was mild regurgitation  AORTA:  Aortic root at the upper limits of normal to mildly enlarged at 3 8 cm    PULMONARY ARTERIES:  Moderate pulmonary hypertension  Pulmonary artery systolic pressures estimated 54 mmHg  PERICARDIUM:  Mild pericardial effusion without hemodynamic significance  SUMMARY MEASUREMENTS  2D measurements:  Unspecified Anatomy:   %FS was 25 4 %  AA PSSL Full was -8 3 %  AAS PSSL Full was -15 1 %  AI PSSL Full was -12 5 %  AL PSSL Full was -5 3 %  AP PSSL Full was -5 4 %  AS PSSL Full was -20 9 %  AVC was 378 3 ms  Ao Diam was 3 4 cm  Ao asc was 3 8 cm  BA PSSL Full was -2 8 %  BAS PSSL Full was -2 6 %  BI PSSL Full was -13 5 %  BL PSSL Full was -6 9 %  BP PSSL Full was -4 2 %  BS PSSL Full was -4 1 %  EDV(Teich) was 136 1 ml   EF(Teich) was 49 7 %  ESV(Teich)  was 68 5 ml   G peak SL Full(A2C) was -7 5 %  G peak SL Full(A4C) was -8 5 %  G peak SL Full(APLAX) was -6 1 %  G peak SL Full(Avg) was -7 4 %  IVSd was 2 1 cm  LA Area was 31 1 cm2  LA Diam was 6 3 cm  LAAs A4C was 30 2 cm2  LAESV  A-L A4C was 122 9 ml  LAESV MOD A4C was 116 1 ml  LALs A4C was 6 3 cm  LVEDV MOD A4C was 217 9 ml  LVEF MOD A4C was 42 2 %  LVESV MOD A4C was 126 ml  LVIDd was 5 3 cm  LVIDs was 4 cm  LVLd A4C was 10 9 cm  LVLs A4C was 10 cm  LVOT Diam was 2 cm  LVPWd was 2 1 cm  MA PSSL Full was -4 3 %  MAS PSSL Full was -10 5 %  MI PSSL Full was -5 %  ML PSSL Full was -3 1 %  MP PSSL Full was 3 2 %  MS PSSL Full was -10 8 %  Peak SL Dispersion Full was 61 5 ms  RA  Area was 21 1 cm2  RVIDd was 5 cm  RWT was 0 8   SV MOD A4C was 91 9 ml   SV(Teich) was 67 6 ml   CF measurements:  Unspecified Anatomy:   MR Als  Marito was 0 4 m/s  MR Flow was 255 2 ml/s  MR Rad was 1 1 cm  MR Trace was 8 9 cm2   CW measurements:  Unspecified Anatomy:   AV Env  Ti was 287 3 ms   AV VTI was 125 6 cm    AV Vmax was 5 3 m/s  AV Vmean was 4 4 m/s  AV maxPG was 114 3 mmHg  AV meanPG was 80 6 mmHg  MR VTI was 162 5 cm  MR Vmax was 4 9 m/s   TR Vmax was 3 1 m/s  TR  maxPG was 39 3 mmHg  MM measurements:  Unspecified Anatomy:   TAPSE was 2 cm  PW measurements:  Unspecified Anatomy:   HUMBERTO (VTI) was 0 3 cm2  HUMBERTO Vmax was 0 3 cm2  AVAI (VTI) was 0 cm2/m2  AVAI Vmax was 0 cm2/m2  DVI was 0 1   E' Sept was 0 m/s  E/E' Sept was 44 1   LVOT Env  Ti was 303 2 ms  LVOT VTI was 13 7 cm  LVOT Vmax  was 0 6 m/s  LVOT Vmean was 0 5 m/s  LVOT maxPG was 1 4 mmHg  LVOT meanPG was 0 9 mmHg  LVSI Dopp was 17 8 ml/m2  LVSV Dopp was 42 3 ml   MR ERO was 0 5 cm2  MR RV was 85 2 ml   MV A Marito was 0 6 m/s  MV Dec Nobles was 11 4 m/s2  MV  DecT was 136 5 ms   MV E Marito was 1 6 m/s  MV E/A Ratio was 2 4   MV PHT was 39 6 ms  MVA By PHT was 5 6 cm2  HISTORY: PRIOR HISTORY: Severe AS  COPD  Hyperlipidemia  Hypertension  Shortness of breath  Chest pain  GERD  Smoker  LVH  PROCEDURE: The procedure was performed at the bedside  This was a routine study  The transthoracic approach was used  The study included complete 2D imaging, M-mode, complete spectral Doppler, and color Doppler  The heart rate was 80 bpm,  at the start of the study  Images were obtained from the parasternal, apical, subcostal, and suprasternal notch acoustic windows  Image quality was adequate  LEFT VENTRICLE: Moderate decreased left ventricular systolic function, EF 68%  Significantly decreased left ventricular global longitudinal strain, minus 7 4%  Severe concentric left ventricular hypertrophy, wall thickness 21 mm  Mildly  increased left ventricular cavity size  Grade 3 left ventricular diastolic dysfunction  Severely elevated left ventricular filling pressures  RIGHT VENTRICLE: Mild right ventricular enlargement with well preserved right ventricular systolic function  LEFT ATRIUM: Moderate left atrial enlargement      RIGHT ATRIUM: Mild right atrial enlargement  MITRAL VALVE: Moderate possibly severe mitral regurgitation  No mitral stenosis  Mitral ERO 0 5 cm2  Mitral valve regurgitant volume 85 mL  The mitral valve annulus was heavily calcified  The mitral valve leaflets were calcified but have  well preserved mobility  There was some calcification in the chordae  AORTIC VALVE: Heavily calcified aortic valve with severe, probably critical aortic stenosis  No significant aortic regurgitation  Aortic valve maximum velocity 5 3 M/S  Aortic valve mean gradient 81 mmHg  Aortic valve area 0 3 cm2  TRICUSPID VALVE: The valve structure was normal  There was normal leaflet separation  DOPPLER: The transtricuspid velocity was within the normal range  There was no evidence for stenosis  There was mild regurgitation  PULMONIC VALVE: DOPPLER: The transpulmonic velocity was within the normal range  There was no regurgitation  PERICARDIUM: Mild pericardial effusion without hemodynamic significance  AORTA: Aortic root at the upper limits of normal to mildly enlarged at 3 8 cm    PULMONARY ARTERY: Moderate pulmonary hypertension  Pulmonary artery systolic pressures estimated 54 mmHg      SYSTEM MEASUREMENT TABLES    2D  %FS: 25 4 %  AA PSSL Full: -8 3 %  AAS PSSL Full: -15 1 %  AI PSSL Full: -12 5 %  AL PSSL Full: -5 3 %  AP PSSL Full: -5 4 %  AS PSSL Full: -20 9 %  AVC: 378 3 ms  Ao Diam: 3 4 cm  Ao asc: 3 8 cm  BA PSSL Full: -2 8 %  BAS PSSL Full: -2 6 %  BI PSSL Full: -13 5 %  BL PSSL Full: -6 9 %  BP PSSL Full: -4 2 %  BS PSSL Full: -4 1 %  EDV(Teich): 136 1 ml  EF(Teich): 49 7 %  ESV(Teich): 68 5 ml  G peak SL Full(A2C): -7 5 %  G peak SL Full(A4C): -8 5 %  G peak SL Full(APLAX): -6 1 %  G peak SL Full(Avg): -7 4 %  IVSd: 2 1 cm  LA Area: 31 1 cm2  LA Diam: 6 3 cm  LAAs A4C: 30 2 cm2  LAESV A-L A4C: 122 9 ml  LAESV MOD A4C: 116 1 ml  LALs A4C: 6 3 cm  LVEDV MOD A4C: 217 9 ml  LVEF MOD A4C: 42 2 %  LVESV MOD A4C: 126 ml  LVIDd: 5 3 cm  LVIDs: 4 cm  LVLd A4C: 10 9 cm  LVLs A4C: 10 cm  LVOT Diam: 2 cm  LVPWd: 2 1 cm  LVd Mass: 718 1 g  LVd Mass Index: 303 g/m2  MA PSSL Full: -4 3 %  MAS PSSL Full: -10 5 %  MI PSSL Full: -5 %  ML PSSL Full: -3 1 %  MP PSSL Full: 3 2 %  MS PSSL Full: -10 8 %  Peak SL Dispersion Full: 61 5 ms  RA Area: 21 1 cm2  RVIDd: 5 cm  RWT: 0 8  SV MOD A4C: 91 9 ml  SV(Teich): 67 6 ml    CF  MR Als  Marito: 0 4 m/s  MR Flow: 255 2 ml/s  MR Rad: 1 1 cm  MR Trace: 8 9 cm2    CW  AV Env  Ti: 287 3 ms  AV VTI: 125 6 cm  AV Vmax: 5 3 m/s  AV Vmean: 4 4 m/s  AV maxP 3 mmHg  AV meanP 6 mmHg  MR VTI: 162 5 cm  MR Vmax: 4 9 m/s  TR Vmax: 3 1 m/s  TR maxP 3 mmHg    MM  TAPSE: 2 cm    PW  HUMBERTO (VTI): 0 3 cm2  HUMBERTO Vmax: 0 3 cm2  AVAI (VTI): 0 cm2/m2  AVAI Vmax: 0 cm2/m2  DVI: 0 1  E' Sept: 0 m/s  E/E' Sept: 44 1  LVOT Env  Ti: 303 2 ms  LVOT VTI: 13 7 cm  LVOT Vmax: 0 6 m/s  LVOT Vmean: 0 5 m/s  LVOT maxP 4 mmHg  LVOT meanP 9 mmHg  LVSI Dopp: 17 8 ml/m2  LVSV Dopp: 42 3 ml  MR ERO: 0 5 cm2  MR RV: 85 2 ml  MV A Marito: 0 6 m/s  MV Dec [place]: 11 4 m/s2  MV DecT: 136 5 ms  MV E Marito: 1 6 m/s  MV E/A Ratio: 2 4  MV PHT: 39 6 ms  MVA By PHT: 5 6 cm2    [employer] Accredited Echocardiography Laboratory    Prepared and electronically signed by    Morales Louis MD  Signed 12-May-2021 12:22:39       Meds/Allergies   all current active meds have been reviewed and current meds:   Current Facility-Administered Medications   Medication Dose Route Frequency    acetaminophen (TYLENOL) tablet 650 mg  650 mg Oral Q4H PRN    albuterol (PROVENTIL HFA,VENTOLIN HFA) inhaler 2 puff  2 puff Inhalation Q4H PRN    albuterol inhalation solution 2 5 mg  2 5 mg Nebulization Once    aspirin (ECOTRIN LOW STRENGTH) EC tablet 81 mg  81 mg Oral Daily    atorvastatin (LIPITOR) tablet 40 mg  40 mg Oral QPM    chlorhexidine (PERIDEX) 0 12 % oral rinse 15 mL  15 mL Mouth/Throat Q12H [address]    fluticasone-vilanterol (BREO ELLIPTA) 100-25 mcg/inh inhaler 1 puff  1 puff Inhalation Daily    heparin (porcine) subcutaneous injection 5,000 Units  5,000 Units Subcutaneous Q8H [facility]    metoprolol tartrate (LOPRESSOR) tablet 25 mg  25 mg Oral Q12H [facility]    mupirocin (BACTROBAN) 2 % nasal ointment 1 application  1 application Nasal [id number] [facility]    nicotine (NICODERM CQ) 21 mg/24 hr TD 24 hr patch 1 patch  1 patch Transdermal Daily    nitroglycerin (NITROSTAT) SL tablet 0 4 mg  0 4 mg Sublingual Q5 Min PRN    ondansetron (ZOFRAN) injection 4 mg  4 mg Intravenous Q6H PRN    pantoprazole (PROTONIX) EC tablet 40 mg  40 mg Oral Early Morning     Facility-Administered Medications Prior to Admission   Medication    nitroglycerin (NITROSTAT) SL tablet 0 4 mg     Medications Prior to Admission   Medication    amLODIPine (NORVASC) 10 mg tablet    Anoro Ellipta 62 5-25 MCG/INH inhaler    aspirin (ECOTRIN LOW STRENGTH) 81 mg EC tablet    atorvastatin (LIPITOR) 20 mg tablet    losartan (COZAAR) 100 MG tablet    Multiple Vitamin (MULTI-VITAMIN DAILY PO)    pantoprazole (PROTONIX) 40 mg tablet    albuterol (PROVENTIL HFA,VENTOLIN HFA) 90 mcg/act inhaler    Aspirin-Acetaminophen-Caffeine (EXCEDRIN MIGRAINE PO)    dextromethorphan-guaifenesin (MUCINEX DM)  MG per 12 hr tablet    predniSONE 20 mg tablet      Counseling / Coordination of Care  Total floor / unit time spent today 20 minutes  Greater than 50% of total time was spent with the patient and / or family counseling and / or coordination of care  ** Please Note: Dragon 360 Dictation voice to text software may have been used in the creation of this document   **

## 2021-05-14 NOTE — H&P (VIEW-ONLY)
Consultation - Ras Prakash 78 y o  male MRN: [id number]  Unit/Bed#: -01 Encounter: [id number]          History of Present Illness   Physician Requesting Consult: Oliver Keith MD  Reason for Consult / Principal Problem: cardiac clearance    HPI:  Katt Gomez is a 78 y o  male who presents with with progessive exertional chest pain dyspnea and fatigue  Freida Malcom was completed show severe AS  Pt is getting CT surgery workup  OMFS was consulted for cardiac clearance  Pt denies seeing a dentist for many years  Denies any current dental pain or swellings  Consults    Review of Systems   HENT: Negative for dental problem  All other systems reviewed and are negative  Historical Information   Past Medical History:   Diagnosis Date    Broken humerus     COPD (chronic obstructive pulmonary disease) ([facility] [address] )     Pneumonia      No past surgical history on file    Social History   Social History     Substance and Sexual Activity   Alcohol Use Yes    Frequency: 2-4 times a month    Comment: 1-2/wk     Social History     Substance and Sexual Activity   Drug Use No     Social History     Tobacco Use   Smoking Status Current Some Day Smoker    Packs/day: 0 50    Types: Cigarettes    Start date: 1989   Smokeless Tobacco Never Used     Family History   Problem Relation Age of Onset    Prostate cancer Father        Meds/Allergies   Current Facility-Administered Medications   Medication Dose Route Frequency    acetaminophen (TYLENOL) tablet 650 mg  650 mg Oral Q4H PRN    albuterol (PROVENTIL HFA,VENTOLIN HFA) inhaler 2 puff  2 puff Inhalation Q4H PRN    aspirin (ECOTRIN LOW STRENGTH) EC tablet 81 mg  81 mg Oral Daily    atorvastatin (LIPITOR) tablet 40 mg  40 mg Oral QPM    chlorhexidine (PERIDEX) 0 12 % oral rinse 15 mL  15 mL Mouth/Throat Q12H [facility]    fluticasone-vilanterol (BREO ELLIPTA) 100-25 mcg/inh inhaler 1 puff  1 puff Inhalation Daily    heparin (porcine) subcutaneous injection 5,000 Units 5,000 Units Subcutaneous Q8H [address]    metoprolol tartrate (LOPRESSOR) tablet 25 mg  25 mg Oral Q12H [address]    mupirocin (BACTROBAN) 2 % nasal ointment 1 application  1 application Nasal H78M [address]    nicotine (NICODERM CQ) 21 mg/24 hr TD 24 hr patch 1 patch  1 patch Transdermal Daily    nitroglycerin (NITROSTAT) SL tablet 0 4 mg  0 4 mg Sublingual Q5 Min PRN    ondansetron (ZOFRAN) injection 4 mg  4 mg Intravenous Q6H PRN    pantoprazole (PROTONIX) EC tablet 40 mg  40 mg Oral Early Morning     Facility-Administered Medications Prior to Admission   Medication    [COMPLETED] nitroglycerin (NITROSTAT) SL tablet 0 4 mg     Medications Prior to Admission   Medication    amLODIPine (NORVASC) 10 mg tablet    Anoro Ellipta 62 5-25 MCG/INH inhaler    aspirin (ECOTRIN LOW STRENGTH) 81 mg EC tablet    atorvastatin (LIPITOR) 20 mg tablet    losartan (COZAAR) 100 MG tablet    Multiple Vitamin (MULTI-VITAMIN DAILY PO)    pantoprazole (PROTONIX) 40 mg tablet    albuterol (PROVENTIL HFA,VENTOLIN HFA) 90 mcg/act inhaler    Aspirin-Acetaminophen-Caffeine (EXCEDRIN MIGRAINE PO)    dextromethorphan-guaifenesin (MUCINEX DM)  MG per 12 hr tablet    predniSONE 20 mg tablet     No Known Allergies    Objective   Vitals:   Blood Pressure: 99/86 (05/14/21 1510), Pulse: 73 (05/14/21 1510), Temperature: 98 2 °F (36 8 °C) (05/14/21 1030), Temp Source: Oral (05/14/21 0242), Respirations: 16 (05/14/21 1510) Height and Weight Weight - Scale: 106 kg (233 lb 7 5 oz) (05/14/21 0631), Weight Method: Standing scale (05/14/21 0631)      Physical Exam  Constitutional:       Appearance: Normal appearance  He is obese  HENT:      Head: Normocephalic and atraumatic  No right periorbital erythema or left periorbital erythema  Jaw: No trismus, tenderness, swelling or pain on movement  Right Ear: Hearing normal       Left Ear: Hearing normal       Nose: Nose normal  No nasal deformity  Right Turbinates: Not swollen        Left Turbinates: Not swollen  Mouth/Throat:      Mouth: Mucous membranes are dry  Dentition: Dental caries present  No dental tenderness, gingival swelling or dental abscesses  Pharynx: Oropharynx is clear  Eyes:      Extraocular Movements: Extraocular movements intact  Conjunctiva/sclera: Conjunctivae normal       Pupils: Pupils are equal, round, and reactive to light  Neck:      Musculoskeletal: Normal range of motion  Neurological:      Mental Status: He is alert  No acute swelling or signs of acute infection       Lab Results:   Admission on 05/12/2021   Component Date Value    PTT 05/12/2021 49*    Cholesterol 05/13/2021 103     Triglycerides 05/13/2021 129     HDL, Direct 05/13/2021 49     LDL Calculated 05/13/2021 28     Non-HDL-Chol (CHOL-HDL) 05/13/2021 54     WBC 05/13/2021 9 33     RBC 05/13/2021 4 29     Hemoglobin 05/13/2021 13 0     Hematocrit 05/13/2021 41 3     MCV 05/13/2021 96     MCH 05/13/2021 30 3     MCHC 05/13/2021 31 5     RDW 05/13/2021 13 9     MPV 05/13/2021 9 1     Platelets 82/09/9313 227     nRBC 05/13/2021 0     Neutrophils Relative 05/13/2021 53     Immat GRANS % 05/13/2021 0     Lymphocytes Relative 05/13/2021 33     Monocytes Relative 05/13/2021 12     Eosinophils Relative 05/13/2021 2     Basophils Relative 05/13/2021 0     Neutrophils Absolute 05/13/2021 4 92     Immature Grans Absolute 05/13/2021 0 03     Lymphocytes Absolute 05/13/2021 3 03     Monocytes Absolute 05/13/2021 1 12     Eosinophils Absolute 05/13/2021 0 19     Basophils Absolute 05/13/2021 0 04     Sodium 05/13/2021 139     Potassium 05/13/2021 4 1     Chloride 05/13/2021 108     CO2 05/13/2021 26     ANION GAP 05/13/2021 5     BUN 05/13/2021 16     Creatinine 05/13/2021 0 76     Glucose 05/13/2021 86     Calcium 05/13/2021 8 8     eGFR 05/13/2021 94     PTT 05/13/2021 68*    Hemoglobin A1C 05/13/2021 5 8*    EAG 05/13/2021 120     Color, UA 05/13/2021 Dk Yellow     Clarity, UA 05/13/2021 Clear     Specific Gravity, UA 05/13/2021 1 043*    pH, UA 05/13/2021 6 0     Leukocytes, UA 05/13/2021 Negative     Nitrite, UA 05/13/2021 Negative     Protein, UA 05/13/2021 Negative     Glucose, UA 05/13/2021 Negative     Ketones, UA 05/13/2021 Negative     Urobilinogen, UA 05/13/2021 0 2     Bilirubin, UA 05/13/2021 Negative     Blood, UA 05/13/2021 Negative     SARS-CoV-2 05/13/2021 Negative     Sodium 05/14/2021 137     Potassium 05/14/2021 4 2     Chloride 05/14/2021 104     CO2 05/14/2021 29     ANION GAP 05/14/2021 4     BUN 05/14/2021 19     Creatinine 05/14/2021 0 66     Glucose 05/14/2021 89     Calcium 05/14/2021 9 1     eGFR 05/14/2021 100        Assessment/Plan     Imaging Studies: I have personally reviewed pertinent reports  Assessment:  Pt is a 77 yo M planned for AVR, pt has multiple retained root tips and grossly carious non restorable teeth  Will require tooth extraction prior to CT surgery  Plan to extract teeth #1,3,18,30 and any other necessary teeth in the OR  Please coordinate with attending on call for DOS  Plan:  Ext #1,3,18,30 required for dental clearance, will coordinate for dental extractions prior to AVR  Oral hygiene Instructions   Comprehensive care per primary team    Discussed case with attending on call  Please contact with any questions or concerns

## 2021-05-14 NOTE — PROGRESS NOTES
[facility]  Progress Note - Coretta Moody 1955, 78 y o  male MRN: [id number]  Unit/Bed#: -01 Encounter: [id number]  Primary Care Provider: Leah Valderrama DO   Date and time admitted to hospital: 5/12/2021  9:34 PM    * Aortic valve stenosis - severe  Assessment & Plan  · Severe-critical AS on current echo with valve area of 0 3cm2  · Also with significant mitral valve disease  · Cardiac surgery completing pre-surgical workup, considering TAVR vs double valve repair  · KATIA scheduled 5/14  · PFTs scheduled 5/14    Acute combined systolic and diastolic CHF, NYHA class 1 ([facility])  Assessment & Plan  Wt Readings from Last 3 Encounters:   05/14/21 106 kg (233 lb 7 5 oz)   05/11/21 112 kg (246 lb 14 6 oz)   05/11/21 113 kg (250 lb)     Monitor intake and output  Monitor daily weights  EF of 42% with grade 3 diastolic dysfunction, severe AS and Mod-severe MR  IV lasix 20mg x1, with approximately 800 cc urine output  Supplemental oxygen weaned  Low Na diet        NSTEMI (non-ST elevated myocardial infarction) [facility])  Assessment & Plan  · Transferred from [facility] for a cardiac catheterization for presumed NSTEMI, also with severe valve stenosis  · Echocardiogram with EF of 42%  · Continue aspirin, atorvastatin  · Patient was started on metoprolol 25mg BID  · S/P cardiac catheterization which showed patent coronaries  D/C'd heparin gtt  Essential hypertension  Assessment & Plan  · bp is acceptable  · Continue metoprolol  · Restart amlodipine and losartan as BP warrants    Still with soft BP's    Hyperlipidemia  Assessment & Plan  · FLP WNL  · Continuing patient's atorvastatin 40mg    Chronic obstructive lung disease (HCC)  Assessment & Plan  · No wheezing at this time  · Continue breo      VTE Pharmacologic Prophylaxis:   Pharmacologic: Heparin  Mechanical VTE Prophylaxis in Place: Yes    Patient Centered Rounds: I have performed bedside rounds with nursing staff today     Discussions with Specialists or Other Care Team Provider:  Discussed with nursing, case management    Education and Discussions with Family / Patient:  Patient declined call to family yesterday    Time Spent for Care: 20 minutes  More than 50% of total time spent on counseling and coordination of care as described above  Current Length of Stay: 2 day(s)    Current Patient Status: Inpatient   Certification Statement: The patient will continue to require additional inpatient hospital stay due to Preoperative workup for cardiac surgery    Discharge Plan:  Not ready    Code Status: Level 1 - Full Code      Subjective:   [Patient currently off floor and is scheduled to be in procedures; subjective not obtained at this time]    Objective:     Vitals:   Temp (24hrs), Av °F (36 7 °C), Min:97 6 °F (36 4 °C), Max:98 3 °F (36 8 °C)    Temp:  [97 6 °F (36 4 °C)-98 3 °F (36 8 °C)] 98 2 °F (36 8 °C)  HR:  [66-83] 77  Resp:  [18-20] 18  BP: ()/(60-74) 98/70  SpO2:  [94 %-95 %] 94 %  Body mass index is 29 98 kg/m²  Input and Output Summary (last 24 hours): Intake/Output Summary (Last 24 hours) at 2021 1252  Last data filed at 2021 0900  Gross per 24 hour   Intake 480 ml   Output 200 ml   Net 280 ml       Physical Exam:     Physical Exam  Note:  Patient currently off floor and is scheduled to be in procedure all day  Exam not done at this time      Additional Data:     Labs:    Results from last 7 days   Lab Units 21  0605   WBC Thousand/uL 9 33   HEMOGLOBIN g/dL 13 0   HEMATOCRIT % 41 3   PLATELETS Thousands/uL 227   NEUTROS PCT % 53   LYMPHS PCT % 33   MONOS PCT % 12   EOS PCT % 2     Results from last 7 days   Lab Units 21  0451  21  1742   SODIUM mmol/L 137   < > 140   POTASSIUM mmol/L 4 2   < > 4 3   CHLORIDE mmol/L 104   < > 106   CO2 mmol/L 29   < > 27   BUN mg/dL 19   < > 14   CREATININE mg/dL 0 66   < > 0 84   ANION GAP mmol/L 4   < > 7   CALCIUM mg/dL 9 1   < > 9  4   ALBUMIN g/dL  --   --  3 9   TOTAL BILIRUBIN mg/dL  --   --  0 78   ALK PHOS U/L  --   --  73   ALT U/L  --   --  23   AST U/L  --   --  14   GLUCOSE RANDOM mg/dL 89   < > 131    < > = values in this interval not displayed  Results from last 7 days   Lab Units 05/12/21  0327   INR  1 13         Results from last 7 days   Lab Units 05/13/21  1150   HEMOGLOBIN A1C % 5 8*               * I Have Reviewed All Lab Data Listed Above  * Additional Pertinent Lab Tests Reviewed: All Labs Within Last 24 Hours Reviewed      Recent Cultures (last 7 days):           Last 24 Hours Medication List:   Current Facility-Administered Medications   Medication Dose Route Frequency Provider Last Rate    acetaminophen  650 mg Oral Q4H PRN BITA Urbina      albuterol  2 puff Inhalation Q4H PRN Tylerjoss Rangel MD      albuterol  2 5 mg Nebulization Once Thuy Crystal MD      aspirin  81 mg Oral Daily Tyler MD Juan Carlos      atorvastatin  40 mg Oral QPM Tyler MD Juan Carlos      chlorhexidine  15 mL Mouth/Throat Q12H [facility]      fluticasone-vilanterol  1 puff Inhalation Daily Tyler MD Juan Carlos      heparin (porcine)  5,000 Units Subcutaneous Q8H [facility] Suzi Miller PA-C      metoprolol tartrate  25 mg Oral Q12H [facility] BITA Gaytan      mupirocin  1 application Nasal C14F [facility]      nicotine  1 patch Transdermal Daily Kaylee Rangel MD      nitroglycerin  0 4 mg Sublingual Q5 Min PRN Tylerjoss Rangel MD      ondansetron  4 mg Intravenous Q6H PRN Tyler MD Juan Carlos      pantoprazole  40 mg Oral Early Morning Kaylee Rangel MD          Today, Patient Was Seen By: Chris Wade PA-C    ** Please Note: Dictation voice to text software may have been used in the creation of this document   **

## 2021-05-14 NOTE — ANESTHESIA PREPROCEDURE EVALUATION
Procedure:  KATIA    Relevant Problems   ANESTHESIA (within normal limits)   (-) History of anesthesia complications      CARDIO   (+) Aortic valve stenosis - severe   (+) Essential hypertension   (+) Hyperlipidemia   (+) Nonobstructive atherosclerosis of coronary artery      ENDO (within normal limits)      GI/HEPATIC   (+) GERD (gastroesophageal reflux disease)      /RENAL (within normal limits)   (+) Nephrolithiasis (Resolved)      HEMATOLOGY (within normal limits)      MUSCULOSKELETAL   (+) Arthritis      NEURO/PSYCH (within normal limits)      PULMONARY   (+) Chronic obstructive lung disease (HCC)   (+) Current smoker   (+) SOB (shortness of breath)        Physical Exam    Airway    Mallampati score: II  TM Distance: >3 FB  Neck ROM: full     Dental       Cardiovascular  Rhythm: regular, Rate: normal, Murmur,     Pulmonary  Pulmonary exam normal Breath sounds clear to auscultation,     Other Findings        Anesthesia Plan  ASA Score- 4     Anesthesia Type- IV sedation with anesthesia with ASA Monitors  Additional Monitors:   Airway Plan:           Plan Factors-    Chart reviewed  EKG reviewed  Imaging results reviewed  Existing labs reviewed  Patient summary reviewed  Patient is a current smoker  Patient did not smoke on day of surgery  Induction- intravenous  Postoperative Plan-     Informed Consent- Anesthetic plan and risks discussed with patient  I personally reviewed this patient with the CRNA  Discussed and agreed on the Anesthesia Plan with the CRNA  Reji Hampton Echo (5/12/21):  LEFT VENTRICLE:  Moderate decreased left ventricular systolic function, EF 45%  Significantly decreased left ventricular global longitudinal strain, minus 7 4%  Severe concentric left ventricular hypertrophy, wall thickness 21 mm  Mildly increased left  ventricular cavity size  Grade 3 left ventricular diastolic dysfunction   Severely elevated left ventricular filling pressures      RIGHT VENTRICLE:  Mild right ventricular enlargement with well preserved right ventricular systolic function      LEFT ATRIUM:  Moderate left atrial enlargement      RIGHT ATRIUM:  Mild right atrial enlargement      MITRAL VALVE:  Moderate possibly severe mitral regurgitation  No mitral stenosis  Mitral ERO 0 5 cm2  Mitral valve regurgitant volume 85 mL  The mitral valve annulus was heavily calcified  The mitral valve leaflets were calcified but have well preserved  mobility  There was some calcification in the chordae      AORTIC VALVE:  Heavily calcified aortic valve with severe, probably critical aortic stenosis  No significant aortic regurgitation  Aortic valve maximum velocity 5 3 M/S  Aortic valve mean gradient 81 mmHg  Aortic valve area 0 3 cm2      TRICUSPID VALVE:  There was mild regurgitation      AORTA:  Aortic root at the upper limits of normal to mildly enlarged at 3 8 cm     PULMONARY ARTERIES:  Moderate pulmonary hypertension  Pulmonary artery systolic pressures estimated 54 mmHg      PERICARDIUM:  Mild pericardial effusion without hemodynamic significance  NPO verified  NKDA  Plan:  IV sedation/MAC, GA as backup    Benefits and risks of sedation were discussed with the patient including possibility of recall under sedation and the potential for conversion to general anesthesia if necessary  All questions were answered  Anesthesia consent was obtained from the patient

## 2021-05-14 NOTE — CONSULTS
Consultation - Yuliana Topete 78 y o  male MRN: [id number]  Unit/Bed#: -01 Encounter: [id number]          History of Present Illness   Physician Requesting Consult: Katy Quinteros MD  Reason for Consult / Principal Problem: cardiac clearance    HPI:  Coretta Moody is a 78 y o  male who presents with with progessive exertional chest pain dyspnea and fatigue  Berny Aas was completed show severe AS  Pt is getting CT surgery workup  OMFS was consulted for cardiac clearance  Pt denies seeing a dentist for many years  Denies any current dental pain or swellings  Consults    Review of Systems   HENT: Negative for dental problem  All other systems reviewed and are negative  Historical Information   Past Medical History:   Diagnosis Date    Broken humerus     COPD [facility]chronic obstructive [facility] disease) ([facility] [address] )     Pneumonia      No past surgical history on file    Social History   Social History     Substance and Sexual Activity   Alcohol Use Yes    Frequency: 2-4 times a month    Comment: 1-2/wk     Social History     Substance and Sexual Activity   Drug Use No     Social History     Tobacco Use   Smoking Status Current Some Day Smoker    Packs/day: 0 50    Types: Cigarettes    Start date: 1989   Smokeless Tobacco Never Used     Family History   Problem Relation Age of Onset    Prostate cancer Father        Meds/Allergies   Current Facility-Administered Medications   Medication Dose Route Frequency    acetaminophen (TYLENOL) tablet 650 mg  650 mg Oral Q4H PRN    albuterol (PROVENTIL HFA,VENTOLIN HFA) inhaler 2 puff  2 puff Inhalation Q4H PRN    aspirin (ECOTRIN LOW STRENGTH) EC tablet 81 mg  81 mg Oral Daily    atorvastatin (LIPITOR) tablet 40 mg  40 mg Oral QPM    chlorhexidine (PERIDEX) 0 12 % oral rinse 15 mL  15 mL Mouth/Throat Q12H [address]    fluticasone-vilanterol (BREO ELLIPTA) 100-25 mcg/inh inhaler 1 puff  1 puff Inhalation Daily    heparin (porcine) subcutaneous injection 5,000 Units 5,000 Units Subcutaneous Q8H [address]    metoprolol tartrate (LOPRESSOR) tablet 25 mg  25 mg Oral Q12H [address]    mupirocin (BACTROBAN) 2 % nasal ointment 1 application  1 application Nasal [address]    nicotine (NICODERM CQ) 21 mg/24 hr TD 24 hr patch 1 patch  1 patch Transdermal Daily    nitroglycerin (NITROSTAT) SL tablet 0 4 mg  0 4 mg Sublingual Q5 Min PRN    ondansetron (ZOFRAN) injection 4 mg  4 mg Intravenous Q6H PRN    pantoprazole (PROTONIX) EC tablet 40 mg  40 mg Oral Early Morning     Facility-Administered Medications Prior to Admission   Medication    [COMPLETED] nitroglycerin (NITROSTAT) SL tablet 0 4 mg     Medications Prior to Admission   Medication    amLODIPine (NORVASC) 10 mg tablet    Anoro Ellipta 62 5-25 MCG/INH inhaler    aspirin (ECOTRIN LOW STRENGTH) 81 mg EC tablet    atorvastatin (LIPITOR) 20 mg tablet    losartan (COZAAR) 100 MG tablet    Multiple Vitamin (MULTI-VITAMIN DAILY PO)    pantoprazole (PROTONIX) 40 mg tablet    albuterol (PROVENTIL HFA,VENTOLIN HFA) 90 mcg/act inhaler    Aspirin-Acetaminophen-Caffeine (EXCEDRIN MIGRAINE PO)    dextromethorphan-guaifenesin (MUCINEX DM)  MG per 12 hr tablet    predniSONE 20 mg tablet     No Known Allergies    Objective   Vitals:   Blood Pressure: 99/86 (05/14/21 1510), Pulse: 73 (05/14/21 1510), Temperature: 98 2 °F (36 8 °C) (05/14/21 1030), Temp Source: Oral (05/14/21 0242), Respirations: 16 (05/14/21 1510) Height and Weight Weight - Scale: 106 kg (233 lb 7 5 oz) (05/14/21 0631), Weight Method: Standing scale (05/14/21 0631)      Physical Exam  Constitutional:       Appearance: Normal appearance  He is obese  HENT:      Head: Normocephalic and atraumatic  No right periorbital erythema or left periorbital erythema  Jaw: No trismus, tenderness, swelling or pain on movement  Right Ear: Hearing normal       Left Ear: Hearing normal       Nose: Nose normal  No nasal deformity  Right Turbinates: Not swollen        Left Turbinates: Not swollen  Mouth/Throat:      Mouth: Mucous membranes are dry  Dentition: Dental caries present  No dental tenderness, gingival swelling or dental abscesses  Pharynx: Oropharynx is clear  Eyes:      Extraocular Movements: Extraocular movements intact  Conjunctiva/sclera: Conjunctivae normal       Pupils: Pupils are equal, round, and reactive to light  Neck:      Musculoskeletal: Normal range of motion  Neurological:      Mental Status: He is alert  No acute swelling or signs of acute infection       Lab Results:   Admission on 05/12/2021   Component Date Value    PTT 05/12/2021 49*    Cholesterol 05/13/2021 103     Triglycerides 05/13/2021 129     HDL, Direct 05/13/2021 49     LDL Calculated 05/13/2021 28     Non-HDL-Chol (CHOL-HDL) 05/13/2021 54     WBC 05/13/2021 9 33     RBC 05/13/2021 4 29     Hemoglobin 05/13/2021 13 0     Hematocrit 05/13/2021 41 3     MCV 05/13/2021 96     MCH 05/13/2021 30 3     MCHC 05/13/2021 31 5     RDW 05/13/2021 13 9     MPV 05/13/2021 9 1     Platelets 13/96/5761 227     nRBC 05/13/2021 0     Neutrophils Relative 05/13/2021 53     Immat GRANS % 05/13/2021 0     Lymphocytes Relative 05/13/2021 33     Monocytes Relative 05/13/2021 12     Eosinophils Relative 05/13/2021 2     Basophils Relative 05/13/2021 0     Neutrophils Absolute 05/13/2021 4 92     Immature Grans Absolute 05/13/2021 0 03     Lymphocytes Absolute 05/13/2021 3 03     Monocytes Absolute 05/13/2021 1 12     Eosinophils Absolute 05/13/2021 0 19     Basophils Absolute 05/13/2021 0 04     Sodium 05/13/2021 139     Potassium 05/13/2021 4 1     Chloride 05/13/2021 108     CO2 05/13/2021 26     ANION GAP 05/13/2021 5     BUN 05/13/2021 16     Creatinine 05/13/2021 0 76     Glucose 05/13/2021 86     Calcium 05/13/2021 8 8     eGFR 05/13/2021 94     PTT 05/13/2021 68*    Hemoglobin A1C 05/13/2021 5 8*    EAG 05/13/2021 120     Color, UA 05/13/2021 Dk Yellow     Clarity, UA 05/13/2021 Clear     Specific Gravity, UA 05/13/2021 1 043*    pH, UA 05/13/2021 6 0     Leukocytes, UA 05/13/2021 Negative     Nitrite, UA 05/13/2021 Negative     Protein, UA 05/13/2021 Negative     Glucose, UA 05/13/2021 Negative     Ketones, UA 05/13/2021 Negative     Urobilinogen, UA 05/13/2021 0 2     Bilirubin, UA 05/13/2021 Negative     Blood, UA 05/13/2021 Negative     SARS-CoV-2 05/13/2021 Negative     Sodium 05/14/2021 137     Potassium 05/14/2021 4 2     Chloride 05/14/2021 104     CO2 05/14/2021 29     ANION GAP 05/14/2021 4     BUN 05/14/2021 19     Creatinine 05/14/2021 0 66     Glucose 05/14/2021 89     Calcium 05/14/2021 9 1     eGFR 05/14/2021 100        Assessment/Plan     Imaging Studies: I have personally reviewed pertinent reports  Assessment:  Pt is a 79 yo M planned for AVR, pt has multiple retained root tips and grossly carious non restorable teeth  Will require tooth extraction prior to CT surgery  Plan to extract teeth #1,3,18,30 and any other necessary teeth in the OR  Please coordinate with attending on call for DOS  Plan:  Ext #1,3,18,30 required for dental clearance, will coordinate for dental extractions prior to AVR  Oral hygiene Instructions   Comprehensive care per primary team    Discussed case with attending on call  Please contact with any questions or concerns

## 2021-05-15 PROCEDURE — 99232 SBSQ HOSP IP/OBS MODERATE 35: CPT | Performed by: INTERNAL MEDICINE

## 2021-05-15 RX ADMIN — CHLORHEXIDINE GLUCONATE 0.12% ORAL RINSE 15 ML: 1.2 LIQUID ORAL at 08:29

## 2021-05-15 RX ADMIN — PANTOPRAZOLE SODIUM 40 MG: 40 TABLET, DELAYED RELEASE ORAL at 05:35

## 2021-05-15 RX ADMIN — CHLORHEXIDINE GLUCONATE 0.12% ORAL RINSE 15 ML: 1.2 LIQUID ORAL at 21:19

## 2021-05-15 RX ADMIN — HEPARIN SODIUM 5000 UNITS: 5000 INJECTION INTRAVENOUS; SUBCUTANEOUS at 21:19

## 2021-05-15 RX ADMIN — FLUTICASONE FUROATE AND VILANTEROL TRIFENATATE 1 PUFF: 100; 25 POWDER RESPIRATORY (INHALATION) at 08:29

## 2021-05-15 RX ADMIN — METOPROLOL TARTRATE 25 MG: 25 TABLET, FILM COATED ORAL at 08:29

## 2021-05-15 RX ADMIN — HEPARIN SODIUM 5000 UNITS: 5000 INJECTION INTRAVENOUS; SUBCUTANEOUS at 14:14

## 2021-05-15 RX ADMIN — HEPARIN SODIUM 5000 UNITS: 5000 INJECTION INTRAVENOUS; SUBCUTANEOUS at 05:34

## 2021-05-15 RX ADMIN — ASPIRIN 81 MG: 81 TABLET, COATED ORAL at 08:29

## 2021-05-15 RX ADMIN — MUPIROCIN 1 APPLICATION: 20 OINTMENT TOPICAL at 08:29

## 2021-05-15 RX ADMIN — MUPIROCIN 1 APPLICATION: 20 OINTMENT TOPICAL at 21:19

## 2021-05-15 RX ADMIN — ATORVASTATIN CALCIUM 40 MG: 40 TABLET, FILM COATED ORAL at 17:03

## 2021-05-15 NOTE — PROGRESS NOTES
Cardiology Progress Note - Lary Machuca 78 y o  male MRN: [id number]    Unit/Bed#: -01 Encounter: [id number]    Assessment and plan  1  Severe aortic stenosis/symptomatic  2  Mitral regurgitation moderate  3  Non MI elevated troponin  4  Nonischemic cardiomyopathy ejection fraction 42%  5  Nonobstructive CAD  6  Hypertension  7  Hyperlipidemia  8  COPD  9  Tobacco abuse    Recommendations:  Transesophageal echocardiogram confirmed severe aortic stenosis with very heavy calcification  The mitral valve appeared moderate measurements were made at low systolics in the 70 range  I suspect that the blood pressure was higher the MR would be worse will review images further later today  Hemodynamics have been stable he is euvolemic on exam continue current medications await decision from CT surgery regarding planning  Dental consult has been placed  Subjective:    No significant events overnight  Resting comfortably denies any chest pain, shortness of breath, palpitations no events on telemetry  Denies any lower extremity edema, PND, orthopnea  ROS    Objective:   Vitals: Blood pressure 97/62, pulse 69, temperature 98 2 °F (36 8 °C), resp  rate 18, weight 106 kg (233 lb 0 4 oz), SpO2 92 %  , Body mass index is 29 92 kg/m² ,   Orthostatic Blood Pressures      Most Recent Value   Blood Pressure  97/62 filed at 05/15/2021 0719   Patient Position - Orthostatic VS  Lying filed at 2021 5847         Systolic (74IBY), MZ , Min:83 , UIY:921     Diastolic (88WTM), NKK:79, Min:51, Max:86      Intake/Output Summary (Last 24 hours) at 5/15/2021 0954  Last data filed at 5/15/2021 0900  Gross per 24 hour   Intake 680 ml   Output --   Net 680 ml     Weight (last 2 days)     Date/Time   Weight    05/15/21 0535   106 (233 03)    21 0631   106 (233 47)    21 0600   106 (234 35)                Telemetry Review: No significant arrhythmias seen on telemetry review     EKG personally reviewed by Delonte Stout Jamie, DO  Physical Exam  Vitals signs and nursing note reviewed  Constitutional:       General: He is not in acute distress  Appearance: He is well-developed  HENT:      Head: Normocephalic and atraumatic  Eyes:      Conjunctiva/sclera: Conjunctivae normal       Pupils: Pupils are equal, round, and reactive to light  Neck:      Musculoskeletal: Neck supple  Cardiovascular:      Rate and Rhythm: Normal rate and regular rhythm  Heart sounds: Murmur present  No friction rub  Pulmonary:      Effort: Pulmonary effort is normal  No respiratory distress  Breath sounds: Normal breath sounds  No wheezing or rales  Abdominal:      General: Bowel sounds are normal  There is no distension  Palpations: Abdomen is soft  Tenderness: There is no abdominal tenderness  There is no rebound  Musculoskeletal: Normal range of motion  General: No tenderness or deformity  Skin:     General: Skin is warm and dry  Findings: No erythema  Neurological:      Mental Status: He is alert and oriented to person, place, and time  Cranial Nerves: No cranial nerve deficit             Laboratory Results:  Results from last 7 days   Lab Units 05/12/21  0327 05/12/21  0016 05/11/21  2059   TROPONIN I ng/mL 0 39* 0 24* 0 16*       CBC with diff:   Results from last 7 days   Lab Units 05/13/21  0605 05/12/21  0335 05/11/21  1742   WBC Thousand/uL 9 33 12 66* 12 76*   HEMOGLOBIN g/dL 13 0 12 7 13 8   HEMATOCRIT % 41 3 40 3 41 5   MCV fL 96 100* 95   PLATELETS Thousands/uL 227 223 252   MCH pg 30 3 31 4 31 5   MCHC g/dL 31 5 31 5 33 3   RDW % 13 9 13 7 13 3   MPV fL 9 1 9 4 9 1   NRBC AUTO /100 WBCs 0  --  0         CMP:  Results from last 7 days   Lab Units 05/14/21  0451 05/13/21  0605 05/12/21  0327 05/11/21  1742   POTASSIUM mmol/L 4 2 4 1 4 4 4 3   CHLORIDE mmol/L 104 108 108 106   CO2 mmol/L 29 26 28 27   BUN mg/dL 19 16 13 14   CREATININE mg/dL 0 66 0 76 0 80 0 84   CALCIUM mg/dL 9 1 8 8 8 9 9 4   AST U/L  --   --   --  14   ALT U/L  --   --   --  23   ALK PHOS U/L  --   --   --  73   EGFR ml/min/1 73sq m 100 94 92 91         BMP:  Results from last 7 days   Lab Units 21  0451 21  0605 21  0327 21  1742   POTASSIUM mmol/L 4 2 4 1 4 4 4 3   CHLORIDE mmol/L 104 108 108 106   CO2 mmol/L 29 26 28 27   BUN mg/dL 19 16 13 14   CREATININE mg/dL 0 66 0 76 0 80 0 84   CALCIUM mg/dL 9 1 8 8 8 9 9 4       BNP: No results for input(s): BNP in the last 72 hours  Magnesium:       Coags:   Results from last 7 days   Lab Units 21  0605 21  2302 21  1215 21  1742   PTT seconds 68* 49* 53* 47* 37 28   INR   --   --   --   --  1 13 1 05       TSH:        Hemoglobin A1C   Results from last 7 days   Lab Units 21  1150   HEMOGLOBIN A1C % 5 8*       Lipid Profile:   Results from last 7 days   Lab Units 21  0605   TRIGLYCERIDES mg/dL 129   HDL mg/dL 49       Cardiac testing:   Results for orders placed during the hospital encounter of 21   Echo complete with contrast if indicated    Narrative [address] [place] [address], [address]  [phone]    Transthoracic Echocardiogram  2D, M-mode, Doppler, and Color Doppler    Study date:  12-May-2021    Patient: Gasper Lomeli  MR number: [id number]  Account number: [de-identified]  : 1955  Age: 78 years  Gender: Male  Status: Outpatient  Location: Bedside  Height: 74 in  Weight: 245 5 lb  BP: 102/ 70 mmHg    Indications: Chest pain  Diagnoses: R07 9 - Chest pain, unspecified    Sonographer:  Luciano Dwyer RDCS  Primary Physician:  Scott Luna DO  Referring Physician:  Virginia Gleason DO  Group:  [facility]  Interpreting Physician:  Evans Garrido MD    SUMMARY    LEFT VENTRICLE:  Moderate decreased left ventricular systolic function, EF 10%   Significantly decreased left ventricular global longitudinal strain, minus 7 4%  Severe concentric left ventricular hypertrophy, wall thickness 21 mm  Mildly increased left  ventricular cavity size  Grade 3 left ventricular diastolic dysfunction  Severely elevated left ventricular filling pressures  RIGHT VENTRICLE:  Mild right ventricular enlargement with well preserved right ventricular systolic function  LEFT ATRIUM:  Moderate left atrial enlargement  RIGHT ATRIUM:  Mild right atrial enlargement  MITRAL VALVE:  Moderate possibly severe mitral regurgitation  No mitral stenosis  Mitral ERO 0 5 cm2  Mitral valve regurgitant volume 85 mL  The mitral valve annulus was heavily calcified  The mitral valve leaflets were calcified but have well preserved  mobility  There was some calcification in the chordae  AORTIC VALVE:  Heavily calcified aortic valve with severe, probably critical aortic stenosis  No significant aortic regurgitation  Aortic valve maximum velocity 5 3 M/S  Aortic valve mean gradient 81 mmHg  Aortic valve area 0 3 cm2  TRICUSPID VALVE:  There was mild regurgitation  AORTA:  Aortic root at the upper limits of normal to mildly enlarged at 3 8 cm    PULMONARY ARTERIES:  Moderate pulmonary hypertension  Pulmonary artery systolic pressures estimated 54 mmHg  PERICARDIUM:  Mild pericardial effusion without hemodynamic significance  SUMMARY MEASUREMENTS  2D measurements:  Unspecified Anatomy:   %FS was 25 4 %  AA PSSL Full was -8 3 %  AAS PSSL Full was -15 1 %  AI PSSL Full was -12 5 %  AL PSSL Full was -5 3 %  AP PSSL Full was -5 4 %  AS PSSL Full was -20 9 %  AVC was 378 3 ms  Ao Diam was 3 4 cm  Ao asc was 3 8 cm  BA PSSL Full was -2 8 %  BAS PSSL Full was -2 6 %  BI PSSL Full was -13 5 %  BL PSSL Full was -6 9 %  BP PSSL Full was -4 2 %  BS PSSL Full was -4 1 %  EDV(Teich) was 136 1 ml   EF(Teich) was 49 7 %  ESV(Teich)  was 68 5 ml   G peak SL Full(A2C) was -7 5 %  G peak SL Full(A4C) was -8 5 %    G peak SL Full(APLAX) was -6 1 %  G peak SL Full(Avg) was -7 4 %  IVSd was 2 1 cm  LA Area was 31 1 cm2  LA Diam was 6 3 cm  LAAs A4C was 30 2 cm2  LAESV  A-L A4C was 122 9 ml  LAESV MOD A4C was 116 1 ml  LALs A4C was 6 3 cm  LVEDV MOD A4C was 217 9 ml  LVEF MOD A4C was 42 2 %  LVESV MOD A4C was 126 ml  LVIDd was 5 3 cm  LVIDs was 4 cm  LVLd A4C was 10 9 cm  LVLs A4C was 10 cm  LVOT Diam was 2 cm  LVPWd was 2 1 cm  MA PSSL Full was -4 3 %  MAS PSSL Full was -10 5 %  MI PSSL Full was -5 %  ML PSSL Full was -3 1 %  MP PSSL Full was 3 2 %  MS PSSL Full was -10 8 %  Peak SL Dispersion Full was 61 5 ms  RA  Area was 21 1 cm2  RVIDd was 5 cm  RWT was 0 8   SV MOD A4C was 91 9 ml   SV(Teich) was 67 6 ml   CF measurements:  Unspecified Anatomy:   MR Als  Marito was 0 4 m/s  MR Flow was 255 2 ml/s  MR Rad was 1 1 cm  MR Trace was 8 9 cm2   CW measurements:  Unspecified Anatomy:   AV Env  Ti was 287 3 ms   AV VTI was 125 6 cm   AV Vmax was 5 3 m/s  AV Vmean was 4 4 m/s  AV maxPG was 114 3 mmHg  AV meanPG was 80 6 mmHg  MR VTI was 162 5 cm  MR Vmax was 4 9 m/s   TR Vmax was 3 1 m/s  TR  maxPG was 39 3 mmHg  MM measurements:  Unspecified Anatomy:   TAPSE was 2 cm  PW measurements:  Unspecified Anatomy:   HUMBERTO (VTI) was 0 3 cm2  HUMBERTO Vmax was 0 3 cm2  AVAI (VTI) was 0 cm2/m2  AVAI Vmax was 0 cm2/m2  DVI was 0 1   E' Sept was 0 m/s  E/E' Sept was 44 1   LVOT Env  Ti was 303 2 ms  LVOT VTI was 13 7 cm  LVOT Vmax  was 0 6 m/s  LVOT Vmean was 0 5 m/s  LVOT maxPG was 1 4 mmHg  LVOT meanPG was 0 9 mmHg  LVSI Dopp was 17 8 ml/m2  LVSV Dopp was 42 3 ml   MR ERO was 0 5 cm2  MR RV was 85 2 ml   MV A Marito was 0 6 m/s  MV Dec [place] was 11 4 m/s2  MV  DecT was 136 5 ms   MV E Marito was 1 6 m/s  MV E/A Ratio was 2 4   MV PHT was 39 6 ms  MVA By PHT was 5 6 cm2  HISTORY: PRIOR HISTORY: Severe AS  COPD  Hyperlipidemia  Hypertension  Shortness of breath  Chest pain  GERD  Smoker  LVH      PROCEDURE: The procedure was performed at the bedside  This was a routine study  The transthoracic approach was used  The study included complete 2D imaging, M-mode, complete spectral Doppler, and color Doppler  The heart rate was 80 bpm,  at the start of the study  Images were obtained from the parasternal, apical, subcostal, and suprasternal notch acoustic windows  Image quality was adequate  LEFT VENTRICLE: Moderate decreased left ventricular systolic function, EF 55%  Significantly decreased left ventricular global longitudinal strain, minus 7 4%  Severe concentric left ventricular hypertrophy, wall thickness 21 mm  Mildly  increased left ventricular cavity size  Grade 3 left ventricular diastolic dysfunction  Severely elevated left ventricular filling pressures  RIGHT VENTRICLE: Mild right ventricular enlargement with well preserved right ventricular systolic function  LEFT ATRIUM: Moderate left atrial enlargement  RIGHT ATRIUM: Mild right atrial enlargement  MITRAL VALVE: Moderate possibly severe mitral regurgitation  No mitral stenosis  Mitral ERO 0 5 cm2  Mitral valve regurgitant volume 85 mL  The mitral valve annulus was heavily calcified  The mitral valve leaflets were calcified but have  well preserved mobility  There was some calcification in the chordae  AORTIC VALVE: Heavily calcified aortic valve with severe, probably critical aortic stenosis  No significant aortic regurgitation  Aortic valve maximum velocity 5 3 M/S  Aortic valve mean gradient 81 mmHg  Aortic valve area 0 3 cm2  TRICUSPID VALVE: The valve structure was normal  There was normal leaflet separation  DOPPLER: The transtricuspid velocity was within the normal range  There was no evidence for stenosis  There was mild regurgitation  PULMONIC VALVE: DOPPLER: The transpulmonic velocity was within the normal range  There was no regurgitation  PERICARDIUM: Mild pericardial effusion without hemodynamic significance      AORTA: Aortic root at the upper limits of normal to mildly enlarged at 3 8 cm    PULMONARY ARTERY: Moderate pulmonary hypertension  Pulmonary artery systolic pressures estimated 54 mmHg  SYSTEM MEASUREMENT TABLES    2D  %FS: 25 4 %  AA PSSL Full: -8 3 %  AAS PSSL Full: -15 1 %  AI PSSL Full: -12 5 %  AL PSSL Full: -5 3 %  AP PSSL Full: -5 4 %  AS PSSL Full: -20 9 %  AVC: 378 3 ms  Ao Diam: 3 4 cm  Ao asc: 3 8 cm  BA PSSL Full: -2 8 %  BAS PSSL Full: -2 6 %  BI PSSL Full: -13 5 %  BL PSSL Full: -6 9 %  BP PSSL Full: -4 2 %  BS PSSL Full: -4 1 %  EDV(Teich): 136 1 ml  EF(Teich): 49 7 %  ESV(Teich): 68 5 ml  G peak SL Full(A2C): -7 5 %  G peak SL Full(A4C): -8 5 %  G peak SL Full(APLAX): -6 1 %  G peak SL Full(Avg): -7 4 %  IVSd: 2 1 cm  LA Area: 31 1 cm2  LA Diam: 6 3 cm  LAAs A4C: 30 2 cm2  LAESV A-L A4C: 122 9 ml  LAESV MOD A4C: 116 1 ml  LALs A4C: 6 3 cm  LVEDV MOD A4C: 217 9 ml  LVEF MOD A4C: 42 2 %  LVESV MOD A4C: 126 ml  LVIDd: 5 3 cm  LVIDs: 4 cm  LVLd A4C: 10 9 cm  LVLs A4C: 10 cm  LVOT Diam: 2 cm  LVPWd: 2 1 cm  LVd Mass: 718 1 g  LVd Mass Index: 303 g/m2  MA PSSL Full: -4 3 %  MAS PSSL Full: -10 5 %  MI PSSL Full: -5 %  ML PSSL Full: -3 1 %  MP PSSL Full: 3 2 %  MS PSSL Full: -10 8 %  Peak SL Dispersion Full: 61 5 ms  RA Area: 21 1 cm2  RVIDd: 5 cm  RWT: 0 8  SV MOD A4C: 91 9 ml  SV(Teich): 67 6 ml    CF  MR Als  Marito: 0 4 m/s  MR Flow: 255 2 ml/s  MR Rad: 1 1 cm  MR Trace: 8 9 cm2    CW  AV Env  Ti: 287 3 ms  AV VTI: 125 6 cm  AV Vmax: 5 3 m/s  AV Vmean: 4 4 m/s  AV maxP 3 mmHg  AV meanP 6 mmHg  MR VTI: 162 5 cm  MR Vmax: 4 9 m/s  TR Vmax: 3 1 m/s  TR maxP 3 mmHg    MM  TAPSE: 2 cm    PW  HUMBERTO (VTI): 0 3 cm2  HUMBERTO Vmax: 0 3 cm2  AVAI (VTI): 0 cm2/m2  AVAI Vmax: 0 cm2/m2  DVI: 0 1  E' Sept: 0 m/s  E/E' Sept: 44 1  LVOT Env  Ti: 303 2 ms  LVOT VTI: 13 7 cm  LVOT Vmax: 0 6 m/s  LVOT Vmean: 0 5 m/s  LVOT maxP 4 mmHg  LVOT meanP 9 mmHg  LVSI Dopp: 17 8 ml/m2  LVSV Dopp: 42 3 ml  MR ERO: 0 5 cm2  MR RV: 85 2 ml  MV A Marito: 0 6 m/s  MV Dec [place]: 11 4 m/s2  MV DecT: 136 5 ms  MV E Marito: 1 6 m/s  MV E/A Ratio: 2 4  MV PHT: 39 6 ms  MVA By PHT: 5 6 cm2    [facility] Accredited Echocardiography Laboratory    Prepared and electronically signed by    Irene Brush MD  Signed 12-May-2021 12:22:39       Results for orders placed during the hospital encounter of 21   KATIA    N[facility]ative [facility] [address]  [facility], [address] [facility]  [phone]    Transesophageal Echocardiogram  2D, 3D, Doppler, and Color Doppler    Study date:  14-May-2021    Patient: Vaishali Daly  MR number: [id number]  Account number: [de-identified]  : 1955  Age: 78 years  Gender: Male  Status: Inpatient  Location: Echo lab  Height: 74 in  Weight: 233 lb  BP: 98/ 70 mmHg    Indications: Mitral valve disease  Diagnoses: I34 9 - Nonrheumatic mitral valve disorder, unspecified    Sonographer:  Angelica Stark RDCS  Interpreting Physician:  Rosa Woodward MD  Primary Physician:  Pushpa Appiah DO  Referring Physician:  Cody Schmidt PA-C  Group:  [facility]  Cardiology Fellow:  Dakota Todd MD  RN:  Muna Dempsey RN    SUMMARY    LEFT VENTRICLE:  The ventricle was mildly dilated  Systolic function was moderately reduced  Ejection fraction was estimated to be 45 %  There was moderate diffuse hypokinesis  Wall thickness was moderately increased  The changes were consistent with eccentric hypertrophy  Features were consistent with a pseudonormal left ventricular filling pattern, with concomitant abnormal relaxation and increased filling pressure (grade 2 diastolic dysfunction)  RIGHT VENTRICLE:  The ventricle was mildly dilated  Systolic function was moderately reduced  LEFT ATRIUM:  The atrium was mildly dilated  LEFT ATRIAL APPENDAGE:  No thrombus was identified  ATRIAL SEPTUM:  There was a patent foramen ovale measuring 5 mm    Doppler evaluation was performed  There was a left-to-right shunt, in the baseline state  RIGHT ATRIUM:  The atrium was mildly dilated  MITRAL VALVE:  There was mild to moderate annular calcification  There was an area of image dropout at the base of the posterior leaflet of the mitral valve in the P1- P2 segment  This was possibly artifact but a perforation or sinus cannot be entirely ruled out  There appeared to be flow into but not  out of this region  There was mild to moderate regurgitation  The regurgitant jet was centrally directed  AORTIC VALVE:  The valve was not visualized well enough to rule out a bicuspid morphology  Leaflets exhibited marked calcification and immobility  There was severe stenosis  There was mild to moderate regurgitation  Valve mean gradient was 44 mmHg  TRICUSPID VALVE:  There was mild regurgitation  PERICARDIUM:  A small pericardial effusion was identified circumferential to the heart  HISTORY: PRIOR HISTORY: Aortic stenosis  HLD  Hypertension  NSTEMI  LVH  Tobacco use  GERD  Shortness of breath  COPD  PROCEDURE: The procedure was performed in the echo lab  This was a routine study  The risks and alternatives of the procedure were explained to the patient and informed consent was obtained  The transesophageal approach was used  The study  included complete 2D imaging, 3D imaging, complete spectral Doppler, and color Doppler  The heart rate was 74 bpm, at the start of the study  An adult omniplane probe was inserted by the cardiology fellow under direct supervision of the  attending cardiologist  Images were obtained from the parasternal and apical acoustic windows  Intubated with ease  One intubation attempt(s)  There was no blood detected on the probe  Image quality was adequate  There were no  complications during the procedure  MEDICATIONS: Anesthesia administered by anesthesia team     LEFT VENTRICLE: The ventricle was mildly dilated   Systolic function was moderately reduced  Ejection fraction was estimated to be 45 %  There was moderate diffuse hypokinesis  Wall thickness was moderately increased  The changes were  consistent with eccentric hypertrophy  DOPPLER: Features were consistent with a pseudonormal left ventricular filling pattern, with concomitant abnormal relaxation and increased filling pressure (grade 2 diastolic dysfunction)  RIGHT VENTRICLE: The ventricle was mildly dilated  Systolic function was moderately reduced  LEFT ATRIUM: The atrium was mildly dilated  APPENDAGE: The size was normal  No thrombus was identified  DOPPLER: The function was mildly reduced (mildly reduced emptying velocity)  ATRIAL SEPTUM: There was a patent foramen ovale measuring 5 mm  Doppler evaluation was performed  There was a left-to-right shunt, in the baseline state  RIGHT ATRIUM: The atrium was mildly dilated  MITRAL VALVE: There was mild to moderate annular calcification  There was an area of image dropout at the base of the posterior leaflet of the mitral valve in the P1- P2 segment  This was possibly artifact but a perforation or sinus cannot  be entirely ruled out  There appeared to be flow into but not out of this region  There was no echocardiographic evidence of vegetation  DOPPLER: There was mild to moderate regurgitation  The regurgitant jet was centrally directed  AORTIC VALVE: The valve was not visualized well enough to rule out a bicuspid morphology  Leaflets exhibited marked calcification and immobility  DOPPLER: There was severe stenosis  There was mild to moderate regurgitation  TRICUSPID VALVE: The valve structure was normal  There was normal leaflet separation  DOPPLER: There was mild regurgitation  The regurgitant jet was toward the septum  The findings suggest at least mild pulmonary hypertension  PULMONIC VALVE: Not well visualized  PERICARDIUM: A small pericardial effusion was identified circumferential to the heart      AORTA: The root exhibited normal size  Discrete areas of mild to moderate atherosclerosis were present  There was no evidence for dissection  There was no evidence for aneurysm  PULMONARY VEINS: DOPPLER: There was systolic blunting in the pulmonary vein(s)  MEASUREMENT TABLES    DOPPLER MEASUREMENTS  LVOT   (Reference normals)  Peak russell   79 cm/s   (--)  Mean russell   47 cm/s   (--)  VTI   21 cm   (--)  Peak gradient   2 mmHg   (--)  Mean gradient   1 mmHg   (--)  Aortic valve   (Reference normals)  Peak russell   459 cm/s   (--)  Mean russell   331 cm/s   (--)  VTI   110 cm   (--)  Peak gradient   84 27 mmHg   (--)  Mean gradient   44 mmHg   (--)  Obstr index, VTI   0 19    (--)  Obstr index, Vmax   0 17    (--)  Obstr index, Vmean   0 14    (--)  Mitral valve   (Reference normals)  Regurg alias russell   31 cm/s   (--)  Regurg PISA radius   6 mm   (--)  Max MR flow rate   70 12 ml/s   (--)    [facility]    Prepared and electronically signed by    Masha Hankins MD  Signed 14-May-2021 15:17:03       No results found for this or any previous visit  No results found for this or any previous visit      Meds/Allergies   all current active meds have been reviewed  Facility-Administered Medications Prior to Admission   Medication    [COMPLETED] nitroglycerin (NITROSTAT) SL tablet 0 4 mg     Medications Prior to Admission   Medication    amLODIPine (NORVASC) 10 mg tablet    Anoro Ellipta 62 5-25 MCG/INH inhaler    aspirin (ECOTRIN LOW STRENGTH) 81 mg EC tablet    atorvastatin (LIPITOR) 20 mg tablet    losartan (COZAAR) 100 MG tablet    Multiple Vitamin (MULTI-VITAMIN DAILY PO)    pantoprazole (PROTONIX) 40 mg tablet    albuterol (PROVENTIL HFA,VENTOLIN HFA) 90 mcg/act inhaler    Aspirin-Acetaminophen-Caffeine (EXCEDRIN MIGRAINE PO)    dextromethorphan-guaifenesin (MUCINEX DM)  MG per 12 hr tablet    predniSONE 20 mg tablet          Assessment:  Principal Problem: Aortic valve stenosis - severe  Active Problems:    Chronic obstructive lung disease ([facility])    Hyperlipidemia    Essential hypertension    NSTEMI (non-ST elevated myocardial infarction) ([facility])    Acute combined systolic and diastolic CHF, NYHA class 1 ([facility])            Counseling / Coordination of Care  Total floor / unit time spent today 25 minutes  Greater than 50% of total time was spent with the patient and / or family counseling and / or coordination of care  A description of the counseling / coordination of care: Soham Matthew

## 2021-05-15 NOTE — ASSESSMENT & PLAN NOTE
C/o generalized arthritis for now  · Takes Excedrin at home  · Will hold that given surgical plans  · Supportive care with tylenol  · Offered lido patches, refuses

## 2021-05-15 NOTE — ASSESSMENT & PLAN NOTE
Wt Readings from Last 3 Encounters:   05/15/21 106 kg (233 lb 0 4 oz)   05/11/21 112 kg (246 lb 14 6 oz)   05/11/21 113 kg (250 lb)     Monitor intake and output   Monitor daily weights  · EF of 42% with grade 3 diastolic dysfunction, severe AS and Mod-severe MR  · IV lasix 20mg x1, with approximately 800 cc urine output  · Supplemental oxygen weaned  · He is currently euvolemic   · Low Na diet

## 2021-05-15 NOTE — PROGRESS NOTES
[address] [facility]  Progress Note - Susy Rogers 1955, 78 y o  male MRN: [id number]  Unit/Bed#: -01 Encounter: [id number]  Primary Care Provider: Mayra Sotelo DO   Date and time admitted to hospital: 5/12/2021  9:34 PM    * Aortic valve stenosis - severe  Assessment & Plan  Severe-critical AS on current echo with valve area of 0 3cm2  · Also with significant mitral valve disease  · Cardiac surgery completing pre-surgical workup, considering TAVR vs double valve repair (AVR/MVR)  · Will need dental extractions, planning for Monday 5/17  · Appreciate cardiology input     Acute combined systolic and diastolic CHF, NYHA class 1 ([facility] [address] )  Assessment & Plan  Wt Readings from Last 3 Encounters:   05/15/21 106 kg (233 lb 0 4 oz)   05/11/21 112 kg (246 lb 14 6 oz)   05/11/21 113 kg (250 lb)     Monitor intake and output  Monitor daily weights  · EF of 42% with grade 3 diastolic dysfunction, severe AS and Mod-severe MR  · IV lasix 20mg x1, with approximately 800 cc urine output  · Supplemental oxygen weaned  · He is currently euvolemic   · Low Na diet        NSTEMI (non-ST elevated myocardial infarction) [facility])  Assessment & Plan  Transferred from [address] [facility] for a cardiac catheterization for presumed NSTEMI, also with severe valve stenosis  · Echocardiogram with EF of 42%  · Continue aspirin, atorvastatin  · Patient was started on metoprolol 25mg BID  · S/P cardiac catheterization which showed patent coronaries  Heparin gtt was discontinued        Arthritis  Assessment & Plan  C/o generalized arthritis for now  · Takes Excedrin at home  · Will hold that given surgical plans  · Supportive care with tylenol  · Offered lido patches, refuses    Essential hypertension  Assessment & Plan  BP is acceptable  · Continue metoprolol 25 mg BID  · Losartan and amlodipine on hold    Hyperlipidemia  Assessment & Plan  · FLP WNL  · Continuing patient's atorvastatin 40mg    Chronic obstructive lung disease [facility])  Assessment & Plan  No wheezing at this time  · Continue breo        VTE Pharmacologic Prophylaxis: VTE Score: 2 Moderate Risk (Score 3-4) - Pharmacological DVT Prophylaxis Ordered: heparin  Patient Centered Rounds: I performed bedside rounds with nursing staff today  Discussions with Specialists or Other Care Team Provider: Ruby  Appreciate other specialists  Education and Discussions with Family / Patient: Patient declined call to   Time Spent for Care: 30 minutes  More than 50% of total time spent on counseling and coordination of care as described above  Current Length of Stay: 3 day(s)  Current Patient Status: Inpatient   Certification Statement: The patient will continue to require additional inpatient hospital stay due to ongoing surgical planning   Discharge Plan: None at this time  Planning for surgery at some point next week  Code Status: Level 1 - Full Code    Subjective:   Doing ok today  Overall bored and sore from laying in bed  Encouraged OOB to bed in chair  Objective:     Vitals:   Temp (24hrs), Av 9 °F (36 6 °C), Min:97 8 °F (36 6 °C), Max:98 2 °F (36 8 °C)    Temp:  [97 8 °F (36 6 °C)-98 2 °F (36 8 °C)] 98 2 °F (36 8 °C)  HR:  [69-77] 69  Resp:  [16-18] 18  BP: ()/(51-86) 97/62  SpO2:  [92 %-97 %] 92 %  Body mass index is 29 92 kg/m²  Input and Output Summary (last 24 hours): Intake/Output Summary (Last 24 hours) at 5/15/2021 1034  Last data filed at 5/15/2021 0900  Gross per 24 hour   Intake 680 ml   Output --   Net 680 ml       Physical Exam:   Physical Exam  Vitals signs and nursing note reviewed  Constitutional:       General: He is not in acute distress  Appearance: He is obese  He is not toxic-appearing  Cardiovascular:      Heart sounds: Murmur present  Pulmonary:      Effort: No respiratory distress  Abdominal:      General: There is no distension  Musculoskeletal:      Right lower leg: No edema        Left lower leg: No edema  Neurological:      Mental Status: He is oriented to person, place, and time  Psychiatric:         Mood and Affect: Mood normal           Additional Data:     Labs:  Results from last 7 days   Lab Units 05/13/21  0605   WBC Thousand/uL 9 33   HEMOGLOBIN g/dL 13 0   HEMATOCRIT % 41 3   PLATELETS Thousands/uL 227   NEUTROS PCT % 53   LYMPHS PCT % 33   MONOS PCT % 12   EOS PCT % 2     Results from last 7 days   Lab Units 05/14/21  0451  05/11/21  1742   SODIUM mmol/L 137   < > 140   POTASSIUM mmol/L 4 2   < > 4 3   CHLORIDE mmol/L 104   < > 106   CO2 mmol/L 29   < > 27   BUN mg/dL 19   < > 14   CREATININE mg/dL 0 66   < > 0 84   ANION GAP mmol/L 4   < > 7   CALCIUM mg/dL 9 1   < > 9 4   ALBUMIN g/dL  --   --  3 9   TOTAL BILIRUBIN mg/dL  --   --  0 78   ALK PHOS U/L  --   --  73   ALT U/L  --   --  23   AST U/L  --   --  14   GLUCOSE RANDOM mg/dL 89   < > 131    < > = values in this interval not displayed  Results from last 7 days   Lab Units 05/12/21  0327   INR  1 13         Results from last 7 days   Lab Units 05/13/21  1150   HEMOGLOBIN A1C % 5 8*           Lines/Drains:  Invasive Devices     Peripheral Intravenous Line            Peripheral IV 05/13/21 Left;Ventral (anterior) Hand 2 days                  Telemetry:  Telemetry Orders (From admission, onward)             48 Hour Telemetry Monitoring  Continuous x 48 hours     Question:  Reason for 48 Hour Telemetry  Answer:  Arrhythmias Requiring Medical Therapy (eg  SVT, Vtach/fib, Bradycardia, Uncontrolled A-fib)                 Telemetry Reviewed: Normal Sinus Rhythm  Indication for Continued Telemetry Use: Awaiting PCI/EP Study/CABG           Imaging: No pertinent imaging reviewed      Recent Cultures (last 7 days):         Last 24 Hours Medication List:   Current Facility-Administered Medications   Medication Dose Route Frequency Provider Last Rate    acetaminophen  650 mg Oral Q4H PRN BITA Maldonado      albuterol  2 puff Inhalation Q4H PRN Lyubov Galvin MD      aspirin  81 mg Oral Daily Lyubov Galvin MD      atorvastatin  40 mg Oral QPM Lyubov Galvin MD      chlorhexidine  15 mL Mouth/Throat Q12H [address] Srinivasa Jaimes PA-C      fluticasone-vilanterol  1 puff Inhalation Daily Lyubov Galvin MD      heparin (porcine)  5,000 Units Subcutaneous Q8H [address] Suzi Miller PA-C      metoprolol tartrate  25 mg Oral Q12H [address] BITA Lang      mupirocin  1 application Nasal A05V [address] Srinivasa Jaimes PA-C      nicotine  1 patch Transdermal Daily Lyubov Galvin MD      nitroglycerin  0 4 mg Sublingual Q5 Min PRN Lyubov Galvin MD      ondansetron  4 mg Intravenous Q6H PRN Lyubov Galvin MD      pantoprazole  40 mg Oral Early Morning Lyubov Galvin MD          Today, Patient Was Seen By: Junior Barger PA-C    **Please Note: This note may have been constructed using a voice recognition system  **

## 2021-05-15 NOTE — PLAN OF CARE
Problem: Potential for Falls  Goal: Patient will remain free of falls  Description: INTERVENTIONS:  - Assess patient frequently for physical needs  -  Identify cognitive and physical deficits and behaviors that affect risk of falls    -  [place] fall precautions as indicated by assessment   - Educate patient/family on patient safety including physical limitations  - Instruct patient to call for assistance with activity based on assessment  - Modify environment to reduce risk of injury  - Consider OT/PT consult to assist with strengthening/mobility  Outcome: Progressing     Problem: CARDIOVASCULAR - ADULT  Goal: Maintains optimal cardiac output and hemodynamic stability  Description: INTERVENTIONS:  - Monitor I/O, vital signs and rhythm  - Monitor for S/S and trends of decreased cardiac output  - Administer and titrate ordered vasoactive medications to optimize hemodynamic stability  - Assess quality of pulses, skin color and temperature  - Assess for signs of decreased coronary artery perfusion  - Instruct patient to report change in severity of symptoms  Outcome: Progressing  Goal: Absence of cardiac dysrhythmias or at baseline rhythm  Description: INTERVENTIONS:  - Continuous cardiac monitoring, vital signs, obtain 12 lead EKG if ordered  - Administer antiarrhythmic and heart rate control medications as ordered  - Monitor electrolytes and administer replacement therapy as ordered  Outcome: Progressing     Problem: RESPIRATORY - ADULT  Goal: Achieves optimal ventilation and oxygenation  Description: INTERVENTIONS:  - Assess for changes in respiratory status  - Assess for changes in mentation and behavior  - Position to facilitate oxygenation and minimize respiratory effort  - Oxygen administered by appropriate delivery if ordered  - Initiate smoking cessation education as indicated  - Encourage broncho-pulmonary hygiene including cough, deep breathe, Incentive Spirometry  - Assess the need for suctioning and aspirate as needed  - Assess and instruct to report SOB or any respiratory difficulty  - Respiratory Therapy support as indicated  Outcome: Progressing     Problem: PAIN - ADULT  Goal: Verbalizes/displays adequate comfort level or baseline comfort level  Description: Interventions:  - Encourage patient to monitor pain and request assistance  - Assess pain using appropriate pain scale  - Administer analgesics based on type and severity of pain and evaluate response  - Implement non-pharmacological measures as appropriate and evaluate response  - Consider cultural and social influences on pain and pain management  - Notify physician/advanced practitioner if interventions unsuccessful or patient reports new pain  Outcome: Progressing     Problem: DISCHARGE PLANNING  Goal: Discharge to home or other facility with appropriate resources  Description: INTERVENTIONS:  - Identify barriers to discharge w/patient and caregiver  - Arrange for needed discharge resources and transportation as appropriate  - Identify discharge learning needs (meds, wound care, etc )  - Arrange for interpretive services to assist at discharge as needed  - Refer to Case Management Department for coordinating discharge planning if the patient needs post-hospital services based on physician/advanced practitioner order or complex needs related to functional status, cognitive ability, or social support system  Outcome: Progressing     Problem: Knowledge Deficit  Goal: Patient/family/caregiver demonstrates understanding of disease process, treatment plan, medications, and discharge instructions  Description: Complete learning assessment and assess knowledge base    Interventions:  - Provide teaching at level of understanding  - Provide teaching via preferred learning methods  Outcome: Progressing

## 2021-05-15 NOTE — ASSESSMENT & PLAN NOTE
Severe-critical AS on current echo with valve area of 0 3cm2  · Also with significant mitral valve disease  · Cardiac surgery completing pre-surgical workup, considering TAVR vs double valve repair (AVR/MVR)  · Will need dental extractions, planning for Monday 5/17  · Appreciate cardiology input

## 2021-05-15 NOTE — ASSESSMENT & PLAN NOTE
Transferred from [facility] for a cardiac catheterization for presumed NSTEMI, also with severe valve stenosis  · Echocardiogram with EF of 42%  · Continue aspirin, atorvastatin  · Patient was started on metoprolol 25mg BID  · S/P cardiac catheterization which showed patent coronaries  Heparin gtt was discontinued

## 2021-05-16 ENCOUNTER — ANESTHESIA EVENT (INPATIENT)
Dept: PERIOP | Facility: HOSPITAL | Age: 66
DRG: 216 | End: 2021-05-16
Payer: COMMERCIAL

## 2021-05-16 PROCEDURE — 99232 SBSQ HOSP IP/OBS MODERATE 35: CPT | Performed by: INTERNAL MEDICINE

## 2021-05-16 RX ORDER — LIDOCAINE HYDROCHLORIDE 10 MG/ML
0.5 INJECTION, SOLUTION EPIDURAL; INFILTRATION; INTRACAUDAL; PERINEURAL ONCE AS NEEDED
Status: DISCONTINUED | OUTPATIENT
Start: 2021-05-16 | End: 2021-05-17 | Stop reason: HOSPADM

## 2021-05-16 RX ORDER — SODIUM CHLORIDE, SODIUM LACTATE, POTASSIUM CHLORIDE, CALCIUM CHLORIDE 600; 310; 30; 20 MG/100ML; MG/100ML; MG/100ML; MG/100ML
125 INJECTION, SOLUTION INTRAVENOUS CONTINUOUS
Status: DISCONTINUED | OUTPATIENT
Start: 2021-05-16 | End: 2021-05-18

## 2021-05-16 RX ORDER — ACETAMINOPHEN 325 MG/1
975 TABLET ORAL EVERY 6 HOURS PRN
Status: DISCONTINUED | OUTPATIENT
Start: 2021-05-16 | End: 2021-05-20

## 2021-05-16 RX ADMIN — ASPIRIN 81 MG: 81 TABLET, COATED ORAL at 08:27

## 2021-05-16 RX ADMIN — METOPROLOL TARTRATE 25 MG: 25 TABLET, FILM COATED ORAL at 08:27

## 2021-05-16 RX ADMIN — ATORVASTATIN CALCIUM 40 MG: 40 TABLET, FILM COATED ORAL at 15:47

## 2021-05-16 RX ADMIN — HEPARIN SODIUM 5000 UNITS: 5000 INJECTION INTRAVENOUS; SUBCUTANEOUS at 15:00

## 2021-05-16 RX ADMIN — SODIUM CHLORIDE, SODIUM LACTATE, POTASSIUM CHLORIDE, AND CALCIUM CHLORIDE 125 ML/HR: .6; .31; .03; .02 INJECTION, SOLUTION INTRAVENOUS at 23:29

## 2021-05-16 RX ADMIN — CHLORHEXIDINE GLUCONATE 0.12% ORAL RINSE 15 ML: 1.2 LIQUID ORAL at 08:27

## 2021-05-16 RX ADMIN — FLUTICASONE FUROATE AND VILANTEROL TRIFENATATE 1 PUFF: 100; 25 POWDER RESPIRATORY (INHALATION) at 08:27

## 2021-05-16 RX ADMIN — HEPARIN SODIUM 5000 UNITS: 5000 INJECTION INTRAVENOUS; SUBCUTANEOUS at 21:14

## 2021-05-16 RX ADMIN — PANTOPRAZOLE SODIUM 40 MG: 40 TABLET, DELAYED RELEASE ORAL at 05:11

## 2021-05-16 RX ADMIN — HEPARIN SODIUM 5000 UNITS: 5000 INJECTION INTRAVENOUS; SUBCUTANEOUS at 05:11

## 2021-05-16 RX ADMIN — MUPIROCIN 1 APPLICATION: 20 OINTMENT TOPICAL at 08:27

## 2021-05-16 RX ADMIN — MUPIROCIN 1 APPLICATION: 20 OINTMENT TOPICAL at 21:14

## 2021-05-16 RX ADMIN — METOPROLOL TARTRATE 25 MG: 25 TABLET, FILM COATED ORAL at 21:15

## 2021-05-16 RX ADMIN — CHLORHEXIDINE GLUCONATE 0.12% ORAL RINSE 15 ML: 1.2 LIQUID ORAL at 21:14

## 2021-05-16 RX ADMIN — ACETAMINOPHEN 975 MG: 325 TABLET, FILM COATED ORAL at 08:33

## 2021-05-16 NOTE — PROGRESS NOTES
[facility]  Progress Note - Marii Herrera 1955, 78 y o  male MRN: [id number]  Unit/Bed#: -01 Encounter: [id number]  Primary Care Provider: Ney Ma DO   Date and time admitted to hospital: 5/12/2021  9:34 PM    * Aortic valve stenosis - severe  Assessment & Plan  Severe-critical AS on current echo with valve area of 0 3cm2  · Also with significant mitral valve disease  · Cardiac surgery completing pre-surgical workup, considering TAVR vs double valve repair (AVR/MVR)  · Will need dental extractions, planning for Monday 5/17  · Appreciate cardiology input     Acute combined systolic and diastolic CHF, NYHA class 1 ([facility] [address] )  Assessment & Plan  Wt Readings from Last 3 Encounters:   05/16/21 104 kg (230 lb 6 1 oz)   05/11/21 112 kg (246 lb 14 6 oz)   05/11/21 113 kg (250 lb)     Monitor intake and output  Monitor daily weights  · EF of 42% with grade 3 diastolic dysfunction, severe AS and Mod-severe MR  · IV lasix 20mg x1, with approximately 800 cc urine output  · Supplemental oxygen weaned  · He is currently euvolemic   · Low Na diet        NSTEMI (non-ST elevated myocardial infarction) [facility])  Assessment & Plan  Transferred from [address] [facility] for a cardiac catheterization for presumed NSTEMI, also with severe valve stenosis  · Echocardiogram with EF of 42%  · Continue aspirin, atorvastatin  · Patient was started on metoprolol 25mg BID  · S/P cardiac catheterization which showed patent coronaries  Heparin gtt was discontinued        Arthritis  Assessment & Plan  C/o generalized arthritis for now  · Takes Excedrin at home  · Will hold that given surgical plans  · Supportive care with tylenol  · Offered lido patches, refuses    Essential hypertension  Assessment & Plan  BP is acceptable  · Continue metoprolol 25 mg BID  · Losartan and amlodipine on hold    Hyperlipidemia  Assessment & Plan  · FLP WNL  · Continuing patient's atorvastatin 40mg    Chronic obstructive lung disease [facility])  Assessment & Plan  No wheezing at this time  · Continue breo        VTE Pharmacologic Prophylaxis: VTE Score: 2 Moderate Risk (Score 3-4) - Pharmacological DVT Prophylaxis Ordered: heparin  Patient Centered Rounds: I performed bedside rounds with nursing staff today  Discussions with Specialists or Other Care Team Provider: Appreciate specialists    Education and Discussions with Family / Patient: Patient declined call to   Time Spent for Care: 30 minutes  More than 50% of total time spent on counseling and coordination of care as described above  Current Length of Stay: 4 day(s)  Current Patient Status: Inpatient   Certification Statement: The patient will continue to require additional inpatient hospital stay due to dental extraction tomorrow   Discharge Plan: Anticipate discharge in >72 hrs to pending     Code Status: Level 1 - Full Code    Subjective:   Doing okay  Main complaint is generalized soreness and pain from his arthritis and not moving around  I encouraged him to get out of bed and walk around  Objective:     Vitals:   Temp (24hrs), Av 2 °F (36 8 °C), Min:97 6 °F (36 4 °C), Max:99 2 °F (37 3 °C)    Temp:  [97 6 °F (36 4 °C)-99 2 °F (37 3 °C)] 97 9 °F (36 6 °C)  HR:  [65-72] 65  Resp:  [17-20] 18  BP: ()/(60-78) 104/72  SpO2:  [89 %-97 %] 97 %  Body mass index is 29 58 kg/m²  Input and Output Summary (last 24 hours): Intake/Output Summary (Last 24 hours) at 2021 0816  Last data filed at 2021 0517  Gross per 24 hour   Intake 1440 ml   Output 1525 ml   Net -85 ml       Physical Exam:   Physical Exam  Vitals signs and nursing note reviewed  Constitutional:       General: He is not in acute distress  Cardiovascular:      Rate and Rhythm: Normal rate and regular rhythm  Heart sounds: Murmur present  Pulmonary:      Effort: No respiratory distress  Abdominal:      General: There is no distension  Tenderness:  There is no abdominal tenderness  Musculoskeletal:      Right lower leg: No edema  Left lower leg: No edema  Neurological:      Mental Status: He is oriented to person, place, and time  Psychiatric:         Mood and Affect: Mood normal           Additional Data:     Labs:  Results from last 7 days   Lab Units 05/13/21  0605   WBC Thousand/uL 9 33   HEMOGLOBIN g/dL 13 0   HEMATOCRIT % 41 3   PLATELETS Thousands/uL 227   NEUTROS PCT % 53   LYMPHS PCT % 33   MONOS PCT % 12   EOS PCT % 2     Results from last 7 days   Lab Units 05/14/21  0451  05/11/21  1742   SODIUM mmol/L 137   < > 140   POTASSIUM mmol/L 4 2   < > 4 3   CHLORIDE mmol/L 104   < > 106   CO2 mmol/L 29   < > 27   BUN mg/dL 19   < > 14   CREATININE mg/dL 0 66   < > 0 84   ANION GAP mmol/L 4   < > 7   CALCIUM mg/dL 9 1   < > 9 4   ALBUMIN g/dL  --   --  3 9   TOTAL BILIRUBIN mg/dL  --   --  0 78   ALK PHOS U/L  --   --  73   ALT U/L  --   --  23   AST U/L  --   --  14   GLUCOSE RANDOM mg/dL 89   < > 131    < > = values in this interval not displayed  Results from last 7 days   Lab Units 05/12/21  0327   INR  1 13         Results from last 7 days   Lab Units 05/13/21  1150   HEMOGLOBIN A1C % 5 8*           Lines/Drains:  Invasive Devices     Peripheral Intravenous Line            Peripheral IV 05/13/21 Left;Ventral (anterior) Hand 3 days                  Telemetry:  Telemetry Orders (From admission, onward)             48 Hour Telemetry Monitoring  Continuous x 48 hours     Question:  Reason for 48 Hour Telemetry  Answer:  Cardiac Surgery                 Telemetry Reviewed: Normal Sinus Rhythm  Indication for Continued Telemetry Use: Awaiting PCI/EP Study/CABG           Imaging: No pertinent imaging reviewed      Recent Cultures (last 7 days):         Last 24 Hours Medication List:   Current Facility-Administered Medications   Medication Dose Route Frequency Provider Last Rate    acetaminophen  975 mg Oral Q6H PRN Conchis Celis PA-C      albuterol  2 puff Inhalation Q4H PRN Allen Barba MD      aspirin  81 mg Oral Daily Allen Barba MD      atorvastatin  40 mg Oral QPM Allen Barba, MD      chlorhexidine  15 mL Mouth/Throat Q12H [facility] PA-C      Diclofenac Sodium  2 g Topical TID PRN Lorenzo Matute PA-C      fluticasone-vilanterol  1 puff Inhalation Daily Allen Barba MD      heparin (porcine)  5,000 Units Subcutaneous Q8H [facility] Suzi Miller PA-C      metoprolol tartrate  25 mg Oral Q12H [facility] BITA Scott      mupirocin  1 application Nasal J42Y [facility] TIFF Collins      nicotine  1 patch Transdermal Daily Allen Barba MD      nitroglycerin  0 4 mg Sublingual Q5 Min PRN Rico Cords, MD      ondansetron  4 mg Intravenous Q6H PRN Rico MD Freda      pantoprazole  40 mg Oral Early Morning Allen Barba, MD          Today, Patient Was Seen By: Lorenzo Matute PA-C    **Please Note: This note may have been constructed using a voice recognition system  **

## 2021-05-16 NOTE — ASSESSMENT & PLAN NOTE
Transferred from [facility] for a cardiac catheterization for presumed NSTEMI, also with severe valve stenosis  · Echocardiogram with EF of 42%  · Continue aspirin, atorvastatin  · Patient was started on metoprolol 25mg BID  · S/P cardiac catheterization which showed patent coronaries  Heparin gtt was discontinued

## 2021-05-16 NOTE — ASSESSMENT & PLAN NOTE
Wt Readings from Last 3 Encounters:   05/16/21 104 kg (230 lb 6 1 oz)   05/11/21 112 kg (246 lb 14 6 oz)   05/11/21 113 kg (250 lb)     Monitor intake and output   Monitor daily weights  · EF of 42% with grade 3 diastolic dysfunction, severe AS and Mod-severe MR  · IV lasix 20mg x1, with approximately 800 cc urine output  · Supplemental oxygen weaned  · He is currently euvolemic   · Low Na diet

## 2021-05-16 NOTE — PLAN OF CARE
Problem: Potential for Falls  Goal: Patient will remain free of falls  Description: INTERVENTIONS:  - Assess patient frequently for physical needs  -  Identify cognitive and physical deficits and behaviors that affect risk of falls    -  [place] fall precautions as indicated by assessment   - Educate patient/family on patient safety including physical limitations  - Instruct patient to call for assistance with activity based on assessment  - Modify environment to reduce risk of injury  - Consider OT/PT consult to assist with strengthening/mobility  Outcome: Progressing     Problem: CARDIOVASCULAR - ADULT  Goal: Maintains optimal cardiac output and hemodynamic stability  Description: INTERVENTIONS:  - Monitor I/O, vital signs and rhythm  - Monitor for S/S and trends of decreased cardiac output  - Administer and titrate ordered vasoactive medications to optimize hemodynamic stability  - Assess quality of pulses, skin color and temperature  - Assess for signs of decreased coronary artery perfusion  - Instruct patient to report change in severity of symptoms  Outcome: Progressing  Goal: Absence of cardiac dysrhythmias or at baseline rhythm  Description: INTERVENTIONS:  - Continuous cardiac monitoring, vital signs, obtain 12 lead EKG if ordered  - Administer antiarrhythmic and heart rate control medications as ordered  - Monitor electrolytes and administer replacement therapy as ordered  Outcome: Progressing     Problem: RESPIRATORY - ADULT  Goal: Achieves optimal ventilation and oxygenation  Description: INTERVENTIONS:  - Assess for changes in respiratory status  - Assess for changes in mentation and behavior  - Position to facilitate oxygenation and minimize respiratory effort  - Oxygen administered by appropriate delivery if ordered  - Initiate smoking cessation education as indicated  - Encourage broncho-pulmonary hygiene including cough, deep breathe, Incentive Spirometry  - Assess the need for suctioning and aspirate as needed  - Assess and instruct to report SOB or any respiratory difficulty  - Respiratory Therapy support as indicated  Outcome: Progressing     Problem: PAIN - ADULT  Goal: Verbalizes/displays adequate comfort level or baseline comfort level  Description: Interventions:  - Encourage patient to monitor pain and request assistance  - Assess pain using appropriate pain scale  - Administer analgesics based on type and severity of pain and evaluate response  - Implement non-pharmacological measures as appropriate and evaluate response  - Consider cultural and social influences on pain and pain management  - Notify physician/advanced practitioner if interventions unsuccessful or patient reports new pain  Outcome: Progressing     Problem: DISCHARGE PLANNING  Goal: Discharge to home or other facility with appropriate resources  Description: INTERVENTIONS:  - Identify barriers to discharge w/patient and caregiver  - Arrange for needed discharge resources and transportation as appropriate  - Identify discharge learning needs (meds, wound care, etc )  - Arrange for interpretive services to assist at discharge as needed  - Refer to Case Management Department for coordinating discharge planning if the patient needs post-hospital services based on physician/advanced practitioner order or complex needs related to functional status, cognitive ability, or social support system  Outcome: Progressing     Problem: Knowledge Deficit  Goal: Patient/family/caregiver demonstrates understanding of disease process, treatment plan, medications, and discharge instructions  Description: Complete learning assessment and assess knowledge base    Interventions:  - Provide teaching at level of understanding  - Provide teaching via preferred learning methods  Outcome: Progressing

## 2021-05-17 ENCOUNTER — ANESTHESIA (INPATIENT)
Dept: PERIOP | Facility: HOSPITAL | Age: 66
DRG: 216 | End: 2021-05-17
Payer: COMMERCIAL

## 2021-05-17 LAB
ANION GAP SERPL CALCULATED.3IONS-SCNC: 4 MMOL/L (ref 4–13)
BASOPHILS # BLD AUTO: 0.03 THOUSANDS/ΜL (ref 0–0.1)
BASOPHILS NFR BLD AUTO: 0 % (ref 0–1)
BUN SERPL-MCNC: 20 MG/DL (ref 5–25)
CALCIUM SERPL-MCNC: 9.3 MG/DL (ref 8.3–10.1)
CHLORIDE SERPL-SCNC: 106 MMOL/L (ref 100–108)
CO2 SERPL-SCNC: 28 MMOL/L (ref 21–32)
CREAT SERPL-MCNC: 0.8 MG/DL (ref 0.6–1.3)
EOSINOPHIL # BLD AUTO: 0.31 THOUSAND/ΜL (ref 0–0.61)
EOSINOPHIL NFR BLD AUTO: 4 % (ref 0–6)
ERYTHROCYTE [DISTWIDTH] IN BLOOD BY AUTOMATED COUNT: 13.2 % (ref 11.6–15.1)
GFR SERPL CREATININE-BSD FRML MDRD: 92 ML/MIN/1.73SQ M
GLUCOSE SERPL-MCNC: 94 MG/DL (ref 65–140)
HCT VFR BLD AUTO: 42.1 % (ref 36.5–49.3)
HGB BLD-MCNC: 13.7 G/DL (ref 12–17)
IMM GRANULOCYTES # BLD AUTO: 0.02 THOUSAND/UL (ref 0–0.2)
IMM GRANULOCYTES NFR BLD AUTO: 0 % (ref 0–2)
INR PPP: 1.02 (ref 0.84–1.19)
LYMPHOCYTES # BLD AUTO: 2.25 THOUSANDS/ΜL (ref 0.6–4.47)
LYMPHOCYTES NFR BLD AUTO: 28 % (ref 14–44)
MCH RBC QN AUTO: 31.1 PG (ref 26.8–34.3)
MCHC RBC AUTO-ENTMCNC: 32.5 G/DL (ref 31.4–37.4)
MCV RBC AUTO: 96 FL (ref 82–98)
MONOCYTES # BLD AUTO: 0.97 THOUSAND/ΜL (ref 0.17–1.22)
MONOCYTES NFR BLD AUTO: 12 % (ref 4–12)
NEUTROPHILS # BLD AUTO: 4.49 THOUSANDS/ΜL (ref 1.85–7.62)
NEUTS SEG NFR BLD AUTO: 56 % (ref 43–75)
NRBC BLD AUTO-RTO: 0 /100 WBCS
PLATELET # BLD AUTO: 235 THOUSANDS/UL (ref 149–390)
PMV BLD AUTO: 9 FL (ref 8.9–12.7)
POTASSIUM SERPL-SCNC: 4.2 MMOL/L (ref 3.5–5.3)
PROTHROMBIN TIME: 13.4 SECONDS (ref 11.6–14.5)
RBC # BLD AUTO: 4.4 MILLION/UL (ref 3.88–5.62)
SODIUM SERPL-SCNC: 138 MMOL/L (ref 136–145)
WBC # BLD AUTO: 8.07 THOUSAND/UL (ref 4.31–10.16)

## 2021-05-17 PROCEDURE — 80048 BASIC METABOLIC PNL TOTAL CA: CPT | Performed by: INTERNAL MEDICINE

## 2021-05-17 PROCEDURE — 88305 TISSUE EXAM BY PATHOLOGIST: CPT | Performed by: PATHOLOGY

## 2021-05-17 PROCEDURE — 0CDXXZ1 EXTRACTION OF LOWER TOOTH, MULTIPLE, EXTERNAL APPROACH: ICD-10-PCS | Performed by: DENTIST

## 2021-05-17 PROCEDURE — 99232 SBSQ HOSP IP/OBS MODERATE 35: CPT | Performed by: INTERNAL MEDICINE

## 2021-05-17 PROCEDURE — 85610 PROTHROMBIN TIME: CPT | Performed by: INTERNAL MEDICINE

## 2021-05-17 PROCEDURE — 0CB40ZX EXCISION OF BUCCAL MUCOSA, OPEN APPROACH, DIAGNOSTIC: ICD-10-PCS | Performed by: DENTIST

## 2021-05-17 PROCEDURE — 0CDWXZ1 EXTRACTION OF UPPER TOOTH, MULTIPLE, EXTERNAL APPROACH: ICD-10-PCS | Performed by: DENTIST

## 2021-05-17 PROCEDURE — 99231 SBSQ HOSP IP/OBS SF/LOW 25: CPT | Performed by: PHYSICIAN ASSISTANT

## 2021-05-17 PROCEDURE — 85025 COMPLETE CBC W/AUTO DIFF WBC: CPT | Performed by: INTERNAL MEDICINE

## 2021-05-17 RX ORDER — LIDOCAINE HYDROCHLORIDE AND EPINEPHRINE 10; 10 MG/ML; UG/ML
INJECTION, SOLUTION INFILTRATION; PERINEURAL AS NEEDED
Status: DISCONTINUED | OUTPATIENT
Start: 2021-05-17 | End: 2021-05-17 | Stop reason: HOSPADM

## 2021-05-17 RX ORDER — PROPOFOL 10 MG/ML
INJECTION, EMULSION INTRAVENOUS CONTINUOUS PRN
Status: DISCONTINUED | OUTPATIENT
Start: 2021-05-17 | End: 2021-05-19

## 2021-05-17 RX ORDER — CEFAZOLIN SODIUM 2 G/50ML
SOLUTION INTRAVENOUS AS NEEDED
Status: DISCONTINUED | OUTPATIENT
Start: 2021-05-17 | End: 2021-05-19

## 2021-05-17 RX ORDER — HYDROMORPHONE HCL IN WATER/PF 6 MG/30 ML
0.2 PATIENT CONTROLLED ANALGESIA SYRINGE INTRAVENOUS
Status: DISCONTINUED | OUTPATIENT
Start: 2021-05-17 | End: 2021-05-17 | Stop reason: HOSPADM

## 2021-05-17 RX ORDER — MAGNESIUM HYDROXIDE 1200 MG/15ML
LIQUID ORAL AS NEEDED
Status: DISCONTINUED | OUTPATIENT
Start: 2021-05-17 | End: 2021-05-17 | Stop reason: HOSPADM

## 2021-05-17 RX ORDER — METOCLOPRAMIDE HYDROCHLORIDE 5 MG/ML
10 INJECTION INTRAMUSCULAR; INTRAVENOUS ONCE AS NEEDED
Status: DISCONTINUED | OUTPATIENT
Start: 2021-05-17 | End: 2021-05-17 | Stop reason: HOSPADM

## 2021-05-17 RX ORDER — DIPHENHYDRAMINE HYDROCHLORIDE 50 MG/ML
12.5 INJECTION INTRAMUSCULAR; INTRAVENOUS ONCE AS NEEDED
Status: DISCONTINUED | OUTPATIENT
Start: 2021-05-17 | End: 2021-05-17 | Stop reason: HOSPADM

## 2021-05-17 RX ORDER — HYDROMORPHONE HCL/PF 1 MG/ML
0.5 SYRINGE (ML) INJECTION
Status: DISCONTINUED | OUTPATIENT
Start: 2021-05-17 | End: 2021-05-17 | Stop reason: HOSPADM

## 2021-05-17 RX ORDER — KETAMINE HYDROCHLORIDE 50 MG/ML
INJECTION, SOLUTION, CONCENTRATE INTRAMUSCULAR; INTRAVENOUS AS NEEDED
Status: DISCONTINUED | OUTPATIENT
Start: 2021-05-17 | End: 2021-05-19

## 2021-05-17 RX ORDER — ONDANSETRON 2 MG/ML
4 INJECTION INTRAMUSCULAR; INTRAVENOUS ONCE AS NEEDED
Status: DISCONTINUED | OUTPATIENT
Start: 2021-05-17 | End: 2021-05-17 | Stop reason: HOSPADM

## 2021-05-17 RX ORDER — PROPOFOL 10 MG/ML
INJECTION, EMULSION INTRAVENOUS AS NEEDED
Status: DISCONTINUED | OUTPATIENT
Start: 2021-05-17 | End: 2021-05-19

## 2021-05-17 RX ORDER — FENTANYL CITRATE/PF 50 MCG/ML
50 SYRINGE (ML) INJECTION
Status: DISCONTINUED | OUTPATIENT
Start: 2021-05-17 | End: 2021-05-17 | Stop reason: HOSPADM

## 2021-05-17 RX ADMIN — PANTOPRAZOLE SODIUM 40 MG: 40 TABLET, DELAYED RELEASE ORAL at 05:52

## 2021-05-17 RX ADMIN — PROPOFOL 20 MG: 10 INJECTION, EMULSION INTRAVENOUS at 15:16

## 2021-05-17 RX ADMIN — PROPOFOL 100 MCG/KG/MIN: 10 INJECTION, EMULSION INTRAVENOUS at 15:04

## 2021-05-17 RX ADMIN — HEPARIN SODIUM 5000 UNITS: 5000 INJECTION INTRAVENOUS; SUBCUTANEOUS at 21:05

## 2021-05-17 RX ADMIN — KETAMINE HYDROCHLORIDE 20 MG: 50 INJECTION, SOLUTION INTRAMUSCULAR; INTRAVENOUS at 15:04

## 2021-05-17 RX ADMIN — SODIUM CHLORIDE, SODIUM LACTATE, POTASSIUM CHLORIDE, AND CALCIUM CHLORIDE: .6; .31; .03; .02 INJECTION, SOLUTION INTRAVENOUS at 15:16

## 2021-05-17 RX ADMIN — KETAMINE HYDROCHLORIDE 10 MG: 50 INJECTION, SOLUTION INTRAMUSCULAR; INTRAVENOUS at 15:09

## 2021-05-17 RX ADMIN — PROPOFOL 30 MG: 10 INJECTION, EMULSION INTRAVENOUS at 15:13

## 2021-05-17 RX ADMIN — ACETAMINOPHEN 975 MG: 325 TABLET, FILM COATED ORAL at 17:54

## 2021-05-17 RX ADMIN — PROPOFOL 20 MG: 10 INJECTION, EMULSION INTRAVENOUS at 15:09

## 2021-05-17 RX ADMIN — PROPOFOL 50 MG: 10 INJECTION, EMULSION INTRAVENOUS at 15:04

## 2021-05-17 RX ADMIN — ATORVASTATIN CALCIUM 40 MG: 40 TABLET, FILM COATED ORAL at 17:42

## 2021-05-17 RX ADMIN — SODIUM CHLORIDE, SODIUM LACTATE, POTASSIUM CHLORIDE, AND CALCIUM CHLORIDE: .6; .31; .03; .02 INJECTION, SOLUTION INTRAVENOUS at 15:17

## 2021-05-17 RX ADMIN — METOPROLOL TARTRATE 25 MG: 25 TABLET, FILM COATED ORAL at 09:05

## 2021-05-17 RX ADMIN — NICOTINE 1 PATCH: 21 PATCH, EXTENDED RELEASE TRANSDERMAL at 09:05

## 2021-05-17 RX ADMIN — FLUTICASONE FUROATE AND VILANTEROL TRIFENATATE 1 PUFF: 100; 25 POWDER RESPIRATORY (INHALATION) at 09:06

## 2021-05-17 RX ADMIN — CHLORHEXIDINE GLUCONATE 0.12% ORAL RINSE 15 ML: 1.2 LIQUID ORAL at 09:05

## 2021-05-17 RX ADMIN — CEFAZOLIN SODIUM 2000 MG: 2 SOLUTION INTRAVENOUS at 15:07

## 2021-05-17 RX ADMIN — METOPROLOL TARTRATE 25 MG: 25 TABLET, FILM COATED ORAL at 21:05

## 2021-05-17 RX ADMIN — MUPIROCIN 1 APPLICATION: 20 OINTMENT TOPICAL at 09:06

## 2021-05-17 NOTE — APP STUDENT NOTE
YLNNE STUDENT  Inpatient Progress Note for TRAINING ONLY  Not Part of Legal Medical Record     Progress Note - Delvin Correa 78 y o  male MRN: [id number]  Unit/Bed#: -01 Encounter: [id number]        * Aortic valve stenosis - severe  Assessment & Plan  Patient presented 05/11 for cardiac catheterization as an NSTEMI transfer from [facility]   · Clean cath  · Known hx of severe - critical AS   · KATIA (05/14) - with aortic valve area of 0 3cm2  · Also with significant mitral valve disease  · Cardiac surgery completing pre-surgical workup, considering TAVR vs double valve repair (AVR/MVR)  · Will need dental extractions, planning for Monday 5/17  · Appreciate cardiology input     Acute combined systolic and diastolic CHF, NYHA class 1 ([facility] [address] )  Assessment & Plan  Wt Readings from Last 3 Encounters:   05/17/21 107 kg (235 lb 3 7 oz)   05/11/21 112 kg (246 lb 14 6 oz)   05/11/21 113 kg (250 lb)     Monitor intake and output  Monitor daily weights  · EF of 42% with grade 3 diastolic dysfunction, severe AS and Mod-severe MR  · IV lasix 20mg x1, with approximately 800 cc urine output  · Supplemental oxygen weaned  · He is currently euvolemic   · Low Na diet        Chronic obstructive lung disease (HCC)  Assessment & Plan  No wheezing at this time  · Continue breo    NSTEMI (non-ST elevated myocardial infarction) [facility])  Assessment & Plan  Transferred from [facility] for a cardiac catheterization for presumed NSTEMI, also with severe valve stenosis  · Echocardiogram with EF of 42%  · Continue aspirin, atorvastatin  · Patient was started on metoprolol 25mg BID  · S/P cardiac catheterization which showed patent coronaries  Heparin gtt was discontinued    · See above      Essential hypertension  Assessment & Plan  BP is acceptable  · Continue metoprolol 25 mg BID  · Losartan and amlodipine on hold    Hyperlipidemia  Assessment & Plan  · FLP WNL  · Continuing patient's atorvastatin 40mg         VTE Pharmacologic Prophylaxis: Pharmacologic: Heparin  Mechanical VTE Prophylaxis in Place: Yes        Discussions with Specialists or Other Care Team Provider:     Education and Discussions with Family / Patient: Patient    Time Spent for Care: 30 minutes  More than 50% of total time spent on counseling and coordination of care as described above  Current Length of Stay: 5 day(s)    Current Patient Status: Inpatient   Certification Statement: The patient will continue to require additional inpatient hospital stay due to cardiac valve replacement    Discharge Plan: None yet    Code Status: Level 1 - Full Code    Subjective:   Is feeling ok  Does not have any complaints  Last BM was yesterday  Objective:     Vitals:   Temp (24hrs), Av 9 °F (36 6 °C), Min:97 5 °F (36 4 °C), Max:98 3 °F (36 8 °C)    Temp:  [97 5 °F (36 4 °C)-98 3 °F (36 8 °C)] 98 2 °F (36 8 °C)  HR:  [61-74] 62  Resp:  [17-18] 17  BP: ()/(63-74) 102/67  SpO2:  [90 %-95 %] 92 %  Body mass index is 30 2 kg/m²  Input and Output Summary (last 24 hours): Intake/Output Summary (Last 24 hours) at 2021 1042  Last data filed at 2021 0401  Gross per 24 hour   Intake 2354 67 ml   Output 1175 ml   Net 1179 67 ml       Physical Exam:     Physical Exam  Vitals signs and nursing note reviewed  Constitutional:       Appearance: Normal appearance  He is well-developed and overweight  HENT:      Head: Normocephalic and atraumatic  Cardiovascular:      Rate and Rhythm: Normal rate and regular rhythm  Pulses: Normal pulses  Heart sounds: Murmur present  Pulmonary:      Effort: Pulmonary effort is normal       Breath sounds: Normal breath sounds  Abdominal:      General: Bowel sounds are normal       Palpations: Abdomen is soft  Musculoskeletal:      Right lower leg: No edema  Left lower leg: No edema  Skin:     General: Skin is warm and dry  Neurological:      Mental Status: He is alert and oriented to person, place, and time  Psychiatric:         Mood and Affect: Mood normal          Behavior: Behavior normal            Historical Information   Past Medical History:   Diagnosis Date    Broken humerus     COPD [facility]chronic obstructive pulmonary disease) ([facility] [address] )     Pneumonia      No past surgical history on file  Social History   Social History     Substance and Sexual Activity   Alcohol Use Yes    Frequency: 2-4 times a month    Comment: 1-2/wk     Social History     Substance and Sexual Activity   Drug Use No     Social History     Tobacco Use   Smoking Status Current Some Day Smoker    Packs/day: 0 50    Types: Cigarettes    Start date: 1989   Smokeless Tobacco Never Used     Family History:   Family History   Problem Relation Age of Onset    Prostate cancer Father        Meds/Allergies   all medications and allergies reviewed  No Known Allergies    Additional Data:     Labs:    Results from last 7 days   Lab Units 05/17/21  0602   WBC Thousand/uL 8 07   HEMOGLOBIN g/dL 13 7   HEMATOCRIT % 42 1   PLATELETS Thousands/uL 235   NEUTROS PCT % 56   LYMPHS PCT % 28   MONOS PCT % 12   EOS PCT % 4     Results from last 7 days   Lab Units 05/17/21  0602  05/11/21  1742   SODIUM mmol/L 138   < > 140   POTASSIUM mmol/L 4 2   < > 4 3   CHLORIDE mmol/L 106   < > 106   CO2 mmol/L 28   < > 27   BUN mg/dL 20   < > 14   CREATININE mg/dL 0 80   < > 0 84   ANION GAP mmol/L 4   < > 7   CALCIUM mg/dL 9 3   < > 9 4   ALBUMIN g/dL  --   --  3 9   TOTAL BILIRUBIN mg/dL  --   --  0 78   ALK PHOS U/L  --   --  73   ALT U/L  --   --  23   AST U/L  --   --  14   GLUCOSE RANDOM mg/dL 94   < > 131    < > = values in this interval not displayed  Results from last 7 days   Lab Units 05/17/21  0602   INR  1 02         Results from last 7 days   Lab Units 05/13/21  1150   HEMOGLOBIN A1C % 5 8*             * I Have Reviewed All Lab Data Listed Above  * Additional Pertinent Lab Tests Reviewed:  [address] Admission Reviewed    Imaging:    Imaging Reports Reviewed Today Include: VAS carotid, Echo      Recent Cultures (last 7 days):           Last 24 Hours Medication List:   Current Facility-Administered Medications   Medication Dose Route Frequency Provider Last Rate    acetaminophen  975 mg Oral Q6H PRN Tammy Cavazos PA-C      albuterol  2 puff Inhalation Q4H PRN Sultana Rojas MD      aspirin  81 mg Oral Daily Sultana Rojas MD      atorvastatin  40 mg Oral QPM Sultana Rojas MD      chlorhexidine  15 mL Mouth/Throat Q12H [facility] Girish Carl PA-C      Diclofenac Sodium  2 g Topical TID PRN Mariontyofelia Cavazos PA-C      fluticasone-vilanterol  1 puff Inhalation Daily Sultana Rojas MD      heparin (porcine)  5,000 Units Subcutaneous Q8H [facility] Suzi Miller PA-C      lactated ringers  125 mL/hr Intravenous Continuous Breann Rizo  mL/hr (05/16/21 2329)    lidocaine (PF)  0 5 mL Infiltration Once PRN Breann Rizo MD      metoprolol tartrate  25 mg Oral Q12H [facility] BITA Alvarez      mupirocin  1 application Nasal [id number] [facility] Girish Carl PA-C      nicotine  1 patch Transdermal Daily Sultana Rojas MD      nitroglycerin  0 4 mg Sublingual Q5 Min PRN Sultanaheron Rojas MD      ondansetron  4 mg Intravenous Q6H PRN Sultana Rojas MD      pantoprazole  40 mg Oral Early Morning Sultana Rojas MD          Today, Patient Was Seen By: Yvonne Dave    ** Please Note: Dictation voice to text software may have been used in the creation of this document   **

## 2021-05-17 NOTE — PROGRESS NOTES
General Cardiology   Progress Note -  Team One   Lary Machuca 78 y o  male MRN: [id number]    Unit/Bed#: -01 Encounter: [id number]    Assessment  1  Symptomatic severe AS and mild-to-moderate MR  -Presented to the ED with exertional CP, dyspnea, and easy fatigability  -CT surgery following -- preoperative workup underway  -TTE 5/14; severe AS/mild-to-moderate AI (MG 44 mmHg, PG 85 mmHg)    2  Non ischemic cardiomyopathy LVEF 42%  -On PE appears to be euvolemic  -Subjectively denies any significant SOB, MATTHEW orthopnea  -NT proBNP 4493  -KATIA 5/14; LVEF 45%, moderate diffuse hypokinesis, wall thickness moderately increased, grade 2 DD, RV mildly dilated, systolic function moderately reduced, PFO (measuring 5 mm) there was left-to-right shunt, RA mildly dilated, mild-to-moderate MR, severe AS/mild-to-moderate AI (MG 44 mmHg, PG 85 mmHg)  -Currently on no diuretics  -GDMT; metoprolol tartrate 25 mg b i d; no ACEI/ARB in the setting of severe AS and tentative surgical intervention this admission  3  Nonobstructive CAD  4  Troponin elevation -- non MI elevation related to # 1 and # 2  -[facility] 5/13; moderate nonobstructive atherosclerosis of the LAD, diffuse atherosclerosis of the branch vessels of the circumflex  -On aspirin, statin, and BB    5  Essential hypertension  -Average /67, last recorded 102/67, HR 62  -On metoprolol tartrate 25 mg b i d     6  Dyslipidemia  -Lipid profile 5/13/2021; cholesterol 103, triglycerides 129, HDL 49, LDL 28  -On atorvastatin 40 mg daily    7   Nicotine dependence; smoking cessation advised    Plan  -Surgical plan per the CT surgery service  -Tentative dental extractions today -- conscious observe its of his volume status with preoperative hydration -- monitor closely for signs of volume overload/worsening respiratory status   -Continue aspirin, statin, and BB at current doses  -Monitor renal function electrolytes closely replete to maintain K + at 4 0, magnesium at 2 0   -Continue to monitor on telemetry    Subjective  Review of Systems   Constitution: Positive for malaise/fatigue  Negative for chills and fever  Cardiovascular: Negative for chest pain, dyspnea on exertion, irregular heartbeat, leg swelling, near-syncope, orthopnea and palpitations  Respiratory: Negative for cough and shortness of breath  Gastrointestinal: Negative for abdominal pain  Neurological: Negative for dizziness, headaches and light-headedness  Objective:   Physical Exam  Vitals signs and nursing note reviewed  Constitutional:       General: He is not in acute distress  Appearance: Normal appearance  He is obese  He is not ill-appearing or diaphoretic  HENT:      Head: Normocephalic and atraumatic  Mouth/Throat:      Mouth: Mucous membranes are moist    Eyes:      General: No scleral icterus  Neck:      Musculoskeletal: Neck supple  Comments: No significant JVD  Cardiovascular:      Rate and Rhythm: Normal rate and regular rhythm  Pulses: Normal pulses  Heart sounds: Murmur present  Comments: Occasional PACs, rare PVCs  Pulmonary:      Effort: Pulmonary effort is normal       Breath sounds: Normal breath sounds  No wheezing or rales  Abdominal:      Palpations: Abdomen is soft  Musculoskeletal:      Right lower leg: No edema  Left lower leg: No edema  Skin:     General: Skin is warm and dry  Capillary Refill: Capillary refill takes less than 2 seconds  Neurological:      General: No focal deficit present  Mental Status: He is alert and oriented to person, place, and time  Psychiatric:         Mood and Affect: Mood normal          Vitals: Blood pressure 102/67, pulse 62, temperature 98 2 °F (36 8 °C), resp  rate 17, height 6' 2" (1 88 m), weight 107 kg (235 lb 3 7 oz), SpO2 92 %  ,     Body mass index is 30 2 kg/m²  ,   Systolic (07AJF), NFY:756 , Min:99 , XMB:353     Diastolic (41YFQ), JVP:86, Min:63, Max:74      Intake/Output Summary (Last 24 hours) at 5/17/2021 1016  Last data filed at 5/17/2021 0401  Gross per 24 hour   Intake 2354 67 ml   Output 1175 ml   Net 1179 67 ml     Weight (last 2 days)     Date/Time   Weight    05/17/21 0600   107 (235 23)    05/16/21 0515   104 (230 38)    05/15/21 19:21:32   106 (233 03)    Weight: from 5/14 at 05/15/21 1921    05/15/21 0535   106 (233 03)              LABORATORY RESULTS  Results from last 7 days   Lab Units 05/12/21  0327 05/12/21  0016 05/11/21  2059   TROPONIN I ng/mL 0 39* 0 24* 0 16*     CBC with diff:   Results from last 7 days   Lab Units 05/17/21  0602 05/13/21  0605 05/12/21  0335 05/11/21  1742   WBC Thousand/uL 8 07 9 33 12 66* 12 76*   HEMOGLOBIN g/dL 13 7 13 0 12 7 13 8   HEMATOCRIT % 42 1 41 3 40 3 41 5   MCV fL 96 96 100* 95   PLATELETS Thousands/uL 235 227 223 252   MCH pg 31 1 30 3 31 4 31 5   MCHC g/dL 32 5 31 5 31 5 33 3   RDW % 13 2 13 9 13 7 13 3   MPV fL 9 0 9 1 9 4 9 1   NRBC AUTO /100 WBCs 0 0  --  0       CMP:  Results from last 7 days   Lab Units 05/17/21  0602 05/14/21  0451 05/13/21  0605 05/12/21  0327 05/11/21  1742   POTASSIUM mmol/L 4 2 4 2 4 1 4 4 4 3   CHLORIDE mmol/L 106 104 108 108 106   CO2 mmol/L 28 29 26 28 27   BUN mg/dL 20 19 16 13 14   CREATININE mg/dL 0 80 0 66 0 76 0 80 0 84   CALCIUM mg/dL 9 3 9 1 8 8 8 9 9 4   AST U/L  --   --   --   --  14   ALT U/L  --   --   --   --  23   ALK PHOS U/L  --   --   --   --  73   EGFR ml/min/1 73sq m 92 100 94 92 91       BMP:  Results from last 7 days   Lab Units 05/17/21  0602 05/14/21  0451 05/13/21  0605 05/12/21  0327 05/11/21  1742   POTASSIUM mmol/L 4 2 4 2 4 1 4 4 4 3   CHLORIDE mmol/L 106 104 108 108 106   CO2 mmol/L 28 29 26 28 27   BUN mg/dL 20 19 16 13 14   CREATININE mg/dL 0 80 0 66 0 76 0 80 0 84   CALCIUM mg/dL 9 3 9 1 8 8 8 9 9 4       Lab Results   Component Value Date    NTBNP 4,493 (H) 05/11/2021    NTBNP 3,228 (H) 05/10/2021              Results from last 7 days   Lab Units 05/13/21  1150 HEMOGLOBIN A1C % 5 8*             Results from last 7 days   Lab Units 21  0602 21  0327 21  1742   INR  1 02 1 13 1 05       Lipid Profile:   Lab Results   Component Value Date    CHOL 209 2014     Lab Results   Component Value Date    HDL 49 2021    HDL 39 2014     Lab Results   Component Value Date    LDLCALC 28 2021    LDLCALC 126 (H) 2014     Lab Results   Component Value Date    TRIG 129 2021    TRIG 220 2014       Cardiac testing:   Results for orders placed during the hospital encounter of 21   Echo complete with contrast if indicated    Narrative [address]  [address]  [place], [address]  [phone]    Transthoracic Echocardiogram  2D, M-mode, Doppler, and Color Doppler    Study date:  12-May-2021    Patient: Javi Rodriguez  MR number: [id number]  Account number: [de-identified]  : 1955  Age: 78 years  Gender: Male  Status: Outpatient  Location: Bedside  Height: 74 in  Weight: 245 5 lb  BP: 102/ 70 mmHg    Indications: Chest pain  Diagnoses: R07 9 - Chest pain, unspecified    Sonographer:  George Peck RDCS  Primary Physician:  Zayra Kaiser DO  Referring Physician:  Bro Terrazas DO  Group:  [id number]  Interpreting Physician:  Rachel Cm MD    SUMMARY    LEFT VENTRICLE:  Moderate decreased left ventricular systolic function, EF 03%  Significantly decreased left ventricular global longitudinal strain, minus 7 4%  Severe concentric left ventricular hypertrophy, wall thickness 21 mm  Mildly increased left  ventricular cavity size  Grade 3 left ventricular diastolic dysfunction  Severely elevated left ventricular filling pressures  RIGHT VENTRICLE:  Mild right ventricular enlargement with well preserved right ventricular systolic function  LEFT ATRIUM:  Moderate left atrial enlargement  RIGHT ATRIUM:  Mild right atrial enlargement      MITRAL VALVE:  Moderate possibly severe mitral regurgitation  No mitral stenosis  Mitral ERO 0 5 cm2  Mitral valve regurgitant volume 85 mL  The mitral valve annulus was heavily calcified  The mitral valve leaflets were calcified but have well preserved  mobility  There was some calcification in the chordae  AORTIC VALVE:  Heavily calcified aortic valve with severe, probably critical aortic stenosis  No significant aortic regurgitation  Aortic valve maximum velocity 5 3 M/S  Aortic valve mean gradient 81 mmHg  Aortic valve area 0 3 cm2  TRICUSPID VALVE:  There was mild regurgitation  AORTA:  Aortic root at the upper limits of normal to mildly enlarged at 3 8 cm    PULMONARY ARTERIES:  Moderate pulmonary hypertension  Pulmonary artery systolic pressures estimated 54 mmHg  PERICARDIUM:  Mild pericardial effusion without hemodynamic significance  SUMMARY MEASUREMENTS  2D measurements:  Unspecified Anatomy:   %FS was 25 4 %  AA PSSL Full was -8 3 %  AAS PSSL Full was -15 1 %  AI PSSL Full was -12 5 %  AL PSSL Full was -5 3 %  AP PSSL Full was -5 4 %  AS PSSL Full was -20 9 %  AVC was 378 3 ms  Ao Diam was 3 4 cm  Ao asc was 3 8 cm  BA PSSL Full was -2 8 %  BAS PSSL Full was -2 6 %  BI PSSL Full was -13 5 %  BL PSSL Full was -6 9 %  BP PSSL Full was -4 2 %  BS PSSL Full was -4 1 %  EDV(Teich) was 136 1 ml   EF(Teich) was 49 7 %  ESV(Teich)  was 68 5 ml   G peak SL Full(A2C) was -7 5 %  G peak SL Full(A4C) was -8 5 %  G peak SL Full(APLAX) was -6 1 %  G peak SL Full(Avg) was -7 4 %  IVSd was 2 1 cm  LA Area was 31 1 cm2  LA Diam was 6 3 cm  LAAs A4C was 30 2 cm2  LAESV  A-L A4C was 122 9 ml  LAESV MOD A4C was 116 1 ml  LALs A4C was 6 3 cm  LVEDV MOD A4C was 217 9 ml  LVEF MOD A4C was 42 2 %  LVESV MOD A4C was 126 ml  LVIDd was 5 3 cm  LVIDs was 4 cm  LVLd A4C was 10 9 cm  LVLs A4C was 10 cm  LVOT Diam was 2 cm  LVPWd was 2 1 cm  MA PSSL Full was -4 3 %    MAS PSSL Full was -10 5 %  MI PSSL Full was -5 %  ML PSSL Full was -3 1 %  MP PSSL Full was 3 2 %  MS PSSL Full was -10 8 %  Peak SL Dispersion Full was 61 5 ms  RA  Area was 21 1 cm2  RVIDd was 5 cm  RWT was 0 8   SV MOD A4C was 91 9 ml   SV(Teich) was 67 6 ml   CF measurements:  Unspecified Anatomy:   MR Als  Marito was 0 4 m/s  MR Flow was 255 2 ml/s  MR Rad was 1 1 cm  MR Trace was 8 9 cm2   CW measurements:  Unspecified Anatomy:   AV Env  Ti was 287 3 ms   AV VTI was 125 6 cm   AV Vmax was 5 3 m/s  AV Vmean was 4 4 m/s  AV maxPG was 114 3 mmHg  AV meanPG was 80 6 mmHg  MR VTI was 162 5 cm  MR Vmax was 4 9 m/s   TR Vmax was 3 1 m/s  TR  maxPG was 39 3 mmHg  MM measurements:  Unspecified Anatomy:   TAPSE was 2 cm  PW measurements:  Unspecified Anatomy:   HUMBERTO (VTI) was 0 3 cm2  HUMBERTO Vmax was 0 3 cm2  AVAI (VTI) was 0 cm2/m2  AVAI Vmax was 0 cm2/m2  DVI was 0 1   E' Sept was 0 m/s  E/E' Sept was 44 1   LVOT Env  Ti was 303 2 ms  LVOT VTI was 13 7 cm  LVOT Vmax  was 0 6 m/s  LVOT Vmean was 0 5 m/s  LVOT maxPG was 1 4 mmHg  LVOT meanPG was 0 9 mmHg  LVSI Dopp was 17 8 ml/m2  LVSV Dopp was 42 3 ml   MR ERO was 0 5 cm2  MR RV was 85 2 ml   MV A Marito was 0 6 m/s  MV Dec Nodaway was 11 4 m/s2  MV  DecT was 136 5 ms   MV E Marito was 1 6 m/s  MV E/A Ratio was 2 4   MV PHT was 39 6 ms  MVA By PHT was 5 6 cm2  HISTORY: PRIOR HISTORY: Severe AS  COPD  Hyperlipidemia  Hypertension  Shortness of breath  Chest pain  GERD  Smoker  LVH  PROCEDURE: The procedure was performed at the bedside  This was a routine study  The transthoracic approach was used  The study included complete 2D imaging, M-mode, complete spectral Doppler, and color Doppler  The heart rate was 80 bpm,  at the start of the study  Images were obtained from the parasternal, apical, subcostal, and suprasternal notch acoustic windows  Image quality was adequate  LEFT VENTRICLE: Moderate decreased left ventricular systolic function, EF 34%  Significantly decreased left ventricular global longitudinal strain, minus 7 4%  Severe concentric left ventricular hypertrophy, wall thickness 21 mm  Mildly  increased left ventricular cavity size  Grade 3 left ventricular diastolic dysfunction  Severely elevated left ventricular filling pressures  RIGHT VENTRICLE: Mild right ventricular enlargement with well preserved right ventricular systolic function  LEFT ATRIUM: Moderate left atrial enlargement  RIGHT ATRIUM: Mild right atrial enlargement  MITRAL VALVE: Moderate possibly severe mitral regurgitation  No mitral stenosis  Mitral ERO 0 5 cm2  Mitral valve regurgitant volume 85 mL  The mitral valve annulus was heavily calcified  The mitral valve leaflets were calcified but have  well preserved mobility  There was some calcification in the chordae  AORTIC VALVE: Heavily calcified aortic valve with severe, probably critical aortic stenosis  No significant aortic regurgitation  Aortic valve maximum velocity 5 3 M/S  Aortic valve mean gradient 81 mmHg  Aortic valve area 0 3 cm2  TRICUSPID VALVE: The valve structure was normal  There was normal leaflet separation  DOPPLER: The transtricuspid velocity was within the normal range  There was no evidence for stenosis  There was mild regurgitation  PULMONIC VALVE: DOPPLER: The transpulmonic velocity was within the normal range  There was no regurgitation  PERICARDIUM: Mild pericardial effusion without hemodynamic significance  AORTA: Aortic root at the upper limits of normal to mildly enlarged at 3 8 cm    PULMONARY ARTERY: Moderate pulmonary hypertension  Pulmonary artery systolic pressures estimated 54 mmHg      SYSTEM MEASUREMENT TABLES    2D  %FS: 25 4 %  AA PSSL Full: -8 3 %  AAS PSSL Full: -15 1 %  AI PSSL Full: -12 5 %  AL PSSL Full: -5 3 %  AP PSSL Full: -5 4 %  AS PSSL Full: -20 9 %  AVC: 378 3 ms  Ao Diam: 3 4 cm  Ao asc: 3 8 cm  BA PSSL Full: -2 8 %  BAS PSSL Full: -2 6 %  BI PSSL Full: -13 5 %  BL PSSL Full: -6 9 %  BP PSSL Full: -4 2 %  BS PSSL Full: -4 1 %  EDV(Teich): 136 1 ml  EF(Teich): 49 7 %  ESV(Teich): 68 5 ml  G peak SL Full(A2C): -7 5 %  G peak SL Full(A4C): -8 5 %  G peak SL Full(APLAX): -6 1 %  G peak SL Full(Avg): -7 4 %  IVSd: 2 1 cm  LA Area: 31 1 cm2  LA Diam: 6 3 cm  LAAs A4C: 30 2 cm2  LAESV A-L A4C: 122 9 ml  LAESV MOD A4C: 116 1 ml  LALs A4C: 6 3 cm  LVEDV MOD A4C: 217 9 ml  LVEF MOD A4C: 42 2 %  LVESV MOD A4C: 126 ml  LVIDd: 5 3 cm  LVIDs: 4 cm  LVLd A4C: 10 9 cm  LVLs A4C: 10 cm  LVOT Diam: 2 cm  LVPWd: 2 1 cm  LVd Mass: 718 1 g  LVd Mass Index: 303 g/m2  MA PSSL Full: -4 3 %  MAS PSSL Full: -10 5 %  MI PSSL Full: -5 %  ML PSSL Full: -3 1 %  MP PSSL Full: 3 2 %  MS PSSL Full: -10 8 %  Peak SL Dispersion Full: 61 5 ms  RA Area: 21 1 cm2  RVIDd: 5 cm  RWT: 0 8  SV MOD A4C: 91 9 ml  SV(Teich): 67 6 ml    CF  MR Als  Marito: 0 4 m/s  MR Flow: 255 2 ml/s  MR Rad: 1 1 cm  MR Trace: 8 9 cm2    CW  AV Env  Ti: 287 3 ms  AV VTI: 125 6 cm  AV Vmax: 5 3 m/s  AV Vmean: 4 4 m/s  AV maxP 3 mmHg  AV meanP 6 mmHg  MR VTI: 162 5 cm  MR Vmax: 4 9 m/s  TR Vmax: 3 1 m/s  TR maxP 3 mmHg    MM  TAPSE: 2 cm    PW  HUMBERTO (VTI): 0 3 cm2  HUMBERTO Vmax: 0 3 cm2  AVAI (VTI): 0 cm2/m2  AVAI Vmax: 0 cm2/m2  DVI: 0 1  E' Sept: 0 m/s  E/E' Sept: 44 1  LVOT Env  Ti: 303 2 ms  LVOT VTI: 13 7 cm  LVOT Vmax: 0 6 m/s  LVOT Vmean: 0 5 m/s  LVOT maxP 4 mmHg  LVOT meanP 9 mmHg  LVSI Dopp: 17 8 ml/m2  LVSV Dopp: 42 3 ml  MR ERO: 0 5 cm2  MR RV: 85 2 ml  MV A Marito: 0 6 m/s  MV Dec Ashe: 11 4 m/s2  MV DecT: 136 5 ms  MV E Marito: 1 6 m/s  MV E/A Ratio: 2 4  MV PHT: 39 6 ms  MVA By PHT: 5 6 cm2    [employer] Accredited Echocardiography Laboratory    Prepared and electronically signed by    Carlotta Mcbride MD  Signed 12-May-2021 12:22:39       Results for orders placed during the hospital encounter of 21   KATIA    Narrative [address]  [address] [facility]  [phone]    Transesophageal Echocardiogram  2D, 3D, Doppler, and Color Doppler    Study date:  14-May-2021    Patient: Mustapha Hood  MR number: [id number]  Account number: [de-identified]  : 1955  Age: 78 years  Gender: Male  Status: Inpatient  Location: Echo lab  Height: 74 in  Weight: 233 lb  BP: 98/ 70 mmHg    Indications: Mitral valve disease  Diagnoses: I34 9 - Nonrheumatic mitral valve disorder, unspecified    Sonographer:  Ashlie High RDCS  Interpreting Physician:  Naif Wooten MD  Primary Physician:  Charo Bennett DO  Referring Physician:  Jillian Whitfield PA-C  Group:  [facility]  Cardiology Fellow:  Melissa Pedraza MD  RN:  Jose Grace RN    SUMMARY    LEFT VENTRICLE:  The ventricle was mildly dilated  Systolic function was moderately reduced  Ejection fraction was estimated to be 45 %  There was moderate diffuse hypokinesis  Wall thickness was moderately increased  The changes were consistent with eccentric hypertrophy  Features were consistent with a pseudonormal left ventricular filling pattern, with concomitant abnormal relaxation and increased filling pressure (grade 2 diastolic dysfunction)  RIGHT VENTRICLE:  The ventricle was mildly dilated  Systolic function was moderately reduced  LEFT ATRIUM:  The atrium was mildly dilated  LEFT ATRIAL APPENDAGE:  No thrombus was identified  ATRIAL SEPTUM:  There was a patent foramen ovale measuring 5 mm  Doppler evaluation was performed  There was a left-to-right shunt, in the baseline state  RIGHT ATRIUM:  The atrium was mildly dilated  MITRAL VALVE:  There was mild to moderate annular calcification  There was an area of image dropout at the base of the posterior leaflet of the mitral valve in the P1- P2 segment  This was possibly artifact but a perforation or sinus cannot be entirely ruled out   There appeared to be flow into but not  out of this region  There was mild to moderate regurgitation  The regurgitant jet was centrally directed  AORTIC VALVE:  The valve was not visualized well enough to rule out a bicuspid morphology  Leaflets exhibited marked calcification and immobility  There was severe stenosis  There was mild to moderate regurgitation  Valve mean gradient was 44 mmHg  TRICUSPID VALVE:  There was mild regurgitation  PERICARDIUM:  A small pericardial effusion was identified circumferential to the heart  HISTORY: PRIOR HISTORY: Aortic stenosis  HLD  Hypertension  NSTEMI  LVH  Tobacco use  GERD  Shortness of breath  COPD  PROCEDURE: The procedure was performed in the echo lab  This was a routine study  The risks and alternatives of the procedure were explained to the patient and informed consent was obtained  The transesophageal approach was used  The study  included complete 2D imaging, 3D imaging, complete spectral Doppler, and color Doppler  The heart rate was 74 bpm, at the start of the study  An adult omniplane probe was inserted by the cardiology fellow under direct supervision of the  attending cardiologist  Images were obtained from the parasternal and apical acoustic windows  Intubated with ease  One intubation attempt(s)  There was no blood detected on the probe  Image quality was adequate  There were no  complications during the procedure  MEDICATIONS: Anesthesia administered by anesthesia team     LEFT VENTRICLE: The ventricle was mildly dilated  Systolic function was moderately reduced  Ejection fraction was estimated to be 45 %  There was moderate diffuse hypokinesis  Wall thickness was moderately increased  The changes were  consistent with eccentric hypertrophy  DOPPLER: Features were consistent with a pseudonormal left ventricular filling pattern, with concomitant abnormal relaxation and increased filling pressure (grade 2 diastolic dysfunction)  RIGHT VENTRICLE: The ventricle was mildly dilated   Systolic function was moderately reduced  LEFT ATRIUM: The atrium was mildly dilated  APPENDAGE: The size was normal  No thrombus was identified  DOPPLER: The function was mildly reduced (mildly reduced emptying velocity)  ATRIAL SEPTUM: There was a patent foramen ovale measuring 5 mm  Doppler evaluation was performed  There was a left-to-right shunt, in the baseline state  RIGHT ATRIUM: The atrium was mildly dilated  MITRAL VALVE: There was mild to moderate annular calcification  There was an area of image dropout at the base of the posterior leaflet of the mitral valve in the P1- P2 segment  This was possibly artifact but a perforation or sinus cannot  be entirely ruled out  There appeared to be flow into but not out of this region  There was no echocardiographic evidence of vegetation  DOPPLER: There was mild to moderate regurgitation  The regurgitant jet was centrally directed  AORTIC VALVE: The valve was not visualized well enough to rule out a bicuspid morphology  Leaflets exhibited marked calcification and immobility  DOPPLER: There was severe stenosis  There was mild to moderate regurgitation  TRICUSPID VALVE: The valve structure was normal  There was normal leaflet separation  DOPPLER: There was mild regurgitation  The regurgitant jet was toward the septum  The findings suggest at least mild pulmonary hypertension  PULMONIC VALVE: Not well visualized  PERICARDIUM: A small pericardial effusion was identified circumferential to the heart  AORTA: The root exhibited normal size  Discrete areas of mild to moderate atherosclerosis were present  There was no evidence for dissection  There was no evidence for aneurysm  PULMONARY VEINS: DOPPLER: There was systolic blunting in the pulmonary vein(s)      MEASUREMENT TABLES    DOPPLER MEASUREMENTS  LVOT   (Reference normals)  Peak russell   79 cm/s   (--)  Mean russell   47 cm/s   (--)  VTI   21 cm   (--)  Peak gradient   2 mmHg   (--)  Mean gradient   1 mmHg   (--)  Aortic valve   (Reference normals)  Peak russell   459 cm/s   (--)  Mean russell   331 cm/s   (--)  VTI   110 cm   (--)  Peak gradient   84 27 mmHg   (--)  Mean gradient   44 mmHg   (--)  Obstr index, VTI   0 19    (--)  Obstr index, Vmax   0 17    (--)  Obstr index, Vmean   0 14    (--)  Mitral valve   (Reference normals)  Regurg alias russell   31 cm/s   (--)  Regurg PISA radius   6 mm   (--)  Max MR flow rate   70 12 ml/s   (--)    [facility]    Prepared and electronically signed by    Scott Layton MD  Signed 14-May-2021 15:17:03       No results found for this or any previous visit  No procedure found  No results found for this or any previous visit      Meds/Allergies   all current active meds have been reviewed and current meds:   Current Facility-Administered Medications   Medication Dose Route Frequency    acetaminophen (TYLENOL) tablet 975 mg  975 mg Oral Q6H PRN    albuterol (PROVENTIL HFA,VENTOLIN HFA) inhaler 2 puff  2 puff Inhalation Q4H PRN    aspirin (ECOTRIN LOW STRENGTH) EC tablet 81 mg  81 mg Oral Daily    atorvastatin (LIPITOR) tablet 40 mg  40 mg Oral QPM    chlorhexidine (PERIDEX) 0 12 % oral rinse 15 mL  15 mL Mouth/Throat Q12H GEORGES    Diclofenac Sodium (VOLTAREN) 1 % topical gel 2 g  2 g Topical TID PRN    fluticasone-vilanterol (BREO ELLIPTA) 100-25 mcg/inh inhaler 1 puff  1 puff Inhalation Daily    heparin (porcine) subcutaneous injection 5,000 Units  5,000 Units Subcutaneous Q8H [facility]    lactated ringers infusion  125 mL/hr Intravenous Continuous    lidocaine (PF) (XYLOCAINE-MPF) 1 % injection 0 5 mL  0 5 mL Infiltration Once PRN    metoprolol tartrate (LOPRESSOR) tablet 25 mg  25 mg Oral Q12H GEORGES    mupirocin (BACTROBAN) 2 % nasal ointment 1 application  1 application Nasal W10S [facility]    nicotine (NICODERM CQ) 21 mg/24 hr TD 24 hr patch 1 patch  1 patch Transdermal Daily    nitroglycerin (NITROSTAT) SL tablet 0 4 mg  0 4 mg Sublingual Q5 Min PRN    ondansetron (ZOFRAN) injection 4 mg  4 mg Intravenous Q6H PRN    pantoprazole (PROTONIX) EC tablet 40 mg  40 mg Oral Early Morning     lactated ringers, 125 mL/hr, Last Rate: 125 mL/hr (05/16/21 2769)        EKG personally reviewed by BITA Ruelas  Assessment:  Principal Problem: Aortic valve stenosis - severe  Active Problems:    Chronic obstructive lung disease ([facility])    Hyperlipidemia    Essential hypertension    NSTEMI (non-ST elevated myocardial infarction) ([facility])    Acute combined systolic and diastolic CHF, NYHA class 1 ([facility])    Arthritis      Counseling / Coordination of Care  Total floor / unit time spent today 20 minutes  Greater than 50% of total time was spent with the patient and / or family counseling and / or coordination of care  ** Please Note: Dragon 360 Dictation voice to text software may have been used in the creation of this document   **

## 2021-05-17 NOTE — PROGRESS NOTES
Progress Note - Cardiothoracic Surgery   Philly Dias 78 y o  male MRN: [id number]  Unit/Bed#: -01 Encounter: [id number]      24 Hour Events: No complaints this morning       Medications:   Scheduled Meds:  Current Facility-Administered Medications   Medication Dose Route Frequency Provider Last Rate    acetaminophen  975 mg Oral Q6H PRN Keli Hand PA-C      albuterol  2 puff Inhalation Q4H PRN Alva Cabrera MD      aspirin  81 mg Oral Daily Alva Cabrera MD      atorvastatin  40 mg Oral QPM Alva Cabrera MD      chlorhexidine  15 mL Mouth/Throat Q12H [address] Jalaine Jeans, PA-C      Diclofenac Sodium  2 g Topical TID PRN Keli Hand PA-C      fluticasone-vilanterol  1 puff Inhalation Daily Alva Cabrera MD      heparin (porcine)  5,000 Units Subcutaneous Q8H [address] Suzi Miller PA-C      lactated ringers  125 mL/hr Intravenous Continuous Alycia Perez  mL/hr (05/16/21 2329)    lidocaine (PF)  0 5 mL Infiltration Once PRN Alycia Perez MD      metoprolol tartrate  25 mg Oral Q12H [address] BITA Horton      mupirocin  1 application Nasal L16J [address] Jalaine Jeans, PA-C      nicotine  1 patch Transdermal Daily Alva Cabrera MD      nitroglycerin  0 4 mg Sublingual Q5 Min PRN Alva Cabrera MD      ondansetron  4 mg Intravenous Q6H PRN Alva Cabrera MD      pantoprazole  40 mg Oral Early Morning Alva Cabrera MD       Continuous Infusions:lactated ringers, 125 mL/hr, Last Rate: 125 mL/hr (05/16/21 2329)        Results:   Results from last 7 days   Lab Units 05/17/21  0602 05/13/21  0605 05/12/21  0335   WBC Thousand/uL 8 07 9 33 12 66*   HEMOGLOBIN g/dL 13 7 13 0 12 7   HEMATOCRIT % 42 1 41 3 40 3   PLATELETS Thousands/uL 235 227 223     Results from last 7 days   Lab Units 05/17/21  0602 05/14/21  0451 05/13/21  0605   POTASSIUM mmol/L 4 2 4 2 4 1   CHLORIDE mmol/L 106 104 108   CO2 mmol/L 28 29 26   BUN mg/dL 20 19 16   CREATININE mg/dL 0 80 0 66 0 76   CALCIUM mg/dL 9 3 9 1 8 8     Results from last 7 days   Lab Units 05/17/21  0602 05/13/21  0605 05/12/21  2302 05/12/21 2010 05/12/21 0327 05/11/21  1742   INR  1 02  --   --   --   --  1 13 1 05   PTT seconds  --  68* 49* 53*   < > 37 28    < > = values in this interval not displayed  Studies:     Cardiac Catheterization:   CORONARY CIRCULATION:  Left main: Normal   LAD: The vessel was large sized  Angiography showed moderate non-obstructive atherosclerosis  Circumflex: The vessel was large sized and dominant, giving rise to several OM and posterolateral branches and the PDA  There was diffuse atherosclerosis of the branch vessels  RCA: Non-dominant; normal     Echocardiogram:   LEFT VENTRICLE:  Moderate decreased left ventricular systolic function, EF 85%  Significantly decreased left ventricular global longitudinal strain, minus 7 4%  Severe concentric left ventricular hypertrophy, wall thickness 21 mm  Mildly increased left  ventricular cavity size  Grade 3 left ventricular diastolic dysfunction  Severely elevated left ventricular filling pressures      RIGHT VENTRICLE:  Mild right ventricular enlargement with well preserved right ventricular systolic function      LEFT ATRIUM:  Moderate left atrial enlargement      RIGHT ATRIUM:  Mild right atrial enlargement      MITRAL VALVE:  Moderate possibly severe mitral regurgitation  No mitral stenosis  Mitral ERO 0 5 cm2  Mitral valve regurgitant volume 85 mL  The mitral valve annulus was heavily calcified  The mitral valve leaflets were calcified but have well preserved  mobility  There was some calcification in the chordae      AORTIC VALVE:  Heavily calcified aortic valve with severe, probably critical aortic stenosis  No significant aortic regurgitation  Aortic valve maximum velocity 5 3 M/S  Aortic valve mean gradient 81 mmHg   Aortic valve area 0 3 cm2      TRICUSPID VALVE:  There was mild regurgitation      AORTA:  Aortic root at the upper limits of normal to mildly enlarged at 3 8 cm     PULMONARY ARTERIES:  Moderate pulmonary hypertension  Pulmonary artery systolic pressures estimated 54 mmHg      PERICARDIUM:  Mild pericardial effusion without hemodynamic significance  Carotid artery duplex:  Impression  RIGHT:  There is <50% stenosis noted in the internal carotid artery  Plaque is  heterogenous/calcified and irregular  Vertebral artery flow is antegrade  There is no significant subclavian artery  disease  LEFT:  There is <50% stenosis noted in the internal carotid artery  Plaque is  heterogenous/calcified and irregular  Vertebral artery flow is antegrade  There is no significant subclavian artery  disease  Vitals:   Vitals:    05/17/21 0247 05/17/21 0600 05/17/21 0715 05/17/21 1100   BP: 104/67  102/67 100/67   BP Location:       Pulse: 67  62 64   Resp: 17 17   Temp: 97 7 °F (36 5 °C)  98 2 °F (36 8 °C) 98 °F (36 7 °C)   TempSrc:    Oral   SpO2: 93%  92% 91%   Weight:  107 kg (235 lb 3 7 oz)     Height:           Physical Exam:    HEENT/NECK:  Normocephalic  Atraumatic  No jugular venous distention  Cardiac: III/VI  systolicmurmur  Pulmonary:  Breath sounds clear bilaterally  Abdomen:  Non-tender and Non-distended  Extremities: Extremities warm/dry  Neuro: Alert and oriented X 3  Skin: Warm/Dry, without rashes or lesions  Assessment: Severe Aortic stenosis and severe mitral regurgitation    Plan:  Dental extractions today  OR on Thursday for AVR and MVR  SIGNATURE: Elana Woods  DATE: May 17, 2021  TIME: 12:45 PM    * This note was completed in part utilizing [employer] direct voice recognition software  Grammatical errors, random word insertion, spelling mistakes, and incomplete sentences may be an occasional consequence of the system secondary to software limitations, ambient noise and hardware issues  At the time of dictation, efforts were made to edit, clarify and /or correct errors   Please read the chart carefully and recognize, using context, where substitutions have occurred  If you have any questions or concerns about the context, text or information contained within the body of this dictation, please contact myself, the provider, for further clarification

## 2021-05-17 NOTE — ASSESSMENT & PLAN NOTE
Transferred from [address] for a cardiac catheterization for presumed NSTEMI, also with severe valve stenosis  · Echocardiogram with EF of 42%  · Continue aspirin, atorvastatin  · Patient was started on metoprolol 25mg BID  · S/P cardiac catheterization which showed patent coronaries  Heparin gtt was discontinued    · See above

## 2021-05-17 NOTE — ASSESSMENT & PLAN NOTE
Patient presented 05/11 for cardiac catheterization as an NSTEMI transfer from [facility]   · Clean cath  · Known hx of severe - critical AS   · KATIA (05/14) - with aortic valve area of 0 3cm2  · Also with significant mitral valve disease  · Cardiac surgery completing pre-surgical workup, considering TAVR vs double valve repair (AVR/MVR)  · Will need dental extractions, planning for Monday 5/17  · Appreciate cardiology input

## 2021-05-17 NOTE — DISCHARGE INSTRUCTIONS
Narcotic Abuse   WHAT YOU NEED TO KNOW:   What is narcotic abuse? Narcotic abuse is when you continue to use narcotics even though they are hurting you or others  Narcotics are medicines used to decrease or take away severe pain  Narcotics may also be called opioids  You may have the following ongoing or repeated problems:  · Trouble doing your job, attending school, or doing necessary things at home, such as care for your children    · Driving a vehicle or operating machinery while under the influence of narcotics    · Legal problems, such as being arrested while under the influence of narcotics    · Ongoing problems with your friends, family, or others that are caused or made worse by using narcotics  What is narcotic dependence? Narcotic dependence is when using the drugs leads to at least 3 of the following problems within 1 year:  · Tolerance to narcotics  that makes you need more narcotics to feel its effects  · Withdrawal symptoms  if you stop using narcotics after using them heavily over a period of time  Withdrawal may also happen if your healthcare provider changes your medicine  You may try using a similar drug to reduce or prevent the signs and symptoms of withdrawal      · Use of more narcotics  than you should  You may use more of the narcotic or use it over a longer period of time than you intended  · No ability to decrease or control  your use of narcotics  You may want narcotics all of the time  You may feel it is not possible to decrease or control the amount you are using  · Less time spent  around others, at work, or doing activities that you enjoy  You may spend most or all of your time using narcotics, searching for narcotics, or managing a hangover  A hangover is a feeling you have hours after using a drug  You may feel very tired and nauseated  · Continued use of the drug  even though it worsens your physical or mental condition   For example, you may get depressed after you use narcotics, but you keep using them  What is narcotic intoxication? Narcotic intoxication usually lasts for several hours  You may have the following during or after you use narcotics:  · Abnormal behavior or mood changes, such as a feeling of wellbeing, followed by not caring about anyone or anything    · Trouble thinking, remembering things, or focusing     · Small pupils    · Drowsiness     · Slurred speech  What is withdrawal?  Signs and symptoms of withdrawal occur if you stop using narcotics after using them heavily over a period of time  Signs and symptoms may begin within minutes or days and continue for days or even months  You will have 3 or more of the following signs and symptoms:  · Depression and anxiety    · Nausea or vomiting    · Muscle aches    · Watery eyes or runny nose    · Large pupils    · Sweating or goosebumps on your skin    · Diarrhea    · Fever    · Trouble sleeping  How is narcotic abuse, dependence, intoxication, or withdrawal diagnosed? Healthcare providers will ask you questions about your use of narcotics and the effects that the drug has on you  You may need tests to help your healthcare provider learn more about your signs and symptoms  How can narcotics harm a pregnant woman and her baby? · Tell your healthcare provider right away if you are trying to get pregnant or you are pregnant and you are using narcotics  Your doctor may suggest other medicines to control pain and prevent withdrawal  If you go through withdrawal while pregnant, you may miscarry your baby, or he may be stillborn  He may be very small and have other medical problems  · When your baby is born, he may show signs of withdrawal  This includes unexpected weight loss, poor feeding, and more crying than normal  Your baby may also have a fever, vomit, and have diarrhea  He may also have learning problems or other health issues when he gets older   If you have a baby and you are using narcotics, you may have trouble caring for your baby  Narcotics may be passed to your baby through breast milk  Talk to your healthcare provider before breastfeeding your baby if you are using narcotics  Where can I find support and more information? · Substance Abuse and [place] [address] [place] [id number]  Web Address: https://VuMedi/  · [facility] on Drug Abuse  [address] [place], [place] [address]  Phone: 9- 986 - 031-2099  Web Address: www dylon nih gov  When should I seek immediate care? · You are very drowsy  · Your speech is slurred  · You have trouble thinking, remembering things, or focusing  When should I contact my healthcare provider? · You want help or information on how to stop using or abusing narcotics  CARE AGREEMENT:   You have the right to help plan your care  Learn about your health condition and how it may be treated  Discuss treatment options with your caregivers to decide what care you want to receive  You always have the right to refuse treatment  The above information is an  only  It is not intended as medical advice for individual conditions or treatments  Talk to your doctor, nurse or pharmacist before following any medical regimen to see if it is safe and effective for you  © 2017 [facility] Information is for End User's use only and may not be sold, redistributed or otherwise used for commercial purposes  All illustrations and images included in CareNotes® are the copyrighted property of A D A M , Inc  or Ellis Reveles  Sternal Precautions   AMBULATORY CARE:   Sternal precautions  are used to help protect your sternum (breastbone) after open chest surgery  Wires are placed during surgery to hold the sternum together as it heals  Sternal precautions help prevent the wires from cutting through the sternum   The precautions also help prevent the sternum from coming apart from an injury, and prevent pain and bleeding  You may need to use the precautions for up to 12 weeks after surgery  Your surgeon will give you specific instructions based on the type of surgery you had  It is important to follow the instructions carefully  An injury to the healing sternum can be life-threatening  General sternal precautions:  Start slowly and do more as you get stronger  Pain medicine might make it harder for you to know when to slow down or be careful  Stop immediately if you hear a crunch or pop in your sternum  · Protect your sternum  Hug a pillow to your chest or cross your arms over your chest when you laugh, sneeze, or cough  · Be careful when you get into or out of a chair or bed  Hug a pillow or cross your arms when you stand or sit  Do not twist as you move  Use only your legs to sit and stand  You may need to use a raised toilet seat if you have trouble standing up without using your arms  Your healthcare provider may teach you to use your elbow for support as you move from lying to sitting  · Ask when you may take a bath or shower  You may need to use a bath chair if you have trouble getting into or out of the tub  Do not use a grab bar  · Do not lift or carry anything heavier than 5 pounds  For example, a gallon of milk weighs 8 pounds  · Keep your arms down as much as possible  Do not put your arms out to the side, behind you, or over your head  Do not let anyone pull your arms to help you move or dress  Do not reach for items  · Do not push or pull anything  Examples include a car door or a vacuum   · Do not drive while you are healing  Your surgeon will tell you when it is safe for you to start driving again  Call 911 for any of the following:   · You have sudden pain in your sternum and hear a crunch or pop  · You have bleeding that does not stop even after you apply pressure for 5 minutes      Seek care immediately if:   · You hear crunching or grinding in your sternum  · You have signs of an infection, such as a fever, red or warm skin, or pus in the surgery wound  Contact your healthcare provider if:   · You continue to feel pain, even after you take your pain medicine  · You have new or worsening pain, or any pain with movement  · You have questions or concerns about your condition or care  Care for your surgery wound:  Always wash your hands before you care for your wound  Wash your wound as directed  Do not rub the wound as you wash or dry the area  Pat the area dry with a clean towel  Change the bandages as directed and when they get wet or dirty  Do not smoke:  Nicotine can damage blood vessels and make it more difficult to heal  Do not use e-cigarettes or smokeless tobacco in place of cigarettes or to help you quit  They still contain nicotine  Ask your healthcare provider for information if you currently smoke and need help quitting  Follow up with your healthcare provider as directed:  Write down your questions so you remember to ask them during your visits  © Copyright [address] Information is for End User's use only and may not be sold, redistributed or otherwise used for commercial purposes  All illustrations and images included in CareNotes® are the copyrighted property of A D A M , Inc  or [address] [place], [place] [address]   The above information is an  only  It is not intended as medical advice for individual conditions or treatments  Talk to your doctor, nurse or pharmacist before following any medical regimen to see if it is safe and effective for you

## 2021-05-17 NOTE — INTERVAL H&P NOTE
H&P reviewed  After examining the patient I find no changes in the patients condition since the H&P had been written  Vitals:    05/17/21 1100   BP: 100/67   Pulse: 64   Resp: 17   Temp: 98 °F (36 7 °C)   SpO2: 91%     66 yom planned AVR procedure, OR today for extraction of teeth 1, 3, 18, 30 +any necessary  All R/B/A/C reviewed  Consents obtained, questions answered       Poonam Dang, GIO

## 2021-05-17 NOTE — PROGRESS NOTES
OMFS    POD #0 s/p extraction teeth 1, 3, 18, 30 with excision of left buccal fibroma     Recommendations:    -HOB >30  -ice to face [id number]  -NO rinsing, spitting or straw use  -clears; advance to mechanical soft when tolerating PO   -peridex oral rinses tid; to begin in 24 hours  patient may use sponge in oral cavity gently, soaked with chlorhexidine  -continue with antibiotics   -gauze change q hour prn bleed   Please moisten gauze prior to replacement with saline  -sutures will dissolve on their own;no need for removal   -sinus precautions - no nose blowing, sneeze with open mouth, no excessive valsalva; consider stool softener   -outpatient follow up prn basis   -no further intervention is indicated from OMFS standpoint   -may proceed with planned valve  -please call with questions    Poonam Dang, DMD

## 2021-05-17 NOTE — PLAN OF CARE
Problem: Potential for Falls  Goal: Patient will remain free of falls  Description: INTERVENTIONS:  - Assess patient frequently for physical needs  -  Identify cognitive and physical deficits and behaviors that affect risk of falls    -  Moran fall precautions as indicated by assessment   - Educate patient/family on patient safety including physical limitations  - Instruct patient to call for assistance with activity based on assessment  - Modify environment to reduce risk of injury  - Consider OT/PT consult to assist with strengthening/mobility  Outcome: Progressing     Problem: CARDIOVASCULAR - ADULT  Goal: Maintains optimal cardiac output and hemodynamic stability  Description: INTERVENTIONS:  - Monitor I/O, vital signs and rhythm  - Monitor for S/S and trends of decreased cardiac output  - Administer and titrate ordered vasoactive medications to optimize hemodynamic stability  - Assess quality of pulses, skin color and temperature  - Assess for signs of decreased coronary artery perfusion  - Instruct patient to report change in severity of symptoms  Outcome: Progressing  Goal: Absence of cardiac dysrhythmias or at baseline rhythm  Description: INTERVENTIONS:  - Continuous cardiac monitoring, vital signs, obtain 12 lead EKG if ordered  - Administer antiarrhythmic and heart rate control medications as ordered  - Monitor electrolytes and administer replacement therapy as ordered  Outcome: Progressing     Problem: RESPIRATORY - ADULT  Goal: Achieves optimal ventilation and oxygenation  Description: INTERVENTIONS:  - Assess for changes in respiratory status  - Assess for changes in mentation and behavior  - Position to facilitate oxygenation and minimize respiratory effort  - Oxygen administered by appropriate delivery if ordered  - Initiate smoking cessation education as indicated  - Encourage broncho-pulmonary hygiene including cough, deep breathe, Incentive Spirometry  - Assess the need for suctioning and aspirate as needed  - Assess and instruct to report SOB or any respiratory difficulty  - Respiratory Therapy support as indicated  Outcome: Progressing     Problem: PAIN - ADULT  Goal: Verbalizes/displays adequate comfort level or baseline comfort level  Description: Interventions:  - Encourage patient to monitor pain and request assistance  - Assess pain using appropriate pain scale  - Administer analgesics based on type and severity of pain and evaluate response  - Implement non-pharmacological measures as appropriate and evaluate response  - Consider cultural and social influences on pain and pain management  - Notify physician/advanced practitioner if interventions unsuccessful or patient reports new pain  Outcome: Progressing     Problem: DISCHARGE PLANNING  Goal: Discharge to home or other facility with appropriate resources  Description: INTERVENTIONS:  - Identify barriers to discharge w/patient and caregiver  - Arrange for needed discharge resources and transportation as appropriate  - Identify discharge learning needs (meds, wound care, etc )  - Arrange for interpretive services to assist at discharge as needed  - Refer to Case Management Department for coordinating discharge planning if the patient needs post-hospital services based on physician/advanced practitioner order or complex needs related to functional status, cognitive ability, or social support system  Outcome: Progressing     Problem: Knowledge Deficit  Goal: Patient/family/caregiver demonstrates understanding of disease process, treatment plan, medications, and discharge instructions  Description: Complete learning assessment and assess knowledge base    Interventions:  - Provide teaching at level of understanding  - Provide teaching via preferred learning methods  Outcome: Progressing

## 2021-05-17 NOTE — OP NOTE
OPERATIVE REPORT  PATIENT NAME: Fanny Narayan    :  1955  MRN: [id number]  Pt Location: BE OR ROOM 08    SURGERY DATE: 2021    Surgeon(s) and Role: * Tony Obrien DMD - Primary    Preop Diagnosis:  Poor dentition [K08 9]    Post-Op Diagnosis Codes:     * Poor dentition [K08 9]    Procedure(s) (LRB):  EXTRACTION TEETH MULTIPLE 1, 3, 18, 30, EXCISION OF LEFT BUCCAL FIBROMA (N/A)    Specimen(s):  ID Type Source Tests Collected by Time Destination   1 : left cheek fibroma Tissue Other TISSUE EXAM Tony Obrien DMD 2021 1512        Estimated Blood Loss:   Minimal    Drains:  * No LDAs found *    Anesthesia Type:   General    Operative Indications:  Poor dentition [K08 9]  Pre-AVR     Operative Findings:  Grossly decayed teeth 1, 3, 18, 30    Complications:   None    Procedure and Technique:  The patient was greeted in the preoperative area  All the risks and benefits of the procedure were once again explained and the risks of sinus communication as well as lower chin and lip numbness were explained in detail all questions were answered  Consent had already been signed  Care was then handed back to the anesthesia team     The patient was brought into the operating room by the anesthesia team and the patient was placed in a supine position where the patient remained for the rest of the case  Anesthesia was able to establish an orotracheal intubation without any complications  Care was then handed back to the OMFS team     Patient was draped in sterile manner timeout was performed in which the patient was correctly identified by name medical record number as well as a site of the procedure be performed  Patient had received preoperative IV ancef Antibiotics  Once a timeout was completed oral cavity was thoroughly suctioned with the Yankauer suction the moist vaginal packing was used it as a throat pack   Patient was given local anesthesia at the sites of the extractions with 1% lidocaine with 1-100,000 epinephrine as local anesthesia per anesthesia record  Patient was also given approximately half percent Marcaine with 1-200,000 epinephrine also at the sites per anesthesia record  An examination of the oral mucosa was done again confirming the teeth  There appeared to be a well circumscribed pink lesion left buccal mucosa, likely a fibroma  This was circumferentially excised and passed off the field as specimen  A periosteal elevator was used to separate the gingiva from the teeth  Full thickness mucoperiosteal flaps elevated at all extraction sites  Periosteal elevator to remove minimal crestal bone  Universal forceps were used to extract teeth #1, 3, 18, 30 without any complications  Surgical sites were thoroughly curetted, bone filed, and irrigated with sterile saline  Closure with 3-0 chromic gut sutures  All surgical sites were reevaluated found to be hemostatic  Next the oral cavity was thoroughly irrigated with sterile saline and suctioned with the Glooplekauer suction  The moist vaginal packing was removed and the oropharynx was suctioned  Care was then handed back to anesthesia team where the patient was extubated in the operating room without any complications and then transferred to the postanesthesia care unit       I was present for the entire procedure    Patient Disposition:  hemodynamically stable    SIGNATURE: Holly Hensley DMD  DATE: May 17, 2021  TIME: 3:21 PM

## 2021-05-17 NOTE — UTILIZATION REVIEW
Continued Stay Review    Date: 05/17/2021                          Current Patient Class: Inpatient  Current Level of Care:Med/Surg    HPI:66 y o  male initially admitted on 05/12/2021   Severe Aortic stenosis and severe mitral regurgitation  Assessment/Plan: Continue aspirin, statin, and BB at current doses  Monitor renal function electrolytes closely replete to maintain K + at 4 0, magnesium at 2 0  Continue to monitor on telemetry  Monitor renal function electrolytes closely replete to maintain K + at 4 0, magnesium at 2 0  VTE Pharmacologic Prophylaxis: VTE Score: 2 Moderate Risk (Score 3-4) - Pharmacological DVT Prophylaxis     Ordered: heparin  SURGERY DATE: 5/17/2021  Procedure(s) (LRB):  EXTRACTION TEETH MULTIPLE 1, 3, 18, 30, EXCISION OF LEFT BUCCAL FIBROMA (N/A)  Anesthesia Type:  General  Operative Findings:  Grossly decayed teeth 1, 3, 18, 30    Vital Signs: /68   Pulse 61   Temp (!) 97 3 °F (36 3 °C)   Resp 18   Ht 6' 2" (1 88 m)   Wt 107 kg (235 lb 3 7 oz)   SpO2 96%   BMI 30 20 kg/m²       Pertinent Labs/Diagnostic Results:   Results from last 7 days   Lab Units 05/13/21  1113   SARS-COV-2  Negative     Results from last 7 days   Lab Units 05/17/21  0602 05/13/21  0605 05/12/21  0335 05/11/21  1742   WBC Thousand/uL 8 07 9 33 12 66* 12 76*   HEMOGLOBIN g/dL 13 7 13 0 12 7 13 8   HEMATOCRIT % 42 1 41 3 40 3 41 5   PLATELETS Thousands/uL 235 227 223 252   NEUTROS ABS Thousands/µL 4 49 4 92  --   --      Results from last 7 days   Lab Units 05/17/21  0602 05/14/21  0451 05/13/21  0605 05/12/21  0327 05/11/21  1742   SODIUM mmol/L 138 137 139 142 140   POTASSIUM mmol/L 4 2 4 2 4 1 4 4 4 3   CHLORIDE mmol/L 106 104 108 108 106   CO2 mmol/L 28 29 26 28 27   ANION GAP mmol/L 4 4 5 6 7   BUN mg/dL 20 19 16 13 14   CREATININE mg/dL 0 80 0 66 0 76 0 80 0 84   EGFR ml/min/1 73sq m 92 100 94 92 91   CALCIUM mg/dL 9 3 9 1 8 8 8 9 9 4     Results from last 7 days   Lab Units 05/11/21  1742   AST U/L 14   ALT U/L 23   ALK PHOS U/L 73   TOTAL PROTEIN g/dL 7 1   ALBUMIN g/dL 3 9   TOTAL BILIRUBIN mg/dL 0 78     Results from last 7 days   Lab Units 05/17/21  0602 05/14/21  0451 05/13/21 0605 05/12/21 0327 05/11/21  1742   GLUCOSE RANDOM mg/dL 94 89 86 116 131     Results from last 7 days   Lab Units 05/13/21  1150   HEMOGLOBIN A1C % 5 8*   EAG mg/dl 120     Results from last 7 days   Lab Units 05/12/21 0327 05/12/21  0016 05/11/21 2059 05/11/21  1742   TROPONIN I ng/mL 0 39* 0 24* 0 16* 0 06*     Results from last 7 days   Lab Units 05/17/21  0602 05/13/21  0605 05/12/21  2302 05/12/21 2010 05/12/21 0327 05/11/21  1742   PROTIME seconds 13 4  --   --   --   --  14 3 13 5   INR  1 02  --   --   --   --  1 13 1 05   PTT seconds  --  68* 49* 53*   < > 37 28    < > = values in this interval not displayed         Results from last 7 days   Lab Units 05/11/21  1742   NT-PRO BNP pg/mL 4,493*       Results from last 7 days   Lab Units 05/13/21  1523   CLARITY UA  Clear   COLOR UA  Dk Yellow   SPEC GRAV UA  1 043*   PH UA  6 0   GLUCOSE UA mg/dl Negative   KETONES UA mg/dl Negative   BLOOD UA  Negative   PROTEIN UA mg/dl Negative   NITRITE UA  Negative   BILIRUBIN UA  Negative   UROBILINOGEN UA E U /dl 0 2   LEUKOCYTES UA  Negative     Results from last 7 days   Lab Units 05/11/21  1742   TOTAL COUNTED  100     Medications:   Scheduled Medications:  aspirin, 81 mg, Oral, Daily  atorvastatin, 40 mg, Oral, QPM  fluticasone-vilanterol, 1 puff, Inhalation, Daily  heparin (porcine), 5,000 Units, Subcutaneous, Q8H GEORGES  metoprolol tartrate, 25 mg, Oral, Q12H GEORGES  nicotine, 1 patch, Transdermal, Daily  pantoprazole, 40 mg, Oral, Early Morning      Continuous IV Infusions:  lactated ringers, 125 mL/hr, Intravenous, Continuous      PRN Meds:  acetaminophen, 975 mg, Oral, Q6H PRN  albuterol, 2 puff, Inhalation, Q4H PRN  Diclofenac Sodium, 2 g, Topical, TID PRN  nitroglycerin, 0 4 mg, Sublingual, Q5 Min PRN  ondansetron, 4 mg, Intravenous, Q6H PRN        Discharge Plan: TBD    Network Utilization Review Department  ATTENTION: Please call with any questions or concerns to [phone] and carefully listen to the prompts so that you are directed to the right person  All voicemails are confidential   Isabella Yang all requests for admission clinical reviews, approved or denied determinations and any other requests to dedicated fax number below belonging to the campus where the patient is receiving treatment   List of dedicated fax numbers for the Facilities:  [address] DENIALS (Administrative/Medical Necessity) [phone]   [address] (Maternity/NICU/Pediatrics) [phone]   [address] [address] [place] [address] [address] [facility] [place] [address] [phone]

## 2021-05-17 NOTE — ANESTHESIA POSTPROCEDURE EVALUATION
Post-Op Assessment Note    CV Status:  Stable  Pain Score: 0    Pain management: adequate     Mental Status:  Sleepy   Hydration Status:  Euvolemic   PONV Controlled:  None   Airway Patency:  Patent      Post Op Vitals Reviewed: Yes      Staff: Anesthesiologist, CRNA   Comments: report given to RN; VSS        No complications documented      BP   98/67   Temp   97 3   Pulse  62   Resp   12   SpO2   96

## 2021-05-17 NOTE — PROGRESS NOTES
[facility] [facility]  Progress Note - Estrella North 1955, 78 y o  male MRN: [id number]  Unit/Bed#: -01 Encounter: [id number]  Primary Care Provider: Marlon Austin DO   Date and time admitted to hospital: 5/12/2021  9:34 PM    * Aortic valve stenosis - severe  Assessment & Plan  Patient presented 05/11 for cardiac catheterization as an NSTEMI transfer from [facility]   · Clean cath  · Known hx of severe - critical AS   · KATIA (05/14) - with aortic valve area of 0 3cm2  · Also with significant mitral valve disease  · Cardiac surgery completing pre-surgical workup, considering TAVR vs double valve repair (AVR/MVR)  · Will need dental extractions, planning for Monday 5/17  · Appreciate cardiology input     Acute combined systolic and diastolic CHF, NYHA class 1 ([facility] [address] )  Assessment & Plan  Wt Readings from Last 3 Encounters:   05/17/21 107 kg (235 lb 3 7 oz)   05/11/21 112 kg (246 lb 14 6 oz)   05/11/21 113 kg (250 lb)     Monitor intake and output  Monitor daily weights  · EF of 42% with grade 3 diastolic dysfunction, severe AS and Mod-severe MR  · IV lasix 20mg x1, with approximately 800 cc urine output  · Supplemental oxygen weaned  · He is currently euvolemic   · Low Na diet        NSTEMI (non-ST elevated myocardial infarction) [facility])  Assessment & Plan  Transferred from [address] [place] [address] for a cardiac catheterization for presumed NSTEMI, also with severe valve stenosis  · Echocardiogram with EF of 42%  · Continue aspirin, atorvastatin  · Patient was started on metoprolol 25mg BID  · S/P cardiac catheterization which showed patent coronaries  Heparin gtt was discontinued    · See above      Arthritis  Assessment & Plan  C/o generalized arthritis for now  · Takes Excedrin at home  · Will hold that given surgical plans  · Supportive care with tylenol  · Offered lido patches, refuses    Essential hypertension  Assessment & Plan  BP is acceptable  · Continue metoprolol 25 mg BID  · Losartan and amlodipine on hold    Hyperlipidemia  Assessment & Plan  · FLP WNL  · Continuing patient's atorvastatin 40mg    Chronic obstructive lung disease ([address] )  Assessment & Plan  No wheezing at this time  · Continue breo        VTE Pharmacologic Prophylaxis: VTE Score: 2 Moderate Risk (Score 3-4) - Pharmacological DVT Prophylaxis Ordered: heparin  Patient Centered Rounds: I performed bedside rounds with nursing staff today  Discussions with Specialists or Other Care Team Provider: appreciate CT surg    Education and Discussions with Family / Patient: Patient declined call to   Time Spent for Care: 30 minutes  More than 50% of total time spent on counseling and coordination of care as described above  Current Length of Stay: 5 day(s)  Current Patient Status: Inpatient   Certification Statement: The patient will continue to require additional inpatient hospital stay due to pending CT surg   Discharge Plan: Anticipate discharge in >72 hrs to discharge location to be determined pending rehab evaluations  Code Status: Level 1 - Full Code    Subjective:   Doing ok today  Hungry  Objective:     Vitals:   Temp (24hrs), Av 9 °F (36 6 °C), Min:97 5 °F (36 4 °C), Max:98 3 °F (36 8 °C)    Temp:  [97 5 °F (36 4 °C)-98 3 °F (36 8 °C)] 98 °F (36 7 °C)  HR:  [61-74] 64  Resp:  [17-18] 17  BP: ()/(63-69) 100/67  SpO2:  [90 %-94 %] 91 %  Body mass index is 30 2 kg/m²  Input and Output Summary (last 24 hours): Intake/Output Summary (Last 24 hours) at 2021 1336  Last data filed at 2021 0401  Gross per 24 hour   Intake 2354 67 ml   Output 1175 ml   Net 1179 67 ml       Physical Exam:   Physical Exam  Vitals signs and nursing note reviewed  Constitutional:       General: He is not in acute distress  Cardiovascular:      Rate and Rhythm: Normal rate and regular rhythm  Heart sounds: Murmur present  Pulmonary:      Effort: No respiratory distress     Abdominal:      General: There is no distension  Neurological:      Mental Status: He is oriented to person, place, and time  Psychiatric:         Mood and Affect: Mood normal           Additional Data:     Labs:  Results from last 7 days   Lab Units 05/17/21  0602   WBC Thousand/uL 8 07   HEMOGLOBIN g/dL 13 7   HEMATOCRIT % 42 1   PLATELETS Thousands/uL 235   NEUTROS PCT % 56   LYMPHS PCT % 28   MONOS PCT % 12   EOS PCT % 4     Results from last 7 days   Lab Units 05/17/21  0602  05/11/21  1742   SODIUM mmol/L 138   < > 140   POTASSIUM mmol/L 4 2   < > 4 3   CHLORIDE mmol/L 106   < > 106   CO2 mmol/L 28   < > 27   BUN mg/dL 20   < > 14   CREATININE mg/dL 0 80   < > 0 84   ANION GAP mmol/L 4   < > 7   CALCIUM mg/dL 9 3   < > 9 4   ALBUMIN g/dL  --   --  3 9   TOTAL BILIRUBIN mg/dL  --   --  0 78   ALK PHOS U/L  --   --  73   ALT U/L  --   --  23   AST U/L  --   --  14   GLUCOSE RANDOM mg/dL 94   < > 131    < > = values in this interval not displayed       Results from last 7 days   Lab Units 05/17/21  0602   INR  1 02         Results from last 7 days   Lab Units 05/13/21  1150   HEMOGLOBIN A1C % 5 8*           Lines/Drains:  Invasive Devices     Peripheral Intravenous Line            Peripheral IV 05/17/21 Left Forearm less than 1 day                  Telemetry:  Telemetry Orders (From admission, onward)             48 Hour Telemetry Monitoring  Continuous x 48 hours     Question:  Reason for 48 Hour Telemetry  Answer:  Cardiac Surgery                 Telemetry Reviewed: Normal Sinus Rhythm  Indication for Continued Telemetry Use: Awaiting PCI/EP Study/CABG           Imaging: Reviewed radiology reports from this admission including: ECHO and none    Recent Cultures (last 7 days):         Last 24 Hours Medication List:   Current Facility-Administered Medications   Medication Dose Route Frequency Provider Last Rate    acetaminophen  975 mg Oral Q6H PRN Delonte Sanchez PA-C      albuterol  2 puff Inhalation Q4H PRN MD Justino Gaytan aspirin  81 mg Oral Daily Shayy Bliss MD      atorvastatin  40 mg Oral QPM Shayy Bliss MD      chlorhexidine  15 mL Mouth/Throat Q12H [facility] Claude Apley, PA-C      Diclofenac Sodium  2 g Topical TID PRN Dillon Ulloa PA-C      fluticasone-vilanterol  1 puff Inhalation Daily Shayy Bliss MD      heparin (porcine)  5,000 Units Subcutaneous Q8H [facility] Suzi Miller PA-C      lactated ringers  125 mL/hr Intravenous Continuous Jeremy Nelson  mL/hr (05/16/21 3757)    lidocaine (PF)  0 5 mL Infiltration Once PRN Jeremy Nelson MD      metoprolol tartrate  25 mg Oral Q12H [facility] BITA Holt      mupirocin  1 application Nasal B90J [facility] Claude Apley, PA-C      nicotine  1 patch Transdermal Daily Shayy Bliss MD      nitroglycerin  0 4 mg Sublingual Q5 Min PRN Shayy Bliss MD      ondansetron  4 mg Intravenous Q6H PRN Shayy Bliss MD      pantoprazole  40 mg Oral Early Morning Shayy Bliss MD          Today, Patient Was Seen By: Dillon Ulloa PA-C    **Please Note: This note may have been constructed using a voice recognition system  **

## 2021-05-17 NOTE — ASSESSMENT & PLAN NOTE
Wt Readings from Last 3 Encounters:   05/17/21 107 kg (235 lb 3 7 oz)   05/11/21 112 kg (246 lb 14 6 oz)   05/11/21 113 kg (250 lb)     Monitor intake and output   Monitor daily weights  · EF of 42% with grade 3 diastolic dysfunction, severe AS and Mod-severe MR  · IV lasix 20mg x1, with approximately 800 cc urine output  · Supplemental oxygen weaned  · He is currently euvolemic   · Low Na diet

## 2021-05-17 NOTE — ANESTHESIA PREPROCEDURE EVALUATION
Procedure:  EXTRACTION TEETH MULTIPLE (N/A Mouth)    Relevant Problems   ANESTHESIA (within normal limits)      CARDIO   (+) Aortic valve stenosis - severe   (+) Essential hypertension   (+) Hyperlipidemia   (+) NSTEMI (non-ST elevated myocardial infarction) (HCC)      ENDO (within normal limits)      GI/HEPATIC   (+) GERD (gastroesophageal reflux disease)      /RENAL (within normal limits)      HEMATOLOGY (within normal limits)      MUSCULOSKELETAL   (+) Arthritis      NEURO/PSYCH (within normal limits)      PULMONARY   (+) Chronic obstructive lung disease (HCC)      Other   (+) Acute combined systolic and diastolic CHF, NYHA class 1 (HCC)   (+) Left ventricular hypertrophy        Physical Exam    Airway      TM Distance: >3 FB  Neck ROM: full     Dental       Cardiovascular  Rhythm: regular, Rate: normal, Cardiovascular exam normal    Pulmonary  Pulmonary exam normal     Other Findings  Poor dentition      Anesthesia Plan  ASA Score- 4     Anesthesia Type- IV sedation with anesthesia with ASA Monitors  Additional Monitors:   Airway Plan:           Plan Factors-    Chart reviewed  Existing labs reviewed  Patient summary reviewed  Induction- intravenous  Postoperative Plan- Plan for postoperative opioid use  Informed Consent- Anesthetic plan and risks discussed with patient  I personally reviewed this patient with the CRNA  Discussed and agreed on the Anesthesia Plan with the CRNA  Reyes Simmons           Recent labs personally reviewed:  Lab Results   Component Value Date    WBC 8 07 05/17/2021    HGB 13 7 05/17/2021     05/17/2021     Lab Results   Component Value Date     08/07/2014    K 4 2 05/17/2021    BUN 20 05/17/2021    CREATININE 0 80 05/17/2021    GLUCOSE 101 08/07/2014     Lab Results   Component Value Date    PTT 68 (H) 05/13/2021      Lab Results   Component Value Date    INR 1 02 05/17/2021     Lab Results   Component Value Date    HGBA1C 5 8 (H) 05/13/2021       Taras Mendoza Latanya Zaman MD, have personally seen and evaluated the patient prior to anesthetic care  I have reviewed the pre-anesthetic record, and other medical records if appropriate to the anesthetic care  If a CRNA is involved in the case, I have reviewed the CRNA assessment, if present, and agree  Risks/benefits and alternatives discussed with patient including possible PONV, sore throat, and possibility of rare anesthetic and surgical emergencies

## 2021-05-18 PROCEDURE — NC001 PR NO CHARGE: Performed by: PHYSICIAN ASSISTANT

## 2021-05-18 PROCEDURE — 99232 SBSQ HOSP IP/OBS MODERATE 35: CPT | Performed by: INTERNAL MEDICINE

## 2021-05-18 RX ADMIN — HEPARIN SODIUM 5000 UNITS: 5000 INJECTION INTRAVENOUS; SUBCUTANEOUS at 04:59

## 2021-05-18 RX ADMIN — ASPIRIN 81 MG: 81 TABLET, COATED ORAL at 08:57

## 2021-05-18 RX ADMIN — HEPARIN SODIUM 5000 UNITS: 5000 INJECTION INTRAVENOUS; SUBCUTANEOUS at 21:25

## 2021-05-18 RX ADMIN — FLUTICASONE FUROATE AND VILANTEROL TRIFENATATE 1 PUFF: 100; 25 POWDER RESPIRATORY (INHALATION) at 08:57

## 2021-05-18 RX ADMIN — ACETAMINOPHEN 975 MG: 325 TABLET, FILM COATED ORAL at 09:00

## 2021-05-18 RX ADMIN — ATORVASTATIN CALCIUM 40 MG: 40 TABLET, FILM COATED ORAL at 17:20

## 2021-05-18 RX ADMIN — PANTOPRAZOLE SODIUM 40 MG: 40 TABLET, DELAYED RELEASE ORAL at 04:59

## 2021-05-18 RX ADMIN — METOPROLOL TARTRATE 25 MG: 25 TABLET, FILM COATED ORAL at 20:38

## 2021-05-18 RX ADMIN — NICOTINE 1 PATCH: 21 PATCH, EXTENDED RELEASE TRANSDERMAL at 08:57

## 2021-05-18 RX ADMIN — HEPARIN SODIUM 5000 UNITS: 5000 INJECTION INTRAVENOUS; SUBCUTANEOUS at 15:55

## 2021-05-18 RX ADMIN — METOPROLOL TARTRATE 25 MG: 25 TABLET, FILM COATED ORAL at 08:57

## 2021-05-18 NOTE — PROGRESS NOTES
Progress Note - Cardiothoracic Surgery   Delvin Correa 78 y o  male MRN: [id number]  Unit/Bed#: -01 Encounter: [id number]      24 Hour Events: No events overnight  Denies angina/dyspnea       Medications:   Scheduled Meds:  Current Facility-Administered Medications   Medication Dose Route Frequency Provider Last Rate    acetaminophen  975 mg Oral Q6H PRN Zollie Maha, DMD      albuterol  2 puff Inhalation Q4H PRN Zollie Maha, DMD      aspirin  81 mg Oral Daily Zollie Maha, DMD      atorvastatin  40 mg Oral QPM Zollie Maha, DMD      Diclofenac Sodium  2 g Topical TID PRN Zollie Maha, DMD      fluticasone-vilanterol  1 puff Inhalation Daily Zollie Maha, DMD      heparin (porcine)  5,000 Units Subcutaneous [facility] Zollie Maha, DMD      lactated ringers  125 mL/hr Intravenous Continuous Zollie Maha, DMD Stopped (05/17/21 1755)    metoprolol tartrate  25 mg Oral Q12H GEORGES Zollie Maha, DMD      nicotine  1 patch Transdermal Daily Zollie Maha, DMD      nitroglycerin  0 4 mg Sublingual Q5 Min PRN Zollie Maha, DMD      ondansetron  4 mg Intravenous Q6H PRN Zollie Maha, DMD      pantoprazole  40 mg Oral Early Morning Zollie Maha, DMD       Facility-Administered Medications Ordered in Other Encounters   Medication Dose Route Frequency Provider Last Rate    ceFAZolin    PRN Kindra Loosen, CRNA      ketamine   Intravenous PRN Kindra Loosen, CRNA      norepinephrine    PRN Kindar Loosen, CRNA      propofol   Intravenous Continuous PRN Kindra Loosen, CRNA Stopped (05/17/21 1518)    propofol   Intravenous PRN Kindra Loosen, CRNA       Continuous Infusions:lactated ringers, 125 mL/hr, Last Rate: Stopped (05/17/21 1755)        Results:   Results from last 7 days   Lab Units 05/17/21  0602 05/13/21  0605 05/12/21  0335   WBC Thousand/uL 8 07 9 33 12 66*   HEMOGLOBIN g/dL 13 7 13 0 12 7   HEMATOCRIT % 42 1 41 3 40 3   PLATELETS Thousands/uL 235 227 223     Results from last 7 days   Lab Units 05/17/21  0602 05/14/21  0451 05/13/21  0605   POTASSIUM mmol/L 4 2 4 2 4 1   CHLORIDE mmol/L 106 104 108   CO2 mmol/L 28 29 26   BUN mg/dL 20 19 16   CREATININE mg/dL 0 80 0 66 0 76   CALCIUM mg/dL 9 3 9 1 8 8     Results from last 7 days   Lab Units 05/17/21  0602 05/13/21  0605 05/12/21  2302 05/12/21 2010 05/12/21 0327 05/11/21  1742   INR  1 02  --   --   --   --  1 13 1 05   PTT seconds  --  68* 49* 53*   < > 37 28    < > = values in this interval not displayed  Studies:     Cardiac Catheterization: No significant obstructive CAD     Echocardiogram: EF 42%  Severe AS with Marcello 0 4 cm2  mean gradient of 81 mm Hg  Moderate possibly severe mitral regurgitation  No mitral stenosis  Severe MAC  Mild TR  Carotid artery duplex: <50% ICA stenosis b/l, antegrade flow b/l, no subclavian disease b/l      Vitals:   Vitals:    05/17/21 1850 05/17/21 2338 05/18/21 0458 05/18/21 0718   BP: 102/68 99/71  98/60   Pulse: 61 59  60   Resp: 18 16     Temp: (!) 97 3 °F (36 3 °C) 97 9 °F (36 6 °C)  97 8 °F (36 6 °C)   TempSrc:       SpO2: 96% 95%  96%   Weight:   105 kg (230 lb 9 6 oz)    Height:           Physical Exam:    HEENT/NECK:  Normocephalic  Atraumatic  No jugular venous distention  Cardiac: Regular rate and rhythm and III/VI  systolicmurmur  Pulmonary:  Breath sounds clear bilaterally  Abdomen:  Non-tender and Non-distended  Extremities: Extremities warm/dry  Neuro: Alert and oriented X 3  Skin: Warm/Dry, without rashes or lesions  Assessment:    Severe aortic stenosis; Ongoing AVR workup  Severe mitral regurgitation; Ongoing MVR workup    Plan:  Patient agreeable to proceed with surgery;  Informed consent signed    Dental extractions completed yesterday    Blood type and cross match ordered for tomorrow    Continue Mupirocin 2% nasal ointment q 12 hrs  Continue topical chlorhexidine bath and mouth rise    Preoperative oxygen, beta blocker, insulin, and antibiotics ordered aortic valve replacement and mitral valve repair vs replacement scheduled for 5/20 with SAAD Landers: Karel Nissen, PA-C  DATE: May 18, 2021  TIME: 7:25 AM    * This note was completed in part utilizing [employer] fluency direct voice recognition software  Grammatical errors, random word insertion, spelling mistakes, and incomplete sentences may be an occasional consequence of the system secondary to software limitations, ambient noise and hardware issues  At the time of dictation, efforts were made to edit, clarify and /or correct errors  Please read the chart carefully and recognize, using context, where substitutions have occurred  If you have any questions or concerns about the context, text or information contained within the body of this dictation, please contact myself, the provider, for further clarification

## 2021-05-18 NOTE — PLAN OF CARE
Problem: Potential for Falls  Goal: Patient will remain free of falls  Description: INTERVENTIONS:  - Assess patient frequently for physical needs  -  Identify cognitive and physical deficits and behaviors that affect risk of falls    -  [place] fall precautions as indicated by assessment   - Educate patient/family on patient safety including physical limitations  - Instruct patient to call for assistance with activity based on assessment  - Modify environment to reduce risk of injury  - Consider OT/PT consult to assist with strengthening/mobility  Outcome: Progressing     Problem: CARDIOVASCULAR - ADULT  Goal: Maintains optimal cardiac output and hemodynamic stability  Description: INTERVENTIONS:  - Monitor I/O, vital signs and rhythm  - Monitor for S/S and trends of decreased cardiac output  - Administer and titrate ordered vasoactive medications to optimize hemodynamic stability  - Assess quality of pulses, skin color and temperature  - Assess for signs of decreased coronary artery perfusion  - Instruct patient to report change in severity of symptoms  Outcome: Progressing  Goal: Absence of cardiac dysrhythmias or at baseline rhythm  Description: INTERVENTIONS:  - Continuous cardiac monitoring, vital signs, obtain 12 lead EKG if ordered  - Administer antiarrhythmic and heart rate control medications as ordered  - Monitor electrolytes and administer replacement therapy as ordered  Outcome: Progressing     Problem: PAIN - ADULT  Goal: Verbalizes/displays adequate comfort level or baseline comfort level  Description: Interventions:  - Encourage patient to monitor pain and request assistance  - Assess pain using appropriate pain scale  - Administer analgesics based on type and severity of pain and evaluate response  - Implement non-pharmacological measures as appropriate and evaluate response  - Consider cultural and social influences on pain and pain management  - Notify physician/advanced practitioner if interventions unsuccessful or patient reports new pain  Outcome: Progressing     Problem: DISCHARGE PLANNING  Goal: Discharge to home or other facility with appropriate resources  Description: INTERVENTIONS:  - Identify barriers to discharge w/patient and caregiver  - Arrange for needed discharge resources and transportation as appropriate  - Identify discharge learning needs (meds, wound care, etc )  - Arrange for interpretive services to assist at discharge as needed  - Refer to Case Management Department for coordinating discharge planning if the patient needs post-hospital services based on physician/advanced practitioner order or complex needs related to functional status, cognitive ability, or social support system  Outcome: Progressing     Problem: Knowledge Deficit  Goal: Patient/family/caregiver demonstrates understanding of disease process, treatment plan, medications, and discharge instructions  Description: Complete learning assessment and assess knowledge base    Interventions:  - Provide teaching at level of understanding  - Provide teaching via preferred learning methods  Outcome: Progressing     Problem: RESPIRATORY - ADULT  Goal: Achieves optimal ventilation and oxygenation  Description: INTERVENTIONS:  - Assess for changes in respiratory status  - Assess for changes in mentation and behavior  - Position to facilitate oxygenation and minimize respiratory effort  - Oxygen administered by appropriate delivery if ordered  - Initiate smoking cessation education as indicated  - Encourage broncho-pulmonary hygiene including cough, deep breathe, Incentive Spirometry  - Assess the need for suctioning and aspirate as needed  - Assess and instruct to report SOB or any respiratory difficulty  - Respiratory Therapy support as indicated  Outcome: Not Progressing

## 2021-05-18 NOTE — PROGRESS NOTES
Progress Note - Cardiology   Kim Tyson 78 y o  male MRN: [id number]  Unit/Bed#: -01 Encounter: [id number]  05/18/21  12:58 PM    Impression and Plan:    27-year-old who presented with anginal symptoms and congestive heart failure to [facility] , with severe/critical aortic stenosis with severe MR, transferred here for cardiac surgical evaluation  Plan is for aortic valve replacement with mitral valve repair/replacement on 5/20/2021      Plan:    Severe symptomatic AS with moderate to severe MR:  for AVR with or without mitral valve intervention post AVR  nonobstructive CAD on coronary angiography  Euvolemic    Mild cardiomyopathy:  Likely secondary to valvular heart disease,     hypertension:  Controlled    Dyslipidemia:  Controlled      ===================================================================    Chief Complaint: No chief complaint on file  Subjective/Objective     Subjective:  Denies any complaints    Objective:  No distress at the time of exam    Patient Active Problem List   Diagnosis    Aortic valve stenosis - severe    Chronic obstructive lung disease ([address] )    Erectile dysfunction of non-organic origin    Hyperlipidemia    Essential hypertension    Rotator cuff tendinitis Left    [facility]STEMI [facility]non-ST elevated [facility] infarction) [facility]    GERD (gastroesophageal reflux disease)    Acute combined systolic and diastolic CHF, NYHA class 1 ([facility])    Arthritis       Vitals: BP 94/60 (BP Location: Right arm)   Pulse 64   Temp 97 6 °F (36 4 °C) (Oral)   Resp 15   Ht 6' 2" (1 88 m)   Wt 105 kg (230 lb 9 6 oz)   SpO2 96%   BMI 29 61 kg/m²     No intake/output data recorded    Wt Readings from Last 3 Encounters:   05/18/21 105 kg (230 lb 9 6 oz)   05/11/21 112 kg (246 lb 14 6 oz)   05/11/21 113 kg (250 lb)       Intake/Output Summary (Last 24 hours) at 5/18/2021 1258  Last data filed at 5/18/2021 0501  Gross per 24 hour   Intake 1811 25 ml   Output --   Net 1811 25 ml I/O last 3 completed shifts: In: 3267 9 [P O :1370; I V :1897 9]  Out: 900 [Urine:900]    Invasive Devices     Peripheral Intravenous Line            Peripheral IV 05/17/21 Left Forearm 1 day                  Physical Exam:  GEN: Susy Rogers appears well, alert and oriented x 3, pleasant and cooperative   HEENT: pupils equal, round, and reactive to light; extraocular muscles intact  NECK: supple, no carotid bruits or JVD  HEART: regular rhythm, normal S1 and S2, ejection systolic murmur, no clicks, gallops or rubs , decreased pulse volume  LUNGS: clear to auscultation bilaterally; no wheezes or rhonchi, no rales  ABDOMEN/GI: normal bowel sounds, soft, no tenderness, no distention  EXTREMITIES/Musculoskeltal: peripheral pulses normal; no clubbing, cyanosis, no edema  NEURO: no focal motor findings   SKIN: normal without suspicious lesions on exposed skin              Lab Results:   Results from last 7 days   Lab Units 05/12/21  0327 05/12/21  0016 05/11/21  2059   TROPONIN I ng/mL 0 39* 0 24* 0 16*     Results from last 7 days   Lab Units 05/17/21  0602 05/13/21  0605 05/12/21  0335   WBC Thousand/uL 8 07 9 33 12 66*   HEMOGLOBIN g/dL 13 7 13 0 12 7   HEMATOCRIT % 42 1 41 3 40 3   PLATELETS Thousands/uL 235 227 223         Results from last 7 days   Lab Units 05/17/21  0602 05/14/21  0451 05/13/21  0605 05/11/21  1742   POTASSIUM mmol/L 4 2 4 2 4 1   < > 4 3   CHLORIDE mmol/L 106 104 108   < > 106   CO2 mmol/L 28 29 26   < > 27   BUN mg/dL 20 19 16   < > 14   CREATININE mg/dL 0 80 0 66 0 76   < > 0 84   CALCIUM mg/dL 9 3 9 1 8 8   < > 9 4   ALK PHOS U/L  --   --   --   --  73   ALT U/L  --   --   --   --  23   AST U/L  --   --   --   --  14    < > = values in this interval not displayed  Results from last 7 days   Lab Units 05/17/21  0602 05/12/21  0327 05/11/21  1742   INR  1 02 1 13 1 05       Imaging: I have personally reviewed pertinent reports      EKG/Telemtry:  No events    Scheduled Meds:  Current Facility-Administered Medications   Medication Dose Route Frequency Provider Last Rate    acetaminophen  975 mg Oral Q6H PRN [facility], DMD      albuterol  2 puff Inhalation Q4H PRN [facility], DMD      aspirin  81 mg Oral Daily [facility], DMD      atorvastatin  40 mg Oral QPM [facility], DMD      Diclofenac Sodium  2 g Topical TID PRN [facility], DMD      fluticasone-vilanterol  1 puff Inhalation Daily [facility], DMD      heparin (porcine)  5,000 Units Subcutaneous [facility], DMD      metoprolol tartrate  25 mg Oral Q12H [address] [facility], DMD      nicotine  1 patch Transdermal Daily [facility], DMD      nitroglycerin  0 4 mg Sublingual Q5 Min PRN [facility], DMD      ondansetron  4 mg Intravenous Q6H PRN [facility], DMD      pantoprazole  40 mg Oral Early Morning [facility], DMD       Facility-Administered Medications Ordered in Other Encounters   Medication Dose Route Frequency Provider Last Rate    ceFAZolin    PRN Deon Couch, CRNA      ketamine   Intravenous PRN Deon Couch, CRNA      norepinephrine    PRN Deon Couch, CRNA      propofol   Intravenous Continuous PRN Deno Couch, CRNA Stopped (05/17/21 1518)    propofol   Intravenous PRN Deon Couch, CRNA       Continuous Infusions:       VTE Pharmacologic Prophylaxis: Heparin  VTE Mechanical Prophylaxis: sequential compression device    This note was completed in part utilizing [employer] direct voice recognition software  Grammatical errors, random word insertion, spelling mistakes, occasional wrong word or "sound-alike" substitutions and incomplete sentences may be an occasional consequence of the system secondary to software limitations, ambient noise and hardware issues  At the time of dictation, efforts were made to edit, clarify and /or correct errors    Please read the chart carefully and recognize, using context, where substitutions have occurred  If you have any questions or concerns about the context, text or information contained within the body of this dictation, please contact myself, the provider, for further clarification

## 2021-05-18 NOTE — ASSESSMENT & PLAN NOTE
Wt Readings from Last 3 Encounters:   05/18/21 105 kg (230 lb 9 6 oz)   05/11/21 112 kg (246 lb 14 6 oz)   05/11/21 113 kg (250 lb)     Monitor intake and output   Monitor daily weights  · EF of 42% with grade 3 diastolic dysfunction, severe AS and Mod-severe MR  · Monitor volume status  · Low Na diet  · Defer need for diuretics to cardiology

## 2021-05-18 NOTE — ASSESSMENT & PLAN NOTE
Patient presented 05/11 for cardiac catheterization as an NSTEMI transfer from [facility]   · Clean cath  · Known hx of severe - critical AS   · KATIA (05/14) - with aortic valve area of 0 3cm2  · Also with significant mitral valve disease  · Cardiac surgery completing pre-surgical workup--planning for double valve repair (AVR/MVR) on 5/20  · S/P dental extractions of 5/17  · Appreciate cardiology input

## 2021-05-18 NOTE — ASSESSMENT & PLAN NOTE
Transferred from [facility] for a cardiac catheterization for presumed NSTEMI, also with severe valve stenosis  · Echocardiogram with EF of 42%  · Continue aspirin, atorvastatin  · Patient was started on metoprolol 25mg BID  · S/P cardiac catheterization which showed patent coronaries  Heparin gtt was discontinued    · See above

## 2021-05-18 NOTE — PROGRESS NOTES
[facility]  Progress Note - Amy Bergman 1955, 78 y o  male MRN: [id number]  Unit/Bed#: -01 Encounter: [id number]  Primary Care Provider: Kelsy Gallardo DO   Date and time admitted to hospital: 5/12/2021  9:34 PM    * Aortic valve stenosis - severe  Assessment & Plan  Patient presented 05/11 for cardiac catheterization as an NSTEMI transfer from [facility]   · Clean cath  · Known hx of severe - critical AS   · KATIA (05/14) - with aortic valve area of 0 3cm2  · Also with significant mitral valve disease  · Cardiac surgery completing pre-surgical workup--planning for double valve repair (AVR/MVR) on 5/20  · S/P dental extractions of 5/17  · Appreciate cardiology input     Acute combined systolic and diastolic CHF, NYHA class 1 ([facility] [address] )  Assessment & Plan  Wt Readings from Last 3 Encounters:   05/18/21 105 kg (230 lb 9 6 oz)   05/11/21 112 kg (246 lb 14 6 oz)   05/11/21 113 kg (250 lb)     Monitor intake and output  Monitor daily weights  · EF of 42% with grade 3 diastolic dysfunction, severe AS and Mod-severe MR  · Monitor volume status  · Low Na diet  · Defer need for diuretics to cardiology        NSTEMI (non-ST elevated myocardial infarction) [facility])  Assessment & Plan  Transferred from [facility] for a cardiac catheterization for presumed NSTEMI, also with severe valve stenosis  · Echocardiogram with EF of 42%  · Continue aspirin, atorvastatin  · Patient was started on metoprolol 25mg BID  · S/P cardiac catheterization which showed patent coronaries  Heparin gtt was discontinued    · See above      Arthritis  Assessment & Plan  C/o generalized arthritis from laying in bed   · Typically takes Excedrin at home  · Will hold that given surgical plans  · Supportive care with tylenol  · Refused lidoderm patch    Essential hypertension  Assessment & Plan  BP is acceptable  · Continue metoprolol 25 mg BID  · Losartan and amlodipine on hold    Hyperlipidemia  Assessment & Plan  FLP WNL  · Continue atorvastatin 40mg/day    Chronic obstructive lung disease (HCC)  Assessment & Plan  No wheezing at this time  · Continue breo        VTE Pharmacologic Prophylaxis: VTE Score: 2 Moderate Risk (Score 3-4) - Pharmacological DVT Prophylaxis Ordered: heparin  Patient Centered Rounds: I performed bedside rounds with nursing staff today  Discussions with Specialists or Other Care Team Provider: appreciate CT surgery and Cardiology  D/W CM    Education and Discussions with Family / Patient: Patient declined call to   Time Spent for Care: 30 minutes  More than 50% of total time spent on counseling and coordination of care as described above  Current Length of Stay: 6 day(s)  Current Patient Status: Inpatient   Certification Statement: The patient will continue to require additional inpatient hospital stay due to CT surgery on   Discharge Plan: none at this time given plans for surgery on     Code Status: Level 1 - Full Code    Subjective:   Doing ok  Minimal mouth soreness 2/2 extractions  No CP/SOB  Objective:     Vitals:   Temp (24hrs), Av 6 °F (36 4 °C), Min:97 3 °F (36 3 °C), Max:98 °F (36 7 °C)    Temp:  [97 3 °F (36 3 °C)-98 °F (36 7 °C)] 97 8 °F (36 6 °C)  HR:  [59-65] 60  Resp:  [5-18] 18  BP: ()/(60-71) 98/60  SpO2:  [90 %-97 %] 96 %  Body mass index is 29 61 kg/m²  Input and Output Summary (last 24 hours): Intake/Output Summary (Last 24 hours) at 2021 0937  Last data filed at 2021 0501  Gross per 24 hour   Intake 1811 25 ml   Output --   Net 1811 25 ml       Physical Exam:   Physical Exam  Vitals signs and nursing note reviewed  Constitutional:       Appearance: He is obese  Comments: On RA    Cardiovascular:      Heart sounds: Murmur present  Pulmonary:      Effort: No respiratory distress  Abdominal:      General: There is no distension  Tenderness: There is no abdominal tenderness     Musculoskeletal:      Right lower leg: No edema  Left lower leg: No edema  Skin:     Coloration: Skin is pale  Neurological:      Mental Status: He is oriented to person, place, and time  Psychiatric:         Mood and Affect: Mood normal         Additional Data:     Labs:  Results from last 7 days   Lab Units 05/17/21  0602   WBC Thousand/uL 8 07   HEMOGLOBIN g/dL 13 7   HEMATOCRIT % 42 1   PLATELETS Thousands/uL 235   NEUTROS PCT % 56   LYMPHS PCT % 28   MONOS PCT % 12   EOS PCT % 4     Results from last 7 days   Lab Units 05/17/21  0602  05/11/21  1742   SODIUM mmol/L 138   < > 140   POTASSIUM mmol/L 4 2   < > 4 3   CHLORIDE mmol/L 106   < > 106   CO2 mmol/L 28   < > 27   BUN mg/dL 20   < > 14   CREATININE mg/dL 0 80   < > 0 84   ANION GAP mmol/L 4   < > 7   CALCIUM mg/dL 9 3   < > 9 4   ALBUMIN g/dL  --   --  3 9   TOTAL BILIRUBIN mg/dL  --   --  0 78   ALK PHOS U/L  --   --  73   ALT U/L  --   --  23   AST U/L  --   --  14   GLUCOSE RANDOM mg/dL 94   < > 131    < > = values in this interval not displayed       Results from last 7 days   Lab Units 05/17/21  0602   INR  1 02         Results from last 7 days   Lab Units 05/13/21  1150   HEMOGLOBIN A1C % 5 8*           Lines/Drains:  Invasive Devices     Peripheral Intravenous Line            Peripheral IV 05/17/21 Left Forearm 1 day                  Telemetry:  Telemetry Orders (From admission, onward)             48 Hour Telemetry Monitoring  Continuous x 48 hours     Question:  Reason for 48 Hour Telemetry  Answer:  Cardiac Surgery                 Telemetry Reviewed: Normal Sinus Rhythm  Indication for Continued Telemetry Use: Awaiting PCI/EP Study/CABG           Imaging: none reviewed today    Recent Cultures (last 7 days):         Last 24 Hours Medication List:   Current Facility-Administered Medications   Medication Dose Route Frequency Provider Last Rate    acetaminophen  975 mg Oral Q6H PRN [facility], DMD      albuterol  2 puff Inhalation Q4H PRN [facility], DMD  aspirin  81 mg Oral Daily [facility], DMD      atorvastatin  40 mg Oral QPM [facility], DMD      Diclofenac Sodium  2 g Topical TID PRN [facility], DMD      fluticasone-vilanterol  1 puff Inhalation Daily [facility], DMD      heparin (porcine)  5,000 Units Subcutaneous [facility], DMD      metoprolol tartrate  25 mg Oral Q12H [address] [facility], DMD      nicotine  1 patch Transdermal Daily [facility], DMD      nitroglycerin  0 4 mg Sublingual Q5 Min PRN [facility], DMD      ondansetron  4 mg Intravenous Q6H PRN [facility], DMD      pantoprazole  40 mg Oral Early Morning [facility], DMD       Facility-Administered Medications Ordered in Other Encounters   Medication Dose Route Frequency Provider Last Rate    ceFAZolin    PRN Deon Dale CRNA      ketamine   Intravenous PRN Deon Dale CRNA      norepinephrine    PRN Deon Dale CRNA      propofol   Intravenous Continuous PRN Deon Dale CRNA Stopped (05/17/21 8298)    propofol   Intravenous PRN Deon Dale CRNA          Today, Patient Was Seen By: Edilia Figueroa PA-C    **Please Note: This note may have been constructed using a voice recognition system  **

## 2021-05-18 NOTE — ASSESSMENT & PLAN NOTE
C/o generalized arthritis from laying in bed   · Typically takes Excedrin at home  · Will hold that given surgical plans  · Supportive care with tylenol  · Refused lidoderm patch

## 2021-05-19 ENCOUNTER — ANESTHESIA EVENT (INPATIENT)
Dept: PERIOP | Facility: HOSPITAL | Age: 66
DRG: 216 | End: 2021-05-19
Payer: COMMERCIAL

## 2021-05-19 LAB
ABO GROUP BLD: NORMAL
ABO GROUP BLD: NORMAL
ANION GAP SERPL CALCULATED.3IONS-SCNC: 7 MMOL/L (ref 4–13)
BASOPHILS # BLD AUTO: 0.04 THOUSANDS/ΜL (ref 0–0.1)
BASOPHILS NFR BLD AUTO: 1 % (ref 0–1)
BLD GP AB SCN SERPL QL: NEGATIVE
BUN SERPL-MCNC: 13 MG/DL (ref 5–25)
CALCIUM SERPL-MCNC: 9.3 MG/DL (ref 8.3–10.1)
CHLORIDE SERPL-SCNC: 104 MMOL/L (ref 100–108)
CO2 SERPL-SCNC: 29 MMOL/L (ref 21–32)
CREAT SERPL-MCNC: 0.77 MG/DL (ref 0.6–1.3)
EOSINOPHIL # BLD AUTO: 0.22 THOUSAND/ΜL (ref 0–0.61)
EOSINOPHIL NFR BLD AUTO: 3 % (ref 0–6)
ERYTHROCYTE [DISTWIDTH] IN BLOOD BY AUTOMATED COUNT: 13.2 % (ref 11.6–15.1)
GFR SERPL CREATININE-BSD FRML MDRD: 94 ML/MIN/1.73SQ M
GLUCOSE SERPL-MCNC: 91 MG/DL (ref 65–140)
HCT VFR BLD AUTO: 42.5 % (ref 36.5–49.3)
HGB BLD-MCNC: 13.7 G/DL (ref 12–17)
IMM GRANULOCYTES # BLD AUTO: 0.02 THOUSAND/UL (ref 0–0.2)
IMM GRANULOCYTES NFR BLD AUTO: 0 % (ref 0–2)
LYMPHOCYTES # BLD AUTO: 2.26 THOUSANDS/ΜL (ref 0.6–4.47)
LYMPHOCYTES NFR BLD AUTO: 32 % (ref 14–44)
MCH RBC QN AUTO: 30.5 PG (ref 26.8–34.3)
MCHC RBC AUTO-ENTMCNC: 32.2 G/DL (ref 31.4–37.4)
MCV RBC AUTO: 95 FL (ref 82–98)
MONOCYTES # BLD AUTO: 0.93 THOUSAND/ΜL (ref 0.17–1.22)
MONOCYTES NFR BLD AUTO: 13 % (ref 4–12)
NEUTROPHILS # BLD AUTO: 3.61 THOUSANDS/ΜL (ref 1.85–7.62)
NEUTS SEG NFR BLD AUTO: 51 % (ref 43–75)
NRBC BLD AUTO-RTO: 0 /100 WBCS
PLATELET # BLD AUTO: 220 THOUSANDS/UL (ref 149–390)
PMV BLD AUTO: 8.9 FL (ref 8.9–12.7)
POTASSIUM SERPL-SCNC: 3.8 MMOL/L (ref 3.5–5.3)
RBC # BLD AUTO: 4.49 MILLION/UL (ref 3.88–5.62)
RH BLD: POSITIVE
RH BLD: POSITIVE
SODIUM SERPL-SCNC: 140 MMOL/L (ref 136–145)
SPECIMEN EXPIRATION DATE: NORMAL
WBC # BLD AUTO: 7.08 THOUSAND/UL (ref 4.31–10.16)

## 2021-05-19 PROCEDURE — 99232 SBSQ HOSP IP/OBS MODERATE 35: CPT | Performed by: INTERNAL MEDICINE

## 2021-05-19 PROCEDURE — NC001 PR NO CHARGE: Performed by: PHYSICIAN ASSISTANT

## 2021-05-19 PROCEDURE — 85025 COMPLETE CBC W/AUTO DIFF WBC: CPT | Performed by: INTERNAL MEDICINE

## 2021-05-19 PROCEDURE — 99292 CRITICAL CARE ADDL 30 MIN: CPT | Performed by: INTERNAL MEDICINE

## 2021-05-19 PROCEDURE — 80048 BASIC METABOLIC PNL TOTAL CA: CPT | Performed by: INTERNAL MEDICINE

## 2021-05-19 PROCEDURE — 86920 COMPATIBILITY TEST SPIN: CPT

## 2021-05-19 PROCEDURE — 86901 BLOOD TYPING SEROLOGIC RH(D): CPT | Performed by: DENTIST

## 2021-05-19 PROCEDURE — 86850 RBC ANTIBODY SCREEN: CPT | Performed by: DENTIST

## 2021-05-19 PROCEDURE — 86900 BLOOD TYPING SEROLOGIC ABO: CPT | Performed by: DENTIST

## 2021-05-19 RX ORDER — HEPARIN SODIUM 1000 [USP'U]/ML
400 INJECTION, SOLUTION INTRAVENOUS; SUBCUTANEOUS ONCE
Status: CANCELLED | OUTPATIENT
Start: 2021-05-20

## 2021-05-19 RX ORDER — HEPARIN SODIUM 1000 [USP'U]/ML
10000 INJECTION, SOLUTION INTRAVENOUS; SUBCUTANEOUS ONCE
Status: CANCELLED | OUTPATIENT
Start: 2021-05-20

## 2021-05-19 RX ORDER — CHLORHEXIDINE GLUCONATE 0.12 MG/ML
15 RINSE ORAL EVERY 12 HOURS SCHEDULED
Status: DISCONTINUED | OUTPATIENT
Start: 2021-05-19 | End: 2021-05-20

## 2021-05-19 RX ADMIN — CHLORHEXIDINE GLUCONATE 15 ML: 1.2 SOLUTION ORAL at 10:48

## 2021-05-19 RX ADMIN — ACETAMINOPHEN 975 MG: 325 TABLET, FILM COATED ORAL at 06:19

## 2021-05-19 RX ADMIN — HEPARIN SODIUM 5000 UNITS: 5000 INJECTION INTRAVENOUS; SUBCUTANEOUS at 21:54

## 2021-05-19 RX ADMIN — NICOTINE 1 PATCH: 21 PATCH, EXTENDED RELEASE TRANSDERMAL at 08:53

## 2021-05-19 RX ADMIN — METOPROLOL TARTRATE 25 MG: 25 TABLET, FILM COATED ORAL at 21:55

## 2021-05-19 RX ADMIN — PANTOPRAZOLE SODIUM 40 MG: 40 TABLET, DELAYED RELEASE ORAL at 06:17

## 2021-05-19 RX ADMIN — HEPARIN SODIUM 5000 UNITS: 5000 INJECTION INTRAVENOUS; SUBCUTANEOUS at 13:16

## 2021-05-19 RX ADMIN — ASPIRIN 81 MG: 81 TABLET, COATED ORAL at 08:53

## 2021-05-19 RX ADMIN — CHLORHEXIDINE GLUCONATE 15 ML: 1.2 SOLUTION ORAL at 21:54

## 2021-05-19 RX ADMIN — METOPROLOL TARTRATE 25 MG: 25 TABLET, FILM COATED ORAL at 08:53

## 2021-05-19 RX ADMIN — FLUTICASONE FUROATE AND VILANTEROL TRIFENATATE 1 PUFF: 100; 25 POWDER RESPIRATORY (INHALATION) at 10:48

## 2021-05-19 RX ADMIN — HEPARIN SODIUM 5000 UNITS: 5000 INJECTION INTRAVENOUS; SUBCUTANEOUS at 06:17

## 2021-05-19 RX ADMIN — ATORVASTATIN CALCIUM 40 MG: 40 TABLET, FILM COATED ORAL at 17:48

## 2021-05-19 NOTE — PROGRESS NOTES
General Cardiology   Progress Note -  Team One   Shaquille Maurer 78 y o  male MRN: [id number]    Unit/Bed#: [facility] 424-01 Encounter: [id number]    Assessment  1  Symptomatic severe AS and mild-to-moderate MR   -Presented to the ED on 5/12 w/ exertional CP, dyspnea, and easy fatigability  -CT surgery following -- preoperative workup underway  Tentative surgical intervention planned for 5/20 with AVR/MV repair vs replacement  2  Non ischemic cardiomyopathy LVEF 42%  -On PE appears to be euvolemic  -Subjectively denies any significant SOB, MATTHEW orthopnea  -NT proBNP 4493  -KATIA 5/14; LVEF 45%, moderate diffuse hypokinesis, wall thickness moderately increased, grade 2 DD, RV mildly dilated, systolic function moderately reduced, PFO (measuring 5 mm) there was left-to-right shunt, RA mildly dilated, mild-to-moderate MR, severe AS/mild-to-moderate AI (MG 44 mmHg, PG 85 mmHg)  -Currently on no diuretics  -GDMT; metoprolol tartrate 25 mg b i d; no ACEI/ARB in the setting of severe AS and tentative surgical intervention this admission      3  Nonobstructive CAD  4  Troponin elevation -- non MI elevation related to # 1 and # 2  -[facility] 5/13; moderate nonobstructive atherosclerosis of the LAD, diffuse atherosclerosis of the branch vessels of the circumflex  -On aspirin, statin, and BB     5  Essential hypertension  -Average /68, last recorded 111/74, HR 67  -On metoprolol tartrate 25 mg b i d      6  Dyslipidemia  -Lipid profile 5/13/2021; cholesterol 103, triglycerides 129, HDL 49, LDL 28  -On atorvastatin 40 mg daily     7  Nicotine dependence; smoking cessation advised     Plan  -Tentative AVR/MV repair vs replacement on 5/20  -Continue aspirin, statin, and BB at current doses  -Monitor renal function electrolytes closely replete to maintain K + at 4 0, magnesium at 2 0   -Continue to monitor on telemetry    Subjective  Review of Systems   Constitution: Negative for chills, fever and malaise/fatigue     Eyes: Negative for visual disturbance  Cardiovascular: Negative for chest pain, dyspnea on exertion, irregular heartbeat, leg swelling, near-syncope, orthopnea and palpitations  Respiratory: Negative for cough and shortness of breath  Gastrointestinal: Negative for abdominal pain  Neurological: Negative for dizziness, headaches and light-headedness  Objective:   Physical Exam  Vitals signs and nursing note reviewed  Constitutional:       General: He is not in acute distress  Appearance: Normal appearance  He is obese  He is not ill-appearing  HENT:      Head: Normocephalic and atraumatic  Mouth/Throat:      Mouth: Mucous membranes are moist    Eyes:      General: No scleral icterus  Neck:      Musculoskeletal: Neck supple  Cardiovascular:      Rate and Rhythm: Normal rate and regular rhythm  Pulses: Normal pulses  Heart sounds: Murmur present  Pulmonary:      Effort: Pulmonary effort is normal       Breath sounds: Normal breath sounds  No wheezing or rales  Abdominal:      Palpations: Abdomen is soft  Musculoskeletal:      Right lower leg: No edema  Left lower leg: No edema  Skin:     General: Skin is warm and dry  Capillary Refill: Capillary refill takes less than 2 seconds  Neurological:      General: No focal deficit present  Mental Status: He is alert and oriented to person, place, and time  Psychiatric:         Mood and Affect: Mood normal          Vitals: Blood pressure 111/74, pulse 67, temperature 97 8 °F (36 6 °C), resp  rate 20, height 6' 2" (1 88 m), weight 105 kg (230 lb 9 6 oz), SpO2 94 %  ,     Body mass index is 29 61 kg/m²  ,   Systolic (92UHS), FFF:367 , Min:94 , RCF:773     Diastolic (17HJF), ADELA:81, Min:60, Max:78      Intake/Output Summary (Last 24 hours) at 5/19/2021 0958  Last data filed at 5/19/2021 0001  Gross per 24 hour   Intake --   Output 600 ml   Net -600 ml     Weight (last 2 days)     Date/Time   Weight    05/19/21 0600   105 (230 6) 05/18/21 0458   105 (230 6)    05/17/21 0600   107 (235 23)              LABORATORY RESULTS      CBC with diff:   Results from last 7 days   Lab Units 05/19/21  0439 05/17/21  0602 05/13/21  0605   WBC Thousand/uL 7 08 8 07 9 33   HEMOGLOBIN g/dL 13 7 13 7 13 0   HEMATOCRIT % 42 5 42 1 41 3   MCV fL 95 96 96   PLATELETS Thousands/uL 220 235 227   MCH pg 30 5 31 1 30 3   MCHC g/dL 32 2 32 5 31 5   RDW % 13 2 13 2 13 9   MPV fL 8 9 9 0 9 1   NRBC AUTO /100 WBCs 0 0 0       CMP:  Results from last 7 days   Lab Units 05/19/21  0439 05/17/21  0602 05/14/21  0451 05/13/21  0605   POTASSIUM mmol/L 3 8 4 2 4 2 4 1   CHLORIDE mmol/L 104 106 104 108   CO2 mmol/L 29 28 29 26   BUN mg/dL 13 20 19 16   CREATININE mg/dL 0 77 0 80 0 66 0 76   CALCIUM mg/dL 9 3 9 3 9 1 8 8   EGFR ml/min/1 73sq m 94 92 100 94       BMP:  Results from last 7 days   Lab Units 05/19/21  0439 05/17/21  0602 05/14/21  0451 05/13/21  0605   POTASSIUM mmol/L 3 8 4 2 4 2 4 1   CHLORIDE mmol/L 104 106 104 108   CO2 mmol/L 29 28 29 26   BUN mg/dL 13 20 19 16   CREATININE mg/dL 0 77 0 80 0 66 0 76   CALCIUM mg/dL 9 3 9 3 9 1 8 8       Lab Results   Component Value Date    NTBNP 4,493 (H) 05/11/2021    NTBNP 3,228 (H) 05/10/2021              Results from last 7 days   Lab Units 05/13/21  1150   HEMOGLOBIN A1C % 5 8*             Results from last 7 days   Lab Units 05/17/21  0602   INR  1 02       Lipid Profile:   Lab Results   Component Value Date    CHOL 209 08/07/2014     Lab Results   Component Value Date    HDL 49 05/13/2021    HDL 39 08/07/2014     Lab Results   Component Value Date    LDLCALC 28 05/13/2021    LDLCALC 126 (H) 08/07/2014     Lab Results   Component Value Date    TRIG 129 05/13/2021    TRIG 220 08/07/2014       Cardiac testing:   Results for orders placed during the hospital encounter of 05/11/21   Echo complete with contrast if indicated    Narrative Pending sale to [facility]  Nadia Richard [address] [place] [id number]  [phone]    Transthoracic Echocardiogram  2D, M-mode, Doppler, and Color Doppler    Study date:  12-May-2021    Patient: Tiffany Zhu  MR number: [id number]  Account number: [de-identified]  : 1955  Age: 78 years  Gender: Male  Status: Outpatient  Location: Bedside  Height: 74 in  Weight: 245 5 lb  BP: 102/ 70 mmHg    Indications: Chest pain  Diagnoses: R07 9 - Chest pain, unspecified    Sonographer:  Linda Rios RDCS  Primary Physician:  Franci Randall DO  Referring Physician:  Grey Mi DO  Group:  [id number]  Interpreting Physician:  Tay Rogers MD    SUMMARY    LEFT VENTRICLE:  Moderate decreased left ventricular systolic function, EF 22%  Significantly decreased left ventricular global longitudinal strain, minus 7 4%  Severe concentric left ventricular hypertrophy, wall thickness 21 mm  Mildly increased left  ventricular cavity size  Grade 3 left ventricular diastolic dysfunction  Severely elevated left ventricular filling pressures  RIGHT VENTRICLE:  Mild right ventricular enlargement with well preserved right ventricular systolic function  LEFT ATRIUM:  Moderate left atrial enlargement  RIGHT ATRIUM:  Mild right atrial enlargement  MITRAL VALVE:  Moderate possibly severe mitral regurgitation  No mitral stenosis  Mitral ERO 0 5 cm2  Mitral valve regurgitant volume 85 mL  The mitral valve annulus was heavily calcified  The mitral valve leaflets were calcified but have well preserved  mobility  There was some calcification in the chordae  AORTIC VALVE:  Heavily calcified aortic valve with severe, probably critical aortic stenosis  No significant aortic regurgitation  Aortic valve maximum velocity 5 3 M/S  Aortic valve mean gradient 81 mmHg  Aortic valve area 0 3 cm2  TRICUSPID VALVE:  There was mild regurgitation      AORTA:  Aortic root at the upper limits of normal to mildly enlarged at 3 8 cm    PULMONARY ARTERIES:  Moderate pulmonary hypertension  Pulmonary artery systolic pressures estimated 54 mmHg  PERICARDIUM:  Mild pericardial effusion without hemodynamic significance  SUMMARY MEASUREMENTS  2D measurements:  Unspecified Anatomy:   %FS was 25 4 %  AA PSSL Full was -8 3 %  AAS PSSL Full was -15 1 %  AI PSSL Full was -12 5 %  AL PSSL Full was -5 3 %  AP PSSL Full was -5 4 %  AS PSSL Full was -20 9 %  AVC was 378 3 ms  Ao Diam was 3 4 cm  Ao asc was 3 8 cm  BA PSSL Full was -2 8 %  BAS PSSL Full was -2 6 %  BI PSSL Full was -13 5 %  BL PSSL Full was -6 9 %  BP PSSL Full was -4 2 %  BS PSSL Full was -4 1 %  EDV(Teich) was 136 1 ml   EF(Teich) was 49 7 %  ESV(Teich)  was 68 5 ml   G peak SL Full(A2C) was -7 5 %  G peak SL Full(A4C) was -8 5 %  G peak SL Full(APLAX) was -6 1 %  G peak SL Full(Avg) was -7 4 %  IVSd was 2 1 cm  LA Area was 31 1 cm2  LA Diam was 6 3 cm  LAAs A4C was 30 2 cm2  LAESV  A-L A4C was 122 9 ml  LAESV MOD A4C was 116 1 ml  LALs A4C was 6 3 cm  LVEDV MOD A4C was 217 9 ml  LVEF MOD A4C was 42 2 %  LVESV MOD A4C was 126 ml  LVIDd was 5 3 cm  LVIDs was 4 cm  LVLd A4C was 10 9 cm  LVLs A4C was 10 cm  LVOT Diam was 2 cm  LVPWd was 2 1 cm  MA PSSL Full was -4 3 %  MAS PSSL Full was -10 5 %  MI PSSL Full was -5 %  ML PSSL Full was -3 1 %  MP PSSL Full was 3 2 %  MS PSSL Full was -10 8 %  Peak SL Dispersion Full was 61 5 ms  RA  Area was 21 1 cm2  RVIDd was 5 cm  RWT was 0 8   SV MOD A4C was 91 9 ml   SV(Teich) was 67 6 ml   CF measurements:  Unspecified Anatomy:   MR Als  Marito was 0 4 m/s  MR Flow was 255 2 ml/s  MR Rad was 1 1 cm  MR Trace was 8 9 cm2   CW measurements:  Unspecified Anatomy:   AV Env  Ti was 287 3 ms   AV VTI was 125 6 cm   AV Vmax was 5 3 m/s  AV Vmean was 4 4 m/s  AV maxPG was 114 3 mmHg  AV meanPG was 80 6 mmHg  MR VTI was 162 5 cm  MR Vmax was 4 9 m/s   TR Vmax was 3 1 m/s  TR  maxPG was 39 3 mmHg    MM measurements:  Unspecified Anatomy: TAPSE was 2 cm  PW measurements:  Unspecified Anatomy:   HUMBERTO (VTI) was 0 3 cm2  HUMBERTO Vmax was 0 3 cm2  AVAI (VTI) was 0 cm2/m2  AVAI Vmax was 0 cm2/m2  DVI was 0 1   E' Sept was 0 m/s  E/E' Sept was 44 1   LVOT Env  Ti was 303 2 ms  LVOT VTI was 13 7 cm  LVOT Vmax  was 0 6 m/s  LVOT Vmean was 0 5 m/s  LVOT maxPG was 1 4 mmHg  LVOT meanPG was 0 9 mmHg  LVSI Dopp was 17 8 ml/m2  LVSV Dopp was 42 3 ml   MR ERO was 0 5 cm2  MR RV was 85 2 ml   MV A Marito was 0 6 m/s  MV Dec Jay was 11 4 m/s2  MV  DecT was 136 5 ms   MV E Marito was 1 6 m/s  MV E/A Ratio was 2 4   MV PHT was 39 6 ms  MVA By PHT was 5 6 cm2  HISTORY: PRIOR HISTORY: Severe AS  COPD  Hyperlipidemia  Hypertension  Shortness of breath  Chest pain  GERD  Smoker  LVH  PROCEDURE: The procedure was performed at the bedside  This was a routine study  The transthoracic approach was used  The study included complete 2D imaging, M-mode, complete spectral Doppler, and color Doppler  The heart rate was 80 bpm,  at the start of the study  Images were obtained from the parasternal, apical, subcostal, and suprasternal notch acoustic windows  Image quality was adequate  LEFT VENTRICLE: Moderate decreased left ventricular systolic function, EF 32%  Significantly decreased left ventricular global longitudinal strain, minus 7 4%  Severe concentric left ventricular hypertrophy, wall thickness 21 mm  Mildly  increased left ventricular cavity size  Grade 3 left ventricular diastolic dysfunction  Severely elevated left ventricular filling pressures  RIGHT VENTRICLE: Mild right ventricular enlargement with well preserved right ventricular systolic function  LEFT ATRIUM: Moderate left atrial enlargement  RIGHT ATRIUM: Mild right atrial enlargement  MITRAL VALVE: Moderate possibly severe mitral regurgitation  No mitral stenosis  Mitral ERO 0 5 cm2  Mitral valve regurgitant volume 85 mL  The mitral valve annulus was heavily calcified   The mitral valve leaflets were calcified but have  well preserved mobility  There was some calcification in the chordae  AORTIC VALVE: Heavily calcified aortic valve with severe, probably critical aortic stenosis  No significant aortic regurgitation  Aortic valve maximum velocity 5 3 M/S  Aortic valve mean gradient 81 mmHg  Aortic valve area 0 3 cm2  TRICUSPID VALVE: The valve structure was normal  There was normal leaflet separation  DOPPLER: The transtricuspid velocity was within the normal range  There was no evidence for stenosis  There was mild regurgitation  PULMONIC VALVE: DOPPLER: The transpulmonic velocity was within the normal range  There was no regurgitation  PERICARDIUM: Mild pericardial effusion without hemodynamic significance  AORTA: Aortic root at the upper limits of normal to mildly enlarged at 3 8 cm    PULMONARY ARTERY: Moderate pulmonary hypertension  Pulmonary artery systolic pressures estimated 54 mmHg      SYSTEM MEASUREMENT TABLES    2D  %FS: 25 4 %  AA PSSL Full: -8 3 %  AAS PSSL Full: -15 1 %  AI PSSL Full: -12 5 %  AL PSSL Full: -5 3 %  AP PSSL Full: -5 4 %  AS PSSL Full: -20 9 %  AVC: 378 3 ms  Ao Diam: 3 4 cm  Ao asc: 3 8 cm  BA PSSL Full: -2 8 %  BAS PSSL Full: -2 6 %  BI PSSL Full: -13 5 %  BL PSSL Full: -6 9 %  BP PSSL Full: -4 2 %  BS PSSL Full: -4 1 %  EDV(Teich): 136 1 ml  EF(Teich): 49 7 %  ESV(Teich): 68 5 ml  G peak SL Full(A2C): -7 5 %  G peak SL Full(A4C): -8 5 %  G peak SL Full(APLAX): -6 1 %  G peak SL Full(Avg): -7 4 %  IVSd: 2 1 cm  LA Area: 31 1 cm2  LA Diam: 6 3 cm  LAAs A4C: 30 2 cm2  LAESV A-L A4C: 122 9 ml  LAESV MOD A4C: 116 1 ml  LALs A4C: 6 3 cm  LVEDV MOD A4C: 217 9 ml  LVEF MOD A4C: 42 2 %  LVESV MOD A4C: 126 ml  LVIDd: 5 3 cm  LVIDs: 4 cm  LVLd A4C: 10 9 cm  LVLs A4C: 10 cm  LVOT Diam: 2 cm  LVPWd: 2 1 cm  LVd Mass: 718 1 g  LVd Mass Index: 303 g/m2  MA PSSL Full: -4 3 %  MAS PSSL Full: -10 5 %  MI PSSL Full: -5 %  ML PSSL Full: -3 1 %  MP PSSL Full: 3 2 %  MS PSSL Full: -10 8 %  Peak SL Dispersion Full: 61 5 ms  RA Area: 21 1 cm2  RVIDd: 5 cm  RWT: 0 8  SV MOD A4C: 91 9 ml  SV(Teich): 67 6 ml    CF  MR Als  Marito: 0 4 m/s  MR Flow: 255 2 ml/s  MR Rad: 1 1 cm  MR Trace: 8 9 cm2    CW  AV Env  Ti: 287 3 ms  AV VTI: 125 6 cm  AV Vmax: 5 3 m/s  AV Vmean: 4 4 m/s  AV maxP 3 mmHg  AV meanP 6 mmHg  MR VTI: 162 5 cm  MR Vmax: 4 9 m/s  TR Vmax: 3 1 m/s  TR maxP 3 mmHg    MM  TAPSE: 2 cm    PW  HUMBERTO (VTI): 0 3 cm2  HUMBERTO Vmax: 0 3 cm2  AVAI (VTI): 0 cm2/m2  AVAI Vmax: 0 cm2/m2  DVI: 0 1  E' Sept: 0 m/s  E/E' Sept: 44 1  LVOT Env  Ti: 303 2 ms  LVOT VTI: 13 7 cm  LVOT Vmax: 0 6 m/s  LVOT Vmean: 0 5 m/s  LVOT maxP 4 mmHg  LVOT meanP 9 mmHg  LVSI Dopp: 17 8 ml/m2  LVSV Dopp: 42 3 ml  MR ERO: 0 5 cm2  MR RV: 85 2 ml  MV A Marito: 0 6 m/s  MV Dec [place]: 11 4 m/s2  MV DecT: 136 5 ms  MV E Marito: 1 6 m/s  MV E/A Ratio: 2 4  MV PHT: 39 6 ms  MVA By PHT: 5 6 cm2    [facility] Accredited Echocardiography Laboratory    Prepared and electronically signed by    Chivo Worley MD  Signed 12-May-2021 12:22:39       Results for orders placed during the hospital encounter of 21   KATIA    Narrative [facility]  [facility], [address] [facility]  [phone]    Transesophageal Echocardiogram  2D, 3D, Doppler, and Color Doppler    Study date:  14-May-2021    Patient: Lee Mcpherson  MR number: [id number]  Account number: [de-identified]  : 1955  Age: 78 years  Gender: Male  Status: Inpatient  Location: Echo lab  Height: 74 in  Weight: 233 lb  BP: 98/ 70 mmHg    Indications: Mitral valve disease  Diagnoses: I34 9 - Nonrheumatic mitral valve disorder, unspecified    Sonographer:  Carol Lao RDCS  Interpreting Physician:  Yisel Elder MD  Primary Physician:  Trice Pop DO  Referring Physician:  Srinivasa Jaimes PA-C  Group:  [id number]  Cardiology Fellow:  Dom Lamar MD  RN:  Nenita Ruano UNRULY Vee    SUMMARY    LEFT VENTRICLE:  The ventricle was mildly dilated  Systolic function was moderately reduced  Ejection fraction was estimated to be 45 %  There was moderate diffuse hypokinesis  Wall thickness was moderately increased  The changes were consistent with eccentric hypertrophy  Features were consistent with a pseudonormal left ventricular filling pattern, with concomitant abnormal relaxation and increased filling pressure (grade 2 diastolic dysfunction)  RIGHT VENTRICLE:  The ventricle was mildly dilated  Systolic function was moderately reduced  LEFT ATRIUM:  The atrium was mildly dilated  LEFT ATRIAL APPENDAGE:  No thrombus was identified  ATRIAL SEPTUM:  There was a patent foramen ovale measuring 5 mm  Doppler evaluation was performed  There was a left-to-right shunt, in the baseline state  RIGHT ATRIUM:  The atrium was mildly dilated  MITRAL VALVE:  There was mild to moderate annular calcification  There was an area of image dropout at the base of the posterior leaflet of the mitral valve in the P1- P2 segment  This was possibly artifact but a perforation or sinus cannot be entirely ruled out  There appeared to be flow into but not  out of this region  There was mild to moderate regurgitation  The regurgitant jet was centrally directed  AORTIC VALVE:  The valve was not visualized well enough to rule out a bicuspid morphology  Leaflets exhibited marked calcification and immobility  There was severe stenosis  There was mild to moderate regurgitation  Valve mean gradient was 44 mmHg  TRICUSPID VALVE:  There was mild regurgitation  PERICARDIUM:  A small pericardial effusion was identified circumferential to the heart  HISTORY: PRIOR HISTORY: Aortic stenosis  HLD  Hypertension  NSTEMI  LVH  Tobacco use  GERD  Shortness of breath  COPD  PROCEDURE: The procedure was performed in the echo lab  This was a routine study   The risks and alternatives of the procedure were explained to the patient and informed consent was obtained  The transesophageal approach was used  The study  included complete 2D imaging, 3D imaging, complete spectral Doppler, and color Doppler  The heart rate was 74 bpm, at the start of the study  An adult omniplane probe was inserted by the cardiology fellow under direct supervision of the  attending cardiologist  Images were obtained from the parasternal and apical acoustic windows  Intubated with ease  One intubation attempt(s)  There was no blood detected on the probe  Image quality was adequate  There were no  complications during the procedure  MEDICATIONS: Anesthesia administered by anesthesia team     LEFT VENTRICLE: The ventricle was mildly dilated  Systolic function was moderately reduced  Ejection fraction was estimated to be 45 %  There was moderate diffuse hypokinesis  Wall thickness was moderately increased  The changes were  consistent with eccentric hypertrophy  DOPPLER: Features were consistent with a pseudonormal left ventricular filling pattern, with concomitant abnormal relaxation and increased filling pressure (grade 2 diastolic dysfunction)  RIGHT VENTRICLE: The ventricle was mildly dilated  Systolic function was moderately reduced  LEFT ATRIUM: The atrium was mildly dilated  APPENDAGE: The size was normal  No thrombus was identified  DOPPLER: The function was mildly reduced (mildly reduced emptying velocity)  ATRIAL SEPTUM: There was a patent foramen ovale measuring 5 mm  Doppler evaluation was performed  There was a left-to-right shunt, in the baseline state  RIGHT ATRIUM: The atrium was mildly dilated  MITRAL VALVE: There was mild to moderate annular calcification  There was an area of image dropout at the base of the posterior leaflet of the mitral valve in the P1- P2 segment  This was possibly artifact but a perforation or sinus cannot  be entirely ruled out   There appeared to be flow into but not out of this region  There was no echocardiographic evidence of vegetation  DOPPLER: There was mild to moderate regurgitation  The regurgitant jet was centrally directed  AORTIC VALVE: The valve was not visualized well enough to rule out a bicuspid morphology  Leaflets exhibited marked calcification and immobility  DOPPLER: There was severe stenosis  There was mild to moderate regurgitation  TRICUSPID VALVE: The valve structure was normal  There was normal leaflet separation  DOPPLER: There was mild regurgitation  The regurgitant jet was toward the septum  The findings suggest at least mild pulmonary hypertension  PULMONIC VALVE: Not well visualized  PERICARDIUM: A small pericardial effusion was identified circumferential to the heart  AORTA: The root exhibited normal size  Discrete areas of mild to moderate atherosclerosis were present  There was no evidence for dissection  There was no evidence for aneurysm  PULMONARY VEINS: DOPPLER: There was systolic blunting in the pulmonary vein(s)  MEASUREMENT TABLES    DOPPLER MEASUREMENTS  LVOT   (Reference normals)  Peak russell   79 cm/s   (--)  Mean russell   47 cm/s   (--)  VTI   21 cm   (--)  Peak gradient   2 mmHg   (--)  Mean gradient   1 mmHg   (--)  Aortic valve   (Reference normals)  Peak russell   459 cm/s   (--)  Mean russell   331 cm/s   (--)  VTI   110 cm   (--)  Peak gradient   84 27 mmHg   (--)  Mean gradient   44 mmHg   (--)  Obstr index, VTI   0 19    (--)  Obstr index, Vmax   0 17    (--)  Obstr index, Vmean   0 14    (--)  Mitral valve   (Reference normals)  Regurg alias russell   31 cm/s   (--)  Regurg PISA radius   6 mm   (--)  Max MR flow rate   70 12 ml/s   (--)    [employer] Accredited Echocardiography Laboratory    Prepared and electronically signed by    Brittney Brunson MD  Signed 14-May-2021 15:17:03       No results found for this or any previous visit  No procedure found    No results found for this or any previous visit     Meds/Allergies   all current active meds have been reviewed and current meds:   Current Facility-Administered Medications   Medication Dose Route Frequency    acetaminophen (TYLENOL) tablet 975 mg  975 mg Oral Q6H PRN    albuterol (PROVENTIL HFA,VENTOLIN HFA) inhaler 2 puff  2 puff Inhalation Q4H PRN    aspirin (ECOTRIN LOW STRENGTH) EC tablet 81 mg  81 mg Oral Daily    atorvastatin (LIPITOR) tablet 40 mg  40 mg Oral QPM    chlorhexidine (PERIDEX) 0 12 % oral rinse 15 mL  15 mL Swish & Spit Q12H [address]    Diclofenac Sodium (VOLTAREN) 1 % topical gel 2 g  2 g Topical TID PRN    fluticasone-vilanterol (BREO ELLIPTA) 100-25 mcg/inh inhaler 1 puff  1 puff Inhalation Daily    heparin (porcine) subcutaneous injection 5,000 Units  5,000 Units Subcutaneous Q8H [address]    [START ON 5/20/2021] metoprolol tartrate (LOPRESSOR) partial tablet 12 5 mg  12 5 mg Oral Once    metoprolol tartrate (LOPRESSOR) tablet 25 mg  25 mg Oral Q12H [address]    [START ON 5/20/2021] mupirocin (BACTROBAN) 2 % nasal ointment 1 application  1 application Nasal Once    nicotine (NICODERM CQ) 21 mg/24 hr TD 24 hr patch 1 patch  1 patch Transdermal Daily    nitroglycerin (NITROSTAT) SL tablet 0 4 mg  0 4 mg Sublingual Q5 Min PRN    ondansetron (ZOFRAN) injection 4 mg  4 mg Intravenous Q6H PRN    pantoprazole (PROTONIX) EC tablet 40 mg  40 mg Oral Early Morning          EKG personally reviewed by BITA Sanford    Assessment:  Principal Problem: Aortic valve stenosis - severe  Active Problems:    Chronic obstructive lung disease (HCC)    Hyperlipidemia    Essential hypertension    NSTEMI (non-ST elevated myocardial infarction) (HCC)    Acute combined systolic and diastolic CHF, NYHA class 1 (HCC)    Arthritis    Counseling / Coordination of Care  Total floor / unit time spent today 20 minutes  Greater than 50% of total time was spent with the patient and / or family counseling and / or coordination of care        ** Please Note: Dragon 360 Dictation voice to text software may have been used in the creation of this document   **

## 2021-05-19 NOTE — CASE MANAGEMENT
Pt will undergo open-heart surgery procedure on 5/20/21 and is recommended to have in-home post-op skilled nursing care via an [facility] agency for his aftercare plan  CM spoke to pt about this recommendation  Pt is agreeable w/ recommendation  TUNDE provided pt w/ freedom of choice for [facility] agencies  Pt requested referral to [facility]  CM made Ecin referral to [facility]  CM to follow

## 2021-05-19 NOTE — PROGRESS NOTES
Progress Note - Cardiothoracic Surgery   Coretta Moody 78 y o  male MRN: [id number]  Unit/Bed#: [facility] 424-01 Encounter: [id number]      24 Hour Events: No events overnight  No complaints this morning       Medications:   Scheduled Meds:  Current Facility-Administered Medications   Medication Dose Route Frequency Provider Last Rate    acetaminophen  975 mg Oral Q6H PRN Iesha Mash, DMD      albuterol  2 puff Inhalation Q4H PRN Iesha Mash, DMD      aspirin  81 mg Oral Daily Iesha Mash, DMD      atorvastatin  40 mg Oral QPM Iesha Mash, DMD      Diclofenac Sodium  2 g Topical TID PRN Iesha Mash, DMD      fluticasone-vilanterol  1 puff Inhalation Daily Iesha Mash, DMD      heparin (porcine)  5,000 Units Subcutaneous [facility] Iesha Mash, DMD      metoprolol tartrate  25 mg Oral Q12H [address] Iesha Mash, DMD      nicotine  1 patch Transdermal Daily Iesha Mash, DMD      nitroglycerin  0 4 mg Sublingual Q5 Min PRN Iesha Mash, DMD      ondansetron  4 mg Intravenous Q6H PRN Iesha Mash, DMD      pantoprazole  40 mg Oral Early Morning Iesha Mash, DMD       Facility-Administered Medications Ordered in Other Encounters   Medication Dose Route Frequency Provider Last Rate    ceFAZolin    PRN Lorean Rudolph, CRNA      ketamine   Intravenous PRN Lorean Rudolph, CRNA      norepinephrine    PRN Lorean Rudolph, CRNA      propofol   Intravenous Continuous PRN Lorean Rudolph, CRNA Stopped (05/17/21 1518)    propofol   Intravenous PRN Lorean Rudolph, CRNA       Continuous Infusions:     Results:   Results from last 7 days   Lab Units 05/19/21  0439 05/17/21  0602 05/13/21  0605   WBC Thousand/uL 7 08 8 07 9 33   HEMOGLOBIN g/dL 13 7 13 7 13 0   HEMATOCRIT % 42 5 42 1 41 3   PLATELETS Thousands/uL 220 235 227     Results from last 7 days   Lab Units 05/19/21  0439 05/17/21  0602 05/14/21  0451   POTASSIUM mmol/L 3 8 4 2 4 2 CHLORIDE mmol/L 104 106 104   CO2 mmol/L 29 28 29   BUN mg/dL 13 20 19   CREATININE mg/dL 0 77 0 80 0 66   CALCIUM mg/dL 9 3 9 3 9 1     Results from last 7 days   Lab Units 05/17/21  0602 05/13/21  0605 05/12/21  2302 05/12/21 2010   INR  1 02  --   --   --    PTT seconds  --  68* 49* 53*       Studies:     Cardiac Catheterization: No significant obstructive CAD      Echocardiogram: EF 42%  Severe AS with Marcello 0 4 cm2  mean gradient of 81 mm Hg  Moderate possibly severe mitral regurgitation  No mitral stenosis  Severe MAC  Mild TR       Carotid artery duplex: <50% ICA stenosis b/l, antegrade flow b/l, no subclavian disease b/l     Vitals:   Vitals:    05/19/21 0246 05/19/21 0600 05/19/21 0655 05/19/21 0730   BP: 122/78  111/74    BP Location:       Pulse: 67  67    Resp: 17  20    Temp: (!) 97 1 °F (36 2 °C)  97 8 °F (36 6 °C)    TempSrc:       SpO2: 96%  92% 94%   Weight:  105 kg (230 lb 9 6 oz)     Height:           Physical Exam:    HEENT/NECK:  Normocephalic  Atraumatic  No jugular venous distention  Cardiac: Regular rate and rhythm  Pulmonary:  Breath sounds clear bilaterally  Abdomen:  Non-tender and Non-distended  Extremities: Extremities warm/dry  Neuro: Alert and oriented X 3  Skin: Warm/Dry, without rashes or lesions  Assessment:  Severe aortic stenosis and severe mitral regurgitation     Plan:  Patient agreeable to proceed with surgery;  Informed consent signed  Blood type and cross match ordered; RBC prepared  Continue Mupirocin 2% nasal ointment q 12 hrs  Continue topical chlorhexidine bath and mouth rise  NPO after midnight  Discontinue heparin infusion on call to OR  Preoperative oxygen, beta blocker, insulin, and antibiotics ordered aortic valve replacement and mitral valve repair vs replacement scheduled for tomorrow with SAAD Lutherter: Kalpana Mcclain [place]  DATE: May 19, 2021  TIME: 9:00 AM    * This note was completed in part utilizing m-modal fluency direct voice recognition software  Grammatical errors, random word insertion, spelling mistakes, and incomplete sentences may be an occasional consequence of the system secondary to software limitations, ambient noise and hardware issues  At the time of dictation, efforts were made to edit, clarify and /or correct errors  Please read the chart carefully and recognize, using context, where substitutions have occurred  If you have any questions or concerns about the context, text or information contained within the body of this dictation, please contact myself, the provider, for further clarification

## 2021-05-19 NOTE — ASSESSMENT & PLAN NOTE
Patient presented 05/11 for cardiac catheterization as an NSTEMI transfer from [facility]   · Clean cath  · Known hx of severe - critical AS   · KATIA (05/14) - with aortic valve area of 0 3cm2  · Also with significant mitral valve disease  · Cardiac surgery completing pre-surgical workup--planning for double valve repair (AVR/MVR) on 5/20  · S/P dental extractions of 5/17  · Appreciate cardiology input   For OR tomorrow  NPO after midnight

## 2021-05-19 NOTE — PROGRESS NOTES
[address] [facility]  Progress Note - Serina Bills 1955, 78 y o  male MRN: [id number]  Unit/Bed#: [facility] 424-01 Encounter: [id number]  Primary Care Provider: Parminder Martin DO   Date and time admitted to hospital: 5/12/2021  9:34 PM    * Aortic valve stenosis - severe  Assessment & Plan  Patient presented 05/11 for cardiac catheterization as an NSTEMI transfer from [facility]   · Clean cath  · Known hx of severe - critical AS   · KATIA (05/14) - with aortic valve area of 0 3cm2  · Also with significant mitral valve disease  · Cardiac surgery completing pre-surgical workup--planning for double valve repair (AVR/MVR) on 5/20  · S/P dental extractions of 5/17  · Appreciate cardiology input   For OR tomorrow  NPO after midnight  Arthritis  Assessment & Plan  C/o generalized arthritis from laying in bed   · Typically takes Excedrin at home  · Will hold that given surgical plans  · Supportive care with tylenol  · Refused lidoderm patch    Acute combined systolic and diastolic CHF, NYHA class 1 (HCC)  Assessment & Plan  Wt Readings from Last 3 Encounters:   05/19/21 105 kg (230 lb 9 6 oz)   05/11/21 112 kg (246 lb 14 6 oz)   05/11/21 113 kg (250 lb)     Monitor intake and output  Monitor daily weights  · EF of 42% with grade 3 diastolic dysfunction, severe AS and Mod-severe MR  · Monitor volume status  · Low Na diet  · Defer need for diuretics to cardiology        NSTEMI (non-ST elevated myocardial infarction) [facility])  Assessment & Plan  Transferred from [address] [place] [address] for a cardiac catheterization for presumed NSTEMI, also with severe valve stenosis  · Echocardiogram with EF of 42%  · Continue aspirin, atorvastatin  · Patient was started on metoprolol 25mg BID  · S/P cardiac catheterization which showed patent coronaries  Heparin gtt was discontinued    · See above      Essential hypertension  Assessment & Plan  BP is acceptable  · Continue metoprolol 25 mg BID  · Losartan and amlodipine on hold    Hyperlipidemia  Assessment & Plan  FLP WNL  · Continue atorvastatin 40mg/day    Chronic obstructive lung disease ([address] )  Assessment & Plan  No wheezing at this time  · Continue breo      VTE Pharmacologic Prophylaxis:   Pharmacologic: Heparin  Mechanical VTE Prophylaxis in Place: No    Patient Centered Rounds: I have performed bedside rounds with nursing staff today  Time Spent for Care: 15 minutes  More than 50% of total time spent on counseling and coordination of care as described above  Current Length of Stay: 7 day(s)    Current Patient Status: Inpatient       Code Status: Level 1 - Full Code      Subjective:   No acute distress    Objective:     Vitals:   Temp (24hrs), Av 7 °F (36 5 °C), Min:97 1 °F (36 2 °C), Max:98 °F (36 7 °C)    Temp:  [97 1 °F (36 2 °C)-98 °F (36 7 °C)] 97 8 °F (36 6 °C)  HR:  [64-70] 64  Resp:  [14-20] 18  BP: ()/(60-78) 112/72  SpO2:  [92 %-96 %] 94 %  Body mass index is 29 61 kg/m²  Input and Output Summary (last 24 hours): Intake/Output Summary (Last 24 hours) at 2021 1149  Last data filed at 2021 1001  Gross per 24 hour   Intake --   Output 850 ml   Net -850 ml       Physical Exam:     Physical Exam  Constitutional:       Appearance: Normal appearance  HENT:      Head: Normocephalic and atraumatic  Cardiovascular:      Rate and Rhythm: Normal rate and regular rhythm  Pulmonary:      Effort: Pulmonary effort is normal       Breath sounds: Normal breath sounds  Abdominal:      General: Abdomen is flat  There is no distension  Palpations: Abdomen is soft  Tenderness: There is no abdominal tenderness  Musculoskeletal:         General: No swelling or tenderness  Skin:     Coloration: Skin is not jaundiced  Neurological:      General: No focal deficit present  Mental Status: He is alert and oriented to person, place, and time     Psychiatric:         Mood and Affect: Mood normal          Behavior: Behavior normal  Additional Data:     Labs:    Results from last 7 days   Lab Units 05/19/21  0439   WBC Thousand/uL 7 08   HEMOGLOBIN g/dL 13 7   HEMATOCRIT % 42 5   PLATELETS Thousands/uL 220   NEUTROS PCT % 51   LYMPHS PCT % 32   MONOS PCT % 13*   EOS PCT % 3     Results from last 7 days   Lab Units 05/19/21  0439   POTASSIUM mmol/L 3 8   CHLORIDE mmol/L 104   CO2 mmol/L 29   BUN mg/dL 13   CREATININE mg/dL 0 77   CALCIUM mg/dL 9 3     Results from last 7 days   Lab Units 05/17/21  0602   INR  1 02       * I Have Reviewed All Lab Data Listed Above  * Additional Pertinent Lab Tests Reviewed:  All Labs Within Last 24 Hours Reviewed        Recent Cultures (last 7 days):           Last 24 Hours Medication List:   Current Facility-Administered Medications   Medication Dose Route Frequency Provider Last Rate    acetaminophen  975 mg Oral Q6H PRN Edinson Ny, DMD      albuterol  2 puff Inhalation Q4H PRN Edinson Ny, DMD      aspirin  81 mg Oral Daily Edinson Ny, DMD      atorvastatin  40 mg Oral QPM Edinson Ny, DMD      chlorhexidine  15 mL Swish & Spit Q12H [facility]      Diclofenac Sodium  2 g Topical TID PRN Edinson Ny, DMD      fluticasone-vilanterol  1 puff Inhalation Daily Edinson Ny, DMD      heparin (porcine)  5,000 Units Subcutaneous [facility]      [START ON 5/20/2021] metoprolol tartrate  12 5 mg Oral Once 30 [facility] TIFF [place]      metoprolol tartrate  25 mg Oral Q12H [facility] Edinson Ny, DMD      [START ON 5/20/2021] mupirocin  1 application Nasal Once Magdalena Drop, PA-C      nicotine  1 patch Transdermal Daily [facility]      nitroglycerin  0 4 mg Sublingual Q5 Min PRN Edinson Ny, DMD      ondansetron  4 mg Intravenous Q6H PRN Edinson Ny, DMD      pantoprazole  40 mg Oral Early Morning Edinson Ny, DMD          Today, Patient Was Seen By: Nito Jean DO    ** Please Note: Dictation voice to text software may have been used in the creation of this document   **

## 2021-05-19 NOTE — ASSESSMENT & PLAN NOTE
Transferred from [facility] for a cardiac catheterization for presumed NSTEMI, also with severe valve stenosis  · Echocardiogram with EF of 42%  · Continue aspirin, atorvastatin  · Patient was started on metoprolol 25mg BID  · S/P cardiac catheterization which showed patent coronaries  Heparin gtt was discontinued    · See above

## 2021-05-19 NOTE — ASSESSMENT & PLAN NOTE
Wt Readings from Last 3 Encounters:   05/19/21 105 kg (230 lb 9 6 oz)   05/11/21 112 kg (246 lb 14 6 oz)   05/11/21 113 kg (250 lb)     Monitor intake and output   Monitor daily weights  · EF of 42% with grade 3 diastolic dysfunction, severe AS and Mod-severe MR  · Monitor volume status  · Low Na diet  · Defer need for diuretics to cardiology

## 2021-05-19 NOTE — ANESTHESIA PREPROCEDURE EVALUATION
Procedure:  REPLACEMENT VALVE AORTIC (AVR) TISSUE (N/A Chest)  REPLACEMENT VALVE MITRAL (MVR) VS REPAIR (N/A Chest)  TRANSESOPHAGEAL ECHOCARDIOGRAM (KATIA) (N/A Esophagus)    Relevant Problems   CARDIO   (+) Aortic valve stenosis - severe   (+) Essential hypertension   (+) Hyperlipidemia   (+) NSTEMI (non-ST elevated myocardial infarction) (HCC)      GI/HEPATIC   (+) GERD (gastroesophageal reflux disease)      MUSCULOSKELETAL   (+) Arthritis      PULMONARY   (+) Chronic obstructive lung disease (HCC)      Other   (+) Acute combined systolic and diastolic CHF, NYHA class 1 (HCC)   LEFT VENTRICLE:  The ventricle was mildly dilated  Systolic function was moderately reduced  Ejection fraction was estimated to be 45 %  There was moderate diffuse hypokinesis  Wall thickness was moderately increased  The changes were consistent with eccentric hypertrophy  Features were consistent with a pseudonormal left ventricular filling pattern, with concomitant abnormal relaxation and increased filling pressure (grade 2 diastolic dysfunction)      RIGHT VENTRICLE:  The ventricle was mildly dilated  Systolic function was moderately reduced      LEFT ATRIUM:  The atrium was mildly dilated      LEFT ATRIAL APPENDAGE:  No thrombus was identified      ATRIAL SEPTUM:  There was a patent foramen ovale measuring 5 mm  Doppler evaluation was performed  There was a left-to-right shunt, in the baseline state      RIGHT ATRIUM:  The atrium was mildly dilated      MITRAL VALVE:  There was mild to moderate annular calcification  There was an area of image dropout at the base of the posterior leaflet of the mitral valve in the P1- P2 segment  This was possibly artifact but a perforation or sinus cannot be entirely ruled out  There appeared to be flow into but not  out of this region  There was mild to moderate regurgitation    The regurgitant jet was centrally directed      AORTIC VALVE:  The valve was not visualized well enough to rule out a bicuspid morphology  Leaflets exhibited marked calcification and immobility  There was severe stenosis  There was mild to moderate regurgitation  Valve mean gradient was 44 mmHg      TRICUSPID VALVE:  There was mild regurgitation      PERICARDIUM:  A small pericardial effusion was identified circumferential to the heart  Physical Exam    Airway    Mallampati score: II  TM Distance: >3 FB  Neck ROM: full     Dental   Comment: Multiple chipped and missing teeth  No loose,     Cardiovascular  Rhythm: regular, Rate: normal, Murmur,     Pulmonary  Pulmonary exam normal Breath sounds clear to auscultation,     Other Findings        Anesthesia Plan  ASA Score- 4     Anesthesia Type- general with ASA Monitors  Additional Monitors: arterial line, central venous line and pulmonary artery catheter  Airway Plan: ETT  Comment: KATIA  Plan Factors-Exercise tolerance (METS): <4 METS  Chart reviewed  Imaging results reviewed  Existing labs reviewed  Patient summary reviewed  Patient is a current smoker  Patient did not smoke on day of surgery  Induction- intravenous  Postoperative Plan- Plan for postoperative opioid use  Planned trial extubation    Informed Consent- Anesthetic plan and risks discussed with patient

## 2021-05-20 ENCOUNTER — APPOINTMENT (INPATIENT)
Dept: NON INVASIVE DIAGNOSTICS | Facility: HOSPITAL | Age: 66
DRG: 216 | End: 2021-05-20
Payer: COMMERCIAL

## 2021-05-20 ENCOUNTER — ANESTHESIA (INPATIENT)
Dept: PERIOP | Facility: HOSPITAL | Age: 66
DRG: 216 | End: 2021-05-20
Payer: COMMERCIAL

## 2021-05-20 ENCOUNTER — APPOINTMENT (INPATIENT)
Dept: RADIOLOGY | Facility: HOSPITAL | Age: 66
DRG: 216 | End: 2021-05-20
Payer: COMMERCIAL

## 2021-05-20 PROBLEM — Z98.890 S/P MVR (MITRAL VALVE REPAIR): Status: ACTIVE | Noted: 2021-05-20

## 2021-05-20 PROBLEM — Z95.2 S/P AVR: Status: ACTIVE | Noted: 2021-05-20

## 2021-05-20 PROBLEM — Q21.1 PFO (PATENT FORAMEN OVALE): Status: ACTIVE | Noted: 2021-05-20

## 2021-05-20 PROBLEM — Z95.2 S/P MVR (MITRAL VALVE REPLACEMENT): Status: ACTIVE | Noted: 2021-05-20

## 2021-05-20 PROBLEM — Q21.12 PFO (PATENT FORAMEN OVALE): Status: ACTIVE | Noted: 2021-05-20

## 2021-05-20 PROBLEM — Z95.2 S/P MVR (MITRAL VALVE REPLACEMENT): Status: RESOLVED | Noted: 2021-05-20 | Resolved: 2021-05-20

## 2021-05-20 LAB
ANION GAP SERPL CALCULATED.3IONS-SCNC: 7 MMOL/L (ref 4–13)
BASE EX.OXY STD BLDV CALC-SCNC: 64.3 % (ref 60–80)
BASE EXCESS BLDA CALC-SCNC: -2 MMOL/L (ref -2–3)
BASE EXCESS BLDA CALC-SCNC: 0 MMOL/L (ref -2–3)
BASE EXCESS BLDA CALC-SCNC: 1 MMOL/L (ref -2–3)
BASE EXCESS BLDA CALC-SCNC: 2 MMOL/L (ref -2–3)
BASE EXCESS BLDA CALC-SCNC: 3 MMOL/L (ref -2–3)
BASE EXCESS BLDA CALC-SCNC: 3 MMOL/L (ref -2–3)
BASE EXCESS BLDA CALC-SCNC: 4 MMOL/L (ref -2–3)
BASE EXCESS BLDA CALC-SCNC: 4 MMOL/L (ref -2–3)
BASE EXCESS BLDV CALC-SCNC: -2.8 MMOL/L
BUN SERPL-MCNC: 10 MG/DL (ref 5–25)
CA-I BLD-SCNC: 1 MMOL/L (ref 1.12–1.32)
CA-I BLD-SCNC: 1.05 MMOL/L (ref 1.12–1.32)
CA-I BLD-SCNC: 1.07 MMOL/L (ref 1.12–1.32)
CA-I BLD-SCNC: 1.07 MMOL/L (ref 1.12–1.32)
CA-I BLD-SCNC: 1.14 MMOL/L (ref 1.12–1.32)
CA-I BLD-SCNC: 1.23 MMOL/L (ref 1.12–1.32)
CA-I BLD-SCNC: 1.23 MMOL/L (ref 1.12–1.32)
CA-I BLD-SCNC: 1.28 MMOL/L (ref 1.12–1.32)
CALCIUM SERPL-MCNC: 8.4 MG/DL (ref 8.3–10.1)
CHLORIDE SERPL-SCNC: 110 MMOL/L (ref 100–108)
CO2 SERPL-SCNC: 24 MMOL/L (ref 21–32)
CREAT SERPL-MCNC: 0.84 MG/DL (ref 0.6–1.3)
DS:DELIVERY SYSTEM: ABNORMAL
FIO2 GAS DIL.REBREATH: 50 L
GFR SERPL CREATININE-BSD FRML MDRD: 91 ML/MIN/1.73SQ M
GLUCOSE SERPL-MCNC: 110 MG/DL (ref 65–140)
GLUCOSE SERPL-MCNC: 110 MG/DL (ref 65–140)
GLUCOSE SERPL-MCNC: 115 MG/DL (ref 65–140)
GLUCOSE SERPL-MCNC: 124 MG/DL (ref 65–140)
GLUCOSE SERPL-MCNC: 137 MG/DL (ref 65–140)
GLUCOSE SERPL-MCNC: 138 MG/DL (ref 65–140)
GLUCOSE SERPL-MCNC: 145 MG/DL (ref 65–140)
GLUCOSE SERPL-MCNC: 145 MG/DL (ref 65–140)
GLUCOSE SERPL-MCNC: 158 MG/DL (ref 65–140)
GLUCOSE SERPL-MCNC: 163 MG/DL (ref 65–140)
GLUCOSE SERPL-MCNC: 168 MG/DL (ref 65–140)
GLUCOSE SERPL-MCNC: 174 MG/DL (ref 65–140)
GLUCOSE SERPL-MCNC: 86 MG/DL (ref 65–140)
GLUCOSE SERPL-MCNC: 90 MG/DL (ref 65–140)
GLUCOSE SERPL-MCNC: 94 MG/DL (ref 65–140)
HCO3 BLDA-SCNC: 24.9 MMOL/L (ref 22–28)
HCO3 BLDA-SCNC: 24.9 MMOL/L (ref 22–28)
HCO3 BLDA-SCNC: 26.1 MMOL/L (ref 22–28)
HCO3 BLDA-SCNC: 27.3 MMOL/L (ref 22–28)
HCO3 BLDA-SCNC: 29.1 MMOL/L (ref 22–28)
HCO3 BLDA-SCNC: 30.1 MMOL/L (ref 24–30)
HCO3 BLDA-SCNC: 30.3 MMOL/L (ref 22–28)
HCO3 BLDA-SCNC: 31.9 MMOL/L (ref 24–30)
HCO3 BLDV-SCNC: 24.9 MMOL/L (ref 24–30)
HCT VFR BLD AUTO: 38.4 % (ref 36.5–49.3)
HCT VFR BLD AUTO: 42.6 % (ref 36.5–49.3)
HCT VFR BLD CALC: 32 % (ref 36.5–49.3)
HCT VFR BLD CALC: 34 % (ref 36.5–49.3)
HCT VFR BLD CALC: 36 % (ref 36.5–49.3)
HCT VFR BLD CALC: 36 % (ref 36.5–49.3)
HCT VFR BLD CALC: 37 % (ref 36.5–49.3)
HCT VFR BLD CALC: 41 % (ref 36.5–49.3)
HCT VFR BLD CALC: 41 % (ref 36.5–49.3)
HCT VFR BLD CALC: 43 % (ref 36.5–49.3)
HGB BLD-MCNC: 12.4 G/DL (ref 12–17)
HGB BLD-MCNC: 13.9 G/DL (ref 12–17)
HGB BLDA-MCNC: 10.9 G/DL (ref 12–17)
HGB BLDA-MCNC: 11.6 G/DL (ref 12–17)
HGB BLDA-MCNC: 12.2 G/DL (ref 12–17)
HGB BLDA-MCNC: 12.2 G/DL (ref 12–17)
HGB BLDA-MCNC: 12.6 G/DL (ref 12–17)
HGB BLDA-MCNC: 13.9 G/DL (ref 12–17)
HGB BLDA-MCNC: 13.9 G/DL (ref 12–17)
HGB BLDA-MCNC: 14.6 G/DL (ref 12–17)
KCT BLD-ACNC: 110 SEC (ref 89–137)
KCT BLD-ACNC: 121 SEC (ref 89–137)
KCT BLD-ACNC: 410 SEC (ref 89–137)
KCT BLD-ACNC: 496 SEC (ref 89–137)
KCT BLD-ACNC: 549 SEC (ref 89–137)
KCT BLD-ACNC: 577 SEC (ref 89–137)
KCT BLD-ACNC: 760 SEC (ref 89–137)
NASAL CANNULA: 4
O2 CT BLDV-SCNC: 11.8 ML/DL
PCO2 BLD: 26 MMOL/L (ref 21–32)
PCO2 BLD: 26 MMOL/L (ref 21–32)
PCO2 BLD: 27 MMOL/L (ref 21–32)
PCO2 BLD: 28 MMOL/L (ref 21–32)
PCO2 BLD: 30 MMOL/L (ref 21–32)
PCO2 BLD: 32 MMOL/L (ref 21–32)
PCO2 BLD: 32 MMOL/L (ref 21–32)
PCO2 BLD: 34 MMOL/L (ref 21–32)
PCO2 BLD: 34.6 MM HG (ref 36–44)
PCO2 BLD: 41 MM HG (ref 36–44)
PCO2 BLD: 43.9 MM HG (ref 36–44)
PCO2 BLD: 44.3 MM HG (ref 36–44)
PCO2 BLD: 50.4 MM HG (ref 36–44)
PCO2 BLD: 57.2 MM HG (ref 36–44)
PCO2 BLD: 59.5 MM HG (ref 42–50)
PCO2 BLD: 68.4 MM HG (ref 42–50)
PCO2 BLDV: 55.9 MM HG (ref 42–50)
PH BLD: 7.28 [PH] (ref 7.3–7.4)
PH BLD: 7.3 [PH] (ref 7.35–7.45)
PH BLD: 7.31 [PH] (ref 7.3–7.4)
PH BLD: 7.33 [PH] (ref 7.35–7.45)
PH BLD: 7.38 [PH] (ref 7.35–7.45)
PH BLD: 7.39 [PH] (ref 7.35–7.45)
PH BLD: 7.43 [PH] (ref 7.35–7.45)
PH BLD: 7.5 [PH] (ref 7.35–7.45)
PH BLDV: 7.27 [PH] (ref 7.3–7.4)
PLATELET # BLD AUTO: 186 THOUSANDS/UL (ref 149–390)
PLATELET # BLD AUTO: 200 THOUSANDS/UL (ref 149–390)
PMV BLD AUTO: 9 FL (ref 8.9–12.7)
PMV BLD AUTO: 9.3 FL (ref 8.9–12.7)
PO2 BLD: 113 MM HG (ref 75–129)
PO2 BLD: 214 MM HG (ref 75–129)
PO2 BLD: 221 MM HG (ref 75–129)
PO2 BLD: 260 MM HG (ref 75–129)
PO2 BLD: 43 MM HG (ref 35–45)
PO2 BLD: 54 MM HG (ref 35–45)
PO2 BLD: 77 MM HG (ref 75–129)
PO2 BLD: 88 MM HG (ref 75–129)
PO2 BLDV: 39.5 MM HG (ref 35–45)
POTASSIUM BLD-SCNC: 3.8 MMOL/L (ref 3.5–5.3)
POTASSIUM BLD-SCNC: 4.2 MMOL/L (ref 3.5–5.3)
POTASSIUM BLD-SCNC: 4.3 MMOL/L (ref 3.5–5.3)
POTASSIUM BLD-SCNC: 4.4 MMOL/L (ref 3.5–5.3)
POTASSIUM BLD-SCNC: 4.6 MMOL/L (ref 3.5–5.3)
POTASSIUM BLD-SCNC: 4.7 MMOL/L (ref 3.5–5.3)
POTASSIUM SERPL-SCNC: 3.9 MMOL/L (ref 3.5–5.3)
POTASSIUM SERPL-SCNC: 4.6 MMOL/L (ref 3.5–5.3)
POTASSIUM SERPL-SCNC: 4.7 MMOL/L (ref 3.5–5.3)
SAO2 % BLD FROM PO2: 100 % (ref 60–85)
SAO2 % BLD FROM PO2: 73 % (ref 60–85)
SAO2 % BLD FROM PO2: 82 % (ref 60–85)
SAO2 % BLD FROM PO2: 95 % (ref 60–85)
SAO2 % BLD FROM PO2: 96 % (ref 60–85)
SAO2 % BLD FROM PO2: 98 % (ref 60–85)
SODIUM BLD-SCNC: 137 MMOL/L (ref 136–145)
SODIUM BLD-SCNC: 138 MMOL/L (ref 136–145)
SODIUM BLD-SCNC: 139 MMOL/L (ref 136–145)
SODIUM BLD-SCNC: 140 MMOL/L (ref 136–145)
SODIUM BLD-SCNC: 141 MMOL/L (ref 136–145)
SODIUM BLD-SCNC: 141 MMOL/L (ref 136–145)
SODIUM SERPL-SCNC: 141 MMOL/L (ref 136–145)
SPECIMEN SOURCE: ABNORMAL
SPECIMEN SOURCE: NORMAL
SPECIMEN SOURCE: NORMAL
VENTILATION VALUE: ABNORMAL

## 2021-05-20 PROCEDURE — 80048 BASIC METABOLIC PNL TOTAL CA: CPT | Performed by: PHYSICIAN ASSISTANT

## 2021-05-20 PROCEDURE — 33641 REPAIR HEART SEPTUM DEFECT: CPT | Performed by: PHYSICIAN ASSISTANT

## 2021-05-20 PROCEDURE — 33405 REPLACEMENT AORTIC VALVE OPN: CPT | Performed by: THORACIC SURGERY (CARDIOTHORACIC VASCULAR SURGERY)

## 2021-05-20 PROCEDURE — 02UG0JZ SUPPLEMENT MITRAL VALVE WITH SYNTHETIC SUBSTITUTE, OPEN APPROACH: ICD-10-PCS | Performed by: THORACIC SURGERY (CARDIOTHORACIC VASCULAR SURGERY)

## 2021-05-20 PROCEDURE — 84295 ASSAY OF SERUM SODIUM: CPT

## 2021-05-20 PROCEDURE — 02HV33Z INSERTION OF INFUSION DEVICE INTO SUPERIOR VENA CAVA, PERCUTANEOUS APPROACH: ICD-10-PCS | Performed by: ANESTHESIOLOGY

## 2021-05-20 PROCEDURE — 85347 COAGULATION TIME ACTIVATED: CPT

## 2021-05-20 PROCEDURE — 82330 ASSAY OF CALCIUM: CPT

## 2021-05-20 PROCEDURE — 85014 HEMATOCRIT: CPT | Performed by: PHYSICIAN ASSISTANT

## 2021-05-20 PROCEDURE — 33426 REPAIR OF MITRAL VALVE: CPT | Performed by: PHYSICIAN ASSISTANT

## 2021-05-20 PROCEDURE — 88305 TISSUE EXAM BY PATHOLOGIST: CPT | Performed by: PATHOLOGY

## 2021-05-20 PROCEDURE — 88311 DECALCIFY TISSUE: CPT | Performed by: PATHOLOGY

## 2021-05-20 PROCEDURE — 85014 HEMATOCRIT: CPT

## 2021-05-20 PROCEDURE — 02RF08Z REPLACEMENT OF AORTIC VALVE WITH ZOOPLASTIC TISSUE, OPEN APPROACH: ICD-10-PCS | Performed by: THORACIC SURGERY (CARDIOTHORACIC VASCULAR SURGERY)

## 2021-05-20 PROCEDURE — 33641 REPAIR HEART SEPTUM DEFECT: CPT | Performed by: THORACIC SURGERY (CARDIOTHORACIC VASCULAR SURGERY)

## 2021-05-20 PROCEDURE — 85049 AUTOMATED PLATELET COUNT: CPT | Performed by: PHYSICIAN ASSISTANT

## 2021-05-20 PROCEDURE — 94002 VENT MGMT INPAT INIT DAY: CPT

## 2021-05-20 PROCEDURE — 99291 CRITICAL CARE FIRST HOUR: CPT | Performed by: ANESTHESIOLOGY

## 2021-05-20 PROCEDURE — 82803 BLOOD GASES ANY COMBINATION: CPT

## 2021-05-20 PROCEDURE — 82948 REAGENT STRIP/BLOOD GLUCOSE: CPT

## 2021-05-20 PROCEDURE — 94760 N-INVAS EAR/PLS OXIMETRY 1: CPT

## 2021-05-20 PROCEDURE — 5A1221Z PERFORMANCE OF CARDIAC OUTPUT, CONTINUOUS: ICD-10-PCS | Performed by: THORACIC SURGERY (CARDIOTHORACIC VASCULAR SURGERY)

## 2021-05-20 PROCEDURE — 82330 ASSAY OF CALCIUM: CPT | Performed by: THORACIC SURGERY (CARDIOTHORACIC VASCULAR SURGERY)

## 2021-05-20 PROCEDURE — 93005 ELECTROCARDIOGRAM TRACING: CPT

## 2021-05-20 PROCEDURE — 85018 HEMOGLOBIN: CPT | Performed by: PHYSICIAN ASSISTANT

## 2021-05-20 PROCEDURE — 71045 X-RAY EXAM CHEST 1 VIEW: CPT

## 2021-05-20 PROCEDURE — 82805 BLOOD GASES W/O2 SATURATION: CPT | Performed by: PHYSICIAN ASSISTANT

## 2021-05-20 PROCEDURE — 33426 REPAIR OF MITRAL VALVE: CPT | Performed by: THORACIC SURGERY (CARDIOTHORACIC VASCULAR SURGERY)

## 2021-05-20 PROCEDURE — 88313 SPECIAL STAINS GROUP 2: CPT | Performed by: PATHOLOGY

## 2021-05-20 PROCEDURE — 5A1223Z PERFORMANCE OF CARDIAC PACING, CONTINUOUS: ICD-10-PCS | Performed by: THORACIC SURGERY (CARDIOTHORACIC VASCULAR SURGERY)

## 2021-05-20 PROCEDURE — 33405 REPLACEMENT AORTIC VALVE OPN: CPT | Performed by: PHYSICIAN ASSISTANT

## 2021-05-20 PROCEDURE — 30233N0 TRANSFUSION OF AUTOLOGOUS RED BLOOD CELLS INTO PERIPHERAL VEIN, PERCUTANEOUS APPROACH: ICD-10-PCS | Performed by: THORACIC SURGERY (CARDIOTHORACIC VASCULAR SURGERY)

## 2021-05-20 PROCEDURE — 84132 ASSAY OF SERUM POTASSIUM: CPT

## 2021-05-20 PROCEDURE — 82947 ASSAY GLUCOSE BLOOD QUANT: CPT

## 2021-05-20 PROCEDURE — 84132 ASSAY OF SERUM POTASSIUM: CPT | Performed by: PHYSICIAN ASSISTANT

## 2021-05-20 PROCEDURE — 85049 AUTOMATED PLATELET COUNT: CPT | Performed by: THORACIC SURGERY (CARDIOTHORACIC VASCULAR SURGERY)

## 2021-05-20 PROCEDURE — 93355 ECHO TRANSESOPHAGEAL (TEE): CPT

## 2021-05-20 DEVICE — VALVE AORTIC INSPIRIS RESILIA 25MM: Type: IMPLANTABLE DEVICE | Site: HEART | Status: FUNCTIONAL

## 2021-05-20 DEVICE — IMPLANTABLE DEVICE: Type: IMPLANTABLE DEVICE | Site: HEART | Status: FUNCTIONAL

## 2021-05-20 RX ORDER — CEFAZOLIN SODIUM 2 G/50ML
2000 SOLUTION INTRAVENOUS EVERY 8 HOURS
Status: COMPLETED | OUTPATIENT
Start: 2021-05-20 | End: 2021-05-21

## 2021-05-20 RX ORDER — MIDAZOLAM HYDROCHLORIDE 2 MG/2ML
INJECTION, SOLUTION INTRAMUSCULAR; INTRAVENOUS AS NEEDED
Status: DISCONTINUED | OUTPATIENT
Start: 2021-05-20 | End: 2021-05-20

## 2021-05-20 RX ORDER — LIDOCAINE HYDROCHLORIDE 20 MG/ML
INJECTION, SOLUTION EPIDURAL; INFILTRATION; INTRACAUDAL; PERINEURAL AS NEEDED
Status: DISCONTINUED | OUTPATIENT
Start: 2021-05-20 | End: 2021-05-20

## 2021-05-20 RX ORDER — ACETAMINOPHEN 325 MG/1
975 TABLET ORAL EVERY 8 HOURS
Status: DISCONTINUED | OUTPATIENT
Start: 2021-05-20 | End: 2021-05-26 | Stop reason: HOSPADM

## 2021-05-20 RX ORDER — SODIUM CHLORIDE 9 MG/ML
INJECTION, SOLUTION INTRAVENOUS CONTINUOUS PRN
Status: DISCONTINUED | OUTPATIENT
Start: 2021-05-20 | End: 2021-05-20

## 2021-05-20 RX ORDER — OXYCODONE HYDROCHLORIDE 5 MG/1
2.5 TABLET ORAL EVERY 4 HOURS PRN
Status: DISCONTINUED | OUTPATIENT
Start: 2021-05-20 | End: 2021-05-26 | Stop reason: HOSPADM

## 2021-05-20 RX ORDER — CEFAZOLIN SODIUM 2 G/50ML
2000 SOLUTION INTRAVENOUS ONCE
Status: COMPLETED | OUTPATIENT
Start: 2021-05-20 | End: 2021-05-20

## 2021-05-20 RX ORDER — PROTAMINE SULFATE 10 MG/ML
INJECTION, SOLUTION INTRAVENOUS AS NEEDED
Status: DISCONTINUED | OUTPATIENT
Start: 2021-05-20 | End: 2021-05-20

## 2021-05-20 RX ORDER — VANCOMYCIN HYDROCHLORIDE 1 G/20ML
INJECTION, POWDER, LYOPHILIZED, FOR SOLUTION INTRAVENOUS AS NEEDED
Status: DISCONTINUED | OUTPATIENT
Start: 2021-05-20 | End: 2021-05-20 | Stop reason: HOSPADM

## 2021-05-20 RX ORDER — GLYCOPYRROLATE 0.2 MG/ML
INJECTION INTRAMUSCULAR; INTRAVENOUS AS NEEDED
Status: DISCONTINUED | OUTPATIENT
Start: 2021-05-20 | End: 2021-05-20

## 2021-05-20 RX ORDER — FENTANYL CITRATE 50 UG/ML
INJECTION, SOLUTION INTRAMUSCULAR; INTRAVENOUS AS NEEDED
Status: DISCONTINUED | OUTPATIENT
Start: 2021-05-20 | End: 2021-05-20

## 2021-05-20 RX ORDER — FUROSEMIDE 10 MG/ML
40 INJECTION INTRAMUSCULAR; INTRAVENOUS EVERY 6 HOURS PRN
Status: DISCONTINUED | OUTPATIENT
Start: 2021-05-20 | End: 2021-05-21

## 2021-05-20 RX ORDER — CALCIUM CHLORIDE 100 MG/ML
1 INJECTION INTRAVENOUS; INTRAVENTRICULAR ONCE
Status: DISCONTINUED | OUTPATIENT
Start: 2021-05-20 | End: 2021-05-21

## 2021-05-20 RX ORDER — BISACODYL 10 MG
10 SUPPOSITORY, RECTAL RECTAL DAILY PRN
Status: DISCONTINUED | OUTPATIENT
Start: 2021-05-20 | End: 2021-05-26 | Stop reason: HOSPADM

## 2021-05-20 RX ORDER — LIDOCAINE HYDROCHLORIDE 10 MG/ML
INJECTION, SOLUTION EPIDURAL; INFILTRATION; INTRACAUDAL; PERINEURAL
Status: COMPLETED | OUTPATIENT
Start: 2021-05-20 | End: 2021-05-20

## 2021-05-20 RX ORDER — FENTANYL CITRATE 50 UG/ML
50 INJECTION, SOLUTION INTRAMUSCULAR; INTRAVENOUS ONCE
Status: COMPLETED | OUTPATIENT
Start: 2021-05-20 | End: 2021-05-20

## 2021-05-20 RX ORDER — AMINOCAPROIC ACID 250 MG/ML
INJECTION, SOLUTION INTRAVENOUS AS NEEDED
Status: DISCONTINUED | OUTPATIENT
Start: 2021-05-20 | End: 2021-05-20

## 2021-05-20 RX ORDER — PANTOPRAZOLE SODIUM 40 MG/1
40 TABLET, DELAYED RELEASE ORAL DAILY
Status: DISCONTINUED | OUTPATIENT
Start: 2021-05-20 | End: 2021-05-26 | Stop reason: HOSPADM

## 2021-05-20 RX ORDER — FENTANYL CITRATE 50 UG/ML
50 INJECTION, SOLUTION INTRAMUSCULAR; INTRAVENOUS
Status: DISCONTINUED | OUTPATIENT
Start: 2021-05-20 | End: 2021-05-21

## 2021-05-20 RX ORDER — HYDROMORPHONE HCL/PF 1 MG/ML
0.5 SYRINGE (ML) INJECTION EVERY 2 HOUR PRN
Status: DISCONTINUED | OUTPATIENT
Start: 2021-05-20 | End: 2021-05-21

## 2021-05-20 RX ORDER — ASPIRIN 325 MG
325 TABLET ORAL DAILY
Status: DISCONTINUED | OUTPATIENT
Start: 2021-05-20 | End: 2021-05-26 | Stop reason: HOSPADM

## 2021-05-20 RX ORDER — NEOSTIGMINE METHYLSULFATE 1 MG/ML
INJECTION INTRAVENOUS AS NEEDED
Status: DISCONTINUED | OUTPATIENT
Start: 2021-05-20 | End: 2021-05-20

## 2021-05-20 RX ORDER — ALBUMIN, HUMAN INJ 5% 5 %
SOLUTION INTRAVENOUS
Status: COMPLETED
Start: 2021-05-20 | End: 2021-05-20

## 2021-05-20 RX ORDER — SODIUM CHLORIDE 450 MG/100ML
20 INJECTION, SOLUTION INTRAVENOUS CONTINUOUS
Status: DISCONTINUED | OUTPATIENT
Start: 2021-05-20 | End: 2021-05-21

## 2021-05-20 RX ORDER — MAGNESIUM HYDROXIDE 1200 MG/15ML
LIQUID ORAL AS NEEDED
Status: DISCONTINUED | OUTPATIENT
Start: 2021-05-20 | End: 2021-05-20 | Stop reason: HOSPADM

## 2021-05-20 RX ORDER — ROCURONIUM BROMIDE 10 MG/ML
INJECTION, SOLUTION INTRAVENOUS AS NEEDED
Status: DISCONTINUED | OUTPATIENT
Start: 2021-05-20 | End: 2021-05-20

## 2021-05-20 RX ORDER — ALBUMIN, HUMAN INJ 5% 5 %
12.5 SOLUTION INTRAVENOUS ONCE
Status: COMPLETED | OUTPATIENT
Start: 2021-05-20 | End: 2021-05-20

## 2021-05-20 RX ORDER — POTASSIUM CHLORIDE 14.9 MG/ML
20 INJECTION INTRAVENOUS
Status: DISCONTINUED | OUTPATIENT
Start: 2021-05-20 | End: 2021-05-21

## 2021-05-20 RX ORDER — POTASSIUM CHLORIDE 14.9 MG/ML
20 INJECTION INTRAVENOUS ONCE AS NEEDED
Status: DISCONTINUED | OUTPATIENT
Start: 2021-05-20 | End: 2021-05-21

## 2021-05-20 RX ORDER — AMIODARONE HYDROCHLORIDE 200 MG/1
200 TABLET ORAL EVERY 8 HOURS SCHEDULED
Status: DISCONTINUED | OUTPATIENT
Start: 2021-05-20 | End: 2021-05-26 | Stop reason: HOSPADM

## 2021-05-20 RX ORDER — HEPARIN SODIUM 1000 [USP'U]/ML
INJECTION, SOLUTION INTRAVENOUS; SUBCUTANEOUS AS NEEDED
Status: DISCONTINUED | OUTPATIENT
Start: 2021-05-20 | End: 2021-05-20

## 2021-05-20 RX ORDER — ACETAMINOPHEN 325 MG/1
975 TABLET ORAL ONCE
Status: COMPLETED | OUTPATIENT
Start: 2021-05-20 | End: 2021-05-20

## 2021-05-20 RX ORDER — ONDANSETRON 2 MG/ML
4 INJECTION INTRAMUSCULAR; INTRAVENOUS EVERY 6 HOURS PRN
Status: DISCONTINUED | OUTPATIENT
Start: 2021-05-20 | End: 2021-05-26 | Stop reason: HOSPADM

## 2021-05-20 RX ORDER — GABAPENTIN 300 MG/1
300 CAPSULE ORAL ONCE
Status: COMPLETED | OUTPATIENT
Start: 2021-05-20 | End: 2021-05-20

## 2021-05-20 RX ORDER — ACETAMINOPHEN 650 MG/1
650 SUPPOSITORY RECTAL EVERY 4 HOURS PRN
Status: DISCONTINUED | OUTPATIENT
Start: 2021-05-20 | End: 2021-05-22

## 2021-05-20 RX ORDER — POLYETHYLENE GLYCOL 3350 17 G/17G
17 POWDER, FOR SOLUTION ORAL DAILY
Status: DISCONTINUED | OUTPATIENT
Start: 2021-05-21 | End: 2021-05-26 | Stop reason: HOSPADM

## 2021-05-20 RX ORDER — KETAMINE HCL IN NACL, ISO-OSM 100MG/10ML
SYRINGE (ML) INJECTION AS NEEDED
Status: DISCONTINUED | OUTPATIENT
Start: 2021-05-20 | End: 2021-05-20

## 2021-05-20 RX ORDER — OXYCODONE HYDROCHLORIDE 5 MG/1
5 TABLET ORAL EVERY 4 HOURS PRN
Status: DISCONTINUED | OUTPATIENT
Start: 2021-05-20 | End: 2021-05-26 | Stop reason: HOSPADM

## 2021-05-20 RX ORDER — CHLORHEXIDINE GLUCONATE 0.12 MG/ML
15 RINSE ORAL 2 TIMES DAILY
Status: DISCONTINUED | OUTPATIENT
Start: 2021-05-20 | End: 2021-05-21

## 2021-05-20 RX ORDER — ONDANSETRON 2 MG/ML
INJECTION INTRAMUSCULAR; INTRAVENOUS AS NEEDED
Status: DISCONTINUED | OUTPATIENT
Start: 2021-05-20 | End: 2021-05-20

## 2021-05-20 RX ORDER — MAGNESIUM SULFATE HEPTAHYDRATE 40 MG/ML
2 INJECTION, SOLUTION INTRAVENOUS ONCE
Status: COMPLETED | OUTPATIENT
Start: 2021-05-20 | End: 2021-05-20

## 2021-05-20 RX ORDER — EPHEDRINE SULFATE 50 MG/ML
INJECTION INTRAVENOUS AS NEEDED
Status: DISCONTINUED | OUTPATIENT
Start: 2021-05-20 | End: 2021-05-20

## 2021-05-20 RX ORDER — DEXAMETHASONE SODIUM PHOSPHATE 10 MG/ML
INJECTION, SOLUTION INTRAMUSCULAR; INTRAVENOUS AS NEEDED
Status: DISCONTINUED | OUTPATIENT
Start: 2021-05-20 | End: 2021-05-20

## 2021-05-20 RX ADMIN — OXYCODONE HYDROCHLORIDE 5 MG: 5 TABLET ORAL at 21:09

## 2021-05-20 RX ADMIN — LIDOCAINE HYDROCHLORIDE 0.5 ML: 10 INJECTION, SOLUTION EPIDURAL; INFILTRATION; INTRACAUDAL; PERINEURAL at 07:38

## 2021-05-20 RX ADMIN — DEXAMETHASONE SODIUM PHOSPHATE 5 MG: 10 INJECTION, SOLUTION INTRAMUSCULAR; INTRAVENOUS at 09:43

## 2021-05-20 RX ADMIN — FENTANYL CITRATE 500 MCG: 50 INJECTION INTRAMUSCULAR; INTRAVENOUS at 09:29

## 2021-05-20 RX ADMIN — ROCURONIUM BROMIDE 100 MG: 50 INJECTION, SOLUTION INTRAVENOUS at 07:45

## 2021-05-20 RX ADMIN — AMINOCAPROIC ACID 5 G: 250 INJECTION, SOLUTION INTRAVENOUS at 08:10

## 2021-05-20 RX ADMIN — CHLORHEXIDINE GLUCONATE 15 ML: 1.2 SOLUTION ORAL at 05:54

## 2021-05-20 RX ADMIN — CEFAZOLIN SODIUM 2000 MG: 2 SOLUTION INTRAVENOUS at 08:10

## 2021-05-20 RX ADMIN — MIDAZOLAM 4 MG: 1 INJECTION INTRAMUSCULAR; INTRAVENOUS at 11:19

## 2021-05-20 RX ADMIN — SODIUM CHLORIDE: 0.9 INJECTION, SOLUTION INTRAVENOUS at 08:10

## 2021-05-20 RX ADMIN — FENTANYL CITRATE 250 MCG: 50 INJECTION INTRAMUSCULAR; INTRAVENOUS at 07:45

## 2021-05-20 RX ADMIN — EPINEPHRINE 1 MCG/MIN: 1 INJECTION, SOLUTION, CONCENTRATE INTRAVENOUS at 13:00

## 2021-05-20 RX ADMIN — SODIUM CHLORIDE 20 ML/HR: 0.45 INJECTION, SOLUTION INTRAVENOUS at 13:20

## 2021-05-20 RX ADMIN — MIDAZOLAM 2 MG: 1 INJECTION INTRAMUSCULAR; INTRAVENOUS at 09:29

## 2021-05-20 RX ADMIN — OXYCODONE HYDROCHLORIDE 5 MG: 5 TABLET ORAL at 17:06

## 2021-05-20 RX ADMIN — EPHEDRINE SULFATE 5 MG: 50 INJECTION, SOLUTION INTRAVENOUS at 08:22

## 2021-05-20 RX ADMIN — SODIUM CHLORIDE: 0.9 INJECTION, SOLUTION INTRAVENOUS at 07:31

## 2021-05-20 RX ADMIN — SODIUM CHLORIDE 0.4 MCG/KG/HR: 9 INJECTION, SOLUTION INTRAVENOUS at 13:54

## 2021-05-20 RX ADMIN — AMIODARONE HYDROCHLORIDE 200 MG: 200 TABLET ORAL at 21:05

## 2021-05-20 RX ADMIN — NOREPINEPHRINE BITARTRATE 2 MCG/MIN: 1 INJECTION, SOLUTION, CONCENTRATE INTRAVENOUS at 11:19

## 2021-05-20 RX ADMIN — Medication 2 MCG/MIN: at 11:19

## 2021-05-20 RX ADMIN — MUPIROCIN 1 APPLICATION: 20 OINTMENT TOPICAL at 21:05

## 2021-05-20 RX ADMIN — GABAPENTIN 300 MG: 300 CAPSULE ORAL at 07:01

## 2021-05-20 RX ADMIN — AMINOCAPROIC ACID 1000 MG/HR: 250 INJECTION, SOLUTION INTRAVENOUS at 08:10

## 2021-05-20 RX ADMIN — ALBUMIN, HUMAN INJ 5% 12.5 G: 5 SOLUTION at 17:07

## 2021-05-20 RX ADMIN — NEOSTIGMINE METHYLSULFATE 4 MG: 1 INJECTION INTRAVENOUS at 12:48

## 2021-05-20 RX ADMIN — NOREPINEPHRINE BITARTRATE 1 MCG/MIN: 1 INJECTION, SOLUTION, CONCENTRATE INTRAVENOUS at 13:00

## 2021-05-20 RX ADMIN — FENTANYL CITRATE 250 MCG: 50 INJECTION INTRAMUSCULAR; INTRAVENOUS at 08:35

## 2021-05-20 RX ADMIN — Medication 50 MG: at 11:19

## 2021-05-20 RX ADMIN — ALBUMIN (HUMAN) 12.5 G: 12.5 INJECTION, SOLUTION INTRAVENOUS at 17:07

## 2021-05-20 RX ADMIN — CEFAZOLIN SODIUM 2000 MG: 2 SOLUTION INTRAVENOUS at 18:36

## 2021-05-20 RX ADMIN — Medication 50 MG: at 07:45

## 2021-05-20 RX ADMIN — ONDANSETRON 4 MG: 2 INJECTION INTRAMUSCULAR; INTRAVENOUS at 12:32

## 2021-05-20 RX ADMIN — ACETAMINOPHEN 975 MG: 325 TABLET, FILM COATED ORAL at 07:01

## 2021-05-20 RX ADMIN — HEPARIN SODIUM 42000 UNITS: 1000 INJECTION INTRAVENOUS; SUBCUTANEOUS at 09:01

## 2021-05-20 RX ADMIN — ALBUMIN (HUMAN) 12.5 G: 12.5 INJECTION, SOLUTION INTRAVENOUS at 14:56

## 2021-05-20 RX ADMIN — GLYCOPYRROLATE 0.4 MG: 0.2 INJECTION, SOLUTION INTRAMUSCULAR; INTRAVENOUS at 12:48

## 2021-05-20 RX ADMIN — ALBUMIN, HUMAN INJ 5% 12.5 G: 5 SOLUTION at 19:08

## 2021-05-20 RX ADMIN — SODIUM CHLORIDE, SODIUM LACTATE, POTASSIUM CHLORIDE, AND CALCIUM CHLORIDE 500 ML: .6; .31; .03; .02 INJECTION, SOLUTION INTRAVENOUS at 13:30

## 2021-05-20 RX ADMIN — SODIUM CHLORIDE, SODIUM LACTATE, POTASSIUM CHLORIDE, AND CALCIUM CHLORIDE 500 ML: .6; .31; .03; .02 INJECTION, SOLUTION INTRAVENOUS at 14:00

## 2021-05-20 RX ADMIN — ACETAMINOPHEN 975 MG: 325 TABLET, FILM COATED ORAL at 21:05

## 2021-05-20 RX ADMIN — MUPIROCIN 1 APPLICATION: 20 OINTMENT TOPICAL at 05:54

## 2021-05-20 RX ADMIN — Medication 12.5 MG: at 05:54

## 2021-05-20 RX ADMIN — ALBUMIN (HUMAN) 12.5 G: 12.5 INJECTION, SOLUTION INTRAVENOUS at 19:08

## 2021-05-20 RX ADMIN — FENTANYL CITRATE 50 MCG: 50 INJECTION INTRAMUSCULAR; INTRAVENOUS at 13:16

## 2021-05-20 RX ADMIN — PROTAMINE SULFATE 300 MG: 10 INJECTION, SOLUTION INTRAVENOUS at 11:37

## 2021-05-20 RX ADMIN — ATORVASTATIN CALCIUM 40 MG: 40 TABLET, FILM COATED ORAL at 17:10

## 2021-05-20 RX ADMIN — ALBUMIN, HUMAN INJ 5% 12.5 G: 5 SOLUTION at 14:56

## 2021-05-20 RX ADMIN — CEFAZOLIN SODIUM 2000 MG: 2 SOLUTION INTRAVENOUS at 11:19

## 2021-05-20 RX ADMIN — MAGNESIUM SULFATE HEPTAHYDRATE 2 G: 40 INJECTION, SOLUTION INTRAVENOUS at 13:16

## 2021-05-20 RX ADMIN — LIDOCAINE HYDROCHLORIDE 5 ML: 20 INJECTION, SOLUTION EPIDURAL; INFILTRATION; INTRACAUDAL; PERINEURAL at 07:45

## 2021-05-20 RX ADMIN — FENTANYL CITRATE 50 MCG: 50 INJECTION INTRAMUSCULAR; INTRAVENOUS at 13:23

## 2021-05-20 RX ADMIN — PHENYLEPHRINE HYDROCHLORIDE 100 MCG: 10 INJECTION INTRAVENOUS at 08:22

## 2021-05-20 RX ADMIN — CHLORHEXIDINE GLUCONATE 15 ML: 1.2 SOLUTION ORAL at 17:10

## 2021-05-20 RX ADMIN — PHENYLEPHRINE HYDROCHLORIDE 100 MCG: 10 INJECTION INTRAVENOUS at 07:45

## 2021-05-20 NOTE — ANESTHESIA PROCEDURE NOTES
Introducer/[place]  Performed by: Adrian Randolph MD  Authorized by: Adrian Randolph MD     Date/Time: 5/20/2021 8:05 AM  Consent: Verbal consent obtained  Written consent obtained  Risks and benefits: risks, benefits and alternatives were discussed  Consent given by: patient  Patient understanding: patient states understanding of the procedure being performed  Patient consent: the patient's understanding of the procedure matches consent given  Required items: required blood products, implants, devices, and special equipment available  Patient identity confirmed: arm band, provided demographic data and hospital-assigned identification number  Time out: Immediately prior to procedure a "time out" was called to verify the correct patient, procedure, equipment, support staff and site/side marked as required    Indications: vascular access and central pressure monitoring  Location details: right internal jugular  Catheter size: 9 Fr  Patient position: Trendelenburg  Assessment: blood return through all ports and free fluid flow  Preparation: skin prepped with 2% chlorhexidine  Skin prep agent dried: skin prep agent completely dried prior to procedure  Sterile barriers: all five maximum sterile barriers used - cap, mask, sterile gown, sterile gloves, and large sterile sheet  Hand hygiene: hand hygiene performed prior to central venous catheter insertion  Ultrasound guidance: yes  ultrasound permanent image saved  Number of attempts: 1  Successful placement: yes  Post-procedure: line sutured and dressing applied  Patient tolerance: Patient tolerated the procedure well with no immediate complications

## 2021-05-20 NOTE — OP NOTE
OPERATIVE REPORT  PATIENT NAME: Neris Weiss    :  1955  MRN: [id number]  Pt Location:  OR ROOM 16    SURGERY DATE: 2021    SURGEON: Ayaz Degroot MD     ASSISTANT: TIFF Weinstein    ADDITIONAL ASSISTANT: Belén Porras PA-C     PREOPERATIVE DIAGNOSIS: 1  Symptomatic severe aortic stenosis and regurgitation; 2  Mitral regurgitation; 3  Patent foramen ovale; 4  Non-ischemic cardiomyopathy  POSTOPERATIVE DIAGNOSES: 1  Symptomatic severe aortic stenosis and regurgitation; 2  Mitral regurgitation; 3  Patent foramen ovale; 4  Non-ischemic cardiomyopathy  NYHA CLASS: 4    CCS CLASS: 4    PROCEDURES:1  Aortic valve replacement with a 25 mm Bauer Inspiris Resilia bioprosthetic valve; 2  Mitral valve repair with 32 mm Bauer Physio II ring annuloplasty; 3  Closure of patent foramen ovale  ANESTHESIA General endotracheal anesthesia with transesophageal echocardiogram guidance, Dr Sanchez Alverto: 114 minutes  CROSSCLAMP TIME: 107 minutes  Chest tubes: x 2     Pacing wires: A x1 and V x1  TRANSFUSIONS: None     SPECIMENS: Aortic valve     ESTIMATED BLOOD LOSS: 200 mL    OPERATIVE TECHNIQUE: The patient was taken to the operating room and placed supine on the operating table  Following the satisfactory induction of general anesthesia and the placement of monitoring lines the patient was prepped and draped in the usual sterile fashion  A time-out procedure was performed  The patient underwent median sternotomy, pericardiotomy, and systemic heparinization  Epiaortic ultrasound was used to evaluate the ascending aorta which was found to be free of significant atheromatous disease  The patient underwent aortic and bicaval cannulation and an antegrade was placed  The patient was initiated on bypass and an LV vent was placed through the right superior pulmonary vein  The ascending aorta was crossclamped   Antegrade [place] cardioplegia was delivered with a borderline arrest    Aortotomy was performed and additional [place] cardioplegia was delivered directly down the coronary ostia with an excellent arrest     The aortic valve was exposed  It was found to be heavily calcified and severely stenotic  The leaflets were excised and the annulus was debrided of calcium  Left atriotomy was performed  A sizable PFO was identified and was closed directly with a running 4-0 Prolene suture in two layers  The mitral valve was exposed with a self-retaining retractor  Thorough mitral valve analysis was performed  There was functional mitral valve disease with mild annular calcification and dilated annulus  I felt that the valve was therefore repairable  Annuloplasty sutures were placed  A 32 mm Physio II ring was selected and brought to the field  The sutures were passed through the ring  The ring was seated and the sutures were tied down  While de-airing the heart the left atriotomy was closed with running 3-0 Prolene suture  Attention was turned back to the aortic valve  The annulus was sized to a 25 mm Bauer Inspiris Resilia bioprosthetic valve  Valve sutures were placed with pledgets on the ventricular side  The valve was brought to the field  The sutures were passed through the sewing ring of the valve, the valve was seated and the sutures were tied down  Clearance of the coronary ostia was confirmed  While de-airing the heart, the aortotomy was closed with two layers of running 4-0 Prolene suture  The heart was extensively de-aired and the crossclamp was removed  Atrial and ventricular pacing wires were placed  Following a period of reperfusion the patient was weaned from cardiopulmonary bypass and decannulated  Protamine was administered with normalization of the ACT  Hemostasis was confirmed in all fields  Thoracostomy tubes were placed  The sternum was closed with stainless steel wires   The fascia, subdermis and skin were closed with multiple layers of running absorbable suture  As the attending surgeon, I was present and scrubbed for all critical portions of this procedure  Sponge, needle and instrument counts were reported as correct by the nursing staff  There is no cardiac residency program at this facility and for complex procedures such as this, it is vital to have a physician assistant experienced in cardiac surgery  The assistance of Elana Brunner was necessary during mitral valve repair for retraction of the heart with correct tissue handling techniques during exposure of the mitral valve, for proper following of the suture during valve reconstruction, annular suture placement, and left atriotomy closure  The assistance of Elana Brunner was necessary for experienced gentle retraction of the aortic root thereby providing exposure of the aortic valve during valve excision, annular debridement, placement of annular sutures, seating of the valve, and tying of the valve sutures  Elana Brunner was also necessary for following the suture with correct tension during closure of the aortotomy  Final KATIA demonstrated slightly improved LVEF at 40-45% (preop 35%)  There was no mitral regurgitation or stenosis  There was no evidence of PFO  There was normal bioAVR function with no PVL         LILYNdenisse Piña MD  DATE: May 20, 2021  TIME: 12:32 PM

## 2021-05-20 NOTE — RESPIRATORY THERAPY NOTE
RT Protocol Note  Marcel Paulson 78 y o  male MRN: [id number]  Unit/Bed#: [facility] 414-01 Encounter: [id number]    Assessment    Principal Problem:     Aortic valve stenosis - severe  Active Problems:    Chronic obstructive lung disease ([facility])    Hyperlipidemia    Essential hypertension    NSTEMI (non-ST elevated myocardial infarction) ([facility])    GERD (gastroesophageal reflux disease)    Acute combined systolic and diastolic CHF, NYHA class 1 ([facility])    Arthritis    S/P AVR    PFO (patent foramen ovale)    S/P MVR (mitral valve repair)      Home Pulmonary Medications:  Daily anoro ellipta; prn albuterol mdi       Past Medical History:   Diagnosis Date    Broken humerus     COPD (chronic obstructive pulmonary disease) ([facility] [address] )     Pneumonia      Social History     Socioeconomic History    Marital status: /Civil Union     Spouse name: None    Number of children: None    Years of education: None    Highest education level: None   Occupational History    Occupation: Machinery Repair     Comment: packaging [employer]  - full time employment   Social Needs    Financial resource strain: None    Food insecurity     Worry: None     Inability: None    Transportation needs     Medical: None     Non-medical: None   Tobacco Use    Smoking status: Current Some Day Smoker     Packs/day: 0 50     Types: Cigarettes     Start date: 1989    Smokeless tobacco: Never Used   Substance and Sexual Activity    Alcohol use: Yes     Frequency: 2-4 times a month     Comment: 1-2/wk    Drug use: No    Sexual activity: None   Lifestyle    Physical activity     Days per week: None     Minutes per session: None    Stress: None   Relationships    Social connections     Talks on phone: None     Gets together: None     Attends Mosque service: None     Active member of club or organization: None     Attends meetings of clubs or organizations: None     Relationship status: None    Intimate partner violence     Fear of current or ex partner: None     Emotionally abused: None     Physically abused: None     Forced sexual activity: None   Other Topics Concern    None   Social History Narrative    None       Subjective         Objective    Physical Exam:   Assessment Type: Assess only  General Appearance: Sedated  Respiratory Pattern: Assisted  Chest Assessment: Chest expansion symmetrical  Bilateral Breath Sounds: Coarse    Vitals:  Blood pressure 91/62, pulse 64, temperature 97 8 °F (36 6 °C), resp  rate 15, height 6' 2" (1 88 m), weight 104 kg (228 lb 13 4 oz), SpO2 96 %  Imaging and other studies: I have personally reviewed pertinent reports              Plan    Respiratory Plan: (P) Home Bronchodilator Patient pathway  Airway Clearance Plan: (P) Incentive Spirometer     Resp Comments: (P) pt admitted to 414 from OR, s/p AVR / MVR; pt has 30 pk/yr smoking Hx, COPD; uses Anoro ellipta daily prn albuterol MDI at home; will con't same;start IS after extubation

## 2021-05-20 NOTE — PLAN OF CARE
Problem: Potential for Falls  Goal: Patient will remain free of falls  Description: INTERVENTIONS:  - Assess patient frequently for physical needs  -  Identify cognitive and physical deficits and behaviors that affect risk of falls    -  [facility] fall precautions as indicated by assessment   - Educate patient/family on patient safety including physical limitations  - Instruct patient to call for assistance with activity based on assessment  - Modify environment to reduce risk of injury  - Consider OT/PT consult to assist with strengthening/mobility  Outcome: Progressing     Problem: CARDIOVASCULAR - ADULT  Goal: Maintains optimal cardiac output and hemodynamic stability  Description: INTERVENTIONS:  - Monitor I/O, vital signs and rhythm  - Monitor for S/S and trends of decreased cardiac output  - Administer and titrate ordered vasoactive medications to optimize hemodynamic stability  - Assess quality of pulses, skin color and temperature  - Assess for signs of decreased coronary artery perfusion  - Instruct patient to report change in severity of symptoms  Outcome: Progressing  Goal: Absence of cardiac dysrhythmias or at baseline rhythm  Description: INTERVENTIONS:  - Continuous cardiac monitoring, vital signs, obtain 12 lead EKG if ordered  - Administer antiarrhythmic and heart rate control medications as ordered  - Monitor electrolytes and administer replacement therapy as ordered  Outcome: Progressing     Problem: RESPIRATORY - ADULT  Goal: Achieves optimal ventilation and oxygenation  Description: INTERVENTIONS:  - Assess for changes in respiratory status  - Assess for changes in mentation and behavior  - Position to facilitate oxygenation and minimize respiratory effort  - Oxygen administered by appropriate delivery if ordered  - Initiate smoking cessation education as indicated  - Encourage broncho-pulmonary hygiene including cough, deep breathe, Incentive Spirometry  - Assess the need for suctioning and aspirate as needed  - Assess and instruct to report SOB or any respiratory difficulty  - Respiratory Therapy support as indicated  Outcome: Progressing     Problem: PAIN - ADULT  Goal: Verbalizes/displays adequate comfort level or baseline comfort level  Description: Interventions:  - Encourage patient to monitor pain and request assistance  - Assess pain using appropriate pain scale  - Administer analgesics based on type and severity of pain and evaluate response  - Implement non-pharmacological measures as appropriate and evaluate response  - Consider cultural and social influences on pain and pain management  - Notify physician/advanced practitioner if interventions unsuccessful or patient reports new pain  Outcome: Progressing     Problem: DISCHARGE PLANNING  Goal: Discharge to home or other facility with appropriate resources  Description: INTERVENTIONS:  - Identify barriers to discharge w/patient and caregiver  - Arrange for needed discharge resources and transportation as appropriate  - Identify discharge learning needs (meds, wound care, etc )  - Arrange for interpretive services to assist at discharge as needed  - Refer to Case Management Department for coordinating discharge planning if the patient needs post-hospital services based on physician/advanced practitioner order or complex needs related to functional status, cognitive ability, or social support system  Outcome: Progressing     Problem: Knowledge Deficit  Goal: Patient/family/caregiver demonstrates understanding of disease process, treatment plan, medications, and discharge instructions  Description: Complete learning assessment and assess knowledge base    Interventions:  - Provide teaching at level of understanding  - Provide teaching via preferred learning methods  Outcome: Progressing

## 2021-05-20 NOTE — INTERVAL H&P NOTE
Vitals:    05/20/21 0548   BP: 91/62   Pulse: 64   Resp:    Temp:    SpO2:          H&P reviewed  After examining the patient I find no changes in the patients condition since the H&P was completed  Plan for Tissue AVR, likely MV repair  Preoperative Beta Blocker: indicated  Anticipated Length of Stay: Patient will be admitted on an inpatient basis with an anticipated length of stay of grater than 2 midnights  Justification for Hospital StaY: Post surgical recovery following open heart surgery        Federico Fox MD  05/20/21  7:27 AM

## 2021-05-20 NOTE — ANESTHESIA PROCEDURE NOTES
Arterial Line Insertion    Date/Time: 5/20/2021 7:38 AM  Performed by: Marcin Eldridge MD  Authorized by: Marcin Eldridge MD   Consent: Verbal consent obtained  Written consent obtained  Risks and benefits: risks, benefits and alternatives were discussed  Consent given by: patient  Patient understanding: patient states understanding of the procedure being performed  Patient consent: the patient's understanding of the procedure matches consent given  Required items: required blood products, implants, devices, and special equipment available  Patient identity confirmed: verbally with patient and arm band  Time out: Immediately prior to procedure a "time out" was called to verify the correct patient, procedure, equipment, support staff and site/side marked as required  Preparation: Patient was prepped and draped in the usual sterile fashion  Indications: hemodynamic monitoring  Orientation:  Left  Location: radial arterylidocaine (PF) (XYLOCAINE-MPF) 1 % infiltration, 0 5 mL  Sedation:  Patient sedated: yes  Sedation type: anxiolysis  Sedatives: midazolam and see MAR for details  Vitals: Vital signs were monitored during sedation      Procedure Details:  Needle gauge: 20  Seldinger technique: Seldinger technique used  Number of attempts: 1    Post-procedure:  Post-procedure: dressing applied  Waveform: good waveform  Post-procedure CNS: unchanged  Patient tolerance: Patient tolerated the procedure well with no immediate complications

## 2021-05-20 NOTE — ANESTHESIA PROCEDURE NOTES
Central Line Insertion  Performed by: Bryanna Ellsworth MD  Authorized by: Bryanna Ellsworth MD     Date/Time: 5/20/2021 8:05 AM  Catheter Type:  triple lumen  Consent: Verbal consent obtained  Written consent obtained  Risks and benefits: risks, benefits and alternatives were discussed  Consent given by: patient  Patient understanding: patient states understanding of the procedure being performed  Patient consent: the patient's understanding of the procedure matches consent given  Required items: required blood products, implants, devices, and special equipment available  Patient identity confirmed: arm band, provided demographic data and hospital-assigned identification number  Time out: Immediately prior to procedure a "time out" was called to verify the correct patient, procedure, equipment, support staff and site/side marked as required    Indications: vascular access and central pressure monitoring  Location details: right internal jugular  Catheter size: 7 Fr  Patient position: Trendelenburg  Assessment: blood return through all ports and free fluid flow  Preparation: skin prepped with 2% chlorhexidine  Skin prep agent dried: skin prep agent completely dried prior to procedure  Sterile barriers: all five maximum sterile barriers used - cap, mask, sterile gown, sterile gloves, and large sterile sheet  Hand hygiene: hand hygiene performed prior to central venous catheter insertion  Ultrasound guidance: yes  sterile gel and probe cover used in ultrasound-guided central venous catheter insertionultrasound permanent image saved  Vessel of Catheter Tip End: svc  Number of attempts: 1  Successful placement: yes  Post-procedure: line sutured,  dressing applied and chlorhexidine patch applied  Patient tolerance: Patient tolerated the procedure well with no immediate complications

## 2021-05-20 NOTE — CONSULTS
Consult - [id number] [facility] 78 y o  male MRN: [id number]  Unit/Bed#: [facility] 414-01 Encounter: [id number]    Inpatient consult to Vickie Hennessy performed by: Hayley Hackett PA-C  Consult ordered by: Carmen Conroy PA-C          Physician Requesting Consult: Pete Arrington MD    Reason for Consult / Principal Problem: Aortic valve stenosis    HPI: Susy Rogers is a 78 y o  male who presents to ICU s/p bioprosthetic aortic valve replacement, mitral valve ring annuloplasty and closure of patent foramen ovale  PMH significant for known severe aortic stenosis, COPD, tobacco abuse, HTN  He initially presented to [facility] for progressive SOB, exertional angina and easy fatigability  Echocardiogram was completed demonstrating EF 42%, severe AS and significant mitral valve regurgitation  He was transferred to [facility] for CT surgery evaluation and cardiac catheterization  Cardiac cath showed no significant obstructive coronary artery disease  He now presents to ICU s/p bioprosthetic aortic valve replacement, mitral valve ring annuloplasty and closure of patent foramen ovale intubated and on Epinephrine 2 mcg/min  History obtained from chart review due to patient being intubated and sedated  ---------------------------------------------------------------------------------------------------------------------------------------------------------------------  Impressions:  1  POD 0 s/p bioprosthetic aortic valve replacement, mitral valve ring annuloplasty and closure of patent foramen oval  2  Severe symptom aortic stenosis  3  Severe mitral regurgitation  4  Patent foramen ovale  5  Non-ischemic cardiomyopathy  6  HTN  7  COPD  8  Tobacco abuse    Plan:    Neuro: No continuous sedation  Tylenol OTC, prn oxycodone and dilaudid for pain  Trend neuro exam   Delirium precautions  CV: MAP goal >65  SBP goal <130  CI>2 2  Post-op medications: Epinephrine, 2 mcg/min  Wean  as able  Volume resuscitation as needed  Monitor rhythm on telemetry  Epicardial pacing wires in place, intrinsically pacing NST 80s  Intra-op KATIA LVEF 40-45%  Lung: Check STAT post-op ABG and CXR  Wean vent with spontaneous breathing trial with goal to extubate   GI: GI prophylaxis with PPI  Bowel regimen  Zofran PRN for nausea  FEN: NPO  Replenish K >4 0, mag >2 0 and calcium >7 0  : Check STAT post-op BMP  Wilkes in place  Monitor UOP with goal >0 5cc/kg/hour  Lasix versus volume resuscitate as needed depending on hemodynamics and volume status  ID: Prophylactic post-op abx  Maintain normothermia  Trend temps  Heme: Check STAT post-op H/H and platelets  Monitor incision site, invasive lines, and chest tube outputs for bleeding  Send coag panel if needed  Endo: Insulin gtt for blood sugar control       Results from last 6 Months   Lab Units 05/13/21  1150   HEMOGLOBIN A1C % 5 8*     Disposition: ICU Care   ---------------------------------------------------------------------------------------------------------------------------------------------------------------------  Historical Information   Past Medical History:   Diagnosis Date    Broken humerus     COPD [facility]chronic obstructive pulmonary disease) ([facility] [address] )     Pneumonia      Past Surgical History:   Procedure Laterality Date    MULTIPLE TOOTH EXTRACTIONS N/A 5/17/2021    Procedure: EXTRACTION TEETH MULTIPLE 1, 3, 18, 30, EXCISION OF LEFT BUCCAL FIBROMA;  Surgeon: Benedict Dandy, DMD;  Location: [facility];  Service: Maxillofacial     Social History   Social History     Substance and Sexual Activity   Alcohol Use Yes    Frequency: 2-4 times a month    Comment: 1-2/wk     Social History     Substance and Sexual Activity   Drug Use No     Social History     Tobacco Use   Smoking Status Current Some Day Smoker    Packs/day: 0 50    Types: Cigarettes    Start date: 1989   Smokeless Tobacco Never Used     Family History   Problem Relation Age of Onset    Prostate cancer Father      I have reviewed this patient's family history and commented on sigificant items within the HPI    ROS: ROS unable to be obtained due to patient being intubated and sedated  Allergies: No Known Allergies    Home Medications:   Prior to Admission medications    Medication Sig Start Date End Date Taking?  Authorizing Provider   amLODIPine (NORVASC) 10 mg tablet TAKE 1 TABLET (10 MG TOTAL) BY MOUTH DAILY 7/9/20  Yes Praveena Ratliff, DO   Anoro Ellipta 62 5-25 MCG/INH inhaler INHALE 1 PUFF BY MOUTH EVERY DAY 1/19/21  Yes Praveena Ratliff, DO   aspirin (ECOTRIN LOW STRENGTH) 81 mg EC tablet Take 1 tablet (81 mg total) by mouth daily 3/26/18  Yes Praveena Ratliff, DO   atorvastatin (LIPITOR) 20 mg tablet TAKE 1 TABLET BY MOUTH EVERY DAY 3/25/21  Yes Praveena Ratliff, DO   losartan (COZAAR) 100 MG tablet TAKE 1 TABLET BY MOUTH EVERY DAY 10/20/20  Yes Praveena Ratliff, DO   Multiple Vitamin (MULTI-VITAMIN DAILY PO) Take 1 tablet by mouth daily 7/1/14  Yes Historical Provider, MD   pantoprazole (PROTONIX) 40 mg tablet TAKE 1 TABLET BY MOUTH EVERY DAY 2/16/21  Yes Praveena Ratliff, DO   albuterol (PROVENTIL HFA,VENTOLIN HFA) 90 mcg/act inhaler INHALE 1-2 PUFFS EVERY 4 (FOUR) HOURS AS NEEDED FOR WHEEZING 3/8/21   Praveena Ratliff, DO   Aspirin-Acetaminophen-Caffeine (EXCEDRIN MIGRAINE PO) Take by mouth    Historical Provider, MD   dextromethorphan-guaifenesin (MUCINEX DM)  MG per 12 hr tablet Take 1 tablet by mouth every 12 (twelve) hours 4/7/21   Isabell Lowe MD   predniSONE 20 mg tablet Please take 3 pills on day 1, 2, 3,  Then 2 pills on day 4, 5 and 6, 1 pill on day 7  Patient not taking: Reported on 5/12/2021 5/10/21   Isabell Lowe MD     Inpatient Medications:  Scheduled Meds:  Current Facility-Administered Medications   Medication Dose Route Frequency Provider Last Rate    acetaminophen  650 mg Rectal Q4H PRN Vickie Dash PA-C      acetaminophen  975 mg Oral Q8H Vickie Dash PA-C     Arde Needs albuterol  2 puff Inhalation Q4H PRN Helder Xie PA-C      amiodarone  200 mg Oral [facility] Helder Xie [place]      aspirin  325 mg Oral Daily Elana Kumar      atorvastatin  40 mg Oral QPM Elana Kumar      bisacodyl  10 mg Rectal Daily PRN Helder Xie PA-C      calcium chloride  1 g Intravenous Once Helder Xie PA-C      cefazolin  2,000 mg Intravenous [facility], TIFF      chlorhexidine  15 mL Swish & Spit BID Elana Kumar      dexmedetomidine  0 1-0 7 mcg/kg/hr Intravenous Titrated BITA Payne      Diclofenac Sodium  2 g Topical TID PRN Helder Xie PA-C      epinephrine  1-10 mcg/min Intravenous Titrated Helder Xie PA-C 1 mcg/min (05/20/21 1300)    fentanyl citrate (PF)  50 mcg Intravenous Q1H PRN Helder Xie PA-C      fluticasone-vilanterol  1 puff Inhalation Daily Elana Kumar      furosemide  40 mg Intravenous Q6H PRN Helder Xie PA-C      HYDROmorphone  0 5 mg Intravenous Q2H PRN Helder Xie PA-C      insulin regular (HumuLIN R,NovoLIN R) infusion  0 3-21 Units/hr Intravenous Titrated Helder Xie PA-C      lactated ringers  500 mL Intravenous Q30 Min PRN Helder Xie PA-C      lidocaine (cardiac)  100 mg Intravenous Q30 Min PRN Helder Xie PA-C      magnesium sulfate  2 g Intravenous Once Helder Xie PA-C      mupirocin  1 application Nasal [id number] Helder Xie PA-C      niCARdipine  2 5-15 mg/hr Intravenous Titrated Helder Xie PA-C      norepinephrine  1-30 mcg/min Intravenous Titrated Helder Xie PA-C 1 mcg/min (05/20/21 1300)    ondansetron  4 mg Intravenous Q6H PRN Helder Xie PA-C      oxyCODONE  2 5 mg Oral Q4H PRN Helder Xie PA-C      oxyCODONE  5 mg Oral Q4H PRN Helder Xie PA-C      pantoprazole  40 mg Oral Daily Elana Kumar      [START ON 5/21/2021] polyethylene glycol  17 g Oral Daily Helder Xie PA-C      potassium chloride  20 mEq Intravenous Once PRN TIFF Kumar potassium chloride  20 mEq Intravenous Q1H PRN MARÍA Kumar-BENJAMIN      potassium chloride  20 mEq Intravenous Q30 Min PRN Helder Xie PA-C      sodium chloride  20 mL/hr Intravenous Continuous Helder Xie PA-C 20 mL/hr (21 1320)     Continuous Infusions:dexmedetomidine, 0 1-0 7 mcg/kg/hr  epinephrine, 1-10 mcg/min, Last Rate: 1 mcg/min (21 1300)  insulin regular (HumuLIN R,NovoLIN R) infusion, 0 3-21 Units/hr  niCARdipine, 2 5-15 mg/hr  norepinephrine, 1-30 mcg/min, Last Rate: 1 mcg/min (21 1300)  sodium chloride, 20 mL/hr, Last Rate: 20 mL/hr (21 1320)      PRN Meds:  acetaminophen, 650 mg, Q4H PRN  albuterol, 2 puff, Q4H PRN  bisacodyl, 10 mg, Daily PRN  Diclofenac Sodium, 2 g, TID PRN  fentanyl citrate (PF), 50 mcg, Q1H PRN  furosemide, 40 mg, Q6H PRN  HYDROmorphone, 0 5 mg, Q2H PRN  lactated ringers, 500 mL, Q30 Min PRN  lidocaine (cardiac), 100 mg, Q30 Min PRN  ondansetron, 4 mg, Q6H PRN  oxyCODONE, 2 5 mg, Q4H PRN  oxyCODONE, 5 mg, Q4H PRN  potassium chloride, 20 mEq, Once PRN  potassium chloride, 20 mEq, Q1H PRN  potassium chloride, 20 mEq, Q30 Min PRN      ---------------------------------------------------------------------------------------------------------------------------------------------------------------------  Vitals:   Vitals:    21 0000 21 0500 21 0548 21 1250   BP:  90/63 91/62    BP Location:       Pulse:  62 64    Resp:  15     Temp:  97 8 °F (36 6 °C)     TempSrc:       SpO2: 93% 92%  96%   Weight:   104 kg (228 lb 13 4 oz)    Height:         Arterial Line:          Temperature: Temp (24hrs), Av 8 °F (36 6 °C), Min:97 7 °F (36 5 °C), Max:97 8 °F (36 6 °C)  Current: Temperature: 97 8 °F (36 6 °C)    Weights: IBW (Ideal Body Weight): 82 2 kg  Body mass index is 29 38 kg/m²      Hemodynamic Monitoring:  PAP:  , CVP:  , CO:  , CI:  , SVR:      Ventilator Settings:  Respiratory    Lab Data (Last 4 hours)    None         O2/Vent Data (Last 4 hours)       1250           Vent Mode AC/VC       Resp Rate (BPM) (BPM) 14       Vt (mL) (mL) 550       FIO2 (%) (%) 50       PEEP (cmH2O) (cmH2O) 6       MV 6 9                 Labs:   Results from last 7 days   Lab Units 21  1307 21  1302 21  1159  21  1051  21  0439 21  0602   WBC Thousand/uL  --   --   --   --   --   --  7 08 8 07   HEMOGLOBIN g/dL  --  13 9  --   --   --   --  13 7 13 7   I STAT HEMOGLOBIN g/dl 13 9  --  12 6   < > 11 6*   < >  --   --    HEMATOCRIT %  --  42 6  --   --   --   --  42 5 42 1   HEMATOCRIT, ISTAT % 41  --  37   < > 34*   < >  --   --    PLATELETS Thousands/uL  --  186  --   --  200  --  220 235   NEUTROS PCT %  --   --   --   --   --   --  51 56   MONOS PCT %  --   --   --   --   --   --  13* 12    < > = values in this interval not displayed  Results from last 7 days   Lab Units 21  1307 21  1159 21  1118  21  0439 21  0602 21  0451   SODIUM mmol/L  --   --   --   --  140 138 137   POTASSIUM mmol/L  --   --   --   --  3 8 4 2 4 2   CHLORIDE mmol/L  --   --   --   --  104 106 104   CO2 mmol/L  --   --   --   --  29 28 29   CO2, I-STAT mmol/L 26 26 30   < >  --   --   --    BUN mg/dL  --   --   --   --  13 20 19   CREATININE mg/dL  --   --   --   --  0 77 0 80 0 66   CALCIUM mg/dL  --   --   --   --  9 3 9 3 9 1   GLUCOSE, ISTAT mg/dl 158* 137 145*   < >  --   --   --     < > = values in this interval not displayed  Post-op AB 30/50  9/-2/88/96%  Post-op CXR: Tubes, lines and drains in place no PTX I have personally reviewed pertinent films in PACS  Post-op EKG: NSR HR 86 1st degree AV block  This was personally reviewed by myself  Physical Exam  Invasive lines and devices:   Invasive Devices     Central Venous Catheter Line            CVC Central Lines 21 Triple less than 1 day    Introducer 21 less than 1 day          Peripheral Intravenous Line            Peripheral IV 05/17/21 Left Forearm 3 days          Arterial Line            Arterial Line 05/20/21 Left Radial less than 1 day    Arterial Line 05/20/21 Right Femoral less than 1 day          Line            Pacer Wires less than 1 day    Pacer Wires less than 1 day          Drain            Chest Tube 1 Anterior Mediastinal 32 Fr  less than 1 day    Chest Tube 1 Posterior Mediastinal 32 Fr  less than 1 day    Urethral Catheter Non-latex; Temperature probe 16 Fr  less than 1 day          Airway            ETT  Hi-Lo; Cuffed;Oral 8 mm less than 1 day              ---------------------------------------------------------------------------------------------------------------------------------------------------------------------  Care Time Delivered:   No Critical Care time spent     SIGNATURE: Chalo Engel PA-C  DATE: May 20, 2021  TIME: 1:25 PM

## 2021-05-20 NOTE — PROGRESS NOTES
Patient does not have his OR consent in chart, not scanned into media either  He was transferred here on 5/18 @ 1700 from [id number]  [facility] contacted and personally went to floor to look for consent and not found  All medical record bins empty and not in patient chart area  Consent did not get transferred with patient  Patient awake and states he did not ever sign a consent  Patient for OR @ 0700

## 2021-05-20 NOTE — ANESTHESIA POSTPROCEDURE EVALUATION
Post-Op Assessment Note    CV Status:  Stable    Pain management: adequate     Mental Status:  Sleepy and somnolent   Hydration Status:  Stable   PONV Controlled:  None   Airway Patency:  Patent  Airway: intubated      Post Op Vitals Reviewed: Yes      Staff: Anesthesiologist         No complications documented      BP   128/71   Temp   97 5F   Pulse  87   Resp   14   SpO2   96%

## 2021-05-20 NOTE — ANESTHESIA PROCEDURE NOTES
Procedure Performed: KATIA Anesthesia  Start Time:  5/20/2021 8:08 AM        Preanesthesia Checklist    Patient identified, IV assessed, risks and benefits discussed, monitors and equipment assessed, procedure being performed at surgeon's request and anesthesia consent obtained  Procedure    Type of KATIA: complete KATIA with interpretation  Images Saved: ultrasound permanent image saved  Physician Requesting Echo: Estela Escalante MD  Location performed: OR  Intubated  Heart visualized  Insertion of KATIA Probe:  Easy  Probe Type:  Epiaortic and multiplane  Modalities:  Color flow mapping, 3D, pulse wave Doppler and continuous wave Doppler  Echocardiographic and Doppler Measurements    PREPROCEDURE    LEFT VENTRICLE:  Systolic Function: moderately impaired  Ejection Fraction: 30-35%  Cavity size: normal  Regional Wall Motion Abnormalities: global hypokinesis  RIGHT VENTRICLE:  Systolic Function: mildly impaired  Cavity size normal  Hypertrophy (LVH) present  AORTIC VALVE:  Leaflets: trileaflet  Leaflet motions restricted  Stenosis: severe  Mean Gradient: 75 mmHg  Peak Gradient: 123 mmHg  Area: 0 74 cm²  Regurgitation: mild  Pressure Half-time: 498 ms  MITRAL VALVE:  Leaflets: calcified  Leaflet Motions: restricted  Regurgitation: mild and Mild to moderate  Stenosis: none  Mean Gradient: 0 mmHg  TRICUSPID VALVE:  Leaflets: normal  Leaflet Motions: normal  Stenosis: none  Regurgitation: mild  PULMONIC VALVE:  Leaflets: normal  Regurgitation: trace  Stenosis: none  ASCENDING AORTA:  Size:  normal  Dissection not present  AORTIC ARCH:  Size:  normal  dissection not present  Grade 2: severe intimal thickening without protruding atheroma  DESCENDING AORTA:  Size: normal  Dissection not present  Grade 3: atheroma protruding < 0 5 cm into lumen          RIGHT ATRIUM:  Size:  normal  No spontaneous echo contrast     LEFT ATRIUM:  Size: normal  No spontaneous echo contrast     LEFT ATRIAL APPENDAGE:  Size: normal  No spontaneous echo contrast         ATRIAL SEPTUM:  Intra-atrial septal morphology: patent foramen ovale  Patent foramen ovale shunt: left to right shunt  VENTRICULAR SEPTUM:  Intra-ventricular septum morphology: normal          EPIAORTIC:  Plaque Thickness: 0-5 mm  OTHER FINDINGS:  Pericardium:  pericardial effusion  Pleural Effusion:  none  POSTPROCEDURE    LEFT VENTRICLE:   Systolic Function: mildly depressed  Ejection Fraction: 40-45 %  Regional Wall Motion Abnormalities: none  RIGHT VENTRICLE: Unchanged   AORTIC VALVE:   Leaflets: bioprosthetic  Stenosis: none  Mean Gradient: 8 mmHg  Regurgitation: none  Valve Size: 25 mm  MITRAL VALVE:   Leaflets: ring  Regurgitation: none  Stenosis: none  Valve/Ring Size: 32 mm  TRICUSPID VALVE: Unchanged   PULMONIC VALVE: Unchanged           ATRIA: Unchanged   AORTA: Unchanged   REMOVAL:  Probe Removal: atraumatic  ECHOCARDIOGRAM COMMENTS:  Residual trace PFO seen(L to R)

## 2021-05-21 ENCOUNTER — APPOINTMENT (INPATIENT)
Dept: RADIOLOGY | Facility: HOSPITAL | Age: 66
DRG: 216 | End: 2021-05-21
Payer: COMMERCIAL

## 2021-05-21 LAB
ABO GROUP BLD BPU: NORMAL
ANION GAP SERPL CALCULATED.3IONS-SCNC: 5 MMOL/L (ref 4–13)
ATRIAL RATE: 67 BPM
ATRIAL RATE: 86 BPM
BPU ID: NORMAL
BUN SERPL-MCNC: 14 MG/DL (ref 5–25)
CALCIUM SERPL-MCNC: 8.1 MG/DL (ref 8.3–10.1)
CHLORIDE SERPL-SCNC: 110 MMOL/L (ref 100–108)
CO2 SERPL-SCNC: 26 MMOL/L (ref 21–32)
CREAT SERPL-MCNC: 0.66 MG/DL (ref 0.6–1.3)
CROSSMATCH: NORMAL
ERYTHROCYTE [DISTWIDTH] IN BLOOD BY AUTOMATED COUNT: 13.8 % (ref 11.6–15.1)
GFR SERPL CREATININE-BSD FRML MDRD: 100 ML/MIN/1.73SQ M
GLUCOSE SERPL-MCNC: 108 MG/DL (ref 65–140)
GLUCOSE SERPL-MCNC: 109 MG/DL (ref 65–140)
GLUCOSE SERPL-MCNC: 111 MG/DL (ref 65–140)
GLUCOSE SERPL-MCNC: 112 MG/DL (ref 65–140)
GLUCOSE SERPL-MCNC: 119 MG/DL (ref 65–140)
GLUCOSE SERPL-MCNC: 128 MG/DL (ref 65–140)
GLUCOSE SERPL-MCNC: 130 MG/DL (ref 65–140)
GLUCOSE SERPL-MCNC: 133 MG/DL (ref 65–140)
GLUCOSE SERPL-MCNC: 161 MG/DL (ref 65–140)
GLUCOSE SERPL-MCNC: 200 MG/DL (ref 65–140)
HCT VFR BLD AUTO: 37.9 % (ref 36.5–49.3)
HGB BLD-MCNC: 12 G/DL (ref 12–17)
MAGNESIUM SERPL-MCNC: 2.6 MG/DL (ref 1.6–2.6)
MCH RBC QN AUTO: 30.7 PG (ref 26.8–34.3)
MCHC RBC AUTO-ENTMCNC: 31.7 G/DL (ref 31.4–37.4)
MCV RBC AUTO: 97 FL (ref 82–98)
P AXIS: 63 DEGREES
P AXIS: 69 DEGREES
PLATELET # BLD AUTO: 147 THOUSANDS/UL (ref 149–390)
PMV BLD AUTO: 9.2 FL (ref 8.9–12.7)
POTASSIUM SERPL-SCNC: 4.8 MMOL/L (ref 3.5–5.3)
PR INTERVAL: 196 MS
PR INTERVAL: 225 MS
QRS AXIS: -31 DEGREES
QRS AXIS: -58 DEGREES
QRSD INTERVAL: 100 MS
QRSD INTERVAL: 100 MS
QT INTERVAL: 392 MS
QT INTERVAL: 458 MS
QTC INTERVAL: 414 MS
QTC INTERVAL: 548 MS
RBC # BLD AUTO: 3.91 MILLION/UL (ref 3.88–5.62)
SODIUM SERPL-SCNC: 141 MMOL/L (ref 136–145)
T WAVE AXIS: 155 DEGREES
T WAVE AXIS: 94 DEGREES
UNIT DISPENSE STATUS: NORMAL
UNIT PRODUCT CODE: NORMAL
UNIT RH: NORMAL
VENTRICULAR RATE: 67 BPM
VENTRICULAR RATE: 86 BPM
WBC # BLD AUTO: 16.11 THOUSAND/UL (ref 4.31–10.16)

## 2021-05-21 PROCEDURE — 80048 BASIC METABOLIC PNL TOTAL CA: CPT | Performed by: PHYSICIAN ASSISTANT

## 2021-05-21 PROCEDURE — C1769 GUIDE WIRE: HCPCS

## 2021-05-21 PROCEDURE — 83735 ASSAY OF MAGNESIUM: CPT | Performed by: PHYSICIAN ASSISTANT

## 2021-05-21 PROCEDURE — 82948 REAGENT STRIP/BLOOD GLUCOSE: CPT

## 2021-05-21 PROCEDURE — C1729 CATH, DRAINAGE: HCPCS

## 2021-05-21 PROCEDURE — 71045 X-RAY EXAM CHEST 1 VIEW: CPT

## 2021-05-21 PROCEDURE — 71046 X-RAY EXAM CHEST 2 VIEWS: CPT

## 2021-05-21 PROCEDURE — 32557 INSERT CATH PLEURA W/ IMAGE: CPT | Performed by: RADIOLOGY

## 2021-05-21 PROCEDURE — 85027 COMPLETE CBC AUTOMATED: CPT | Performed by: PHYSICIAN ASSISTANT

## 2021-05-21 PROCEDURE — 97167 OT EVAL HIGH COMPLEX 60 MIN: CPT

## 2021-05-21 PROCEDURE — 32557 INSERT CATH PLEURA W/ IMAGE: CPT

## 2021-05-21 PROCEDURE — 97535 SELF CARE MNGMENT TRAINING: CPT

## 2021-05-21 PROCEDURE — 92610 EVALUATE SWALLOWING FUNCTION: CPT

## 2021-05-21 PROCEDURE — 93005 ELECTROCARDIOGRAM TRACING: CPT

## 2021-05-21 PROCEDURE — NC001 PR NO CHARGE: Performed by: PHYSICIAN ASSISTANT

## 2021-05-21 PROCEDURE — 99024 POSTOP FOLLOW-UP VISIT: CPT | Performed by: THORACIC SURGERY (CARDIOTHORACIC VASCULAR SURGERY)

## 2021-05-21 PROCEDURE — 0W9B30Z DRAINAGE OF LEFT PLEURAL CAVITY WITH DRAINAGE DEVICE, PERCUTANEOUS APPROACH: ICD-10-PCS | Performed by: RADIOLOGY

## 2021-05-21 PROCEDURE — 93010 ELECTROCARDIOGRAM REPORT: CPT | Performed by: INTERNAL MEDICINE

## 2021-05-21 PROCEDURE — 99291 CRITICAL CARE FIRST HOUR: CPT | Performed by: ANESTHESIOLOGY

## 2021-05-21 PROCEDURE — 97163 PT EVAL HIGH COMPLEX 45 MIN: CPT

## 2021-05-21 RX ORDER — DOCUSATE SODIUM 100 MG/1
100 CAPSULE, LIQUID FILLED ORAL 2 TIMES DAILY
Status: DISCONTINUED | OUTPATIENT
Start: 2021-05-21 | End: 2021-05-26 | Stop reason: HOSPADM

## 2021-05-21 RX ORDER — KETOROLAC TROMETHAMINE 30 MG/ML
15 INJECTION, SOLUTION INTRAMUSCULAR; INTRAVENOUS ONCE
Status: COMPLETED | OUTPATIENT
Start: 2021-05-21 | End: 2021-05-21

## 2021-05-21 RX ORDER — FUROSEMIDE 10 MG/ML
40 INJECTION INTRAMUSCULAR; INTRAVENOUS DAILY
Status: DISCONTINUED | OUTPATIENT
Start: 2021-05-21 | End: 2021-05-22

## 2021-05-21 RX ORDER — KETOROLAC TROMETHAMINE 30 MG/ML
15 INJECTION, SOLUTION INTRAMUSCULAR; INTRAVENOUS ONCE
Status: DISCONTINUED | OUTPATIENT
Start: 2021-05-21 | End: 2021-05-21

## 2021-05-21 RX ORDER — FONDAPARINUX SODIUM 2.5 MG/.5ML
2.5 INJECTION SUBCUTANEOUS EVERY 24 HOURS
Status: DISCONTINUED | OUTPATIENT
Start: 2021-05-21 | End: 2021-05-26 | Stop reason: HOSPADM

## 2021-05-21 RX ORDER — TEMAZEPAM 15 MG/1
15 CAPSULE ORAL
Status: DISCONTINUED | OUTPATIENT
Start: 2021-05-21 | End: 2021-05-26 | Stop reason: HOSPADM

## 2021-05-21 RX ADMIN — ACETAMINOPHEN 975 MG: 325 TABLET, FILM COATED ORAL at 12:10

## 2021-05-21 RX ADMIN — INSULIN LISPRO 2 UNITS: 100 INJECTION, SOLUTION INTRAVENOUS; SUBCUTANEOUS at 16:57

## 2021-05-21 RX ADMIN — OXYCODONE HYDROCHLORIDE 5 MG: 5 TABLET ORAL at 19:59

## 2021-05-21 RX ADMIN — FONDAPARINUX SODIUM 2.5 MG: 2.5 INJECTION, SOLUTION SUBCUTANEOUS at 08:56

## 2021-05-21 RX ADMIN — AMIODARONE HYDROCHLORIDE 200 MG: 200 TABLET ORAL at 05:49

## 2021-05-21 RX ADMIN — CEFAZOLIN SODIUM 2000 MG: 2 SOLUTION INTRAVENOUS at 04:01

## 2021-05-21 RX ADMIN — ASPIRIN 325 MG ORAL TABLET 325 MG: 325 PILL ORAL at 08:56

## 2021-05-21 RX ADMIN — FLUTICASONE FUROATE AND VILANTEROL TRIFENATATE 1 PUFF: 100; 25 POWDER RESPIRATORY (INHALATION) at 11:11

## 2021-05-21 RX ADMIN — INSULIN LISPRO 1 UNITS: 100 INJECTION, SOLUTION INTRAVENOUS; SUBCUTANEOUS at 21:59

## 2021-05-21 RX ADMIN — ACETAMINOPHEN 975 MG: 325 TABLET, FILM COATED ORAL at 04:01

## 2021-05-21 RX ADMIN — DOCUSATE SODIUM 100 MG: 100 CAPSULE ORAL at 17:02

## 2021-05-21 RX ADMIN — AMIODARONE HYDROCHLORIDE 200 MG: 200 TABLET ORAL at 21:58

## 2021-05-21 RX ADMIN — KETOROLAC TROMETHAMINE 15 MG: 30 INJECTION, SOLUTION INTRAMUSCULAR at 22:00

## 2021-05-21 RX ADMIN — FUROSEMIDE 40 MG: 10 INJECTION, SOLUTION INTRAVENOUS at 08:56

## 2021-05-21 RX ADMIN — POLYETHYLENE GLYCOL 3350 17 G: 17 POWDER, FOR SOLUTION ORAL at 08:56

## 2021-05-21 RX ADMIN — AMIODARONE HYDROCHLORIDE 200 MG: 200 TABLET ORAL at 14:27

## 2021-05-21 RX ADMIN — ATORVASTATIN CALCIUM 40 MG: 40 TABLET, FILM COATED ORAL at 17:02

## 2021-05-21 RX ADMIN — MUPIROCIN 1 APPLICATION: 20 OINTMENT TOPICAL at 21:58

## 2021-05-21 RX ADMIN — CEFAZOLIN SODIUM 2000 MG: 2 SOLUTION INTRAVENOUS at 11:11

## 2021-05-21 RX ADMIN — OXYCODONE HYDROCHLORIDE 5 MG: 5 TABLET ORAL at 11:11

## 2021-05-21 RX ADMIN — MUPIROCIN 1 APPLICATION: 20 OINTMENT TOPICAL at 08:56

## 2021-05-21 RX ADMIN — ACETAMINOPHEN 975 MG: 325 TABLET, FILM COATED ORAL at 19:59

## 2021-05-21 RX ADMIN — OXYCODONE HYDROCHLORIDE 5 MG: 5 TABLET ORAL at 02:08

## 2021-05-21 RX ADMIN — HYDROMORPHONE HYDROCHLORIDE 0.5 MG: 1 INJECTION, SOLUTION INTRAMUSCULAR; INTRAVENOUS; SUBCUTANEOUS at 05:32

## 2021-05-21 RX ADMIN — PHENYLEPHRINE HYDROCHLORIDE 30 MCG/MIN: 10 INJECTION INTRAVENOUS at 09:10

## 2021-05-21 RX ADMIN — PANTOPRAZOLE SODIUM 40 MG: 40 TABLET, DELAYED RELEASE ORAL at 08:56

## 2021-05-21 NOTE — PROGRESS NOTES
Progress Note - Critical Care   Neris Weiss 78 y o  male MRN: [id number]  Unit/Bed#: [facility] 500-99 Encounter: [id number]      Attending Physician: Laure Hunt MD    24 Hour Events/HPI: POD # 1 s/p AVR/MV ring annuloplasty closure of PFO  Arrieved supported on low dose Epi and levophed  Received post op fluid for hypotension with improvement  Epi and levo weaned off overnight  Extubated to nasal cannula  CI 2 6 this morning and he was therefore de'lined  Levo restarted low dose this AM after getting out of bed to the chair  ROS:  "I feel ok, I just feel like I cant take a full breath because of pain"   Review of Systems   Constitutional: Negative  HENT: Negative  Cardiovascular: Negative for palpitations and leg swelling  Gastrointestinal: Negative for nausea and vomiting  Endocrine: Negative  Genitourinary: Negative  Musculoskeletal: Negative  Neurological: Negative       ---------------------------------------------------------------------------------------------------------------------------------------------------------------------  Impressions:  1  Severe aortic stenosis s/p AVR  2  Severe mitral regurgitation s/p MV ring annuloplasty  3  PFO s/p closure  4  Non-ischemic cardiomyopathy   5  Left pneumothorax  6  HTN  7  COPD  8  Tobacco abuse     Plan:    Neuro:   · Pain controlled with: PRN oxycodone/ATC tylenol   · Regulate sleep/wake cycle  · Delirium precautions  · CAM-ICU daily  · Trend neuro exam    CV:   · Cardiac infusions: None  · MAP goal > 65   · D/C Pollock Arvind catheter  · D/c Arterial line  · Rhythm: NSR  · Follow rhythm on telemetry  · Hold lopressor this AM due to hypotension, consider starting 12 5 this evening with hold parameters   · Maintain epicardial pacing wires for POD 1  · Continue Amio/ASA/statin    Lung:   · Acute post-op pulmonary insufficiency; Requiring 4 liters via nasal cannla, secondary to poor inspiratory effort secondary to pain   Continue incentive spirometry/coughing/deep breathing exercises  Wean supplemental oxygen as tolerated for saturation > 90%   · Left sided pneumothorax  · Plan for IR vs ICU chest tube  · Chest tube output remains persistently high; Continue chest tubes to suction today      GI:   · Continue PPI for stress ulcer prophylaxis  · Continue bowel regimen  · Trend abdominal exam and bowel function    FEN:   · Diuretic plan: Lasix 40 mg IV q QD  · K-dur 20 mEQ PO q QD  · Nutrition/diet plan: cardiac diet, 1800 fluid restriction   · Replenish electrolytes with goals: K >4 0, Mag >2 0, and Phos >3 0    :   · Indwelling Wilkes present: yes   · D C Wilkes  · Trend UOP and BUN/creat  · Strict I and O    ID:   · Trend temps and WBC count  · Maintain normothermia        Heme:   · Trend hgb and plts    Endo:   · Glycemic control plan: Glucose well-controlled   Discontinue continuous insulin infusion and add Insulin sliding scale coverage    Results from last 6 Months   Lab Units 21  1150   HEMOGLOBIN A1C % 5 8*     MSK/Skin:  · Mobility goal:   · PT consult: yes  · OT consult: yes  · Frequent turning and pressure off-loading  · Local wound care as needed    Disposition:  Transfer to telemetry  ---------------------------------------------------------------------------------------------------------------------------------------------------------------------  Allergies: No Known Allergies  Medications:   Scheduled Meds:  Current Facility-Administered Medications   Medication Dose Route Frequency Provider Last Rate    acetaminophen  650 mg Rectal Q4H PRN Fredericka Apgar, PA-C      acetaminophen  975 mg Oral Q8H Fredericka Apgar, PAANTONY      albuterol  2 puff Inhalation Q4H PRN Fredericka Apgar, PA-C      amiodarone  200 mg Oral [facility] Fredericka Apgar, [place]      aspirin  325 mg Oral Daily Fredericka Apgar, [place]      atorvastatin  40 mg Oral QPM Fredericka Apgar, PA-C      bisacodyl  10 mg Rectal Daily PRN Fredericka Apgar, PA-C      calcium chloride  1 g Intravenous Once Rocael Pagan PA-C      cefazolin  2,000 mg Intravenous Q8H Rocael Pagan PA-C 2,000 mg (05/21/21 0401)    chlorhexidine  15 mL Swish & Spit BID Rocael Pagan PA-C      dexmedetomidine  0 1-0 7 mcg/kg/hr Intravenous Titrated BITA Francisco Stopped (05/20/21 1549)    Diclofenac Sodium  2 g Topical TID PRN Rocael Pagan PA-C      epinephrine  1-10 mcg/min Intravenous Titrated Rocael Pagan PA-C Stopped (05/20/21 1426)    fentanyl citrate (PF)  50 mcg Intravenous Q1H PRN Rocael Pagan PA-C      fluticasone-vilanterol  1 puff Inhalation Daily Elana Gil      furosemide  40 mg Intravenous Q6H PRN Rocael Pagan PA-C      HYDROmorphone  0 5 mg Intravenous Q2H PRN Rocael Pagan PA-C      insulin regular (HumuLIN R,NovoLIN R) infusion  0 3-21 Units/hr Intravenous Titrated Rocael Pagan PA-C Stopped (05/21/21 0555)    lactated ringers  500 mL Intravenous Q30 Min PRN Rocael Pagan PA-C 500 mL (05/20/21 1400)    lidocaine (cardiac)  100 mg Intravenous Q30 Min PRN Rocael Pagan PA-C      mupirocin  1 application Nasal [address] [facility] Rocael Pagan PA-C      niCARdipine  2 5-15 mg/hr Intravenous Titrated Rocael Pagan PA-C      norepinephrine  1-30 mcg/min Intravenous Titrated JOANA GilC 2 mcg/min (05/21/21 0553)    ondansetron  4 mg Intravenous Q6H PRN Rocael Pagan PA-C      oxyCODONE  2 5 mg Oral Q4H PRN Rocael Pagan PA-C      oxyCODONE  5 mg Oral Q4H PRN Rocael Pagan PA-C      pantoprazole  40 mg Oral Daily Rocael Pagan PA-C      polyethylene glycol  17 g Oral Daily Elana Gil      potassium chloride  20 mEq Intravenous Once PRN Rocael Bouillon, PA-C      potassium chloride  20 mEq Intravenous Q1H PRN Rocael Bouillon, PA-C      potassium chloride  20 mEq Intravenous Q30 Min PRN Rocael Bouillon, PA-C      sodium chloride  20 mL/hr Intravenous Continuous Rocael Bouillon, PA-C 20 mL/hr (05/20/21 1320)     VTE Pharmacologic Prophylaxis: Fondaparinux (Arixtra)  VTE Mechanical Prophylaxis: sequential compression device    Continuous Infusions:dexmedetomidine, 0 1-0 7 mcg/kg/hr, Last Rate: Stopped (05/20/21 1549)  epinephrine, 1-10 mcg/min, Last Rate: Stopped (05/20/21 1426)  insulin regular (HumuLIN R,NovoLIN R) infusion, 0 3-21 Units/hr, Last Rate: Stopped (05/21/21 0555)  niCARdipine, 2 5-15 mg/hr  norepinephrine, 1-30 mcg/min, Last Rate: 2 mcg/min (05/21/21 0553)  sodium chloride, 20 mL/hr, Last Rate: 20 mL/hr (05/20/21 1320)      PRN Meds:  acetaminophen, 650 mg, Q4H PRN  albuterol, 2 puff, Q4H PRN  bisacodyl, 10 mg, Daily PRN  Diclofenac Sodium, 2 g, TID PRN  fentanyl citrate (PF), 50 mcg, Q1H PRN  furosemide, 40 mg, Q6H PRN  HYDROmorphone, 0 5 mg, Q2H PRN  lactated ringers, 500 mL, Q30 Min PRN  lidocaine (cardiac), 100 mg, Q30 Min PRN  ondansetron, 4 mg, Q6H PRN  oxyCODONE, 2 5 mg, Q4H PRN  oxyCODONE, 5 mg, Q4H PRN  potassium chloride, 20 mEq, Once PRN  potassium chloride, 20 mEq, Q1H PRN  potassium chloride, 20 mEq, Q30 Min PRN      Home Medications:   Prior to Admission medications    Medication Sig Start Date End Date Taking?  Authorizing Provider   amLODIPine (NORVASC) 10 mg tablet TAKE 1 TABLET (10 MG TOTAL) BY MOUTH DAILY 7/9/20  Yes Jaylen Jackson,    Anoro Ellipta 62 5-25 MCG/INH inhaler INHALE 1 PUFF BY MOUTH EVERY DAY 1/19/21  Yes Jaylen Jackson, DO   aspirin (ECOTRIN LOW STRENGTH) 81 mg EC tablet Take 1 tablet (81 mg total) by mouth daily 3/26/18  Yes Jaylen Jackson, DO   atorvastatin (LIPITOR) 20 mg tablet TAKE 1 TABLET BY MOUTH EVERY DAY 3/25/21  Yes Jaylen Jackson, DO   losartan (COZAAR) 100 MG tablet TAKE 1 TABLET BY MOUTH EVERY DAY 10/20/20  Yes Jaylen Jackson, DO   Multiple Vitamin (MULTI-VITAMIN DAILY PO) Take 1 tablet by mouth daily 7/1/14  Yes Historical Provider, MD   pantoprazole (PROTONIX) 40 mg tablet TAKE 1 TABLET BY MOUTH EVERY DAY 2/16/21  Yes Jaylen Jackson DO   albuterol (PROVENTIL HFA,VENTOLIN HFA) 90 mcg/act inhaler INHALE 1-2 PUFFS EVERY 4 (FOUR) HOURS AS NEEDED FOR WHEEZING 3/8/21   Brittany Oneill DO   Aspirin-Acetaminophen-Caffeine (EXCEDRIN MIGRAINE PO) Take by mouth    Historical Provider, MD   dextromethorphan-guaifenesin [facility] DM)  MG per 12 hr tablet Take 1 tablet by mouth every 12 (twelve) hours 21   Angelito Stokes MD   predniSONE 20 mg tablet Please take 3 pills on day 1, 2, 3,  Then 2 pills on day 4, 5 and 6, 1 pill on day 7  Patient not taking: Reported on 2021 5/10/21   Angelito Stokes MD     ---------------------------------------------------------------------------------------------------------------------------------------------------------------------  Vitals:   Vitals:    21 0200 21 0300 21 0400 21 0551   BP:       BP Location:       Pulse: 70 66 64    Resp: 19 12 16    Temp:   99 1 °F (37 3 °C)    TempSrc:   Probe    SpO2: 97% 97% 97%    Weight:    108 kg (237 lb 14 oz)   Height:         Arterial Line:  Arterial Line BP: 118/60  Arterial Line MAP (mmHg): 76 mmHg    Tele Rhythm: NSR This was personally reviewed by myself  Respiratory:  SpO2: SpO2: 97 %, SpO2 Activity: SpO2 Activity: At Rest, SpO2 Device: O2 Device: Nasal cannula  Nasal Cannula O2 Flow Rate (L/min): 4 L/min    Ventilator:  Respiratory    Lab Data (Last 4 hours)    None         O2/Vent Data (Last 4 hours)    None              Temperature: Temp (24hrs), Av 4 °F (36 9 °C), Min:97 9 °F (36 6 °C), Max:99 3 °F (37 4 °C)  Current: Temperature: 99 1 °F (37 3 °C)    Weights:   Weight (last 2 days)     Date/Time   Weight    21 0551   108 (237 88)    21 0548   104 (228 84)    21 0600   105 (230 6)            Body mass index is 30 54 kg/m²      Hemodynamic Monitoring:  PAP: PAP: 43/21, CVP: CVP (mean): 10 mmHg, CO: CO (L/min): 6 1 L/min, CI: CI (L/min/m2): 2 6 L/min/m2, SVR: SVR (dyne*sec)/cm5: 852 (dyne*sec)/cm5  PAP: (36-50)/(21-36) 43/21  CO:  [3 8 L/min-7 3 L/min] 6 1 L/min  CI:  [1 6 L/min/m2-3 1 L/min/m2] 2 6 L/min/m2    Intake and Outputs:    Intake/Output Summary (Last 24 hours) at 5/21/2021 0604  Last data filed at 5/21/2021 0400  Gross per 24 hour   Intake 4427 59 ml   Output 2940 ml   Net 1487 59 ml     I/O last 24 hours: In: 4427 6 [I V :1827 6; IV Piggyback:1850]  Out: 3840 [Urine:2800; Blood:750; Chest Tube:290]      Chest tube Output:  Mediastinal tubes: 120 mL/8 hours  340 mL/24 hours          Labs:  Results from last 7 days   Lab Units 05/21/21  0358 05/20/21 2008 05/20/21  1307 05/20/21  1302  05/20/21  1051  05/19/21  0439 05/17/21  0602   WBC Thousand/uL 16 11*  --   --   --   --   --   --  7 08 8 07   HEMOGLOBIN g/dL 12 0 12 4  --  13 9  --   --   --  13 7 13 7   I STAT HEMOGLOBIN g/dl  --   --  13 9  --    < > 11 6*   < >  --   --    HEMATOCRIT % 37 9 38 4  --  42 6  --   --   --  42 5 42 1   HEMATOCRIT, ISTAT %  --   --  41  --    < > 34*   < >  --   --    PLATELETS Thousands/uL 147*  --   --  186  --  200  --  220 235   NEUTROS PCT %  --   --   --   --   --   --   --  51 56   MONOS PCT %  --   --   --   --   --   --   --  13* 12    < > = values in this interval not displayed  Results from last 7 days   Lab Units 05/21/21  0358 05/20/21 2008 05/20/21  1635 05/20/21  1307 05/20/21  1302 05/20/21  1159 05/20/21  1118  05/19/21  0439   SODIUM mmol/L 141  --   --   --  141  --   --   --  140   POTASSIUM mmol/L 4 8 4 7 4 6  --  3 9  --   --   --  3 8   CHLORIDE mmol/L 110*  --   --   --  110*  --   --   --  104   CO2 mmol/L 26  --   --   --  24  --   --   --  29   CO2, I-STAT mmol/L  --   --   --  26  --  26 30   < >  --    BUN mg/dL 14  --   --   --  10  --   --   --  13   CREATININE mg/dL 0 66  --   --   --  0 84  --   --   --  0 77   CALCIUM mg/dL 8 1*  --   --   --  8 4  --   --   --  9 3   GLUCOSE, ISTAT mg/dl  --   --   --  158*  --  137 145*   < >  --     < > = values in this interval not displayed       Baseline Creat:  7    Results from last 7 days   Lab Units 05/21/21  0358   MAGNESIUM mg/dL 2 6          Results from last 7 days   Lab Units 05/17/21  0602   INR  1 02               Micro:   Blood Culture: No results found for: BLOODCX  Urine Culture: No results found for: URINECX  Sputum Culture: No components found for: SPUTUMCX  Wound Culure: No results found for: WOUNDCULT    Diagnositic Studies:  05/21/21 CXR: Small to moderate left upper pnuemothorax  Lines and tubes in adequate positionThis was personally reviewed by myself in PACS   05/21/21 EKG: normal sinus rhythm, incomplete RRRR with twave inversions in I, II and  V3-6,   This was personally reviewed by myself  Nutrition:        Diet Orders   (From admission, onward)             Start     Ordered    05/21/21 0000  Diet Cardiovascular; Cardiac; Fluid Restriction 1800 ML  Diet effective 0500     Question Answer Comment   Diet Type Cardiovascular    Cardiac Cardiac    Other Restriction(s): Fluid Restriction 1800 ML    RD to adjust diet per protocol? Yes        05/20/21 1218              Physical Exam  Constitutional:       Appearance: Normal appearance  HENT:      Head: Normocephalic and atraumatic  Nose: Nose normal       Mouth/Throat:      Mouth: Mucous membranes are dry  Eyes:      Extraocular Movements: Extraocular movements intact  Pupils: Pupils are equal, round, and reactive to light  Neck:      Musculoskeletal: Normal range of motion  Comments: R IJ TLC  Cardiovascular:      Rate and Rhythm: Normal rate and regular rhythm  Pulses: Normal pulses  Heart sounds: Friction rub present  Comments: AV epicardial wires  Pulmonary:      Effort: No respiratory distress  Breath sounds: Examination of the left-upper field reveals decreased breath sounds  Examination of the left-middle field reveals decreased breath sounds  Examination of the right-lower field reveals decreased breath sounds  Decreased breath sounds present  No rhonchi or rales  Comments: CT with sero-sang drainage  Abdominal:      General: Abdomen is flat  Bowel sounds are normal       Palpations: Abdomen is soft  Genitourinary:     Comments: Wilkes with clear, yellow urine   Musculoskeletal: Normal range of motion  Right lower leg: No edema  Left lower leg: No edema  Skin:     General: Skin is warm and dry  Capillary Refill: Capillary refill takes less than 2 seconds  Findings: No lesion  Comments: MSI dressing intact   Neurological:      General: No focal deficit present  Mental Status: He is alert and oriented to person, place, and time  Invasive lines and devices: Invasive Devices     Central Venous Catheter Line            CVC Central Lines 05/20/21 Triple less than 1 day          Peripheral Intravenous Line            Peripheral IV 05/17/21 Left Forearm 4 days          Arterial Line            Arterial Line 05/20/21 Left Radial less than 1 day          Line            Pacer Wires less than 1 day    Pacer Wires less than 1 day          Drain            Chest Tube 1 Anterior Mediastinal 32 Fr  less than 1 day    Chest Tube 1 Posterior Mediastinal 32 Fr  less than 1 day    Urethral Catheter Non-latex; Temperature probe 16 Fr  less than 1 day              ---------------------------------------------------------------------------------------------------------------------------------------------------------------------  Code Status: Level 1 - Full Code    Care Time Delivered:   No Critical Care time spent     Collaborative bedside rounds performed with cardiac surgery attending, critical care attending and bedside RN      SIGNATURE: BITA Francisco  DATE: May 21, 2021  TIME: 6:04 AM

## 2021-05-21 NOTE — PROGRESS NOTES
Progress Note - Cardiothoracic Surgery   Osmin Merritt 78 y o  male MRN: [id number]  Unit/Bed#: [facility] 414-01 Encounter: [id number]      POD # 1 s/p Tissue AVR, MV repair, PFO closure    Pt seen/examined  Interval history and data reviewed with critical care team   Pt doing well  No specific complaints  Levophed gtt 2    CXR showing possible L PTX        Medications:   Scheduled Meds:  Current Facility-Administered Medications   Medication Dose Route Frequency Provider Last Rate    acetaminophen  650 mg Rectal Q4H PRN [facility] TIFF Acuña      acetaminophen  975 mg Oral Q8H [facility], TIFF      albuterol  2 puff Inhalation Q4H PRN [facility] TIFF Acuña      amiodarone  200 mg Oral [place], [place]      aspirin  325 mg Oral Daily [place], [place]      at[facility]vastatin  40 [facility]PM [facility]      bisacodyl  10 mg Rectal Daily [facility]N [facility]      calcium chloride  1 g Intravenous Once [facility]      cefazolin  2,000 mg Intravenous Q8H [facility] Stopped (05/21/21 0600)    chlorhexidine  15 mL Swish & Spit BID [facility]      Diclofenac Sodium  2 g Topical TID PRN [facility]      fentanyl citrate (PF)  50 mcg Intravenous Q1H [facility]N [facility]-BENJAMIN      fluticasone-vilanterol  1 puff Inhalation Daily [place], [place]      fondaparinux  2 5 mg Subcutaneous Q24H Harry Hem, CRNP      furosemide  40 mg Intravenous Q6H PRN [facility] TIFF Acuña      HYDROmorphone  0 5 mg Intravenous Q2H PRN [facility] noyola-BENJAMIN      insulin regular (HumuLIN R,NovoLIN R) infusion  0 3-21 Units/hr Intravenous Titrated Dano Acuña PA-C Stopped (05/21/21 0555)    lactated ringers  500 mL Intravenous Q30 Min PRN [place] Acuña PA-C 500 mL (05/20/21 1400)    lidocaine (cardiac)  100 mg Intravenous Q30 Min PRN [place] Acuña PA-C      mupirocin  1 application Nasal [id number] [facility] TIFF Acuña      niCARdipine  2 5-15 mg/hr Intravenous Titrated Dano Acuña PA-C  norepinephrine  1-30 mcg/min Intravenous Titrated Thuy Loser, PA-C 2 mcg/min (05/21/21 0553)    ondansetron  4 mg Intravenous Q6H PRN Thuy Loser, PA-C      oxyCODONE  2 5 mg Oral Q4H PRN Thuy Loser, PA-C      oxyCODONE  5 mg Oral Q4H PRN Thuy Loser, PA-C      pantoprazole  40 mg Oral Daily Thuy Loser, PA-C      polyethylene glycol  17 g Oral Daily Thuy Loser, [place]      potassium chloride  20 mEq Intravenous Once PRN Thuy Loser, PA-C      potassium chloride  20 mEq Intravenous Q1H PRN Thuy Loser, PA-C      potassium chloride  20 mEq Intravenous Q30 Min PRN Thuy Loser, PA-C      sodium chloride  20 mL/hr Intravenous Continuous Thuy Loser, PA-C 20 mL/hr (05/20/21 1320)     Continuous Infusions:insulin regular (HumuLIN R,NovoLIN R) infusion, 0 3-21 Units/hr, Last Rate: Stopped (05/21/21 0555)  niCARdipine, 2 5-15 mg/hr  norepinephrine, 1-30 mcg/min, Last Rate: 2 mcg/min (05/21/21 0553)  sodium chloride, 20 mL/hr, Last Rate: 20 mL/hr (05/20/21 1320)      PRN Meds:   acetaminophen    albuterol    bisacodyl    Diclofenac Sodium    fentanyl citrate (PF)    furosemide    HYDROmorphone    lactated ringers    lidocaine (cardiac)    ondansetron    oxyCODONE    oxyCODONE    potassium chloride    potassium chloride    potassium chloride    Vitals: Blood pressure 91/62, pulse 64, temperature 99 1 °F (37 3 °C), temperature source Probe, resp  rate 14, height 6' 2" (1 88 m), weight 108 kg (237 lb 14 oz), SpO2 97 %  ,Body mass index is 30 54 kg/m²  I/O last 24 hours: In: 4520 1 [I V :1870 1;  IV Piggyback:1900]  Out: 3024 [Urine:1940; Blood:750; Chest Tube:340]  Invasive Devices     Central Venous Catheter Line            CVC Central Lines 05/20/21 Triple less than 1 day          Peripheral Intravenous Line            Peripheral IV 05/17/21 Left Forearm 4 days          Arterial Line            Arterial Line 05/20/21 Left Radial 1 day          Line            Pacer Wires less than 1 day Pacer Wires less than 1 day          Drain            Chest Tube 1 Anterior Mediastinal 32 Fr  less than 1 day    Chest Tube 1 Posterior Mediastinal 32 Fr  less than 1 day    Urethral Catheter Non-latex; Temperature probe 16 Fr  less than 1 day                  Lab, Imaging and other studies:   Results from last 7 days   Lab Units 05/21/21 0358 05/20/21 2008 05/20/21  1307 05/20/21  1302  05/20/21  1051  05/19/21  0439 05/17/21  0602   WBC Thousand/uL 16 11*  --   --   --   --   --   --  7 08 8 07   HEMOGLOBIN g/dL 12 0 12 4  --  13 9  --   --   --  13 7 13 7   I STAT HEMOGLOBIN g/dl  --   --  13 9  --    < > 11 6*   < >  --   --    HEMATOCRIT % 37 9 38 4  --  42 6  --   --   --  42 5 42 1   HEMATOCRIT, ISTAT %  --   --  41  --    < > 34*   < >  --   --    PLATELETS Thousands/uL 147*  --   --  186  --  200  --  220 235    < > = values in this interval not displayed  Results from last 7 days   Lab Units 05/21/21 0358 05/20/21 2008 05/20/21  1635 05/20/21  1307 05/20/21  1302  05/19/21  0439   POTASSIUM mmol/L 4 8 4 7 4 6  --  3 9  --  3 8   CHLORIDE mmol/L 110*  --   --   --  110*  --  104   CO2 mmol/L 26  --   --   --  24  --  29   CO2, I-STAT mmol/L  --   --   --  26  --    < >  --    BUN mg/dL 14  --   --   --  10  --  13   CREATININE mg/dL 0 66  --   --   --  0 84  --  0 77   GLUCOSE, ISTAT mg/dl  --   --   --  158*  --    < >  --    CALCIUM mg/dL 8 1*  --   --   --  8 4  --  9 3    < > = values in this interval not displayed  Results from last 7 days   Lab Units 05/17/21  0602   INR  1 02     No results for input(s): PHART, CGZ4NQJ, PO2ART, CTZ9UVM, X7UPXBQF, BEART in the last 72 hours  Plan:    PA/Lat CXR this AM   Likely IR drain for L PTX  Wean Levophed as able  DC Tower/Cordis/Manju/Wilkes  Continue chest tubes, pacing wires  Ambulate  Incentive spirometry  Diuresis  PO ASA/Statin  Hold B blocker        Lucian Zuniga MD  DATE: May 21, 2021  TIME: 7:43 AM

## 2021-05-21 NOTE — TELEMEDICINE
INTERPROFESSIONAL (PHONE) CONSULTATION - Interventional Radiology  Delvin Correa 78 y o  male MRN: [id number]  Unit/Bed#: [facility] 414-01 Encounter: [id number]    IR has been consulted to evaluate the patient, determine the appropriate procedure, and whether or not a procedure can and should be performed regarding the care of Delvni Correa  We were consulted by CT surgery concerning large left pneumothorax, and to possibly perform a left chest tube if medically appropriate for the patient  IP Consult to IR  Consult performed by: Cedric Lopez PA-C  Consult ordered by: BITA Callahna        05/21/21      Assessment/Recommendation:     78year old male POD#1 s/p AVR, MV repair, closure PFO, now with large left pneumothorax    - plan for left sided chest tube placement today with CT guidance    Total time spent in review of data, discussion with requesting provider and rendering advice was 25 minutes       Patient or appropriate family member was verbally informed by CT surgery of this consultative service on their behalf to provide more timely access to specialty care in lieu of an in person consultation  Verbal consent was obtained  Thank you for allowing Interventional Radiology to participate in the care of Delvin Correa  Please don't hesitate to call or TigerText us with any questions       Cedric Lopez PA-C

## 2021-05-21 NOTE — BRIEF OP NOTE (RAD/CATH)
INTERVENTIONAL RADIOLOGY PROCEDURE NOTE    Date: 5/21/2021    Procedure: chest tube placement    Preoperative diagnosis:   1  Nonrheumatic aortic valve stenosis    2  Coronary artery calcification    3  Poor dentition    4  PFO (patent foramen ovale)    5  S/P AVR    6  S/P MVR (mitral valve replacement)    7  Aortic valve stenosis, etiology of cardiac valve disease unspecified         Postoperative diagnosis: Same  Surgeon: Joe Matthews MD     Assistant: None  No qualified resident was available  Blood loss: 0    Specimens: 0     Findings:  Large left pneumothorax, 10 Japanese apical chest tube placement    Complications: None immediate      Anesthesia: local

## 2021-05-21 NOTE — OCCUPATIONAL THERAPY NOTE
Occupational Therapy Evaluation     Patient Name: Jan PANW Date: 5/21/2021  Problem List  Principal Problem: Aortic valve stenosis - severe  Active Problems:    Chronic obstructive lung disease (HCC)    Hyperlipidemia    Essential hypertension    NSTEMI (non-ST elevated myocardial infarction) ([facility])    GERD (gastroesophageal reflux disease)    Acute combined systolic and diastolic CHF, NYHA class 1 (HCC)    Arthritis    S/P AVR    PFO (patent foramen ovale)    S/P MVR (mitral valve repair)    Past Medical History  Past Medical History:   Diagnosis Date    Broken humerus     COPD (chronic obstructive pulmonary disease) ([facility] [address] )     Pneumonia      Past Surgical History  Past Surgical History:   Procedure Laterality Date    MULTIPLE TOOTH EXTRACTIONS N/A 5/17/2021    Procedure: EXTRACTION TEETH MULTIPLE 1, 3, 18, 30, EXCISION OF LEFT BUCCAL FIBROMA;  Surgeon: Sun Amaro DMD;  [facility];  Service: Maxillofacial    ME ECHO TRANSESOPHAG R-T 2D W/PRB IMG ACQUISJ I&R N/A 5/20/2021    Procedure: TRANSESOPHAGEAL ECHOCARDIOGRAM (KATIA); Surgeon: Jessie Wilkerson MD;  [facility];  Service: Cardiac Surgery    ME REPLACEMENT OF MITRAL VALVE N/A 5/20/2021    Procedure: VALVE MITRAL (MVR) REPAIR with 32mm physio II annuloplasty ring;  Surgeon: Jessie Wilkerson MD;  [facility];  Service: Cardiac Surgery    ME RPLCMT AORTIC VALVE OPN W/STENTLESS TISSUE VALVE N/A 5/20/2021    Procedure: REPLACEMENT VALVE AORTIC (AVR) TISSUE with 25mm montgomery inspiris tissue valve;  Surgeon: Jessie Wilkerson MD;  [facility];  Service: Cardiac Surgery           05/21/21 0815   OT Last Visit   OT Visit Date 05/21/21   Note Type   Note type Evaluation   Restrictions/Precautions   Braces or Orthoses   (Pt reports that he wears knee compression sleeves PRN )   Other Precautions Cardiac/sternal;Multiple lines;Telemetry; Fall Risk;Pain  (a-line: CT to suction at this time per PA)   Pain Assessment   Pain Assessment Tool 0-10   Pain Score 6   Pain Location/Orientation Orientation: Mid;Location: Chest   Hospital Pain Intervention(s) Repositioned; Ambulation/increased activity; Emotional support   Home Living   Type of [address] Two level; Able to live on main level with bedroom/bathroom;Stairs to enter with rails   Bathroom Shower/Tub Tub/shower unit   Bathroom Toilet Standard   Bathroom Equipment   (denies)   [address]   (denies)   Additional Comments Pt reports living in a 2 story home with 5-6 XAVIER and a first floor setup if needed  Prior Function   Level of [place] Independent with ADLs and functional mobility   Lives With Spouse   Receives Help From Family   ADL Assistance Independent   IADLs Independent   Falls in the last 6 months 0   Lifestyle   Autonomy Pt reports being I with ADLS, IADLS and mobility without device PTA  (+)     Reciprocal Relationships Pt lives with his wife who he reports is able to assist as needed upon d/c    Service to Others Works as an     Intrinsic Gratification Enjoys working and being active   ADL   Where [phone]  [address] [facility] 5  [address] [facility] 4  Minimal Assistance   LB [address] 3  Moderate Assistance   [address] 4  [address] 3  Moderate Assistance   [address]  3  Moderate Assistance   Bed Mobility   Additional Comments Unable to assess, pt seated OOB in chair upon OT arrival  After OT session pt in chair with all needs within reach  Transfers   Sit to Stand 3  Moderate assistance   Additional items Assist x 1; Increased time required;Verbal cues   Stand to Sit 3  Moderate assistance   Additional items Assist x 1; Increased time required;Verbal cues   Functional Mobility   Functional Mobility 4  Minimal assistance Additional Comments Pt demonstrated short distance mobility within room with RW  Additional items Rolling walker   Balance   Static Sitting Fair   Dynamic Sitting Fair -   Static Standing Poor +   Dynamic Standing Poor +   Ambulatory Poor +   Activity Tolerance   Activity Tolerance Patient limited by fatigue   Medical Staff Made Aware Seen with PT 2* multiple comorbidities and medical instability  Nurse Made Aware RN confirmed okay to see pt    RUE Assessment   RUE Assessment WFL   LUE Assessment   LUE Assessment WFL   Cognition   Overall Cognitive Status WFL   Arousal/Participation Alert; Cooperative   Attention Within functional limits   Orientation Level Oriented X4   Memory Within functional limits   Following Commands Follows one step commands without difficulty   Comments Pt is very pleasant and cooperative to work with therapy  Assessment   Limitation Decreased ADL status; Decreased endurance;Decreased self-care trans;Decreased high-level ADLs   Prognosis Good   Assessment Pt is a 78 y o  male admitted to [facility] on 5/12/2021 w/ severe aortic valve stenosis s/p EVR and MVR on 5/20/2021  Pt  has a past medical history of Broken humerus, COPD (chronic obstructive pulmonary disease) ([facility] [address] ), and Pneumonia  Pt with active OT orders and ambulate  orders  Pt resides in a 2 story home with 5-6 XAVIER  Pt lives with his wife who he reports is able to assist as needed upon d/c  Pt was I w/  ADLS and IADLS, (+) drove, & required no use of DME PTA  Currently pt is mod A for functional transfers, min A for functional mobility, min A for UB ADLS and mod A for LB ADLS  Pt is limited at this time 2*: pain, endurance, activity tolerance, functional mobility, forward functional reach, functional standing tolerance and decreased I w/ ADLS/IADLS  The following Occupational Performance Areas to address include: grooming, bathing/shower, toilet hygiene, dressing, functional mobility and clothing management   Based on the aforementioned OT evaluation, functional performance deficits, and assessments, pt has been identified as a high complexity evaluation  From OT standpoint, anticipate d/c home with family support  Pt to continue to benefit from acute immediate OT services to address the following goals 3-5x/week to  w/in 10-14 days: Saroj Gusman Goals   Patient Goals To feel better and return home    LTG Time Frame 10-14   Long Term Goal #1 See goals below   Plan   Treatment Interventions ADL retraining;Functional transfer training;UE strengthening/ROM; Endurance training;Patient/family training;Equipment evaluation/education; Compensatory technique education;Continued evaluation;Cardiac education; Energy conservation; Activityengagement   Goal Expiration Date 21   OT Treatment Day 1   OT Frequency 3-5x/wk   Additional Treatment Session   Start Time 0940   End Time 0950   Treatment Assessment Pt participated in additional OT session focusing on cardiac education  OT provided Pt w/ Recovering After Cardiac Surgery Packet and educated Pt regarding: sternal precautions, cardiac precautions, lifting restrictions, safe activity engagement, energy conservation, lifestyle modifications, stress management and cardiac rehabilitation programs  Pt's questions were addressed after discussion of the packet  Provided Pt w/ contact information regarding OT department if questions arise  Pt continues to benefit from inpatient skilled acute care OT services  Will continue to follow and address goals stated below      Additional Treatment Day 1   Recommendation   OT Discharge Recommendation No rehabilitation needs  (Home with increased social support)   OT - OK to Discharge Yes  (When medically appropriate)   AM-PAC Daily Activity Inpatient   Lower Body Dressing 2   Bathing 2   Toileting 3   Upper Body Dressing 3   Grooming 4   Eating 4   Daily Activity Raw Score 18   Daily Activity Standardized Score (Calc for Raw Score >=11) 38 66   AM-PAC Applied Cognition Inpatient   Following a Speech/Presentation 4   Understanding Ordinary Conversation 4   Taking Medications 4   Remembering Where Things Are Placed or Put Away 4   Remembering List of 4-5 Errands 4   Taking Care of Complicated Tasks 4   Applied Cognition Raw Score 24   Applied Cognition Standardized Score 62 21   Modified Ernst Scale   Modified Ernst Scale 4       GOALS    1) Pt will increase activity tolerance to G for 30 min txment sessions    2) Pt will complete UB/LB dressing/self care w/ mod I using adaptive device and DME as needed    3) Pt will complete bathing w/ Mod I w/ use of AE and DME as needed    4) Pt will complete toileting w/ mod I w/ G hygiene/thoroughness using DME as needed    5) Pt will improve functional transfers to Mod I on/off all surfaces using DME as needed w/ G balance/safety     6) Pt will improve functional mobility during ADL/IADL/leisure tasks to Mod I using DME as needed w/ G balance/safety     7) Pt will demonstrate G carryover of pt/caregiver education and training as appropriate w/ mod I     8) Pt will engage in cardiac education using the Recovering After Cardiac Surgery packet w/ G participation and G carryover  9) Pt will demonstrate 100% carryover of precautions s/p review w/ mod I w/ G tolerance/participation t/o functional ADL/IADL/leisure tasks      10) Pt will independently identify and utilize 2-3 coping strategies to increase positive affect and promote overall well-being      11) Pt will engage in ongoing cognitive assessment w/ G participation to assist w/ safe d/c planning/recommendations (as needed)    CYN Dwyer, OTR/L

## 2021-05-21 NOTE — PLAN OF CARE
Problem: PHYSICAL THERAPY ADULT  Goal: Performs mobility at highest level of function for planned discharge setting  See evaluation for individualized goals  Description: Treatment/Interventions: Functional transfer training, LE strengthening/ROM, Elevations, Therapeutic exercise, Endurance training, Equipment eval/education, Bed mobility, Gait training, Spoke to nursing, OT  Equipment Recommended: Walker(at this time; will cont to monitor progress)       See flowsheet documentation for full assessment, interventions and recommendations  Note: Prognosis: Good  Problem List: Decreased strength, Decreased endurance, Impaired balance, Decreased mobility, Pain  Assessment: Pt is 78 y o  male admitted with hx of CP and SOB and Dx of NSTEMI; echo revealed severe aortic valve stenosis and significant mitral regurgitation; other Dx included: patent foramen ovale and non-ischemic cardiomyopathy; during the course, pt underwent EXTRACTION TEETH MULTIPLE 1, 3, 18, 30, EXCISION OF LEFT BUCCAL FIBROMA on 5/17/20201 and aortic valve replacement with a 25 mm Bauer Inspiris Resilia bioprosthetic valve, mitral valve repair with 32 mm Bauer Physio II ring annuloplasty and closure of patent foramen ovale on 5/20/2021  Pt 's comorbidities affecting POC include: COPD, current smoker, HTN, arthritis (reports wearing (B) knee soft braces for support when amb in the community but not at home; OK to amb w/o them, as per pt) and personal factors of: XAVIER and steps in the house  Pt's clinical presentation is currently unstable/unpredictable which is evident in ongoing telemetry monitoring while in a critical care unit w/ a-line in place, abnormal lab values being monitored/trending, CT in place and currently to cont suction (reportedly w/ PTX --> repeat CXR is pending) and inability to progress further w/ mobilization at this time   Pt presents w/ postop discomfort and guarding, generalized weakness, incl decreased LE strength, decreased functional endurance and activity tolerance, and impaired balance w/ associated gait deviations requiring use of rw at this time  Will cont to follow pt in PT for progressive mobilization to address above functional deficits and to max level of (I), endurance, and safety  Otherwise, anticipate pt will return home w/ available family support upon D/C provided he cont improving w/ mobility skills, safety, and endurance (incl on the steps) and when medically cleared; home PT follow up is recommended at this time; will follow  Barriers to Discharge: Inaccessible home environment(steps in the house)        PT Discharge Recommendation: Home with home health rehabilitation(home PT; r/o 1st floor set-up)          See flowsheet documentation for full assessment

## 2021-05-21 NOTE — PLAN OF CARE
Problem: OCCUPATIONAL THERAPY ADULT  Goal: Performs self-care activities at highest level of function for planned discharge setting  See evaluation for individualized goals  Description: Treatment Interventions: ADL retraining, Functional transfer training, UE strengthening/ROM, Endurance training, Patient/family training, Equipment evaluation/education, Compensatory technique education, Continued evaluation, Cardiac education, Energy conservation, Activityengagement          See flowsheet documentation for full assessment, interventions and recommendations  Note: Limitation: Decreased ADL status, Decreased endurance, Decreased self-care trans, Decreased high-level ADLs  Prognosis: Good  Assessment: Pt is a 78 y o  male admitted to [facility] on 5/12/2021 w/ severe aortic valve stenosis s/p EVR and MVR on 5/20/2021  Pt  has a past medical history of Broken humerus, COPD (chronic obstructive pulmonary disease) ([facility] [address] ), and Pneumonia  Pt with active OT orders and ambulate  orders  Pt resides in a 2 story home with 5-6 XAVIER  Pt lives with his wife who he reports is able to assist as needed upon d/c  Pt was I w/  ADLS and IADLS, (+) drove, & required no use of DME PTA  Currently pt is mod A for functional transfers, min A for functional mobility, min A for UB ADLS and mod A for LB ADLS  Pt is limited at this time 2*: pain, endurance, activity tolerance, functional mobility, forward functional reach, functional standing tolerance and decreased I w/ ADLS/IADLS  The following Occupational Performance Areas to address include: grooming, bathing/shower, toilet hygiene, dressing, functional mobility and clothing management  Based on the aforementioned OT evaluation, functional performance deficits, and assessments, pt has been identified as a high complexity evaluation  From OT standpoint, anticipate d/c home with family support   Pt to continue to benefit from acute immediate OT services to address the following goals 3-5x/week to  w/in 10-14 days:        OT Discharge Recommendation: No rehabilitation needs(Home with increased social support)  OT - OK to Discharge: Yes(When medically appropriate)

## 2021-05-21 NOTE — PHYSICAL THERAPY NOTE
Physical Therapy Evaluation     Patient's Name: Salomon Wiseman    Admitting Diagnosis  NSTEMI (non-ST elevated myocardial infarction) [facility]) [I21 4]    Problem List  Patient Active Problem List   Diagnosis    Aortic valve stenosis - severe    Chronic obstructive lung disease ([facility] )    Erectile dysfunction of non-organic origin    Hyperlipidemia    Essential hypertension    Rotator cuff tendinitis Left    NSTEMI (non-ST elevated myocardial infarction) ([facility])    GERD (gastroesophageal reflux disease)    Acute combined systolic and diastolic CHF, NYHA class 1 ([facility]    Arthritis    S/P AVR    PFO (patent foramen ovale)    S/P MVR (mitral valve repair)       Past Medical History  Past Medical History:   Diagnosis Date    Broken humerus     COPD [facility]chronic obstructive pulmonary disease) [facility]     Pneumonia        Past Surgical History  Past Surgical History:   Procedure Laterality Date    MULTIPLE TOOTH EXTRACTIONS N/A 5/17/2021    Procedure: EXTRACTION TEETH MULTIPLE 1, 3, 18, 30, EXCISION OF LEFT BUCCAL FIBROMA;  Surgeon: Kev Sinha DMD;  [facility]: [facility];  Service: Maxillofacial    HI ECHO TRANSESOPHAG R-T 2D W/PRB IMG ACQUISJ I&R N/A 5/20/2021    Procedure: TRANSESOPHAGEAL ECHOCARDIOGRAM (KATIA);   Surgeon: Anita Kerns MD;  [facility];  Service: Cardiac Surgery    HI REPLACEMENT OF MITRAL VALVE N/A 5/20/2021    Procedure: VALVE MITRAL (MVR) REPAIR with 32mm physio II annuloplasty ring;  Surgeon: Anita Kerns MD;  [facility];  Service: Cardiac Surgery    HI RPLCMT AORTIC VALVE OPN W/STENTLESS TISSUE VALVE N/A 5/20/2021    Procedure: REPLACEMENT VALVE AORTIC (AVR) TISSUE with 25mm montgomery inspiris tissue valve;  Surgeon: Anita Kerns MD;  [facility];  Service: Cardiac Surgery            05/21/21 0816   PT Last Visit   PT Visit Date 05/21/21   Note Type   Note type Evaluation   Pain Assessment   Pain Assessment Tool 0-10   Pain Score 6   Pain Location/Orientation Orientation: Mid;Location: Chest;Location: Incision   Pain Onset/Description Onset: Ongoing;Frequency: Constant/Continuous; Descriptor: Aching;Descriptor: Discomfort   Effect of Pain on Daily Activities guarding w/ positional changes   Patient's Stated Pain Goal No pain   Hospital Pain Intervention(s) Repositioned; Ambulation/increased activity; Emotional support   Pain Rating: FLACC (Rest) - Face 0   Pain Rating: FLACC (Rest) - Legs 0   Pain Rating: FLACC (Rest) - Activity 0   Pain Rating: FLACC (Rest) - Cry 0   Pain Rating: FLACC (Rest) - Consolability 0   Score: FLACC (Rest) 0   Home Living   Type of Home House   Home Layout Two level; Able to live on main level with bedroom/bathroom  (FF of steps to 2nd floor w/ hand rail; 5-6 XAVIER w/ hand rail)   Prior Function   Level of [place] Independent with ADLs and functional mobility  (amb w/o AD)   Lives With Spouse  (able to (A), per pt)   Restrictions/Precautions   Braces or Orthoses   (reports wearing soft knees "braces" for support for outside)   Other Precautions Cardiac/sternal;Multiple lines;Telemetry;O2;Fall Risk;Pain  (a-line; CT (to maintain to suction, as per NP w/ CC); )   General   Additional Pertinent History cleared for assessment (spoke to RN and NP w/ critical care)   Cognition   Overall Cognitive Status WFL   Arousal/Participation Alert   Orientation Level Oriented to person;Oriented to place;Oriented to situation   Memory Within functional limits   Following Commands Follows one step commands without difficulty   Comments Pt is in the chair; pleasant and cooperative; agreeable to try mobilization at bedside; denies dizziness     RUE Assessment   RUE Assessment WFL  (AROM)   LUE Assessment   LUE Assessment WFL  (AROM)   RLE Assessment   RLE Assessment WFL  (AROM)   Strength RLE   RLE Overall Strength   (good - (grossly))   LLE Assessment   LLE Assessment WFL  (AROM)   Strength LLE   LLE Overall Strength   (good - (grossly))   Transfers   Sit to Stand 3  Moderate assistance   Additional items Assist x 1; Increased time required;Verbal cues  (stand by (A) of 2nd for lines)   Stand to Sit 3  Moderate assistance   Additional items Assist x 1; Increased time required;Verbal cues  (stand by (A) of 2nd for lines)   Additional Comments Pt was reclined in the chair w/ LE elevated at the end of session; SCDs on  Ambulation/Elevation   Gait pattern Inconsistent raul   Gait Assistance 4  Minimal assist   Additional items Assist x 1;Verbal cues; Tactile cues  (stand by (A) of 2 more staff for lines and chair follow)   Assistive Device Rolling walker   Distance 5 x 4 ft w/ standing stops in between; BP stable per a-line;   Balance   Static Sitting Fair   Dynamic Sitting Fair -   Static Standing Poor +   Dynamic Standing Poor +   Ambulatory Poor +   Activity Tolerance   Activity Tolerance Patient limited by fatigue   Medical Staff Made Aware Spoke to Efra Orellana NP w/ critical care; Co-daniel performed w/ OTR due to complexity of medical status and comorbidities   Nurse Made Aware spoke to Kalyan [address]   Assessment   Prognosis Good   Problem List Decreased strength;Decreased endurance; Impaired balance;Decreased mobility;Pain   Assessment Pt is 78 y o  male admitted with hx of CP and SOB and Dx of NSTEMI; echo revealed severe aortic valve stenosis and significant mitral regurgitation; other Dx included: patent foramen ovale and non-ischemic cardiomyopathy; during the course, pt underwent EXTRACTION TEETH MULTIPLE 1, 3, 18, 30, EXCISION OF LEFT BUCCAL FIBROMA on 5/17/20201 and aortic valve replacement with a 25 mm Bauer Inspiris Resilia bioprosthetic valve, mitral valve repair with 32 mm Bauer Physio II ring annuloplasty and closure of patent foramen ovale on 5/20/2021   Pt 's comorbidities affecting POC include: COPD, current smoker, HTN, arthritis (reports wearing (B) knee soft braces for support when amb in the community but not at home; OK to amb w/o them, as per pt) and personal factors of: XAVIER and steps in the house  Pt's clinical presentation is currently unstable/unpredictable which is evident in ongoing telemetry monitoring while in a critical care unit w/ a-line in place, abnormal lab values being monitored/trending, CT in place and currently to cont suction (reportedly w/ PTX --> repeat CXR is pending) and inability to progress further w/ mobilization at this time  Pt presents w/ postop discomfort and guarding, generalized weakness, incl decreased LE strength, decreased functional endurance and activity tolerance, and impaired balance w/ associated gait deviations requiring use of rw at this time  Will cont to follow pt in PT for progressive mobilization to address above functional deficits and to max level of (I), endurance, and safety  Otherwise, anticipate pt will return home w/ available family support upon D/C provided he cont improving w/ mobility skills, safety, and endurance (incl on the steps) and when medically cleared; home PT follow up is recommended at this time; will follow  Barriers to Discharge Inaccessible home environment  (steps in the house)   Goals   Patient Goals to get better   STG Expiration Date 05/31/21   Short Term Goal #1 7-10 days  Pt will amb 300 ft w/ least restrictive assistive device PRN, mod (I) in order to facilitate safe return to premorbid environment and community amb status  Pt will negotiate 6 --> 14 steps w/ hand rail and SPC PRN, mod (I) in order to navigate in and out of the house and between levels of home environment safely  Pt will achieve (I) level w/ bed mob in order to facilitate safety with OOB and back to bed transitions in own living environment  Pt will perform transfers w/ mod (I) to assure (I) and safety w/ functional mobility/transitions w/ all aspects of mobility/locomotion  Pt will participate in LE therex and balance activities to max progression w/ mobility skills     PT Treatment Day 0   Plan   Treatment/Interventions Functional transfer training;LE strengthening/ROM; Elevations; Therapeutic exercise; Endurance training;Equipment eval/education; Bed mobility;Gait training;Spoke to nursing;OT   PT Frequency Other (Comment)  (4-6x/wk)   Recommendation   PT Discharge Recommendation Home with home health rehabilitation  (home PT; r/o 1st floor set-up)   Equipment Recommended Walker  (at this time; will cont to monitor progress)   Walker Package Recommended Wheeled walker   Scotty 8 in Bed Without Bedrails 2   Lying on Back to Sitting on Edge of Flat Bed 2   Moving Bed to Chair 2   Standing Up From Chair 2   Walk in Room 3   Climb 3-5 Stairs 2   Basic Mobility Inpatient Raw Score 13   Basic Mobility Standardized Score 33 99   Modified Ernst Scale   Modified Ernst Scale 4       Earl Lyman, PT

## 2021-05-22 LAB
ANION GAP SERPL CALCULATED.3IONS-SCNC: 4 MMOL/L (ref 4–13)
ATRIAL RATE: 69 BPM
BUN SERPL-MCNC: 18 MG/DL (ref 5–25)
CALCIUM SERPL-MCNC: 8.5 MG/DL (ref 8.3–10.1)
CHLORIDE SERPL-SCNC: 105 MMOL/L (ref 100–108)
CO2 SERPL-SCNC: 28 MMOL/L (ref 21–32)
CREAT SERPL-MCNC: 0.62 MG/DL (ref 0.6–1.3)
ERYTHROCYTE [DISTWIDTH] IN BLOOD BY AUTOMATED COUNT: 13.4 % (ref 11.6–15.1)
GFR SERPL CREATININE-BSD FRML MDRD: 103 ML/MIN/1.73SQ M
GLUCOSE SERPL-MCNC: 110 MG/DL (ref 65–140)
GLUCOSE SERPL-MCNC: 121 MG/DL (ref 65–140)
GLUCOSE SERPL-MCNC: 125 MG/DL (ref 65–140)
GLUCOSE SERPL-MCNC: 138 MG/DL (ref 65–140)
GLUCOSE SERPL-MCNC: 142 MG/DL (ref 65–140)
HCT VFR BLD AUTO: 35.4 % (ref 36.5–49.3)
HGB BLD-MCNC: 11.4 G/DL (ref 12–17)
MAGNESIUM SERPL-MCNC: 2.3 MG/DL (ref 1.6–2.6)
MCH RBC QN AUTO: 31.2 PG (ref 26.8–34.3)
MCHC RBC AUTO-ENTMCNC: 32.2 G/DL (ref 31.4–37.4)
MCV RBC AUTO: 97 FL (ref 82–98)
P AXIS: 36 DEGREES
PLATELET # BLD AUTO: 127 THOUSANDS/UL (ref 149–390)
PMV BLD AUTO: 9.3 FL (ref 8.9–12.7)
POTASSIUM SERPL-SCNC: 4.1 MMOL/L (ref 3.5–5.3)
PR INTERVAL: 225 MS
QRS AXIS: -17 DEGREES
QRSD INTERVAL: 96 MS
QT INTERVAL: 413 MS
QTC INTERVAL: 443 MS
RBC # BLD AUTO: 3.65 MILLION/UL (ref 3.88–5.62)
SODIUM SERPL-SCNC: 137 MMOL/L (ref 136–145)
T WAVE AXIS: 162 DEGREES
VENTRICULAR RATE: 69 BPM
WBC # BLD AUTO: 14.17 THOUSAND/UL (ref 4.31–10.16)

## 2021-05-22 PROCEDURE — 99024 POSTOP FOLLOW-UP VISIT: CPT | Performed by: THORACIC SURGERY (CARDIOTHORACIC VASCULAR SURGERY)

## 2021-05-22 PROCEDURE — 80048 BASIC METABOLIC PNL TOTAL CA: CPT | Performed by: NURSE PRACTITIONER

## 2021-05-22 PROCEDURE — 83735 ASSAY OF MAGNESIUM: CPT | Performed by: NURSE PRACTITIONER

## 2021-05-22 PROCEDURE — 94760 N-INVAS EAR/PLS OXIMETRY 1: CPT

## 2021-05-22 PROCEDURE — 85027 COMPLETE CBC AUTOMATED: CPT | Performed by: NURSE PRACTITIONER

## 2021-05-22 PROCEDURE — NC001 PR NO CHARGE: Performed by: PHYSICIAN ASSISTANT

## 2021-05-22 PROCEDURE — 93010 ELECTROCARDIOGRAM REPORT: CPT | Performed by: INTERNAL MEDICINE

## 2021-05-22 PROCEDURE — 99233 SBSQ HOSP IP/OBS HIGH 50: CPT | Performed by: ANESTHESIOLOGY

## 2021-05-22 PROCEDURE — 82948 REAGENT STRIP/BLOOD GLUCOSE: CPT

## 2021-05-22 RX ORDER — FUROSEMIDE 10 MG/ML
40 INJECTION INTRAMUSCULAR; INTRAVENOUS
Status: DISCONTINUED | OUTPATIENT
Start: 2021-05-22 | End: 2021-05-23

## 2021-05-22 RX ORDER — POTASSIUM CHLORIDE 20 MEQ/1
20 TABLET, EXTENDED RELEASE ORAL 2 TIMES DAILY
Status: DISCONTINUED | OUTPATIENT
Start: 2021-05-22 | End: 2021-05-23

## 2021-05-22 RX ADMIN — POTASSIUM CHLORIDE 20 MEQ: 1500 TABLET, EXTENDED RELEASE ORAL at 08:12

## 2021-05-22 RX ADMIN — TEMAZEPAM 15 MG: 15 CAPSULE ORAL at 00:15

## 2021-05-22 RX ADMIN — DOCUSATE SODIUM 100 MG: 100 CAPSULE ORAL at 08:12

## 2021-05-22 RX ADMIN — MUPIROCIN 1 APPLICATION: 20 OINTMENT TOPICAL at 08:12

## 2021-05-22 RX ADMIN — TEMAZEPAM 15 MG: 15 CAPSULE ORAL at 21:05

## 2021-05-22 RX ADMIN — FLUTICASONE FUROATE AND VILANTEROL TRIFENATATE 1 PUFF: 100; 25 POWDER RESPIRATORY (INHALATION) at 08:12

## 2021-05-22 RX ADMIN — OXYCODONE HYDROCHLORIDE 5 MG: 5 TABLET ORAL at 00:15

## 2021-05-22 RX ADMIN — ACETAMINOPHEN 975 MG: 325 TABLET, FILM COATED ORAL at 21:05

## 2021-05-22 RX ADMIN — ATORVASTATIN CALCIUM 40 MG: 40 TABLET, FILM COATED ORAL at 17:00

## 2021-05-22 RX ADMIN — FONDAPARINUX SODIUM 2.5 MG: 2.5 INJECTION, SOLUTION SUBCUTANEOUS at 06:00

## 2021-05-22 RX ADMIN — FUROSEMIDE 40 MG: 10 INJECTION, SOLUTION INTRAMUSCULAR; INTRAVENOUS at 08:12

## 2021-05-22 RX ADMIN — AMIODARONE HYDROCHLORIDE 200 MG: 200 TABLET ORAL at 13:21

## 2021-05-22 RX ADMIN — OXYCODONE HYDROCHLORIDE 5 MG: 5 TABLET ORAL at 21:11

## 2021-05-22 RX ADMIN — ASPIRIN 325 MG ORAL TABLET 325 MG: 325 PILL ORAL at 08:12

## 2021-05-22 RX ADMIN — AMIODARONE HYDROCHLORIDE 200 MG: 200 TABLET ORAL at 21:05

## 2021-05-22 RX ADMIN — MUPIROCIN 1 APPLICATION: 20 OINTMENT TOPICAL at 21:06

## 2021-05-22 RX ADMIN — OXYCODONE HYDROCHLORIDE 5 MG: 5 TABLET ORAL at 05:12

## 2021-05-22 RX ADMIN — DOCUSATE SODIUM 100 MG: 100 CAPSULE ORAL at 17:00

## 2021-05-22 RX ADMIN — ACETAMINOPHEN 975 MG: 325 TABLET, FILM COATED ORAL at 05:12

## 2021-05-22 RX ADMIN — POTASSIUM CHLORIDE 20 MEQ: 1500 TABLET, EXTENDED RELEASE ORAL at 17:00

## 2021-05-22 RX ADMIN — ACETAMINOPHEN 975 MG: 325 TABLET, FILM COATED ORAL at 13:21

## 2021-05-22 RX ADMIN — AMIODARONE HYDROCHLORIDE 200 MG: 200 TABLET ORAL at 05:13

## 2021-05-22 RX ADMIN — PHENYLEPHRINE HYDROCHLORIDE 30 MCG/MIN: 10 INJECTION INTRAVENOUS at 17:04

## 2021-05-22 RX ADMIN — FUROSEMIDE 40 MG: 10 INJECTION, SOLUTION INTRAMUSCULAR; INTRAVENOUS at 17:00

## 2021-05-22 RX ADMIN — OXYCODONE HYDROCHLORIDE 2.5 MG: 5 TABLET ORAL at 10:15

## 2021-05-22 RX ADMIN — PANTOPRAZOLE SODIUM 40 MG: 40 TABLET, DELAYED RELEASE ORAL at 08:12

## 2021-05-22 RX ADMIN — POLYETHYLENE GLYCOL 3350 17 G: 17 POWDER, FOR SOLUTION ORAL at 08:11

## 2021-05-22 NOTE — PROGRESS NOTES
Progress Note - Critical Care   Jag Phipps 78 y o  male MRN: [id number]  Unit/Bed#: [facility] 103-79 Encounter: [id number]      Attending Physician: Joanne De Leon MD    24 Hour Events/HPI: POD # 2 s/p tissue AVR, MV ring annuloplasty and closure of PFO  IR placed CT yesterday for LEFT pTX  Persistent ST depressions on EKG  No chest pain this AM  Maintained on and off luis for MAP goal >65  ROS: Review of Systems   Respiratory: Negative for chest tightness and shortness of breath  Cardiovascular: Negative for chest pain  Gastrointestinal: Negative for nausea and vomiting  All other systems reviewed and are negative     ---------------------------------------------------------------------------------------------------------------------------------------------------------------------  Impressions:  1  Severe aortic stenosis s/p AVR  2  Severe mitral regurgitation s/p MV ring annuloplasty  3  PFO s/p closure  4  Non-ischemic cardiomyopathy   5  Left pneumothorax s/p CT placement  6  HTN  7  COPD  8  Tobacco abuse     Plan:    Neuro:   · Pain controlled with: tylenol ATC and oxycodone PRN  · Regulate sleep/wake cycle  · Restoril qHS  · Delirium precautions  · CAM-ICU daily  · Trend neuro exam    CV:   · Cardiac infusions: Neosynephrine, 30 mcg/min  · Wean to off as able  · MAP goal > 65  · Keep Arterial line  · Rhythm: NSR  · Follow rhythm on telemetry  · Holding BB with hypotension  · Epicardial pacing wires no longer required  Remove today    Lung:   · Acute post-op pulmonary insufficiency; Requiring 2 Liters via nasal cannula, secondary to poor inspiratory effort secondary to pain  Continue incentive spirometry/coughing/deep breathing exercises    Wean supplemental oxygen as tolerated for saturation > 90%  · Chest tube output remains persistently high; Continue chest tubes to suction today   · Consider LEFT pleural CT to water seal and PA lateral CXR in AM    GI:   · Continue PPI for stress ulcer prophylaxis  · Continue bowel regimen  · Trend abdominal exam and bowel function    FEN:   · Diuretic plan: Lasix 40 mg IV q BID  · K-dur 20 mEQ PO q BID  · Nutrition/diet plan: PO diet  · Replenish electrolytes with goals: K >4 0, Mag >2 0, and Phos >3 0    :   · Indwelling Wilkes present: no   · Trend UOP and BUN/creat  · Strict I and O    ID:   · Trend temps and WBC count  · Maintain normothermia    Heme:   · Trend hgb and plts    Endo:   · Glycemic control plan: Glucose well-controlled  Insulin sliding scale coverage      Results from last 6 Months   Lab Units 05/13/21  1150   HEMOGLOBIN A1C % 5 8*     MSK/Skin:  · Mobility goal: OOB and ambulating as able  · PT consult: yes  · OT consult: yes  · Frequent turning and pressure off-loading  · Local wound care as needed    Disposition:  Transfer to SD level 1  ---------------------------------------------------------------------------------------------------------------------------------------------------------------------  Allergies: No Known Allergies  Medications:   Scheduled Meds:  Current Facility-Administered Medications   Medication Dose Route Frequency Provider Last Rate    acetaminophen  650 mg Rectal Q4H PRN Harry Hem, CRNP      acetaminophen  975 mg Oral Q8H Harry Hem, CRNP      albuterol  2 puff Inhalation Q4H PRN Harry Hem, CRNP      amiodarone  200 mg Oral [facility] Jacqualin Fragmin Mol, CRNP      aspirin  325 mg Oral Daily Jacqualin Fragmin Mol, [address]      atorvastatin  40 mg Oral QPM Harry Hem, CRNP      bisacodyl  10 mg Rectal Daily PRN Harry Hem, CRNP      Diclofenac Sodium  2 g Topical TID PRN Harry Hem, CRNP      docusate sodium  100 mg Oral BID Harry Xavier, BITA      fluticasone-vilanterol  1 puff Inhalation Daily Jacqucecile Fragmin Mol, BITA      fondaparinux  2 5 mg Subcutaneous Q24H Harry Xavier, BITA      furosemide  40 mg Intravenous Daily Harry Xavier, [address] insulin lispro  1-6 Units Subcutaneous TID AC Austyn Washington, CRNP      insulin lispro  1-6 Units Subcutaneous HS BITA [place]      mupirocin  1 application Nasal T40M [address] Austyn Washington, BITA      ondansetron  4 mg Intravenous Q6H PRN Helena Hoa, BITA      oxyCODONE  2 5 mg Oral Q4H PRN Helena Hoa, BITA      oxyCODONE  5 mg Oral Q4H PRN Helena BITA Camara      pantoprazole  40 mg Oral Daily Austyn Washington, [address]      phenylephine   mcg/min Intravenous Titrated Seble Wilhelm PA-C 30 mcg/min (05/22/21 0600)    polyethylene glycol  17 g Oral Daily Helena BITA Camara      temazepam  15 mg Oral HS PRN BITA Hadley       VTE Pharmacologic Prophylaxis: Fondaparinux (Arixtra)  VTE Mechanical Prophylaxis: sequential compression device    Continuous Infusions:phenylephine,  mcg/min, Last Rate: 30 mcg/min (05/22/21 0600)      PRN Meds:  acetaminophen, 650 mg, Q4H PRN  albuterol, 2 puff, Q4H PRN  bisacodyl, 10 mg, Daily PRN  Diclofenac Sodium, 2 g, TID PRN  ondansetron, 4 mg, Q6H PRN  oxyCODONE, 2 5 mg, Q4H PRN  oxyCODONE, 5 mg, Q4H PRN  temazepam, 15 mg, HS PRN      Home Medications:   Prior to Admission medications    Medication Sig Start Date End Date Taking?  Authorizing Provider   amLODIPine (NORVASC) 10 mg tablet TAKE 1 TABLET (10 MG TOTAL) BY MOUTH DAILY 7/9/20  Yes Marlon Austin DO   Anoro Ellipta 62 5-25 MCG/INH inhaler INHALE 1 PUFF BY MOUTH EVERY DAY 1/19/21  Yes Marlon Austin DO   aspirin (ECOTRIN LOW STRENGTH) 81 mg EC tablet Take 1 tablet (81 mg total) by mouth daily 3/26/18  Yes Marlon Austin DO   atorvastatin (LIPITOR) 20 mg tablet TAKE 1 TABLET BY MOUTH EVERY DAY 3/25/21  Yes Marlon Austin DO   losartan (COZAAR) 100 MG tablet TAKE 1 TABLET BY MOUTH EVERY DAY 10/20/20  Yes Marlon Reasoner, DO   Multiple Vitamin (MULTI-VITAMIN DAILY PO) Take 1 tablet by mouth daily 7/1/14  Yes Historical Provider, MD   pantoprazole (PROTONIX) 40 mg tablet TAKE 1 TABLET BY MOUTH EVERY DAY 21  Yes Parminder Martin DO   albuterol (PROVENTIL HFA,VENTOLIN HFA) 90 mcg/act inhaler INHALE 1-2 PUFFS EVERY 4 (FOUR) HOURS AS NEEDED FOR WHEEZING 3/8/21   Parminder Martin DO   Aspirin-Acetaminophen-Caffeine (EXCEDRIN MIGRAINE PO) Take by mouth    Historical Provider, MD   dextromethorphan-guaifenesin ([place] [address] DM)  MG per 12 hr tablet Take 1 tablet by mouth every 12 (twelve) hours 21   Chucho Ramos MD   predniSONE 20 mg tablet Please take 3 pills on day 1, 2, 3,  Then 2 pills on day 4, 5 and 6, 1 pill on day 7  Patient not taking: Reported on 2021 5/10/21   Chucho Ramos MD     ---------------------------------------------------------------------------------------------------------------------------------------------------------------------  Vitals:   Vitals:    21 0300 21 0400 21 0500 21 0600   BP: 96/59 110/68 128/71 115/64   BP Location:  Left arm     Pulse: 64 64 64 66   Resp: 16 15 (!) 11 16   Temp:  98 4 °F (36 9 °C)     TempSrc:  Axillary     SpO2: 95% 98% 98% 94%   Weight:    109 kg (240 lb 4 8 oz)   Height:         Arterial Line:  Arterial Line BP: 134/60  Arterial Line MAP (mmHg): 84 mmHg    Tele Rhythm: NSR This was personally reviewed by myself  Respiratory:  SpO2: SpO2: 94 %, SpO2 Activity: SpO2 Activity: At Rest, SpO2 Device: O2 Device: Nasal cannula  Nasal Cannula O2 Flow Rate (L/min): 2 L/min    Temperature: Temp (24hrs), Av 8 °F (37 1 °C), Min:98 1 °F (36 7 °C), Max:99 4 °F (37 4 °C)  Current: Temperature: 98 4 °F (36 9 °C)    Weights:   Weight (last 2 days)     Date/Time   Weight    21 0600   109 (240 3)    21 0551   108 (237 88)    21 0548   104 (228 84)            Body mass index is 30 85 kg/m²      Intake and Outputs:    Intake/Output Summary (Last 24 hours) at 2021 0612  Last data filed at 2021 0600  Gross per 24 hour   Intake 1912 9 ml   Output 1410 ml   Net 502 9 ml     I/O last 24 hours: In: 2650 4 [P O :1680; I V :570 4; IV Piggyback:400]  Out: 2200 [Urine:1795; Chest Tube:405]    UOP: 50/hour on average   BM: 0 in the last 24 hours    Chest tube Output:  Mediastinal tubes: 70 mL/8 hours  230 mL/24 hours   LEFT pleural tubes: 0 mL/8 hours  5 mL/24 hours     Labs:  Results from last 7 days   Lab Units 05/22/21 0513 05/21/21 0358 05/20/21 2008 05/20/21  1302  05/19/21  0439 05/17/21  0602   WBC Thousand/uL 14 17* 16 11*  --   --   --   --  7 08 8 07   HEMOGLOBIN g/dL 11 4* 12 0 12 4  --  13 9  --  13 7 13 7   I STAT HEMOGLOBIN   --   --   --    < >  --    < >  --   --    HEMATOCRIT % 35 4* 37 9 38 4  --  42 6  --  42 5 42 1   HEMATOCRIT, ISTAT   --   --   --    < >  --    < >  --   --    PLATELETS Thousands/uL 127* 147*  --   --  186   < > 220 235   NEUTROS PCT %  --   --   --   --   --   --  51 56   MONOS PCT %  --   --   --   --   --   --  13* 12    < > = values in this interval not displayed  Results from last 7 days   Lab Units 05/22/21 0513 05/21/21 0358 05/20/21 2008 05/20/21  1307 05/20/21  1302 05/20/21  1159 05/20/21  1118   SODIUM mmol/L 137 141  --   --   --  141  --   --    POTASSIUM mmol/L 4 1 4 8 4 7   < >  --  3 9  --   --    CHLORIDE mmol/L 105 110*  --   --   --  110*  --   --    CO2 mmol/L 28 26  --   --   --  24  --   --    CO2, I-STAT mmol/L  --   --   --   --  26  --  26 30   BUN mg/dL 18 14  --   --   --  10  --   --    CREATININE mg/dL 0 62 0 66  --   --   --  0 84  --   --    CALCIUM mg/dL 8 5 8 1*  --   --   --  8 4  --   --    GLUCOSE, ISTAT mg/dl  --   --   --   --  158*  --  137 145*    < > = values in this interval not displayed  Results from last 7 days   Lab Units 05/22/21  0513 05/21/21  0358   MAGNESIUM mg/dL 2 3 2 6          Results from last 7 days   Lab Units 05/17/21  0602   INR  1 02     Micro:   Blood Culture: No results found for: BLOODCX  Urine Culture: No results found for: URINECX  Sputum Culture:  No components found for: SPUTUMCX  Wound Culure: No results found for: WOUNDCULT    Diagnositic Studies:  No new imaging    Nutrition:        Diet Orders   (From admission, onward)             Start     Ordered    05/21/21 1538  Diet Cardiovascular; Cardiac; Fluid Restriction 1800 ML  Diet effective now     Question Answer Comment   Diet Type Cardiovascular    Cardiac Cardiac    Other Restriction(s): Fluid Restriction 1800 ML    RD to adjust diet per protocol? Yes        05/21/21 1539              Physical Exam  Vitals signs reviewed  Constitutional:       General: He is awake  Appearance: He is normal weight  He is not toxic-appearing  Interventions: Nasal cannula in place  Neck:      Comments: RI CVC  Cardiovascular:      Rate and Rhythm: Normal rate and regular rhythm  Heart sounds: Heart sounds are distant  No murmur  No friction rub  Pulmonary:      Effort: Pulmonary effort is normal       Breath sounds: Normal breath sounds  No wheezing, rhonchi or rales  Abdominal:      Palpations: Abdomen is soft  Neurological:      General: No focal deficit present  Mental Status: He is alert and oriented to person, place, and time  Mental status is at baseline  Psychiatric:         Mood and Affect: Mood and affect normal          Speech: Speech normal          Behavior: Behavior normal        Invasive lines and devices:   Invasive Devices     Central Venous Catheter Line            CVC Central Lines 05/20/21 Triple 1 day          Peripheral Intravenous Line            Peripheral IV 05/20/21 Right Wrist 1 day          Arterial Line            Arterial Line 05/20/21 Left Radial 1 day          Line            Pacer Wires 1 day    Pacer Wires 1 day          Drain            Chest Tube 1 Anterior Mediastinal 32 Fr  1 day    Chest Tube 1 Posterior Mediastinal 32 Fr  1 day    Chest Tube 1 Left Pleural 10 Fr  less than 1 day ---------------------------------------------------------------------------------------------------------------------------------------------------------------------  Code Status: Level 1 - Full Code    Care Time Delivered:   No Critical Care time spent     Collaborative bedside rounds performed with cardiac surgery attending, critical care attending and bedside RN      Annika Hackett PA-C  DATE: May 22, 2021  TIME: 6:12 AM

## 2021-05-22 NOTE — PROGRESS NOTES
Progress Note - Cardiothoracic Surgery   Coretta Moody 78 y o  male MRN: [id number]  Unit/Bed#: [facility] 414-01 Encounter: [id number]      POD # 2 s/p Tissue AVR, MV repair, PFO closure    Pt seen/examined  Interval history and data reviewed with critical care team   Pt doing well  No specific complaints      Low dose luis  Left pigtail placed for pneumothorax    Medications:   Scheduled Meds:  Current Facility-Administered Medications   Medication Dose Route Frequency Provider Last Rate    acetaminophen  975 mg Oral Q8H Angelo Plascencia, CRNP      albuterol  2 puff Inhalation Q4H PRN Angelo Plascencia, HIGINIONP      amiodarone  200 mg Oral [facility] Galileo Lamont Mol, CRNP      aspirin  325 mg Oral Daily Galileo Washington, [address]      atorvastatin  40 mg Oral QPM Angelo Plascencia, CRNP      bisacodyl  10 mg Rectal Daily PRN Angelo Plascencia, HIGINIONP      Diclofenac Sodium  2 g Topical TID PRN Angelo Plascencia, HIGINIONP      docusate sodium  100 mg Oral BID BITA Landrum      fluticasone-vilanterol  1 puff Inhalation Daily Galileo Washington, CRNP      fondaparinux  2 5 mg Subcutaneous Q24H Galileo Washington, CRNP      furosemide  40 mg Intravenous BID (diuretic) He Rios PA-C      insulin lispro  1-6 Units Subcutaneous TID AC Galileo Washington, CRNP      insulin lispro  1-6 Units Subcutaneous HS BITA Landrum      mupirocin  1 application Nasal Q21O [facility], BITA      ondansetron  4 mg Intravenous Q6H PRN Angelo Plascencia, BITA      oxyCODONE  2 5 mg Oral Q4H PRN BITA Landrum      oxyCODONE  5 mg Oral Q4H PRN Angelo Plascencia, BITA      pantoprazole  40 mg Oral Daily Galileo [place], CRJASON      phenylephine   mcg/min Intravenous Titrated Seble Wilhelm PA-C 30 mcg/min (05/22/21 0600)    polyethylene glycol  17 g Oral Daily BITA Landrum      potassium chloride  20 mEq Oral BID Irina Dupont PA-C      temazepam  15 mg Oral HS PRN Hiren Coca, CRNP       Continuous Infusions:phenylephine,  mcg/min, Last Rate: 30 mcg/min (05/22/21 0600)      PRN Meds: albuterol    bisacodyl    Diclofenac Sodium    ondansetron    oxyCODONE    oxyCODONE    temazepam    Vitals: Blood pressure 115/64, pulse 66, temperature 98 4 °F (36 9 °C), temperature source Axillary, resp  rate 16, height 6' 2" (1 88 m), weight 109 kg (240 lb 4 8 oz), SpO2 94 %  ,Body mass index is 30 85 kg/m²  I/O last 24 hours: In: 1912 9 [P O :1680; I V :182 9; IV Piggyback:50]  Out: 1410 [Urine:1175; Chest Tube:235]  Invasive Devices     Central Venous Catheter Line            CVC Central Lines 05/20/21 Triple 2 days          Peripheral Intravenous Line            Peripheral IV 05/20/21 Right Wrist 2 days          Arterial Line            Arterial Line 05/20/21 Left Radial 2 days          Line            Pacer Wires 1 day    Pacer Wires 1 day          Drain            Chest Tube 1 Posterior Mediastinal 32 Fr  2 days    Chest Tube 1 Anterior Mediastinal 32 Fr  1 day    Chest Tube 1 Left Pleural 10 Fr  less than 1 day                  Lab, Imaging and other studies:   Results from last 7 days   Lab Units 05/22/21 0513 05/21/21 0358 05/20/21 2008 05/20/21  1302  05/19/21  0439   WBC Thousand/uL 14 17* 16 11*  --   --   --   --  7 08   HEMOGLOBIN g/dL 11 4* 12 0 12 4  --  13 9  --  13 7   I STAT HEMOGLOBIN   --   --   --    < >  --    < >  --    HEMATOCRIT % 35 4* 37 9 38 4  --  42 6  --  42 5   HEMATOCRIT, ISTAT   --   --   --    < >  --    < >  --    PLATELETS Thousands/uL 127* 147*  --   --  186   < > 220    < > = values in this interval not displayed       Results from last 7 days   Lab Units 05/22/21 0513 05/21/21 0358 05/20/21 2008 05/20/21  1307 05/20/21  1302   POTASSIUM mmol/L 4 1 4 8 4 7   < >  --  3 9   CHLORIDE mmol/L 105 110*  --   --   --  110*   CO2 mmol/L 28 26  --   --   --  24   CO2, I-STAT mmol/L  --   --   --   --  26  --    BUN mg/dL 18 14  --   --   --  10   CREATININE mg/dL 0 62 0 66  --   --   --  0 84   GLUCOSE, ISTAT mg/dl  --   --   --   --  158*  --    CALCIUM mg/dL 8 5 8 1*  --   --   --  8 4    < > = values in this interval not displayed  Results from last 7 days   Lab Units 05/17/21  0602   INR  1 02     No results for input(s): PHART, PWA8XKV, PO2ART, QUD6PKU, R1BDISQE, BEART in the last 72 hours  Plan:      Wean luis  DC [place]  Continue chest tubes to suction, remove pacing wires  Ambulate  Incentive spirometry  Diuresis  PO ASA/Statin  Hold B blocker    Transfer stepdown      SIGNATURE: Shaniqua Guillermo DO  DATE: May 22, 2021  TIME: 10:34 AM

## 2021-05-23 ENCOUNTER — APPOINTMENT (INPATIENT)
Dept: RADIOLOGY | Facility: HOSPITAL | Age: 66
DRG: 216 | End: 2021-05-23
Payer: COMMERCIAL

## 2021-05-23 LAB
ANION GAP SERPL CALCULATED.3IONS-SCNC: 4 MMOL/L (ref 4–13)
BUN SERPL-MCNC: 16 MG/DL (ref 5–25)
CALCIUM SERPL-MCNC: 8.8 MG/DL (ref 8.3–10.1)
CHLORIDE SERPL-SCNC: 102 MMOL/L (ref 100–108)
CO2 SERPL-SCNC: 31 MMOL/L (ref 21–32)
CREAT SERPL-MCNC: 0.6 MG/DL (ref 0.6–1.3)
ERYTHROCYTE [DISTWIDTH] IN BLOOD BY AUTOMATED COUNT: 13.5 % (ref 11.6–15.1)
GFR SERPL CREATININE-BSD FRML MDRD: 104 ML/MIN/1.73SQ M
GLUCOSE SERPL-MCNC: 102 MG/DL (ref 65–140)
GLUCOSE SERPL-MCNC: 125 MG/DL (ref 65–140)
GLUCOSE SERPL-MCNC: 128 MG/DL (ref 65–140)
GLUCOSE SERPL-MCNC: 133 MG/DL (ref 65–140)
GLUCOSE SERPL-MCNC: 145 MG/DL (ref 65–140)
HCT VFR BLD AUTO: 33.8 % (ref 36.5–49.3)
HGB BLD-MCNC: 10.9 G/DL (ref 12–17)
MAGNESIUM SERPL-MCNC: 1.9 MG/DL (ref 1.6–2.6)
MCH RBC QN AUTO: 31 PG (ref 26.8–34.3)
MCHC RBC AUTO-ENTMCNC: 32.2 G/DL (ref 31.4–37.4)
MCV RBC AUTO: 96 FL (ref 82–98)
PLATELET # BLD AUTO: 126 THOUSANDS/UL (ref 149–390)
PMV BLD AUTO: 9.8 FL (ref 8.9–12.7)
POTASSIUM SERPL-SCNC: 4.2 MMOL/L (ref 3.5–5.3)
RBC # BLD AUTO: 3.52 MILLION/UL (ref 3.88–5.62)
SODIUM SERPL-SCNC: 137 MMOL/L (ref 136–145)
WBC # BLD AUTO: 11.98 THOUSAND/UL (ref 4.31–10.16)

## 2021-05-23 PROCEDURE — 99233 SBSQ HOSP IP/OBS HIGH 50: CPT | Performed by: ANESTHESIOLOGY

## 2021-05-23 PROCEDURE — 71045 X-RAY EXAM CHEST 1 VIEW: CPT

## 2021-05-23 PROCEDURE — 99232 SBSQ HOSP IP/OBS MODERATE 35: CPT | Performed by: INTERNAL MEDICINE

## 2021-05-23 PROCEDURE — 85027 COMPLETE CBC AUTOMATED: CPT | Performed by: PHYSICIAN ASSISTANT

## 2021-05-23 PROCEDURE — 80048 BASIC METABOLIC PNL TOTAL CA: CPT | Performed by: PHYSICIAN ASSISTANT

## 2021-05-23 PROCEDURE — NC001 PR NO CHARGE: Performed by: PHYSICIAN ASSISTANT

## 2021-05-23 PROCEDURE — 82948 REAGENT STRIP/BLOOD GLUCOSE: CPT

## 2021-05-23 PROCEDURE — 83735 ASSAY OF MAGNESIUM: CPT | Performed by: PHYSICIAN ASSISTANT

## 2021-05-23 PROCEDURE — 99024 POSTOP FOLLOW-UP VISIT: CPT | Performed by: THORACIC SURGERY (CARDIOTHORACIC VASCULAR SURGERY)

## 2021-05-23 RX ORDER — FUROSEMIDE 10 MG/ML
40 INJECTION INTRAMUSCULAR; INTRAVENOUS DAILY
Status: DISCONTINUED | OUTPATIENT
Start: 2021-05-24 | End: 2021-05-26 | Stop reason: HOSPADM

## 2021-05-23 RX ORDER — POTASSIUM CHLORIDE 20 MEQ/1
20 TABLET, EXTENDED RELEASE ORAL DAILY
Status: DISCONTINUED | OUTPATIENT
Start: 2021-05-24 | End: 2021-05-26 | Stop reason: HOSPADM

## 2021-05-23 RX ADMIN — PANTOPRAZOLE SODIUM 40 MG: 40 TABLET, DELAYED RELEASE ORAL at 08:00

## 2021-05-23 RX ADMIN — ASPIRIN 325 MG ORAL TABLET 325 MG: 325 PILL ORAL at 08:00

## 2021-05-23 RX ADMIN — MUPIROCIN 1 APPLICATION: 20 OINTMENT TOPICAL at 08:00

## 2021-05-23 RX ADMIN — OXYCODONE HYDROCHLORIDE 2.5 MG: 5 TABLET ORAL at 07:56

## 2021-05-23 RX ADMIN — OXYCODONE HYDROCHLORIDE 5 MG: 5 TABLET ORAL at 23:45

## 2021-05-23 RX ADMIN — FUROSEMIDE 40 MG: 10 INJECTION, SOLUTION INTRAMUSCULAR; INTRAVENOUS at 07:57

## 2021-05-23 RX ADMIN — DOCUSATE SODIUM 100 MG: 100 CAPSULE ORAL at 19:11

## 2021-05-23 RX ADMIN — ACETAMINOPHEN 975 MG: 325 TABLET, FILM COATED ORAL at 04:28

## 2021-05-23 RX ADMIN — ATORVASTATIN CALCIUM 40 MG: 40 TABLET, FILM COATED ORAL at 19:11

## 2021-05-23 RX ADMIN — DOCUSATE SODIUM 100 MG: 100 CAPSULE ORAL at 08:00

## 2021-05-23 RX ADMIN — ACETAMINOPHEN 975 MG: 325 TABLET, FILM COATED ORAL at 20:58

## 2021-05-23 RX ADMIN — AMIODARONE HYDROCHLORIDE 200 MG: 200 TABLET ORAL at 05:57

## 2021-05-23 RX ADMIN — OXYCODONE HYDROCHLORIDE 5 MG: 5 TABLET ORAL at 14:40

## 2021-05-23 RX ADMIN — MUPIROCIN 1 APPLICATION: 20 OINTMENT TOPICAL at 20:58

## 2021-05-23 RX ADMIN — FLUTICASONE FUROATE AND VILANTEROL TRIFENATATE 1 PUFF: 100; 25 POWDER RESPIRATORY (INHALATION) at 08:01

## 2021-05-23 RX ADMIN — AMIODARONE HYDROCHLORIDE 200 MG: 200 TABLET ORAL at 21:00

## 2021-05-23 RX ADMIN — POTASSIUM CHLORIDE 20 MEQ: 1500 TABLET, EXTENDED RELEASE ORAL at 08:00

## 2021-05-23 RX ADMIN — FONDAPARINUX SODIUM 2.5 MG: 2.5 INJECTION, SOLUTION SUBCUTANEOUS at 05:57

## 2021-05-23 RX ADMIN — OXYCODONE HYDROCHLORIDE 5 MG: 5 TABLET ORAL at 19:26

## 2021-05-23 RX ADMIN — OXYCODONE HYDROCHLORIDE 5 MG: 5 TABLET ORAL at 02:35

## 2021-05-23 RX ADMIN — AMIODARONE HYDROCHLORIDE 200 MG: 200 TABLET ORAL at 14:40

## 2021-05-23 RX ADMIN — POLYETHYLENE GLYCOL 3350 17 G: 17 POWDER, FOR SOLUTION ORAL at 08:00

## 2021-05-23 RX ADMIN — ACETAMINOPHEN 975 MG: 325 TABLET, FILM COATED ORAL at 14:40

## 2021-05-23 NOTE — PROGRESS NOTES
Progress Note - Critical Care   Akshat Barahona 78 y o  male MRN: [id number]  Unit/Bed#: [facility] 915-83 Encounter: [id number]      Attending Physician: Cornel Carbone MD    24 Hour Events/HPI: POD # 3 s/p tissue AVR, MV ring annuloplasty and closure of PFO  Usmit maintained for MAP goal >65  CT to suction  ROS: Review of Systems   Respiratory: Positive for cough  Negative for chest tightness and shortness of breath  Cardiovascular: Negative for chest pain  Gastrointestinal: Negative for nausea and vomiting  All other systems reviewed and are negative     ---------------------------------------------------------------------------------------------------------------------------------------------------------------------  Impressions:  1  Severe aortic stenosis s/p AVR  2  Severe mitral regurgitation s/p MV ring annuloplasty  3  PFO s/p closure  4  Non-ischemic cardiomyopathy   5  Left pneumothorax s/p CT placement  6  HTN  7  COPD  8  Tobacco abuse     Plan:    Neuro:   · Pain controlled with: tylenol ATC and oxycodone PRN  · Regulate sleep/wake cycle  § Restoril qHS  · Delirium precautions  § CAM-ICU daily  · Trend neuro exam     CV:   · Cardiac infusions: Neosynephrine, 20-40 mcg/min  ? Wean to off as able  ? MAP goal > 65  § DIscontinue arterial line  · Rhythm: NSR  § Follow rhythm on telemetry  § Holding BB with hypotension  ? Epicardial pacing wires removed 5/22       Lung:   · Acute post-op pulmonary insufficiency; Requiring 2 Liters via nasal cannula, secondary to poor inspiratory effort secondary to pain  Continue incentive spirometry/coughing/deep breathing exercises  Wean supplemental oxygen as tolerated for saturation > 90%  · Chest tube output low   Remove mediastinal CTs and place LEFT pleural CT to water seal  CXR in AM        GI:   · Continue PPI for stress ulcer prophylaxis  · Continue bowel regimen  · Trend abdominal exam and bowel function     FEN:   · Diuretic plan: Lasix 40 mg IV q BID  § K-dur 20 mEQ PO q BID  · Nutrition/diet plan: PO diet  · Replenish electrolytes with goals: K >4 0, Mag >2 0, and Phos >3 0     :   · Indwelling [place] present: no   · Trend UOP and BUN/creat  · Strict I and O     ID:   · Trend temps and WBC count  · Maintain normothermia     Heme:   · Trend hgb and plts     Endo:   · Glycemic control plan: Glucose well-controlled   Insulin sliding scale coverage           Results from last 6 Months   Lab Units 05/13/21  1150   HEMOGLOBIN A1C % 5 8*      MSK/Skin:  · Mobility goal: OOB and ambulating as able  § PT consult: yes  § OT consult: yes  · Frequent turning and pressure off-loading  · Local wound care as needed     Disposition:  SD level 1  ---------------------------------------------------------------------------------------------------------------------------------------------------------------------  Allergies: No Known Allergies  Medications:   Scheduled Meds:  Current Facility-Administered Medications   Medication Dose Route Frequency Provider Last Rate    acetaminophen  975 mg Oral Q8H Gaile Faes, CRNP      albuterol  2 puff Inhalation Q4H PRN Gaile Faes, CRNP      amiodarone  200 mg Oral [facility] Merlin Husky Mol, CRNP      aspirin  325 mg Oral Daily Merlin Husky Mol, [address]      atorvastatin  40 mg Oral QPM Gaile Faes, CRNP      bisacodyl  10 mg Rectal Daily PRN Gaile Faes, CRNP      Diclofenac Sodium  2 g Topical TID PRN Gaile Faes, CRNP      docusate sodium  100 mg Oral BID Gaile Faes, CRNP      fluticasone-vilanterol  1 puff Inhalation Daily Merlin Husky Mol, CRNP      fondaparinux  2 5 mg Subcutaneous Q24H BITA Dowd      furosemide  40 mg Intravenous BID (diuretic) Irina Dupont PA-C      insulin lispro  1-6 Units Subcutaneous TID AC Merlin Husky Mol, CRNP      insulin lispro  1-6 Units Subcutaneous HS BITA Dowd      mupirocin  1 application Nasal [address] Anastacia Clamp, CRNP      ondansetron  4 mg Intravenous Q6H PRN Anastacia Clamp, CRNP      oxyCODONE  2 5 mg Oral Q4H PRN Anastacia Clamp, CRNP      oxyCODONE  5 mg Oral Q4H PRN Anastacia Clamp, CRNP      pantoprazole  40 mg Oral Daily Aissatou Xie Mol, [address]      phenylephine   mcg/min Intravenous Titrated Seble Wilhelm PA-C 20 mcg/min (05/23/21 0600)    polyethylene glycol  17 g Oral Daily Anastacia Clamp, CRNP      potassium chloride  20 mEq Oral BID Irina Dupont PA-C      temazepam  15 mg Oral HS PRN Anastacia Clamp, CRNP       VTE Pharmacologic Prophylaxis: Fondaparinux (Arixtra)  VTE Mechanical Prophylaxis: sequential compression device    Continuous Infusions:phenylephine,  mcg/min, Last Rate: 20 mcg/min (05/23/21 0600)      PRN Meds:  albuterol, 2 puff, Q4H PRN  bisacodyl, 10 mg, Daily PRN  Diclofenac Sodium, 2 g, TID PRN  ondansetron, 4 mg, Q6H PRN  oxyCODONE, 2 5 mg, Q4H PRN  oxyCODONE, 5 mg, Q4H PRN  temazepam, 15 mg, HS PRN      Home Medications:   Prior to Admission medications    Medication Sig Start Date End Date Taking?  Authorizing Provider   amLODIPine (NORVASC) 10 mg tablet TAKE 1 TABLET (10 MG TOTAL) BY MOUTH DAILY 7/9/20  Yes Florinda Luciano DO   Anoro Ellipta 62 5-25 MCG/INH inhaler INHALE 1 PUFF BY MOUTH EVERY DAY 1/19/21  Yes Florinda Luciano DO   aspirin (ECOTRIN LOW STRENGTH) 81 mg EC tablet Take 1 tablet (81 mg total) by mouth daily 3/26/18  Yes Florinda Luciano DO   atorvastatin (LIPITOR) 20 mg tablet TAKE 1 TABLET BY MOUTH EVERY DAY 3/25/21  Yes Florinda Luciano DO   losartan (COZAAR) 100 MG tablet TAKE 1 TABLET BY MOUTH EVERY DAY 10/20/20  Yes Florinda Luciano DO   Multiple Vitamin (MULTI-VITAMIN DAILY PO) Take 1 tablet by mouth daily 7/1/14  Yes Historical Provider, MD   pantoprazole (PROTONIX) 40 mg tablet TAKE 1 TABLET BY MOUTH EVERY DAY 2/16/21  Yes Florinda Luciano,    albuterol (PROVENTIL HFA,VENTOLIN HFA) 90 mcg/act inhaler INHALE 1-2 PUFFS EVERY 4 (FOUR) HOURS AS NEEDED FOR WHEEZING 3/8/21   Tommy Sosa DO   Aspirin-Acetaminophen-Caffeine (EXCEDRIN MIGRAINE PO) Take by mouth    Historical Provider, MD   dextromethorphan-guaifenesin [facility] DM)  MG per 12 hr tablet Take 1 tablet by mouth every 12 (twelve) hours 21   Aliya Strauss MD   predniSONE 20 mg tablet Please take 3 pills on day 1, 2, 3,  Then 2 pills on day 4, 5 and 6, 1 pill on day 7  Patient not taking: Reported on 2021 5/10/21   Aliya Strauss MD     ---------------------------------------------------------------------------------------------------------------------------------------------------------------------  Vitals:   Vitals:    21 0400 21 0430 21 0500 21 0600   BP: (!) 86/59 97/59 113/65 118/84   BP Location: Left arm      Pulse: 68 68 68 68   Resp: 16 15 15 17   Temp: 98 6 °F (37 °C)      TempSrc: Oral      SpO2: 96% 96% 97% 93%   Weight:    107 kg (236 lb 12 4 oz)   Height:         Arterial Line:  Arterial Line BP: 126/58  Arterial Line MAP (mmHg): 80 mmHg    Tele Rhythm: NSR  This was personally reviewed by myself  Respiratory:  SpO2: SpO2: 93 %, SpO2 Activity: SpO2 Activity: At Rest, SpO2 Device: O2 Device: Nasal cannula  Nasal Cannula O2 Flow Rate (L/min): 2 L/min    Temperature: Temp (24hrs), Av 4 °F (36 9 °C), Min:98 2 °F (36 8 °C), Max:98 6 °F (37 °C)  Current: Temperature: 98 6 °F (37 °C)    Weights:   Weight (last 2 days)     Date/Time   Weight    21 0600   107 (236 77)    21 0600   109 (240 3)    21 0551   108 (237 88)            Body mass index is 30 4 kg/m²  Intake and Outputs:    Intake/Output Summary (Last 24 hours) at 2021 0620  Last data filed at 2021 0600  Gross per 24 hour   Intake 1407 ml   Output 3540 ml   Net -2133 ml     I/O last 24 hours: In:  9 [P O :1680; I V :334 9]  Out: 8331 [Urine:3850;  Chest Tube:235]    UOP: 140/hour   BM: 0 in the last 24 hours    Chest tube Output:  Mediastinal tubes: 10 mL/8 hours  140 mL/24 hours   LEFT pleural tube: 0 mL/8 hours  0 mL/24 hours     Labs:  Results from last 7 days   Lab Units 05/23/21 0429 05/22/21 0513 05/21/21  0358  05/19/21  0439 05/17/21  0602   WBC Thousand/uL 11 98* 14 17* 16 11*  --  7 08 8 07   HEMOGLOBIN g/dL 10 9* 11 4* 12 0   < > 13 7 13 7   I STAT HEMOGLOBIN   --   --   --    < >  --   --    HEMATOCRIT % 33 8* 35 4* 37 9   < > 42 5 42 1   HEMATOCRIT, ISTAT   --   --   --    < >  --   --    PLATELETS Thousands/uL 126* 127* 147*   < > 220 235   NEUTROS PCT %  --   --   --   --  51 56   MONOS PCT %  --   --   --   --  13* 12    < > = values in this interval not displayed  Results from last 7 days   Lab Units 05/23/21 0429 05/22/21  0513 05/21/21  0358  05/20/21  1307  05/20/21  1159 05/20/21  1118   SODIUM mmol/L 137 137 141  --   --    < >  --   --    POTASSIUM mmol/L 4 2 4 1 4 8   < >  --    < >  --   --    CHLORIDE mmol/L 102 105 110*  --   --    < >  --   --    CO2 mmol/L 31 28 26  --   --    < >  --   --    CO2, I-STAT mmol/L  --   --   --   --  26  --  26 30   BUN mg/dL 16 18 14  --   --    < >  --   --    CREATININE mg/dL 0 60 0 62 0 66  --   --    < >  --   --    CALCIUM mg/dL 8 8 8 5 8 1*  --   --    < >  --   --    GLUCOSE, ISTAT mg/dl  --   --   --   --  158*  --  137 145*    < > = values in this interval not displayed  Baseline Creat: 0 6    Results from last 7 days   Lab Units 05/23/21 0429 05/22/21 0513 05/21/21  0358   MAGNESIUM mg/dL 1 9 2 3 2 6          Results from last 7 days   Lab Units 05/17/21  0602   INR  1 02     Micro:   Blood Culture: No results found for: BLOODCX  Urine Culture: No results found for: URINECX  Sputum Culture: No components found for: SPUTUMCX  Wound Culure: No results found for: WOUNDCULT    Diagnositic Studies:  05/23/21 CXR: No pTX  LEFT pigtail catheter in place  TLC in place  Lungs clear  This was personally reviewed by myself in PACS      Nutrition:        Diet Orders   (From admission, onward)             Start     Ordered    05/21/21 1538  Diet Cardiovascular; Cardiac; Fluid Restriction 1800 ML  Diet effective now     Question Answer Comment   Diet Type Cardiovascular    Cardiac Cardiac    Other Restriction(s): Fluid Restriction 1800 ML    RD to adjust diet per protocol? Yes        05/21/21 1539              Physical Exam  Vitals signs reviewed  Constitutional:       General: He is awake  He is not in acute distress  Appearance: He is well-developed  Interventions: Nasal cannula in place  HENT:      Head: Normocephalic and atraumatic  Eyes:      Conjunctiva/sclera: Conjunctivae normal    Neck:      Musculoskeletal: Neck supple  Comments: RIJ CVC  Cardiovascular:      Rate and Rhythm: Normal rate and regular rhythm  Heart sounds: Heart sounds are distant  No murmur  Pulmonary:      Effort: Pulmonary effort is normal  No respiratory distress  Breath sounds: Transmitted upper airway sounds present  No wheezing, rhonchi or rales  Abdominal:      Palpations: Abdomen is soft  Tenderness: There is no abdominal tenderness  Skin:     General: Skin is warm and dry  Neurological:      General: No focal deficit present  Mental Status: He is alert and oriented to person, place, and time  Mental status is at baseline  Psychiatric:         Mood and Affect: Mood and affect normal          Speech: Speech normal          Behavior: Behavior normal        Invasive lines and devices:   Invasive Devices     Central Venous Catheter Line            CVC Central Lines 05/20/21 Triple 2 days          Peripheral Intravenous Line            Peripheral IV 05/20/21 Right Wrist 2 days          Arterial Line            Arterial Line 05/20/21 Left Radial 2 days          Drain            Chest Tube 1 Anterior Mediastinal 32 Fr  2 days    Chest Tube 1 Posterior Mediastinal 32 Fr  2 days    Chest Tube 1 Left Pleural 10 Fr  1 day ---------------------------------------------------------------------------------------------------------------------------------------------------------------------  Code Status: Level 1 - Full Code    Care Time Delivered:   No Critical Care time spent     Collaborative bedside rounds performed with cardiac surgery attending, critical care attending and bedside RN      Laurence Oneal PA-C  DATE: May 23, 2021  TIME: 6:20 AM

## 2021-05-23 NOTE — RESPIRATORY THERAPY NOTE
care      05/23/21 0818   Respiratory Assessment   Assessment Type Assess only   General Appearance Alert; Awake   Respiratory Pattern Normal   Chest Assessment Chest expansion symmetrical   Bilateral Breath Sounds Diminished;Clear   Cough None   Resp Comments patient sitting out in chair and using IS well, will d/c ACP at this time and patient on Albuterol MDI, will d/ c resp protocol

## 2021-05-23 NOTE — PROGRESS NOTES
Progress Note - Cardiology   Veda Nieves 78 y o  male MRN: [id number]  Unit/Bed#: [facility] 827-61 Encounter: [id number]  05/23/21  2:22 PM    Impression and Plan:     56-year-old who presented with anginal symptoms and congestive heart failure to [facility], with severe/critical aortic stenosis with severe MR, transferred here for cardiac surgical evaluation      Underwent bioprosthetic aortic valve replacement with 25 mm Bauer Inspiris Resilia bioprosthetic valve, Mitral valve repair with 32 mm Bauer Physio II ring annuloplasty and Closure of patent foramen ovale on 5/20/2021    For left pneumothorax-underwent left chest tube placement       Plan:     Severe symptomatic AS with moderate to severe MR:  Status post bioprosthetic AVR and mitral valve annuloplasty  nonobstructive CAD on coronary angiography  Doing relatively well postoperatively, ambulant without any symptoms  Continue Lasix for postoperative volume overload  Rhythm is normal sinus rhythm on amiodarone     Mild cardiomyopathy -ejection fraction around 45%:  Likely secondary to valvular heart disease, should improve postoperatively      hypertension:  Was borderline, was on Sumit-Synephrine, being weaned off     Dyslipidemia:  Controlled         ===================================================================    Chief Complaint: No chief complaint on file          Subjective/Objective     Subjective:  No complaints    Objective:  No distress at the time of exam    Patient Active Problem List   Diagnosis    Aortic valve stenosis - severe    Chronic obstructive lung disease ([facility] [address] )    Erectile dysfunction of non-organic origin    Hyperlipidemia    Essential hypertension    Rotator cuff tendinitis Left    [facility]STEMI [facility]non-ST elevated [facility] infarction) [facility])    GERD (gastroesophageal reflux disease)    Acute combined systolic and diastolic CHF, NYHA class 1 ([facility])    Arthritis    S/P AVR    PFO (patent foramen ovale)    S/P MVR (mitral valve repair)       Vitals: /73   Pulse 62   Temp 98 6 °F (37 °C) (Oral)   Resp 19   Ht 6' 2" (1 88 m)   Wt 107 kg (236 lb 12 4 oz)   SpO2 94%   BMI 30 40 kg/m²     I/O this shift:  In: 17 5 [I V :17 5]  Out: 2125 [Urine:2125]  Wt Readings from Last 3 Encounters:   05/23/21 107 kg (236 lb 12 4 oz)   05/11/21 112 kg (246 lb 14 6 oz)   05/11/21 113 kg (250 lb)       Intake/Output Summary (Last 24 hours) at 5/23/2021 1422  Last data filed at 5/23/2021 1100  Gross per 24 hour   Intake 647 5 ml   Output 5095 ml   Net -4447 5 ml     I/O last 3 completed shifts: In: 2014 9 [P O :1680; I V :334 9]  Out: 9616 [Urine:3850;  Chest Tube:235]    Invasive Devices     Central Venous Catheter Line            CVC Central Lines 05/20/21 Triple 3 days          Peripheral Intravenous Line            Peripheral IV 05/20/21 Right Wrist 3 days          Drain            Chest Tube 1 Left Pleural 10 Fr  1 day                  Physical Exam:  GEN: Nory Goldman appears well, alert and oriented x 3, pleasant and cooperative   HEENT: pupils equal, round, and reactive to light; extraocular muscles intact  NECK: supple, no carotid bruits or JVD  HEART: regular rhythm, normal S1 and S2, short ejection systolic murmur, no clicks, gallops or rubs   LUNGS: clear to auscultation bilaterally; no wheezes or rhonchi, no rales  ABDOMEN/GI: normal bowel sounds, soft, no tenderness, no distention  EXTREMITIES/Musculoskeltal: peripheral pulses normal; no clubbing, cyanosis, mild bilateral lower extremity edema  NEURO: no focal motor findings   SKIN: normal without suspicious lesions on exposed skin              Lab Results:       Results from last 7 days   Lab Units 05/23/21  0429 05/22/21  0513 05/21/21  0358   WBC Thousand/uL 11 98* 14 17* 16 11*   HEMOGLOBIN g/dL 10 9* 11 4* 12 0   HEMATOCRIT % 33 8* 35 4* 37 9   PLATELETS Thousands/uL 126* 127* 147*         Results from last 7 days   Lab Units 05/23/21  0429 05/22/21  8085 05/21/21  0358  05/20/21  1307  05/20/21  1159 05/20/21  1118   POTASSIUM mmol/L 4 2 4 1 4 8   < >  --    < >  --   --    CHLORIDE mmol/L 102 105 110*  --   --    < >  --   --    CO2 mmol/L 31 28 26  --   --    < >  --   --    CO2, I-STAT mmol/L  --   --   --   --  26  --  26 30   BUN mg/dL 16 18 14  --   --    < >  --   --    CREATININE mg/dL 0 60 0 62 0 66  --   --    < >  --   --    CALCIUM mg/dL 8 8 8 5 8 1*  --   --    < >  --   --    GLUCOSE, ISTAT mg/dl  --   --   --   --  158*  --  137 145*    < > = values in this interval not displayed  Results from last 7 days   Lab Units 05/17/21  0602   INR  1 02       Imaging: I have personally reviewed pertinent reports      EKG/Telemtry:  No events    Scheduled Meds:  Current Facility-Administered Medications   Medication Dose Route Frequency Provider Last Rate    acetaminophen  975 mg Oral Q8H Lc Oilmakenzie, BITA      albuterol  2 puff Inhalation Q4H PRN Lc Wagner, BITA      amiodarone  200 mg Oral [facility] BITA Espinoza      aspirin  325 mg Oral Daily Fatou Washington, [facility]      atorvastatin  40 mg Oral QPM Lc Oiler, BITA      bisacodyl  10 mg Rectal Daily PRN BITA Souza      Diclofenac Sodium  2 g Topical TID PRN Lc Wagner, BITA      docusate sodium  100 mg Oral BID Lc Oilmakenzie, BITA      fluticasone-vilanterol  1 puff Inhalation Daily BITA Espinoza      fondaparinux  2 5 mg Subcutaneous Q24H BITA Espinoza      [START ON 5/24/2021] furosemide  40 mg Intravenous Daily Irina Dupont PA-C      insulin lispro  1-6 Units Subcutaneous TID AC BITA Souza      insulin lispro  1-6 Units Subcutaneous HS BITA Souza      mupirocin  1 application Nasal [id number] [address] [facility], CRNP      ondansetron  4 mg Intravenous Q6H PRN Lc Oiler, CRNP      oxyCODONE  2 5 mg Oral Q4H PRN Lc Oiler, CRNP      oxyCODONE  5 mg Oral Q4H PRN [place] Rist, CRNP      pantoprazole  40 mg Oral Daily Peña Blunt Mol, [address]      phenylephine   mcg/min Intravenous Titrated Seble Wilhelm PA-C 20 mcg/min (05/23/21 0915)    polyethylene glycol  17 g Oral Daily Peña Blunt Mol, CRNP      [START ON 5/24/2021] potassium chloride  20 mEq Oral Daily Elana Patel      temazepam  15 mg Oral HS PRN [place] Rist, CRNP       Continuous Infusions:  phenylephine,  mcg/min, Last Rate: 20 mcg/min (05/23/21 0915)        VTE Pharmacologic Prophylaxis: Fondaparinux (Arixtra)  VTE Mechanical Prophylaxis: sequential compression device    This note was completed in part utilizing [employer] fluency direct voice recognition software  Grammatical errors, random word insertion, spelling mistakes, occasional wrong word or "sound-alike" substitutions and incomplete sentences may be an occasional consequence of the system secondary to software limitations, ambient noise and hardware issues  At the time of dictation, efforts were made to edit, clarify and /or correct errors  Please read the chart carefully and recognize, using context, where substitutions have occurred  If you have any questions or concerns about the context, text or information contained within the body of this dictation, please contact myself, the provider, for further clarification

## 2021-05-23 NOTE — PROGRESS NOTES
Progress Note - Cardiothoracic Surgery   Shaquille Maurer 78 y o  male MRN: [id number]  Unit/Bed#: [facility] 414-01 Encounter: [id number]      POD # 3 s/p Tissue AVR, MV repair, PFO closure    Pt seen/examined  Interval history and data reviewed with critical care team   Pt doing well  No specific complaints      Low dose luis    Medications:   Scheduled Meds:  Current Facility-Administered Medications   Medication Dose Route Frequency Provider Last Rate    acetaminophen  975 mg Oral Q8H Jey Fam, CRNP      albuterol  2 puff Inhalation Q4H PRN Jey Fam, CRNP      amiodarone  200 mg Oral [facility] Ziund Lexis Mol, CRNP      aspirin  325 mg Oral Daily Zigmund Lexis Mol, [address]      atorvastatin  40 mg Oral QPM Jey Fam, CRNP      bisacodyl  10 mg Rectal Daily PRN Jey Fam, CRNP      Diclofenac Sodium  2 g Topical TID PRN Jey Fam, CRNP      docusate sodium  100 mg Oral BID Jey Fam, CRNP      fluticasone-vilanterol  1 puff Inhalation Daily Zigmund Lexis Mol, CRNP      fondaparinux  2 5 mg Subcutaneous Q24H Zigmund Lexis Mol, CRNP      furosemide  40 mg Intravenous BID (diuretic) Celeste Stanton PA-C      insulin lispro  1-6 Units Subcutaneous TID AC Zigmund Lexis Mol, CRNP      insulin lispro  1-6 Units Subcutaneous HS Jey Fam, CRNP      mupirocin  1 application Nasal [id number] [address] [facility], CRNP      ondansetron  4 mg Intravenous Q6H PRN Jey Fam, CRNP      oxyCODONE  2 5 mg Oral Q4H PRN Jey Fam, CRNP      oxyCODONE  5 mg Oral Q4H PRN Jey Fam, CRNP      pantoprazole  40 mg Oral Daily Ziund Lexis Mol, CRNP      phenylephine   mcg/min Intravenous Titrated Sbele Wilhelm PA-C Stopped (05/23/21 0901)    polyethylene glycol  17 g Oral Daily Jey Fam, CRNP      potassium chloride  20 mEq Oral BID Irina Dupont PA-C      temazepam  15 mg Oral HS PRN Jey Fam, CRNP Continuous Infusions:phenylephine,  mcg/min, Last Rate: Stopped (05/23/21 0901)      PRN Meds: albuterol    bisacodyl    Diclofenac Sodium    ondansetron    oxyCODONE    oxyCODONE    temazepam    Vitals: Blood pressure 120/73, pulse 62, temperature 98 6 °F (37 °C), temperature source Oral, resp  rate 19, height 6' 2" (1 88 m), weight 107 kg (236 lb 12 4 oz), SpO2 94 %  ,Body mass index is 30 4 kg/m²  I/O last 24 hours: In: 1424 5 [P O :1200; I V :224 5]  Out: 3992 [Urine:4550; Chest Tube:140]  Invasive Devices     Central Venous Catheter Line            CVC Central Lines 05/20/21 Triple 3 days          Peripheral Intravenous Line            Peripheral IV 05/20/21 Right Wrist 3 days          Arterial Line            Arterial Line 05/20/21 Left Radial 3 days          Drain            Chest Tube 1 Posterior Mediastinal 32 Fr  3 days    Chest Tube 1 Anterior Mediastinal 32 Fr  2 days    Chest Tube 1 Left Pleural 10 Fr  1 day                  Lab, Imaging and other studies:   Results from last 7 days   Lab Units 05/23/21 0429 05/22/21  0513 05/21/21  0358   WBC Thousand/uL 11 98* 14 17* 16 11*   HEMOGLOBIN g/dL 10 9* 11 4* 12 0   HEMATOCRIT % 33 8* 35 4* 37 9   PLATELETS Thousands/uL 126* 127* 147*     Results from last 7 days   Lab Units 05/23/21  0429 05/22/21  0513 05/21/21  0358  05/20/21  1307   POTASSIUM mmol/L 4 2 4 1 4 8   < >  --    CHLORIDE mmol/L 102 105 110*  --   --    CO2 mmol/L 31 28 26  --   --    CO2, I-STAT mmol/L  --   --   --   --  26   BUN mg/dL 16 18 14  --   --    CREATININE mg/dL 0 60 0 62 0 66  --   --    GLUCOSE, ISTAT mg/dl  --   --   --   --  158*   CALCIUM mg/dL 8 8 8 5 8 1*  --   --     < > = values in this interval not displayed  Results from last 7 days   Lab Units 05/17/21  0602   INR  1 02     No results for input(s): PHART, CBQ3IGM, PO2ART, RAI1VFE, X9VMGXAK, BEART in the last 72 hours          Plan:      Wean luis off  Remove mediastinal drains, keep pigtail and water seal, check CXR  Ambulate  Incentive spirometry  Diuresis  PO ASA/Statin  Hold B blocker    Transfer stepdown      SIGNATURE: Preston Brothers DO  DATE: May 23, 2021  TIME: 10:21 AM

## 2021-05-24 ENCOUNTER — APPOINTMENT (INPATIENT)
Dept: RADIOLOGY | Facility: HOSPITAL | Age: 66
DRG: 216 | End: 2021-05-24
Payer: COMMERCIAL

## 2021-05-24 LAB
ATRIAL RATE: 156 BPM
GLUCOSE SERPL-MCNC: 121 MG/DL (ref 65–140)
GLUCOSE SERPL-MCNC: 124 MG/DL (ref 65–140)
GLUCOSE SERPL-MCNC: 130 MG/DL (ref 65–140)
GLUCOSE SERPL-MCNC: 130 MG/DL (ref 65–140)
INR PPP: 1.07 (ref 0.84–1.19)
PROTHROMBIN TIME: 13.9 SECONDS (ref 11.6–14.5)
QRS AXIS: -7 DEGREES
QRSD INTERVAL: 108 MS
QT INTERVAL: 336 MS
QTC INTERVAL: 511 MS
T WAVE AXIS: 141 DEGREES
VENTRICULAR RATE: 139 BPM

## 2021-05-24 PROCEDURE — 85610 PROTHROMBIN TIME: CPT | Performed by: PHYSICIAN ASSISTANT

## 2021-05-24 PROCEDURE — 93010 ELECTROCARDIOGRAM REPORT: CPT | Performed by: INTERNAL MEDICINE

## 2021-05-24 PROCEDURE — 99024 POSTOP FOLLOW-UP VISIT: CPT | Performed by: THORACIC SURGERY (CARDIOTHORACIC VASCULAR SURGERY)

## 2021-05-24 PROCEDURE — 82948 REAGENT STRIP/BLOOD GLUCOSE: CPT

## 2021-05-24 PROCEDURE — 99232 SBSQ HOSP IP/OBS MODERATE 35: CPT | Performed by: INTERNAL MEDICINE

## 2021-05-24 PROCEDURE — 93005 ELECTROCARDIOGRAM TRACING: CPT

## 2021-05-24 PROCEDURE — 71046 X-RAY EXAM CHEST 2 VIEWS: CPT

## 2021-05-24 RX ORDER — WARFARIN SODIUM 2.5 MG/1
2.5 TABLET ORAL
Status: COMPLETED | OUTPATIENT
Start: 2021-05-24 | End: 2021-05-24

## 2021-05-24 RX ORDER — BISACODYL 10 MG
10 SUPPOSITORY, RECTAL RECTAL ONCE
Status: COMPLETED | OUTPATIENT
Start: 2021-05-24 | End: 2021-05-24

## 2021-05-24 RX ADMIN — BISACODYL 10 MG: 10 SUPPOSITORY RECTAL at 08:32

## 2021-05-24 RX ADMIN — ATORVASTATIN CALCIUM 40 MG: 40 TABLET, FILM COATED ORAL at 17:15

## 2021-05-24 RX ADMIN — AMIODARONE HYDROCHLORIDE 0.5 MG/MIN: 50 INJECTION, SOLUTION INTRAVENOUS at 17:15

## 2021-05-24 RX ADMIN — OXYCODONE HYDROCHLORIDE 5 MG: 5 TABLET ORAL at 13:17

## 2021-05-24 RX ADMIN — FONDAPARINUX SODIUM 2.5 MG: 2.5 INJECTION, SOLUTION SUBCUTANEOUS at 05:30

## 2021-05-24 RX ADMIN — FUROSEMIDE 40 MG: 10 INJECTION, SOLUTION INTRAMUSCULAR; INTRAVENOUS at 08:23

## 2021-05-24 RX ADMIN — AMIODARONE HYDROCHLORIDE 1 MG/MIN: 50 INJECTION, SOLUTION INTRAVENOUS at 09:49

## 2021-05-24 RX ADMIN — MUPIROCIN 1 APPLICATION: 20 OINTMENT TOPICAL at 22:25

## 2021-05-24 RX ADMIN — ACETAMINOPHEN 975 MG: 325 TABLET, FILM COATED ORAL at 11:59

## 2021-05-24 RX ADMIN — Medication 12.5 MG: at 11:58

## 2021-05-24 RX ADMIN — OXYCODONE HYDROCHLORIDE 5 MG: 5 TABLET ORAL at 08:22

## 2021-05-24 RX ADMIN — AMIODARONE HYDROCHLORIDE 200 MG: 200 TABLET ORAL at 22:25

## 2021-05-24 RX ADMIN — PANTOPRAZOLE SODIUM 40 MG: 40 TABLET, DELAYED RELEASE ORAL at 08:22

## 2021-05-24 RX ADMIN — Medication 12.5 MG: at 08:22

## 2021-05-24 RX ADMIN — POLYETHYLENE GLYCOL 3350 17 G: 17 POWDER, FOR SOLUTION ORAL at 08:23

## 2021-05-24 RX ADMIN — DOCUSATE SODIUM 100 MG: 100 CAPSULE ORAL at 17:15

## 2021-05-24 RX ADMIN — FLUTICASONE FUROATE AND VILANTEROL TRIFENATATE 1 PUFF: 100; 25 POWDER RESPIRATORY (INHALATION) at 08:23

## 2021-05-24 RX ADMIN — DOCUSATE SODIUM 100 MG: 100 CAPSULE ORAL at 08:22

## 2021-05-24 RX ADMIN — ASPIRIN 325 MG ORAL TABLET 325 MG: 325 PILL ORAL at 08:22

## 2021-05-24 RX ADMIN — DEXTROSE 150 MG: 50 INJECTION, SOLUTION INTRAVENOUS at 09:42

## 2021-05-24 RX ADMIN — ACETAMINOPHEN 975 MG: 325 TABLET, FILM COATED ORAL at 20:08

## 2021-05-24 RX ADMIN — ACETAMINOPHEN 975 MG: 325 TABLET, FILM COATED ORAL at 05:23

## 2021-05-24 RX ADMIN — MUPIROCIN 1 APPLICATION: 20 OINTMENT TOPICAL at 08:22

## 2021-05-24 RX ADMIN — AMIODARONE HYDROCHLORIDE 200 MG: 200 TABLET ORAL at 05:23

## 2021-05-24 RX ADMIN — AMIODARONE HYDROCHLORIDE 200 MG: 200 TABLET ORAL at 13:17

## 2021-05-24 RX ADMIN — POTASSIUM CHLORIDE 20 MEQ: 1500 TABLET, EXTENDED RELEASE ORAL at 08:22

## 2021-05-24 RX ADMIN — WARFARIN SODIUM 2.5 MG: 2.5 TABLET ORAL at 17:15

## 2021-05-24 RX ADMIN — Medication 12.5 MG: at 23:35

## 2021-05-24 NOTE — PROGRESS NOTES
Progress Note - Cardiothoracic Surgery   Tammy Dennis 78 y o  male MRN: [id number]  Unit/Bed#: [facility] 420-01 Encounter: [id number]     Aortic stenosis, Non-Rheumatic, Mitral regurgitation  S/P AVR (#25mm Bauer Inspiris), MV repair (#32mm Bauer Physio II Ring) w/ PFO closure  ; POD # 4      24 Hour Events: Transferred from ICU to telemetry  Remains on oxygen at 2L       Medications:   Scheduled Meds:  Current Facility-Administered Medications   Medication Dose Route Frequency Provider Last Rate    acetaminophen  975 mg Oral Q8H Chaudhari Dues, PA-C      albuterol  2 puff Inhalation Q4H PRN Chaudhari Dues, PA-C      amiodarone  200 mg Oral [facility] Chaudhari Dues, PA-C      aspirin  325 mg Oral Daily Chaudhari Dues, PA-C      atorvastatin  40 mg Oral QPM Chaudhari Dues, PA-C      bisacodyl  10 mg Rectal Daily PRN Chaudhari Dues, PA-C      Diclofenac Sodium  2 g Topical TID PRN Chaudhari Dues, PA-C      docusate sodium  100 mg Oral BID Chaudhari Dues, PA-C      fluticasone-vilanterol  1 puff Inhalation Daily Chaudhari Dues, PA-C      fondaparinux  2 5 mg Subcutaneous Q24H Chaudhari Dues, PA-C      furosemide  40 mg Intravenous Daily Chaudhari Dues, PA-C      insulin lispro  1-6 Units Subcutaneous TID AC Chaudhari Dues, PA-C      insulin lispro  1-6 Units Subcutaneous HS Chaudhari Dues, PA-C      mupirocin  1 application Nasal [id number] [place], CRNP      ondansetron  4 mg Intravenous Q6H PRN Chaudhari Dues, PA-C      oxyCODONE  2 5 mg Oral Q4H PRN Chaudhari Dues, PA-C      oxyCODONE  5 mg Oral Q4H PRN Chaudhari Dues, PA-C      pantoprazole  40 mg Oral Daily Chaudhari Dues, PA-C      phenylephine   mcg/min Intravenous Titrated Chaudhari Dues, PA-C 20 mcg/min (05/23/21 0915)    polyethylene glycol  17 g Oral Daily Chaudhari Dues, PA-C      potassium chloride  20 mEq Oral Daily Chaudhari Dues, PA-C      temazepam  15 mg Oral HS PRN Chaudhari Dues, PA-C       Continuous Infusions:phenylephine,  mcg/min, Last Rate: 20 mcg/min (05/23/21 0915)      PRN Meds: albuterol    bisacodyl    Diclofenac Sodium    ondansetron    oxyCODONE    oxyCODONE    temazepam    Vitals:   Vitals:    05/23/21 2237 05/24/21 0227 05/24/21 0600 05/24/21 0712   BP: 107/64 112/68  112/68   BP Location: Right arm Left arm     Pulse: 70 75  73   Resp: 14 18  18   Temp: 99 2 °F (37 3 °C) 99 2 °F (37 3 °C)  98 9 °F (37 2 °C)   TempSrc: Oral Oral     SpO2: 95% 95%  94%   Weight:   106 kg (234 lb 3 2 oz)    Height:           Telemetry: NSR; Heart Rate: 72    Respiratory:   SpO2: SpO2: 94 %, SpO2 Activity: SpO2 Activity: At Rest; 2 LPM    Intake/Output:   I/O       05/22 0701 - 05/23 0700 05/23 0701 - 05/24 0700 05/24 0701 - 05/25 0700    P  O  1200 360     I V  (mL/kg) 207 (1 9) 17 5 (0 2)     IV Piggyback       Total Intake(mL/kg) 1407 (13 1) 377 5 (3 6)     Urine (mL/kg/hr) 3400 (1 3) 2750 (1 1)     Chest Tube 140 0     Total Output 3540 2750     Net -2133 -2372 5                  Chest tube Output:         Pleural tubes: 0 mL/8 hours  0 mL/24 hours     No air leak    Weights:   Weight (last 2 days)     Date/Time   Weight    05/24/21 0600   106 (234 2)    05/23/21 0600   107 (236 77)    05/22/21 0600   109 (240 3)                Results:   Results from last 7 days   Lab Units 05/23/21  0429 05/22/21  0513 05/21/21  0358   WBC Thousand/uL 11 98* 14 17* 16 11*   HEMOGLOBIN g/dL 10 9* 11 4* 12 0   HEMATOCRIT % 33 8* 35 4* 37 9   PLATELETS Thousands/uL 126* 127* 147*     Results from last 7 days   Lab Units 05/23/21  0429 05/22/21  0513 05/21/21  0358  05/20/21  1307   SODIUM mmol/L 137 137 141  --   --    POTASSIUM mmol/L 4 2 4 1 4 8   < >  --    CHLORIDE mmol/L 102 105 110*  --   --    CO2 mmol/L 31 28 26  --   --    CO2, I-STAT mmol/L  --   --   --   --  26   BUN mg/dL 16 18 14  --   --    CREATININE mg/dL 0 60 0 62 0 66  --   --    GLUCOSE, ISTAT mg/dl  --   --   --   --  158*   CALCIUM mg/dL 8 8 8 5 8 1* --   --     < > = values in this interval not displayed  Point of care glucose: 130-145    Studies:   CXR: 5/23 at 1400  No pneumothorax  Left pleural pigtail in good position  I have personally reviewed pertinent films in PACS    Invasive Lines/Tubes:  Invasive Devices     Central Venous Catheter Line            CVC Central Lines 05/20/21 Triple 3 days          Drain            Chest Tube 1 Left Pleural 10 Fr  2 days                Physical Exam:    HEENT/NECK:  Normocephalic  Atraumatic  No jugular venous distention  Cardiac: Regular rate and rhythm  Pulmonary:  Breath sounds clear bilaterally  Abdomen:  Non-tender and Non-distended  Incisions: Sternum is stable  Incision is clean, dry, and intact  Extremities: Extremities warm/dry  Neuro: Alert and oriented X 3  Skin: Warm/Dry, without rashes or lesions  Assessment:  Principal Problem: Aortic valve stenosis - severe  Active Problems:    Chronic obstructive lung disease ([facility])    Hyperlipidemia    Essential hypertension    NSTEMI (non-ST elevated myocardial infarction) ([facility])    GERD (gastroesophageal reflux disease)    Acute combined systolic and diastolic CHF, NYHA class 1 ([facility])    S/P AVR    PFO (patent foramen ovale)    S/P MVR (mitral valve repair)       Aortic stenosis, Non-Rheumatic, Mitral regurgitation  S/P AVR (#25mm Bauer Inspiris), MV repair (#32mm Bauer Physio II Ring) w/ PFO closure  ; POD # 4    Plan:    1  Cardiac:   NSR; HR/BP well-controlled   Sumit weaned off  Add Lopressor, 12 5mg PO BID  Continue ASA and Statin therapy  Epicardial pacing wires out  Central IV access no longer required; Remove central venous catheter today  Continue DVT prophylaxis    2  Pulmonary:   Acute post-op pulmonary insufficiency; Requiring 2 Liters via nasal cannula, secondary to COPD and history of tobacco use  Continue incentive spirometry/coughing/deep breathing exercises    Wean supplemental oxygen as tolerated for saturation > 90%    Postoperative left apical Pneumothorax  No recurrent pneumothorax after mediastinal CT removal yesterday  No air leak  F/U CXR and D/c       3  Renal:   Intake/Output net: -2300  mL/24 hours  Continue diuresis   Lasix 40 mg IV QD  Potassium Chloride 20 mEq PO QD  Post op Creatinine stable; Follow up labs prn    4  Neuro:  Neurologically intact; No active issues  Incisional pain well-controlled  Continue Tylenol, 975 mg PO q 8, standing dose  Continue Oxycodone, 2 5 to 5 mg PO q 4 hours prn pain    5  GI:  Tolerating TLC 2 3 gm sodium diet  Maintain 1800 mL daily fluid restriction   Continue stool softeners and prn suppository  Continue GI prophylaxis    6  Endo:   Glucose well-controlled with sliding scale coverage    7    Hematology:    Post-operative blood count acceptable; Trend prn    Thrombocytopenia; 126, from 127  From 147    CBC in AM     8    Disposition:      Ambulating independently, Anticipate discharge to home when off of supplemental oxygen     VTE Pharmacologic Prophylaxis: Sequential compression device (Venodyne)  and Fondaparinux (Arixtra)  VTE Mechanical Prophylaxis: sequential compression device    Collaborative rounds completed with SAAD Barnett    Plan of care discussed with bedside nurse    SIGNATURE: Lauren Mallory PA-C  DATE: May 24, 2021  TIME: 7:52 AM

## 2021-05-24 NOTE — UTILIZATION REVIEW
Continued Stay Review    Date:  05/22/2021                          Current Patient Class: Inpatient  Current Level of Care: Level 1 stepdown    HPI:66 y o  male initially admitted on 05/12/2021  POD # 2 s/p Tissue AVR, MV repair, PFO closure  Assessment/Plan: Remains on and off low dose Neosynephrine  Wean Sumit  IR placed left apical CT for treatment of large left PTX  Maintain Triple CVC line, A  Line left radial   Maintain epicardial pacing wires A/V  Maintain Chest Tubes #1,2,3 to suction (-20cm)  Hold BB        Vital Signs:   05/22/21 2300  --  68  17  90/57  69  122/54  70 mmHg  97 %  --  --  --  --   05/22/21 2200  --  66  18  90/59  68  126/58  78 mmHg  98 %  --  --  --  --   05/22/21 2130  --  68  20  128/77  92  166/72  98 mmHg  95 %  --  --  --  --   05/22/21 2100  --  66  17  92/62  71  116/56  72 mmHg  96 %  --  --  --  --   05/22/21 2000  --  66  16  103/70  85  132/62  82 mmHg  99 %  --  --  --  --   05/22/21 1945  98 4 °F (36 9 °C)  64  18  114/74  85  128/62  82 mmHg  98 %  28  2 L/min  Nasal cannula  Lying   05/22/21 1927  --  --  --  --  --  --  --  --  28  2 L/min  Nasal cannula  --   05/22/21 1900  --  62  18  101/75  82  120/58  76 mmHg  97 %  --  --  --  --   05/22/21 1600  98 2 °F (36 8 °C)  60  12  100/64  75  104/52  66 mmHg  98 %  28  2 L/min  Nasal cannula  Sitting   05/22/21 1500  --  60  14  --  --  98/48  64 mmHg  96 %  --  --  --  --   05/22/21 1400  --  66  19  --  --  --  --  95 %  --  --  --  --   05/22/21 1300  --  64  9Abnormal   95/63  75  102/52  66 mmHg  97 %  --  --  --  --   05/22/21 1200  98 4 °F (36 9 °C)  64  12  104/63  78  116/58  74 mmHg  97 %  28  2 L/min  Nasal cannula  Sitting   05/22/21 1100  --  66  24Abnormal   114/73  88  144/70  90 mmHg  93 %  --  --  --  --   05/22/21 1000  --  66  25Abnormal   --  --  128/60  78 mmHg  94 %  --  --  --  --   05/22/21 0900  --  62  14  --  --  108/54  72 mmHg  95 %  --  --  --  --   05/22/21 0800  98 4 °F (36 9 °C)  60  15 --  --  102/52  68 mmHg  96 %  28  2 L/min  Nasal cannula  --   05/22/21 0600  --  66  16  115/64  85  134/60  84 mmHg  94 %  --  --  --  --   05/22/21 0500  --  64  11Abnormal   128/71  92  154/72  98 mmHg  98 %  --  --  --  --   05/22/21 0400  98 4 °F (36 9 °C)  64  15  110/68  87  132/64  86 mmHg  98 %  28  2 L/min  Nasal cannula  Lying   05/22/21 0300  --  64  16  96/59  78  112/58  76 mmHg  95 %  --  --  --  --   05/22/21 0200  --  64  15  110/66  83  114/58  76 mmHg  95 %  --  --  --  --   05/22/21 0115  --  64  15  104/71  77  120/60  76 mmHg  96 %  --  --  --  --   05/22/21 0100  --  62  16  77/52Abnormal   58  86/44  58 mmHg  95 %  --  --  --  --   05/22/21 0049  --  62  18  74/47Abnormal   55  92/46  58 mmHg  95 %  --  --  --  --   05/22/21 0048  --  62  22  80/50Abnormal   58  80/40  52 mmHg  95 %  --  --  --  --   05/22/21 0000  99 4 °F (37 4 °C)  66  14  82/61Abnormal   66  92/46  60 mmHg  96 %  28  2 L/min  Nasal cannula  Lying     Date and Time Eye Opening Best Verbal Response Best Motor Response Garrett Coma Scale Score   05/22/21 1945 4 5 6 15   05/22/21 1600 4 5 6 15   05/22/21 1200 4 5 6 15   05/22/21 0800 4 5 6 15   05/22/21 0400 4 5 6 15   05/22/21 0000 4 5 6 15     Pertinent Labs/Diagnostic Results:     Results from last 7 days   Lab Units 05/23/21  0429 05/22/21  0513 05/21/21  0358 05/20/21 2008 05/20/21  1307  05/19/21  0439   WBC Thousand/uL 11 98* 14 17* 16 11*  --   --   --  7 08   HEMOGLOBIN g/dL 10 9* 11 4* 12 0 12 4  --    < > 13 7   I STAT HEMOGLOBIN g/dl  --   --   --   --  13 9   < >  --    HEMATOCRIT % 33 8* 35 4* 37 9 38 4  --    < > 42 5   HEMATOCRIT, ISTAT %  --   --   --   --  41   < >  --    PLATELETS Thousands/uL 126* 127* 147*  --   --    < > 220   NEUTROS ABS Thousands/µL  --   --   --   --   --   --  3 61    < > = values in this interval not displayed       Results from last 7 days   Lab Units 05/23/21  0429 05/22/21  0513 05/21/21  0358 05/20/21 2008 05/20/21  1635 05/20/21  1307 05/20/21  1302 05/20/21  1159 05/20/21  1118 05/20/21  1051 05/20/21  1017  05/19/21  0439   SODIUM mmol/L 137 137 141  --   --   --  141  --   --   --   --   --  140   POTASSIUM mmol/L 4 2 4 1 4 8 4 7 4 6  --  3 9  --   --   --   --   --  3 8   CHLORIDE mmol/L 102 105 110*  --   --   --  110*  --   --   --   --   --  104   CO2 mmol/L 31 28 26  --   --   --  24  --   --   --   --   --  29   CO2, I-STAT mmol/L  --   --   --   --   --  26  --  26 30 28 34*   < >  --    ANION GAP mmol/L 4 4 5  --   --   --  7  --   --   --   --   --  7   BUN mg/dL 16 18 14  --   --   --  10  --   --   --   --   --  13   CREATININE mg/dL 0 60 0 62 0 66  --   --   --  0 84  --   --   --   --   --  0 77   EGFR ml/min/1 73sq m 104 103 100  --   --   --  91  --   --   --   --   --  94   CALCIUM mg/dL 8 8 8 5 8 1*  --   --   --  8 4  --   --   --   --   --  9 3   CALCIUM, IONIZED mmol/L  --   --   --   --   --   --   --   --   --  1 00*  --   --   --    CALCIUM, IONIZED, ISTAT mmol/L  --   --   --   --   --   --   --  1 23 1 07* 1 05* 1 14   < >  --    MAGNESIUM mg/dL 1 9 2 3 2 6  --   --   --   --   --   --   --   --   --   --     < > = values in this interval not displayed       Results from last 7 days   Lab Units 05/24/21  0600 05/23/21 2031 05/23/21  1634 05/23/21  1111 05/23/21  0559 05/22/21  2109 05/22/21  1658 05/22/21  1320 05/22/21  0606 05/21/21  2056 05/21/21  1656 05/21/21  1103   POC GLUCOSE mg/dl 130 145* 133 125 128 138 142* 125 121 161* 200* 133     Results from last 7 days   Lab Units 05/23/21  0429 05/22/21  0513 05/21/21  0358 05/20/21  1302 05/19/21  0439   GLUCOSE RANDOM mg/dL 102 110 111 163* 91     Results from last 7 days   Lab Units 05/20/21  1912   PH AZALIA  7 266*   PCO2 AZALIA mm Hg 55 9*   PO2 AZALIA mm Hg 39 5   HCO3 AZALIA mmol/L 24 9   BASE EXC AZALIA mmol/L -2 8   O2 CONTENT AZALIA ml/dL 11 8   O2 HGB, VENOUS % 64 3     Results from last 7 days   Lab Units 05/20/21  1307 05/20/21  1159 05/20/21  1118 05/20/21  1017  05/20/21  0803   PH, AZALIA I-STAT   --   --   --   --  7 276*  --  7 312   PCO2, AZALIA ISTAT mm HG  --   --   --   --  68 4*  --  59 5*   PO2, AZALIA ISTAT mm HG  --   --   --   --  54 0*  --  43 0   HCO3, AZALIA ISTAT mmol/L  --   --   --   --  31 9*  --  30 1*   I STAT BASE EXC mmol/L -2 0 4*   < > 3   < > 2   I STAT O2 SAT % 96* 95* 100*   < > 82   < > 73   ISTAT PH ART  7 302* 7 391 7 426   < >  --    < >  --    I STAT ART PCO2 mm HG 50 4* 41 0 44 3*   < >  --    < >  --    I STAT ART PO2 mm HG 88 0 77 0 260 0*   < >  --    < >  --    I STAT ART HCO3 mmol/L 24 9 24 9 29 1*   < >  --    < >  --     < > = values in this interval not displayed  Medications:   Scheduled Medications:  acetaminophen, 975 mg, Oral, Q8H  amiodarone, 200 mg, Oral, Q8H GEORGES  aspirin, 325 mg, Oral, Daily  atorvastatin, 40 mg, Oral, QPM  docusate sodium, 100 mg, Oral, BID  fluticasone-vilanterol, 1 puff, Inhalation, Daily  fondaparinux, 2 5 mg, Subcutaneous, Q24H  furosemide, 40 mg, Intravenous, Daily  insulin lispro, 1-6 Units, Subcutaneous, TID AC  insulin lispro, 1-6 Units, Subcutaneous, HS  metoprolol tartrate, 12 5 mg, Oral, Q12H GEORGES  mupirocin, 1 application, Nasal, D69A GEORGES  pantoprazole, 40 mg, Oral, Daily  polyethylene glycol, 17 g, Oral, Daily  potassium chloride, 20 mEq, Oral, Daily      Continuous IV Infusions:  phenylephine,  mcg/min, Intravenous, Titrated      PRN Meds:  albuterol, 2 puff, Inhalation, Q4H PRN  bisacodyl, 10 mg, Rectal, Daily PRN  Diclofenac Sodium, 2 g, Topical, TID PRN  ondansetron, 4 mg, Intravenous, Q6H PRN  oxyCODONE, 2 5 mg, Oral, Q4H PRN  oxyCODONE, 5 mg, Oral, Q4H PRN  temazepam, 15 mg, Oral, HS PRN        Discharge Plan: TBD    Network Utilization Review Department  ATTENTION: Please call with any questions or concerns to [phone] and carefully listen to the prompts so that you are directed to the right person   All voicemails are confidential   Danelle Meyer all requests for admission clinical reviews, approved or denied determinations and any other requests to dedicated fax number below belonging to the campus where the patient is receiving treatment   List of dedicated fax numbers for the Facilities:  [address] [place] [address] DENIALS (Administrative/Medical Necessity) [phone]   [address] (Maternity/NICU/Pediatrics) [phone]   [address] [place] [address] [address] [place] [address] [address] [place] [address] [phone]

## 2021-05-24 NOTE — PHYSICAL THERAPY NOTE
Physical Therapy Cancellation Note         05/24/21 3385   Note Type   Note Type Treatment   Cancel Reasons Medical status       Chart reviewed; attempted to see pt in AM; spoke to RN who informs pt is in rapid a-fib at this time; held PT/mobilization; will follow as clinical course allows      Silas Courtney, PT

## 2021-05-24 NOTE — OCCUPATIONAL THERAPY NOTE
Occupational Therapy Cancellation Note      Patient Name: Layne Joseph  [id number] Date: 5/24/2021 05/24/21 0941   OT Last Visit   OT Visit Date 05/24/21   Note Type   Note Type Treatment   Cancel Reasons Medical status       Chart reviewed, OT treatment attempted  Pt currently in rapid a-fib and not appropriate for therapy at this time  OT will hold and continue to follow and see as medically appropriate and able       CYN Sadler, OTR/L

## 2021-05-24 NOTE — PROGRESS NOTES
General Cardiology   Progress Note -  Team One   Katt Gomez 78 y o  male MRN: [id number]    Unit/Bed#: [facility] 420-01 Encounter: [id number]    Assessment  1  Severe symptomatic AS w/ moderate to severe MR s/p AVR w/ a 25 mm Bauer Inspiris bioprosthetic valve, and mitral valve repair w/ a 32 mm Bauer Physio II ring angioplasty and PFO closure (5/20); POD # 4  -Medical therapies; aspirin 325 mg daily, atorvastatin 40 mg daily, and metoprolol tartrate 12 5 mg q 12 hours  -On IV furosemide 40 mg daily for postoperative volume management; 24 hour I&O balance; -2 4 L; overall -960 ml      2  Nonischemic cardiomyopathy - LVEF 40-45%  -Compensated  -TTE 5/20; LVEF (postprocedure); 40-45%, lobe 0 hypokinesis, mildly impaired RV systolic function  -LHC 4/47; nonobstructive CAD  -GDMT; metoprolol tartrate 12 5 mg q 12 hours ; on IV furosemide (for postop volume management)    3  New onset, postop atrial fibrillation with RVR  -Symptomatic w/ palpitations  1-2 lead ECG 5/24 0747; atrial fibrillation with RVR  -24 hour telemetry review; atrial fibrillation with RVR, rates currently in the 120-140 range  -Received 150 mg IV amiodarone bolus this a m  (0003) + initiation of IV amiodarone GTT  -On oral amiodarone 200 mg t i d   -Started on metoprolol tartrate 12 5 mg q 12 hours    4  Essential hypertension  -BP last recorded at 112/68, HR 73  -On metoprolol tartrate 12 5 mg q 12 hours    5   Dyslipidemia  -Lipid profile 5/13/2021; cholesterol 103, triglycerides 129, HDL 49, LDL 28  -On atorvastatin 40 mg daily    Plan  -Will adjust metoprolol to 12 5 mg q 6 hours - for improved rate control hold for SBP <100  -Agree with IV amiodarone continue GTT + oral amiodarone   -Continue aspirin, and high-intensity statin at current doses  -Consider eventual addition of low-dose ACEI if BP can tolerate   -Continue IV diuresis for postoperative volume management   -Monitor renal function electrolytes closely -- replete as needed  -Strict I&Os, daily standing weights, 2 g NA +diet 1 8 L FR  -Monitor on telemetry    Subjective  Review of Systems   Constitution: Positive for malaise/fatigue  Negative for chills and fever  Cardiovascular: Positive for irregular heartbeat and palpitations  Negative for chest pain, dyspnea on exertion, leg swelling, near-syncope and orthopnea  Respiratory: Negative for cough and shortness of breath  Gastrointestinal: Negative for abdominal pain  Neurological: Negative for dizziness, headaches and light-headedness  Objective:   Physical Exam  Vitals signs and nursing note reviewed  Constitutional:       General: He is not in acute distress  Appearance: Normal appearance  He is obese  He is not ill-appearing or diaphoretic  HENT:      Head: Normocephalic and atraumatic  Mouth/Throat:      Mouth: Mucous membranes are moist    Eyes:      General: No scleral icterus  Neck:      Musculoskeletal: Neck supple  Comments: Right IJ TLC covered by CDI dressing  Cardiovascular:      Rate and Rhythm: Tachycardia present  Rhythm irregular  Pulses: Normal pulses  Heart sounds: Normal heart sounds  No murmur  Pulmonary:      Effort: Pulmonary effort is normal       Breath sounds: Normal breath sounds  No wheezing  Comments: Left lung fields are diminished throughout  Left pleural chest tube scant pleural fluid noted in chest tube  Abdominal:      General: Bowel sounds are normal       Palpations: Abdomen is soft  Tenderness: There is no abdominal tenderness  Musculoskeletal:      Right lower leg: No edema  Left lower leg: No edema  Skin:     General: Skin is warm and dry  Capillary Refill: Capillary refill takes less than 2 seconds  Coloration: Skin is pale  Comments: Mid sternal chest incision well approximated/open to air   Neurological:      General: No focal deficit present  Mental Status: He is alert and oriented to person, place, and time  Psychiatric:         Mood and Affect: Mood normal          Vitals: Blood pressure 112/68, pulse 73, temperature 98 9 °F (37 2 °C), resp  rate 18, height 6' 2" (1 88 m), weight 106 kg (234 lb 3 2 oz), SpO2 94 %  ,     Body mass index is 30 07 kg/m²  ,   Systolic (35SHM), BQO:990 , Min:97 , NUT:313     Diastolic (01YOD), TMA:40, Min:59, Max:68      Intake/Output Summary (Last 24 hours) at 5/24/2021 1009  Last data filed at 5/24/2021 0900  Gross per 24 hour   Intake 660 ml   Output 1900 ml   Net -1240 ml     Weight (last 2 days)     Date/Time   Weight    05/24/21 0600   106 (234 2)    05/23/21 0600   107 (236 77)    05/22/21 0600   109 (240 3)              LABORATORY RESULTS      CBC with diff:   Results from last 7 days   Lab Units 05/23/21  0429 05/22/21  0513 05/21/21  0358 05/20/21 2008 05/20/21  1307 05/20/21  1302 05/20/21  1159  05/20/21  1051  05/19/21  0439   WBC Thousand/uL 11 98* 14 17* 16 11*  --   --   --   --   --   --   --  7 08   HEMOGLOBIN g/dL 10 9* 11 4* 12 0 12 4  --  13 9  --   --   --   --  13 7   I STAT HEMOGLOBIN g/dl  --   --   --   --  13 9  --  12 6   < > 11 6*   < >  --    HEMATOCRIT % 33 8* 35 4* 37 9 38 4  --  42 6  --   --   --   --  42 5   HEMATOCRIT, ISTAT %  --   --   --   --  41  --  37   < > 34*   < >  --    MCV fL 96 97 97  --   --   --   --   --   --   --  95   PLATELETS Thousands/uL 126* 127* 147*  --   --  186  --   --  200  --  220   MCH pg 31 0 31 2 30 7  --   --   --   --   --   --   --  30 5   MCHC g/dL 32 2 32 2 31 7  --   --   --   --   --   --   --  32 2   RDW % 13 5 13 4 13 8  --   --   --   --   --   --   --  13 2   MPV fL 9 8 9 3 9 2  --   --  9 0  --   --  9 3  --  8 9   NRBC AUTO /100 WBCs  --   --   --   --   --   --   --   --   --   --  0    < > = values in this interval not displayed         CMP:  Results from last 7 days   Lab Units 05/23/21  0429 05/22/21  0513 05/21/21  0358 05/20/21 2008 05/20/21  1635 05/20/21  1307 05/20/21  1302 05/20/21  1159 05/20/21  1118 05/20/21  1051 05/20/21  1017 05/20/21  0945 05/20/21  0909  05/19/21  0439   POTASSIUM mmol/L 4 2 4 1 4 8 4 7 4 6  --  3 9  --   --   --   --   --   --   --  3 8   CHLORIDE mmol/L 102 105 110*  --   --   --  110*  --   --   --   --   --   --   --  104   CO2 mmol/L 31 28 26  --   --   --  24  --   --   --   --   --   --   --  29   CO2, I-STAT mmol/L  --   --   --   --   --  26  --  26 30 28 34* 32 27   < >  --    BUN mg/dL 16 18 14  --   --   --  10  --   --   --   --   --   --   --  13   CREATININE mg/dL 0 60 0 62 0 66  --   --   --  0 84  --   --   --   --   --   --   --  0 77   GLUCOSE, ISTAT mg/dl  --   --   --   --   --  158*  --  137 145* 138 115 110 110   < >  --    CALCIUM mg/dL 8 8 8 5 8 1*  --   --   --  8 4  --   --   --   --   --   --   --  9 3   EGFR ml/min/1 73sq m 104 103 100  --   --   --  91  --   --   --   --   --   --   --  94    < > = values in this interval not displayed  BMP:  Results from last 7 days   Lab Units 05/23/21  0429 05/22/21  0513 05/21/21  0358 05/20/21 2008 05/20/21  1635 05/20/21  1307 05/20/21  1302 05/20/21  1159 05/20/21  1118  05/19/21  0439   POTASSIUM mmol/L 4 2 4 1 4 8 4 7 4 6  --  3 9  --   --   --  3 8   CHLORIDE mmol/L 102 105 110*  --   --   --  110*  --   --   --  104   CO2 mmol/L 31 28 26  --   --   --  24  --   --   --  29   CO2, I-STAT mmol/L  --   --   --   --   --  26  --  26 30   < >  --    BUN mg/dL 16 18 14  --   --   --  10  --   --   --  13   CREATININE mg/dL 0 60 0 62 0 66  --   --   --  0 84  --   --   --  0 77   GLUCOSE, ISTAT mg/dl  --   --   --   --   --  158*  --  137 145*   < >  --    CALCIUM mg/dL 8 8 8 5 8 1*  --   --   --  8 4  --   --   --  9 3    < > = values in this interval not displayed         Lab Results   Component Value Date    NTBN 4,493 (H) 05/11/2021    NTBNP 3,228 (H) 05/10/2021        Results from last 7 days   Lab Units 05/23/21  0429 05/22/21  0513 05/21/21  0358   MAGNESIUM mg/dL 1 9 2 3 2 6 Lipid Profile:   Lab Results   Component Value Date    CHOL 209 2014     Lab Results   Component Value Date    HDL 49 2021    HDL 39 2014     Lab Results   Component Value Date    LDLCALC 28 2021    LDLCALC 126 (H) 2014     Lab Results   Component Value Date    TRIG 129 2021    TRIG 220 2014       Cardiac testing:   Results for orders placed during the hospital encounter of 21   Echo complete with contrast if indicated    Narrative [facility]  [facility], [address]  [phone]    Transthoracic Echocardiogram  2D, M-mode, Doppler, and Color Doppler    Study date:  12-May-2021    Patient: Sree Olsen  MR number: [id number]  Account number: [de-identified]  : 1955  Age: 78 years  Gender: Male  Status: Outpatient  Location: Bedside  Height: 74 in  Weight: 245 5 lb  BP: 102/ 70 mmHg    Indications: Chest pain  Diagnoses: R07 9 - Chest pain, unspecified    Sonographer:  Carolynn Srivastava RDCS  Primary Physician:  Nicolasa Jeffries DO  Referring Physician:  Cheryle Drilling, DO  Group:  [facility]  Interpreting Physician:  Sammy Villarreal MD    SUMMARY    LEFT VENTRICLE:  Moderate decreased left ventricular systolic function, EF 09%  Significantly decreased left ventricular global longitudinal strain, minus 7 4%  Severe concentric left ventricular hypertrophy, wall thickness 21 mm  Mildly increased left  ventricular cavity size  Grade 3 left ventricular diastolic dysfunction  Severely elevated left ventricular filling pressures  RIGHT VENTRICLE:  Mild right ventricular enlargement with well preserved right ventricular systolic function  LEFT ATRIUM:  Moderate left atrial enlargement  RIGHT ATRIUM:  Mild right atrial enlargement  MITRAL VALVE:  Moderate possibly severe mitral regurgitation  No mitral stenosis  Mitral ERO 0 5 cm2  Mitral valve regurgitant volume 85 mL   The mitral valve annulus was heavily calcified  The mitral valve leaflets were calcified but have well preserved  mobility  There was some calcification in the chordae  AORTIC VALVE:  Heavily calcified aortic valve with severe, probably critical aortic stenosis  No significant aortic regurgitation  Aortic valve maximum velocity 5 3 M/S  Aortic valve mean gradient 81 mmHg  Aortic valve area 0 3 cm2  TRICUSPID VALVE:  There was mild regurgitation  AORTA:  Aortic root at the upper limits of normal to mildly enlarged at 3 8 cm    PULMONARY ARTERIES:  Moderate pulmonary hypertension  Pulmonary artery systolic pressures estimated 54 mmHg  PERICARDIUM:  Mild pericardial effusion without hemodynamic significance  SUMMARY MEASUREMENTS  2D measurements:  Unspecified Anatomy:   %FS was 25 4 %  AA PSSL Full was -8 3 %  AAS PSSL Full was -15 1 %  AI PSSL Full was -12 5 %  AL PSSL Full was -5 3 %  AP PSSL Full was -5 4 %  AS PSSL Full was -20 9 %  AVC was 378 3 ms  Ao Diam was 3 4 cm  Ao asc was 3 8 cm  BA PSSL Full was -2 8 %  BAS PSSL Full was -2 6 %  BI PSSL Full was -13 5 %  BL PSSL Full was -6 9 %  BP PSSL Full was -4 2 %  BS PSSL Full was -4 1 %  EDV(Teich) was 136 1 ml   EF(Teich) was 49 7 %  ESV(Teich)  was 68 5 ml   G peak SL Full(A2C) was -7 5 %  G peak SL Full(A4C) was -8 5 %  G peak SL Full(APLAX) was -6 1 %  G peak SL Full(Avg) was -7 4 %  IVSd was 2 1 cm  LA Area was 31 1 cm2  LA Diam was 6 3 cm  LAAs A4C was 30 2 cm2  LAESV  A-L A4C was 122 9 ml  LAESV MOD A4C was 116 1 ml  LALs A4C was 6 3 cm  LVEDV MOD A4C was 217 9 ml  LVEF MOD A4C was 42 2 %  LVESV MOD A4C was 126 ml  LVIDd was 5 3 cm  LVIDs was 4 cm  LVLd A4C was 10 9 cm  LVLs A4C was 10 cm  LVOT Diam was 2 cm  LVPWd was 2 1 cm  MA PSSL Full was -4 3 %  MAS PSSL Full was -10 5 %  MI PSSL Full was -5 %  ML PSSL Full was -3 1 %  MP PSSL Full was 3 2 %  MS PSSL Full was -10 8 %  Peak SL Dispersion Full was 61 5 ms  RA  Area was 21 1 cm2  RVIDd was 5 cm  RWT was 0 8   SV MOD A4C was 91 9 ml   SV(Teich) was 67 6 ml   CF measurements:  Unspecified Anatomy:   MR Als  Marito was 0 4 m/s  MR Flow was 255 2 ml/s  MR Rad was 1 1 cm  MR Trace was 8 9 cm2   CW measurements:  Unspecified Anatomy:   AV Env  Ti was 287 3 ms   AV VTI was 125 6 cm   AV Vmax was 5 3 m/s  AV Vmean was 4 4 m/s  AV maxPG was 114 3 mmHg  AV meanPG was 80 6 mmHg  MR VTI was 162 5 cm  MR Vmax was 4 9 m/s   TR Vmax was 3 1 m/s  TR  maxPG was 39 3 mmHg  MM measurements:  Unspecified Anatomy:   TAPSE was 2 cm  PW measurements:  Unspecified Anatomy:   HUMBERTO (VTI) was 0 3 cm2  HUMBERTO Vmax was 0 3 cm2  AVAI (VTI) was 0 cm2/m2  AVAI Vmax was 0 cm2/m2  DVI was 0 1   E' Sept was 0 m/s  E/E' Sept was 44 1   LVOT Env  Ti was 303 2 ms  LVOT VTI was 13 7 cm  LVOT Vmax  was 0 6 m/s  LVOT Vmean was 0 5 m/s  LVOT maxPG was 1 4 mmHg  LVOT meanPG was 0 9 mmHg  LVSI Dopp was 17 8 ml/m2  LVSV Dopp was 42 3 ml   MR ERO was 0 5 cm2  MR RV was 85 2 ml   MV A Marito was 0 6 m/s  MV Dec [place] was 11 4 m/s2  MV  DecT was 136 5 ms   MV E Marito was 1 6 m/s  MV E/A Ratio was 2 4   MV PHT was 39 6 ms  MVA By PHT was 5 6 cm2  HISTORY: PRIOR HISTORY: Severe AS  COPD  Hyperlipidemia  Hypertension  Shortness of breath  Chest pain  GERD  Smoker  LVH  PROCEDURE: The procedure was performed at the bedside  This was a routine study  The transthoracic approach was used  The study included complete 2D imaging, M-mode, complete spectral Doppler, and color Doppler  The heart rate was 80 bpm,  at the start of the study  Images were obtained from the parasternal, apical, subcostal, and suprasternal notch acoustic windows  Image quality was adequate  LEFT VENTRICLE: Moderate decreased left ventricular systolic function, EF 70%  Significantly decreased left ventricular global longitudinal strain, minus 7 4%  Severe concentric left ventricular hypertrophy, wall thickness 21 mm  Mildly  increased left ventricular cavity size  Grade 3 left ventricular diastolic dysfunction  Severely elevated left ventricular filling pressures  RIGHT VENTRICLE: Mild right ventricular enlargement with well preserved right ventricular systolic function  LEFT ATRIUM: Moderate left atrial enlargement  RIGHT ATRIUM: Mild right atrial enlargement  MITRAL VALVE: Moderate possibly severe mitral regurgitation  No mitral stenosis  Mitral ERO 0 5 cm2  Mitral valve regurgitant volume 85 mL  The mitral valve annulus was heavily calcified  The mitral valve leaflets were calcified but have  well preserved mobility  There was some calcification in the chordae  AORTIC VALVE: Heavily calcified aortic valve with severe, probably critical aortic stenosis  No significant aortic regurgitation  Aortic valve maximum velocity 5 3 M/S  Aortic valve mean gradient 81 mmHg  Aortic valve area 0 3 cm2  TRICUSPID VALVE: The valve structure was normal  There was normal leaflet separation  DOPPLER: The transtricuspid velocity was within the normal range  There was no evidence for stenosis  There was mild regurgitation  PULMONIC VALVE: DOPPLER: The transpulmonic velocity was within the normal range  There was no regurgitation  PERICARDIUM: Mild pericardial effusion without hemodynamic significance  AORTA: Aortic root at the upper limits of normal to mildly enlarged at 3 8 cm    PULMONARY ARTERY: Moderate pulmonary hypertension  Pulmonary artery systolic pressures estimated 54 mmHg      SYSTEM MEASUREMENT TABLES    2D  %FS: 25 4 %  AA PSSL Full: -8 3 %  AAS PSSL Full: -15 1 %  AI PSSL Full: -12 5 %  AL PSSL Full: -5 3 %  AP PSSL Full: -5 4 %  AS PSSL Full: -20 9 %  AVC: 378 3 ms  Ao Diam: 3 4 cm  Ao asc: 3 8 cm  BA PSSL Full: -2 8 %  BAS PSSL Full: -2 6 %  BI PSSL Full: -13 5 %  BL PSSL Full: -6 9 %  BP PSSL Full: -4 2 %  BS PSSL Full: -4 1 %  EDV(Teich): 136 1 ml  EF(Teich): 49 7 %  ESV(Teich): 68 5 ml  G peak SL Full(A2C): -7 5 %  G peak SL Full(A4C): -8 5 %  G peak SL Full(APLAX): -6 1 %  G peak SL Full(Avg): -7 4 %  IVSd: 2 1 cm  LA Area: 31 1 cm2  LA Diam: 6 3 cm  LAAs A4C: 30 2 cm2  LAESV A-L A4C: 122 9 ml  LAESV MOD A4C: 116 1 ml  LALs A4C: 6 3 cm  LVEDV MOD A4C: 217 9 ml  LVEF MOD A4C: 42 2 %  LVESV MOD A4C: 126 ml  LVIDd: 5 3 cm  LVIDs: 4 cm  LVLd A4C: 10 9 cm  LVLs A4C: 10 cm  LVOT Diam: 2 cm  LVPWd: 2 1 cm  LVd Mass: 718 1 g  LVd Mass Index: 303 g/m2  MA PSSL Full: -4 3 %  MAS PSSL Full: -10 5 %  MI PSSL Full: -5 %  ML PSSL Full: -3 1 %  MP PSSL Full: 3 2 %  MS PSSL Full: -10 8 %  Peak SL Dispersion Full: 61 5 ms  RA Area: 21 1 cm2  RVIDd: 5 cm  RWT: 0 8  SV MOD A4C: 91 9 ml  SV(Teich): 67 6 ml    CF  MR Als  Marito: 0 4 m/s  MR Flow: 255 2 ml/s  MR Rad: 1 1 cm  MR Trace: 8 9 cm2    CW  AV Env  Ti: 287 3 ms  AV VTI: 125 6 cm  AV Vmax: 5 3 m/s  AV Vmean: 4 4 m/s  AV maxP 3 mmHg  AV meanP 6 mmHg  MR VTI: 162 5 cm  MR Vmax: 4 9 m/s  TR Vmax: 3 1 m/s  TR maxP 3 mmHg    MM  TAPSE: 2 cm    PW  HUMBERTO (VTI): 0 3 cm2  HUMBERTO Vmax: 0 3 cm2  AVAI (VTI): 0 cm2/m2  AVAI Vmax: 0 cm2/m2  DVI: 0 1  E' Sept: 0 m/s  E/E' Sept: 44 1  LVOT Env  Ti: 303 2 ms  LVOT VTI: 13 7 cm  LVOT Vmax: 0 6 m/s  LVOT Vmean: 0 5 m/s  LVOT maxP 4 mmHg  LVOT meanP 9 mmHg  LVSI Dopp: 17 8 ml/m2  LVSV Dopp: 42 3 ml  MR ERO: 0 5 cm2  MR RV: 85 2 ml  MV A Marito: 0 6 m/s  MV Dec [place]: 11 4 m/s2  MV DecT: 136 5 ms  MV E Marito: 1 6 m/s  MV E/A Ratio: 2 4  MV PHT: 39 6 ms  MVA By PHT: 5 6 cm2    [facility] Accredited Echocardiography Laboratory    Prepared and electronically signed by    Kenneth Perez MD  Signed 12-May-2021 12:22:39       Results for orders placed during the hospital encounter of 21   KATIA    Narrative [address]  [address] [place] [address]  [phone]    Transesophageal Echocardiogram  2D, 3D, Doppler, and Color Doppler    Study date:  14-May-2021    Patient: Dari Henao [facility]  MR number: [id number]  Account number: [de-identified]  : 1955  Age: 78 years  Gender: Male  Status: Inpatient  Location: Echo lab  Height: 74 in  Weight: 233 lb  BP: 98/ 70 mmHg    Indications: Mitral valve disease  Diagnoses: I34 9 - Nonrheumatic mitral valve disorder, unspecified    Sonographer:  Michael Young RDCS  Interpreting Physician:  Addy Del Rosario MD  Primary Physician:  Jeanne Kuo DO  Referring Physician:  Benjamín Benson PA-C  Group:  [facility]  Cardiology Fellow:  Johnathan Rojas MD  RN:  Haroldo Lobato RN    SUMMARY    LEFT VENTRICLE:  The ventricle was mildly dilated  Systolic function was moderately reduced  Ejection fraction was estimated to be 45 %  There was moderate diffuse hypokinesis  Wall thickness was moderately increased  The changes were consistent with eccentric hypertrophy  Features were consistent with a pseudonormal left ventricular filling pattern, with concomitant abnormal relaxation and increased filling pressure (grade 2 diastolic dysfunction)  RIGHT VENTRICLE:  The ventricle was mildly dilated  Systolic function was moderately reduced  LEFT ATRIUM:  The atrium was mildly dilated  LEFT ATRIAL APPENDAGE:  No thrombus was identified  ATRIAL SEPTUM:  There was a patent foramen ovale measuring 5 mm  Doppler evaluation was performed  There was a left-to-right shunt, in the baseline state  RIGHT ATRIUM:  The atrium was mildly dilated  MITRAL VALVE:  There was mild to moderate annular calcification  There was an area of image dropout at the base of the posterior leaflet of the mitral valve in the P1- P2 segment  This was possibly artifact but a perforation or sinus cannot be entirely ruled out  There appeared to be flow into but not  out of this region  There was mild to moderate regurgitation  The regurgitant jet was centrally directed      AORTIC VALVE:  The valve was not visualized well enough to rule out a bicuspid morphology  Leaflets exhibited marked calcification and immobility  There was severe stenosis  There was mild to moderate regurgitation  Valve mean gradient was 44 mmHg  TRICUSPID VALVE:  There was mild regurgitation  PERICARDIUM:  A small pericardial effusion was identified circumferential to the heart  HISTORY: PRIOR HISTORY: Aortic stenosis  HLD  Hypertension  NSTEMI  LVH  Tobacco use  GERD  Shortness of breath  COPD  PROCEDURE: The procedure was performed in the echo lab  This was a routine study  The risks and alternatives of the procedure were explained to the patient and informed consent was obtained  The transesophageal approach was used  The study  included complete 2D imaging, 3D imaging, complete spectral Doppler, and color Doppler  The heart rate was 74 bpm, at the start of the study  An adult omniplane probe was inserted by the cardiology fellow under direct supervision of the  attending cardiologist  Images were obtained from the parasternal and apical acoustic windows  Intubated with ease  One intubation attempt(s)  There was no blood detected on the probe  Image quality was adequate  There were no  complications during the procedure  MEDICATIONS: Anesthesia administered by anesthesia team     LEFT VENTRICLE: The ventricle was mildly dilated  Systolic function was moderately reduced  Ejection fraction was estimated to be 45 %  There was moderate diffuse hypokinesis  Wall thickness was moderately increased  The changes were  consistent with eccentric hypertrophy  DOPPLER: Features were consistent with a pseudonormal left ventricular filling pattern, with concomitant abnormal relaxation and increased filling pressure (grade 2 diastolic dysfunction)  RIGHT VENTRICLE: The ventricle was mildly dilated  Systolic function was moderately reduced  LEFT ATRIUM: The atrium was mildly dilated  APPENDAGE: The size was normal  No thrombus was identified   DOPPLER: The function was mildly reduced (mildly reduced emptying velocity)  ATRIAL SEPTUM: There was a patent foramen ovale measuring 5 mm  Doppler evaluation was performed  There was a left-to-right shunt, in the baseline state  RIGHT ATRIUM: The atrium was mildly dilated  MITRAL VALVE: There was mild to moderate annular calcification  There was an area of image dropout at the base of the posterior leaflet of the mitral valve in the P1- P2 segment  This was possibly artifact but a perforation or sinus cannot  be entirely ruled out  There appeared to be flow into but not out of this region  There was no echocardiographic evidence of vegetation  DOPPLER: There was mild to moderate regurgitation  The regurgitant jet was centrally directed  AORTIC VALVE: The valve was not visualized well enough to rule out a bicuspid morphology  Leaflets exhibited marked calcification and immobility  DOPPLER: There was severe stenosis  There was mild to moderate regurgitation  TRICUSPID VALVE: The valve structure was normal  There was normal leaflet separation  DOPPLER: There was mild regurgitation  The regurgitant jet was toward the septum  The findings suggest at least mild pulmonary hypertension  PULMONIC VALVE: Not well visualized  PERICARDIUM: A small pericardial effusion was identified circumferential to the heart  AORTA: The root exhibited normal size  Discrete areas of mild to moderate atherosclerosis were present  There was no evidence for dissection  There was no evidence for aneurysm  PULMONARY VEINS: DOPPLER: There was systolic blunting in the pulmonary vein(s)      MEASUREMENT TABLES    DOPPLER MEASUREMENTS  LVOT   (Reference normals)  Peak russell   79 cm/s   (--)  Mean russell   47 cm/s   (--)  VTI   21 cm   (--)  Peak gradient   2 mmHg   (--)  Mean gradient   1 mmHg   (--)  Aortic valve   (Reference normals)  Peak russell   459 cm/s   (--)  Mean russell   331 cm/s   (--)  VTI   110 cm   (--)  Peak gradient   84 27 mmHg   (--)  Mean gradient   44 mmHg   (--)  Obstr index, VTI   0 19    (--)  Obstr index, Vmax   0 17    (--)  Obstr index, Vmean   0 14    (--)  Mitral valve   (Reference normals)  Regurg alias russell   31 cm/s   (--)  Regurg PISA radius   6 mm   (--)  Max MR flow rate   70 12 ml/s   (--)    [facility]    Prepared and electronically signed by    Cari Lesches, MD  Signed 14-May-2021 15:17:03       No results found for this or any previous visit  No procedure found  No results found for this or any previous visit      Meds/Allergies   all current active meds have been reviewed and current meds:   Current Facility-Administered Medications   Medication Dose Route Frequency    acetaminophen (TYLENOL) tablet 975 mg  975 mg Oral Q8H    albuterol (PROVENTIL HFA,VENTOLIN HFA) inhaler 2 puff  2 puff Inhalation Q4H PRN    amiodarone (CORDARONE) 900 mg in dextrose 5 % 500 mL infusion  1 mg/min Intravenous Continuous    Followed by   Wash Caller amiodarone (CORDARONE) 900 mg in dextrose 5 % 500 mL infusion  0 5 mg/min Intravenous Continuous    amiodarone tablet 200 mg  200 mg Oral Q8H [address]    aspirin tablet 325 mg  325 mg Oral Daily    atorvastatin (LIPITOR) tablet 40 mg  40 mg Oral QPM    bisacodyl (DULCOLAX) rectal suppository 10 mg  10 mg Rectal Daily PRN    Diclofenac Sodium (VOLTAREN) 1 % topical gel 2 g  2 g Topical TID PRN    docusate sodium (COLACE) capsule 100 mg  100 mg Oral BID    fluticasone-vilanterol (BREO ELLIPTA) 100-25 mcg/inh inhaler 1 puff  1 puff Inhalation Daily    fondaparinux (ARIXTRA) subcutaneous injection 2 5 mg  2 5 mg Subcutaneous Q24H    furosemide (LASIX) injection 40 mg  40 mg Intravenous Daily    insulin lispro (HumaLOG) 100 units/mL subcutaneous injection 1-6 Units  1-6 Units Subcutaneous TID AC    insulin lispro (HumaLOG) 100 units/mL subcutaneous injection 1-6 Units  1-6 Units Subcutaneous HS    metoprolol tartrate (LOPRESSOR) partial tablet 12 5 mg 12 5 mg Oral Q12H [facility]    mupirocin (BACTROBAN) 2 % nasal ointment 1 application  1 application Nasal F78P [facility] [place]    ondansetron (ZOFRAN) injection 4 mg  4 mg Intravenous Q6H PRN    oxyCODONE (ROXICODONE) IR tablet 2 5 mg  2 5 mg Oral Q4H PRN    oxyCODONE (ROXICODONE) IR tablet 5 mg  5 mg Oral Q4H PRN    pantoprazole (PROTONIX) EC tablet 40 mg  40 mg Oral Daily    phenylephrine (TAYLA-SYNEPHRINE) 50 mg (STANDARD CONCENTRATION) in sodium chloride 0 9% 250 mL   mcg/min Intravenous Titrated    polyethylene glycol (MIRALAX) packet 17 g  17 g Oral Daily    potassium chloride (K-DUR,KLOR-CON) CR tablet 20 mEq  20 mEq Oral Daily    temazepam (RESTORIL) capsule 15 mg  15 mg Oral HS PRN    warfarin (COUMADIN) tablet 2 5 mg  2 5 mg Oral Once (warfarin)     amiodarone, 1 mg/min, Last Rate: 1 mg/min (05/24/21 0949)    Followed by  amiodarone, 0 5 mg/min  phenylephine,  mcg/min, Last Rate: 20 mcg/min (05/23/21 0915)      EKG personally reviewed by BITA Hebert    Assessment:  Principal Problem: Aortic valve stenosis - severe  Active Problems:    Chronic obstructive lung disease (HCC)    Hyperlipidemia    Essential hypertension    NSTEMI (non-ST elevated myocardial infarction) ([facility])    GERD (gastroesophageal reflux disease)    Acute combined systolic and diastolic CHF, NYHA class 1 ([facility])    S/P AVR    PFO (patent foramen ovale)    S/P MVR (mitral valve repair)    Counseling / Coordination of Care  Total floor / unit time spent today 20 minutes  Greater than 50% of total time was spent with the patient and / or family counseling and / or coordination of care  ** Please Note: Dragon 360 Dictation voice to text software may have been used in the creation of this document   **

## 2021-05-24 NOTE — PROGRESS NOTES
Patient converted to sinus rhythm at 1657  He is now sinus rich, HR 48-55  Patient still on amio drip and also ordered lopressor 12 5mg  Niles Fuller with ct sx notified, will continue amio drip at 0 5mg/min and hold evening dose of lopressor  Will continue to monitor

## 2021-05-24 NOTE — RESTORATIVE TECHNICIAN NOTE
Restorative Specialist Mobility Note       Activity: Ambulate in [place], Chair     Assistive Device: Front wheel walker

## 2021-05-24 NOTE — PROGRESS NOTES
Patient noted to be in afib RVR while on morning walk in the halls  ECG obtained, -170s, /84  Patient states that he can feel his heart beating fast but otherwise asymptomatic  Devaughn MATTHEW notified, amio drip and bolus ordered  Will continue to monitor

## 2021-05-25 LAB
ANION GAP SERPL CALCULATED.3IONS-SCNC: 4 MMOL/L (ref 4–13)
BUN SERPL-MCNC: 19 MG/DL (ref 5–25)
CALCIUM SERPL-MCNC: 9.9 MG/DL (ref 8.3–10.1)
CHLORIDE SERPL-SCNC: 100 MMOL/L (ref 100–108)
CO2 SERPL-SCNC: 30 MMOL/L (ref 21–32)
CREAT SERPL-MCNC: 0.84 MG/DL (ref 0.6–1.3)
ERYTHROCYTE [DISTWIDTH] IN BLOOD BY AUTOMATED COUNT: 13.1 % (ref 11.6–15.1)
GFR SERPL CREATININE-BSD FRML MDRD: 91 ML/MIN/1.73SQ M
GLUCOSE SERPL-MCNC: 101 MG/DL (ref 65–140)
GLUCOSE SERPL-MCNC: 114 MG/DL (ref 65–140)
GLUCOSE SERPL-MCNC: 116 MG/DL (ref 65–140)
GLUCOSE SERPL-MCNC: 119 MG/DL (ref 65–140)
GLUCOSE SERPL-MCNC: 130 MG/DL (ref 65–140)
HCT VFR BLD AUTO: 35 % (ref 36.5–49.3)
HGB BLD-MCNC: 11.4 G/DL (ref 12–17)
INR PPP: 1.12 (ref 0.84–1.19)
MAGNESIUM SERPL-MCNC: 2.2 MG/DL (ref 1.6–2.6)
MCH RBC QN AUTO: 31.2 PG (ref 26.8–34.3)
MCHC RBC AUTO-ENTMCNC: 32.6 G/DL (ref 31.4–37.4)
MCV RBC AUTO: 96 FL (ref 82–98)
PLATELET # BLD AUTO: 184 THOUSANDS/UL (ref 149–390)
PMV BLD AUTO: 9.8 FL (ref 8.9–12.7)
POTASSIUM SERPL-SCNC: 4.2 MMOL/L (ref 3.5–5.3)
PROTHROMBIN TIME: 14.5 SECONDS (ref 11.6–14.5)
RBC # BLD AUTO: 3.65 MILLION/UL (ref 3.88–5.62)
SODIUM SERPL-SCNC: 134 MMOL/L (ref 136–145)
WBC # BLD AUTO: 10.1 THOUSAND/UL (ref 4.31–10.16)

## 2021-05-25 PROCEDURE — 83735 ASSAY OF MAGNESIUM: CPT | Performed by: PHYSICIAN ASSISTANT

## 2021-05-25 PROCEDURE — 97530 THERAPEUTIC ACTIVITIES: CPT

## 2021-05-25 PROCEDURE — 82948 REAGENT STRIP/BLOOD GLUCOSE: CPT

## 2021-05-25 PROCEDURE — 85027 COMPLETE CBC AUTOMATED: CPT | Performed by: PHYSICIAN ASSISTANT

## 2021-05-25 PROCEDURE — 85610 PROTHROMBIN TIME: CPT | Performed by: PHYSICIAN ASSISTANT

## 2021-05-25 PROCEDURE — 80048 BASIC METABOLIC PNL TOTAL CA: CPT | Performed by: PHYSICIAN ASSISTANT

## 2021-05-25 PROCEDURE — 99024 POSTOP FOLLOW-UP VISIT: CPT | Performed by: PHYSICIAN ASSISTANT

## 2021-05-25 PROCEDURE — 97116 GAIT TRAINING THERAPY: CPT

## 2021-05-25 PROCEDURE — 99232 SBSQ HOSP IP/OBS MODERATE 35: CPT | Performed by: INTERNAL MEDICINE

## 2021-05-25 RX ORDER — WARFARIN SODIUM 5 MG/1
5 TABLET ORAL
Status: COMPLETED | OUTPATIENT
Start: 2021-05-25 | End: 2021-05-25

## 2021-05-25 RX ADMIN — Medication 12.5 MG: at 20:26

## 2021-05-25 RX ADMIN — ASPIRIN 325 MG ORAL TABLET 325 MG: 325 PILL ORAL at 08:28

## 2021-05-25 RX ADMIN — FUROSEMIDE 40 MG: 10 INJECTION, SOLUTION INTRAMUSCULAR; INTRAVENOUS at 08:28

## 2021-05-25 RX ADMIN — AMIODARONE HYDROCHLORIDE 200 MG: 200 TABLET ORAL at 05:35

## 2021-05-25 RX ADMIN — ACETAMINOPHEN 975 MG: 325 TABLET, FILM COATED ORAL at 05:33

## 2021-05-25 RX ADMIN — AMIODARONE HYDROCHLORIDE 200 MG: 200 TABLET ORAL at 21:08

## 2021-05-25 RX ADMIN — DOCUSATE SODIUM 100 MG: 100 CAPSULE ORAL at 08:28

## 2021-05-25 RX ADMIN — ATORVASTATIN CALCIUM 40 MG: 40 TABLET, FILM COATED ORAL at 17:03

## 2021-05-25 RX ADMIN — Medication 12.5 MG: at 05:34

## 2021-05-25 RX ADMIN — AMIODARONE HYDROCHLORIDE 0.5 MG/MIN: 50 INJECTION, SOLUTION INTRAVENOUS at 05:39

## 2021-05-25 RX ADMIN — POLYETHYLENE GLYCOL 3350 17 G: 17 POWDER, FOR SOLUTION ORAL at 08:28

## 2021-05-25 RX ADMIN — ACETAMINOPHEN 975 MG: 325 TABLET, FILM COATED ORAL at 11:31

## 2021-05-25 RX ADMIN — PANTOPRAZOLE SODIUM 40 MG: 40 TABLET, DELAYED RELEASE ORAL at 08:28

## 2021-05-25 RX ADMIN — FLUTICASONE FUROATE AND VILANTEROL TRIFENATATE 1 PUFF: 100; 25 POWDER RESPIRATORY (INHALATION) at 08:32

## 2021-05-25 RX ADMIN — WARFARIN SODIUM 5 MG: 5 TABLET ORAL at 17:03

## 2021-05-25 RX ADMIN — AMIODARONE HYDROCHLORIDE 200 MG: 200 TABLET ORAL at 13:24

## 2021-05-25 RX ADMIN — ACETAMINOPHEN 975 MG: 325 TABLET, FILM COATED ORAL at 20:26

## 2021-05-25 RX ADMIN — POTASSIUM CHLORIDE 20 MEQ: 1500 TABLET, EXTENDED RELEASE ORAL at 08:28

## 2021-05-25 RX ADMIN — FONDAPARINUX SODIUM 2.5 MG: 2.5 INJECTION, SOLUTION SUBCUTANEOUS at 05:37

## 2021-05-25 NOTE — PROGRESS NOTES
Progress Note - Cardiothoracic Surgery   Jan Winchester 78 y o  male MRN: [id number]  Unit/Bed#: [facility] 420-01 Encounter: [id number]     Aortic stenosis, Non-Rheumatic, Mitral regurgitation  S/P AVR (#25mm Bauer Inspiris), MV repair (#32mm Bauer Physio II Ring) w/ PFO closure  ; POD # 5      24 Hour Events: Developed rapid AF yesterday at 0900; Converted back to NSR at 1700  No further AF overnight  Remains on oxygen at 2L       Medications:   Scheduled Meds:  Current Facility-Administered Medications   Medication Dose Route Frequency Provider Last Rate    acetaminophen  975 mg Oral Q8H Rosalio Jacobs PA-C      albuterol  2 puff Inhalation Q4H PRN Rosalio Jacobs PA-C      amiodarone  0 5 mg/min Intravenous Continuous Rosalio Jacobs PA-C 0 5 mg/min (05/25/21 0539)    amiodarone  200 mg Oral [facility] Rosalio Jacobs PA-C      aspirin  325 mg Oral Daily Rosalio Jacobs PA-C      atorvastatin  40 mg Oral QPM Rosalio aJcobs PA-C      bisacodyl  10 mg Rectal Daily PRN Rosalio Jacobs PA-C      Diclofenac Sodium  2 g Topical TID PRN Rosalio Jacobs PA-C      docusate sodium  100 mg Oral BID Rosalio Jacobs PA-C      fluticasone-vilanterol  1 puff Inhalation Daily Rosalio Jacobs PA-C      fondaparinux  2 5 mg Subcutaneous Q24H Rosalio Jacobs PA-C      furosemide  40 mg Intravenous Daily Rosalio Jacobs PA-C      insulin lispro  1-6 Units Subcutaneous TID AC Rosalio Jacobs PA-C      insulin lispro  1-6 Units Subcutaneous HS Rosalio Jacobs PA-C      metoprolol tartrate  12 5 mg Oral Q6H Para Raring, CRNP      mupirocin  1 application Nasal O45C [address] Marlise Radar, CRNP      ondansetron  4 mg Intravenous Q6H PRN Rosalio Jacobs PA-C      oxyCODONE  2 5 mg Oral Q4H PRN Rosalio Jacobs PA-C      oxyCODONE  5 mg Oral Q4H PRN Rosalio Jacobs PA-C      pantoprazole  40 mg Oral Daily Rosalio Jacobs PA-C      phenylephine   mcg/min Intravenous Titrated Rosalio Jacobs PA-C 20 mcg/min (05/23/21 0915)    polyethylene glycol  17 g Oral Daily Lisa Watson PA-C      potassium chloride  20 mEq Oral Daily Lisa Watson PA-C      temazepam  15 mg Oral HS PRN Lisa Watson PA-C       Continuous Infusions:amiodarone, 0 5 mg/min, Last Rate: 0 5 mg/min (05/25/21 0539)  phenylephine,  mcg/min, Last Rate: 20 mcg/min (05/23/21 0915)      PRN Meds: albuterol    bisacodyl    Diclofenac Sodium    ondansetron    oxyCODONE    oxyCODONE    temazepam    Vitals:   Vitals:    05/24/21 2242 05/25/21 0225 05/25/21 0600 05/25/21 0711   BP: 113/71 108/69  106/68   BP Location: Left arm Left arm     Pulse: 60 61  58   Resp: 14 14  17   Temp: 98 °F (36 7 °C) 98 4 °F (36 9 °C)  (!) 97 2 °F (36 2 °C)   TempSrc: Oral Oral     SpO2: 97% 97%  94%   Weight:   105 kg (231 lb 4 2 oz)    Height:           Telemetry: NSR; Heart Rate: 56    Respiratory:   SpO2: SpO2: 94 %, SpO2 Activity: SpO2 Activity: At Rest; 2 LPM    Intake/Output:   I/O       05/22 0701 - 05/23 0700 05/23 0701 - 05/24 0700 05/24 0701 - 05/25 0700    P  O  1200 360     I V  (mL/kg) 207 (1 9) 17 5 (0 2)     IV Piggyback       Total Intake(mL/kg) 1407 (13 1) 377 5 (3 6)     Urine (mL/kg/hr) 3400 (1 3) 2750 (1 1)     Chest Tube 140 0     Total Output 3540 2750     Net -2133 -2372 5                I/O: +520 mL/24 hours with 4 unmeasured occurences    No air leak    Weights:   Weight (last 2 days)     Date/Time   Weight    05/25/21 0600   105 (231 26)    05/24/21 0600   106 (234 2)    05/23/21 0600   107 (236 77)              CXR 5/24: No pneumothorax, s/p chest tube removal          Results:   Results from last 7 days   Lab Units 05/25/21  0532 05/23/21  0429 05/22/21  0513   WBC Thousand/uL 10 10 11 98* 14 17*   HEMOGLOBIN g/dL 11 4* 10 9* 11 4*   HEMATOCRIT % 35 0* 33 8* 35 4*   PLATELETS Thousands/uL 184 126* 127*     Results from last 7 days   Lab Units 05/23/21  0429 05/22/21  0513 05/21/21  0358  05/20/21  1307   SODIUM mmol/L 137 137 141  -- --    POTASSIUM mmol/L 4 2 4 1 4 8   < >  --    CHLORIDE mmol/L 102 105 110*  --   --    CO2 mmol/L 31 28 26  --   --    CO2, I-STAT mmol/L  --   --   --   --  26   BUN mg/dL 16 18 14  --   --    CREATININE mg/dL 0 60 0 62 0 66  --   --    GLUCOSE, ISTAT mg/dl  --   --   --   --  158*   CALCIUM mg/dL 8 8 8 5 8 1*  --   --     < > = values in this interval not displayed  Results from last 7 days   Lab Units 05/25/21  0532 05/24/21  0927   INR  1 12 1 07     Point of care glucose: 130-145    Invasive Lines/Tubes:  Invasive Devices     Central Venous Catheter Line            CVC Central Lines 05/20/21 Triple 5 days          Drain            Chest Tube 1 Left Pleural 10 Fr  3 days              Physical Exam:    HEENT/NECK:  Normocephalic  Atraumatic  No jugular venous distention  Cardiac: Regular rate and rhythm  Pulmonary:  Breath sounds clear bilaterally  Abdomen:  Non-tender, Non-distended and Normal bowel sounds  Incisions: Sternum is stable  Incision is clean, dry, and intact  Extremities: Extremities warm/dry  Neuro: Alert and oriented X 3  Skin: Warm/Dry, without rashes or lesions  Assessment:  Principal Problem: Aortic valve stenosis - severe  Active Problems:    Chronic obstructive lung disease ([facility])    Hyperlipidemia    Essential hypertension    NSTEMI (non-ST elevated myocardial infarction) ([facility])    GERD (gastroesophageal reflux disease)    Acute combined systolic and diastolic CHF, NYHA class 1 ([facility])    S/P AVR    PFO (patent foramen ovale)    S/P MVR (mitral valve repair)       Aortic stenosis, Non-Rheumatic, Mitral regurgitation  S/P AVR (#25mm Bauer Inspiris), MV repair (#32mm Bauer Physio II Ring) w/ PFO closure  ; POD # 5    Plan:    1   Cardiac:   S/p PAF;    Converted back to NSR after approximately 8 hours (@ 1700)   IV Amiodarone infusing at 0 5    D/C now    Continue PO Amiodarone   INR 1 1, after 2 mg Coumadin yesterday    Coumadin 5 mg today     HR 57-61    Decrease Lopressor, 12 5mg PO q 12 hours,  Continue ASA and Statin therapy  Epicardial pacing wires out  Central IV access no longer required; Remove central venous catheter today  Continue DVT prophylaxis    2  Pulmonary:   Acute post-op pulmonary insufficiency; Requiring 2 Liters via nasal cannula, secondary to COPD and history of tobacco use  Continue incentive spirometry/coughing/deep breathing exercises  Wean supplemental oxygen as tolerated for saturation > 90%    CXR clear, after chest tube has been removed    3  Renal:   Intake/Output net: +590 mL/24 hours, with multiple unmeasured occurrences   Daily weight down 1 Kg  Continue diuresis   Lasix 40 mg IV QD  Potassium Chloride 20 mEq PO QD  Post op Creatinine stable; Follow up labs prn    4  Neuro:  Neurologically intact; No active issues  Incisional pain well-controlled  Continue Tylenol, 975 mg PO q 8, standing dose  Continue Oxycodone, 2 5 to 5 mg PO q 4 hours prn pain    5  GI:  Tolerating TLC 2 3 gm sodium diet  Maintain 1800 mL daily fluid restriction   Continue stool softeners and prn suppository  Continue GI prophylaxis    6  Endo:   Glucose well-controlled with sliding scale coverage    7    Hematology:    Post-operative blood count acceptable; Trend prn    Thrombocytopenia; Resolved;     184, from 126, from 127  From 147      8  Disposition:      Ambulating independently, Anticipate discharge to home when off of supplemental oxygen     VTE Pharmacologic Prophylaxis: Sequential compression device (Venodyne)  and Fondaparinux (Arixtra)  VTE Mechanical Prophylaxis: sequential compression device    Collaborative rounds completed with SAAD Cruz    Plan of care discussed with bedside nurse    SIGNATURE: Francisca García PA-C  DATE: May 25, 2021  TIME: 8:22 AM

## 2021-05-25 NOTE — PLAN OF CARE
Problem: PHYSICAL THERAPY ADULT  Goal: Performs mobility at highest level of function for planned discharge setting  See evaluation for individualized goals  Description: Treatment/Interventions: Functional transfer training, LE strengthening/ROM, Elevations, Therapeutic exercise, Endurance training, Equipment eval/education, Bed mobility, Gait training, Spoke to nursing, OT  Equipment Recommended: Walker(at this time; will cont to monitor progress)       See flowsheet documentation for full assessment, interventions and recommendations  Outcome: Adequate for Discharge  Note: Prognosis: Good  Problem List: Decreased endurance  Assessment: Pt demonstrated significant overall improvement in balance, endurance and all aspects of observed mobility progressing to (I)/mod (I) level on level surfaces (incl amb w/o AD) and (S) on the steps; no overt uncorrected LOB, gross knee buckling, or excessive swaying observed during the session; no increased discomfort expressed; some fatigue noted after bouts of mobilization w/ rest periods provided as needed; LE therex/HEP reviewed and performed in an AROM mode; pt appeared to be comfortable at the end of session/reclined; O2 sat remained in the 90s % t/o the session; overall, cont to anticipate pt will return home w/ available family support upon D/C from PT/mobility stand point and when medically cleared; home PT follow up is recommended; will follow  Barriers to Discharge: None        PT Discharge Recommendation: Home with home health rehabilitation(home PT)          See flowsheet documentation for full assessment

## 2021-05-25 NOTE — PROGRESS NOTES
General Cardiology   Progress Note -  Team One   Nory Goldman 78 y o  male MRN: [id number]    Unit/Bed#: [facility] 420-01 Encounter: [id number]    Assessment  1  Severe symptomatic AS w/ moderate to severe MR s/p AVR w/ a 25 mm Bauer Inspiris bioprosthetic valve, and mitral valve repair w/ a 32 mm Bauer Physio II ring angioplasty and PFO closure (5/20); POD # 5  -Medical therapies; aspirin 325 mg daily, atorvastatin 40 mg daily, and metoprolol tartrate 12 5 mg q 12 hours  -On IV furosemide 40 mg daily for postoperative volume management; 24 hour I&O balance; +520 ml; overall -140 ml ? Accuracy, weights are down trending currently 231 lb today  2  Nonischemic cardiomyopathy - LVEF 40-45%  -Compensated  -TTE 5/20; LVEF (postprocedure); 40-45%, lobe 0 hypokinesis, mildly impaired RV systolic function  -[facility] 2/84; nonobstructive CAD  -GDMT; metoprolol tartrate 12 5 mg q 12 hours ;   -On IV furosemide 40 mg daily (for post-op volume management)     3  New onset, postop atrial fibrillation with RVR  -Resolved  -12 lead ECG 5/24 0747; atrial fibrillation with RVR  -24 hour telemetry review; Converted to NSR at 5:00 p m  yesterday evening, no further AF episodes overnight  -IV amiodarone GTT discontinued  -Remains on oral amiodarone 200 mg t i d  + metoprolol tartrate 12 5 mg q 12 hours  -Initiated on warfarin last evening ; INR 1 1 this am    4  Essential hypertension  -Average /65, last recorded at 106/68, HR 50  -On metoprolol tartrate 12 5 mg q 12 hours     5  Dyslipidemia  -Lipid profile 5/13/2021; cholesterol 103, triglycerides 129, HDL 49, LDL 28  -On atorvastatin 40 mg daily     Plan  -Continue metoprolol tartrate 12 5 mg q 12 hours and oral amiodarone at 200 mg t i d for now; likely to need a short course of oral amiodarone as an outpatient for maintenance of NSR   Will discuss w/attending    -Continue aspirin, and high-intensity statin at current doses  -Consider eventual addition of low-dose ACEI if BP can tolerate   -Continue IV diuresis for postoperative volume management   -Monitor renal function electrolytes closely -- replete as needed  -Strict I&Os, daily standing weights, 2 g NA +diet 1 8 L FR  -Monitor on telemetry     Subjective  Review of Systems   Constitution: Negative for chills, fever and malaise/fatigue  Cardiovascular: Negative for chest pain, dyspnea on exertion, irregular heartbeat, leg swelling, near-syncope, orthopnea and palpitations  Respiratory: Negative for cough and shortness of breath  Gastrointestinal: Negative for abdominal pain  Neurological: Negative for dizziness, headaches and light-headedness  Objective:   Physical Exam  Vitals signs and nursing note reviewed  Constitutional:       General: He is not in acute distress  Appearance: Normal appearance  He is obese  He is not ill-appearing or diaphoretic  HENT:      Head: Normocephalic and atraumatic  Mouth/Throat:      Mouth: Mucous membranes are moist    Eyes:      General: No scleral icterus  Neck:      Musculoskeletal: Neck supple  Cardiovascular:      Rate and Rhythm: Normal rate and regular rhythm  Pulses: Normal pulses  Heart sounds: Normal heart sounds  No murmur  Pulmonary:      Effort: Pulmonary effort is normal       Breath sounds: Normal breath sounds  No wheezing or rales  Abdominal:      Palpations: Abdomen is soft  Musculoskeletal:      Right lower leg: No edema  Left lower leg: No edema  Skin:     General: Skin is warm and dry  Capillary Refill: Capillary refill takes less than 2 seconds  Comments: Mid sternal chest incision well approximated   Neurological:      General: No focal deficit present  Mental Status: He is alert and oriented to person, place, and time  Psychiatric:         Mood and Affect: Mood normal          Vitals: Blood pressure 106/68, pulse 58, temperature (!) 97 2 °F (36 2 °C), resp   rate 17, height 6' 2" (1 88 m), weight 105 kg (231 lb 4 2 oz), SpO2 94 %  ,     Body mass index is 29 69 kg/m²  ,   Systolic (45OLG), HVP:244 , Min:94 , MBO:657     Diastolic (57KDC), FLH:99, Min:59, Max:71      Intake/Output Summary (Last 24 hours) at 5/25/2021 1032  Last data filed at 5/25/2021 0800  Gross per 24 hour   Intake 1120 ml   Output 300 ml   Net 820 ml     Weight (last 2 days)     Date/Time   Weight    05/25/21 0600   105 (231 26)    05/24/21 0600   106 (234 2)    05/23/21 0600   107 (236 77)              LABORATORY RESULTS      CBC with diff:   Results from last 7 days   Lab Units 05/25/21  0532 05/23/21  0429 05/22/21  0513 05/21/21  0358 05/20/21 2008 05/20/21  1307 05/20/21  1302  05/20/21  1051  05/19/21  0439   WBC Thousand/uL 10 10 11 98* 14 17* 16 11*  --   --   --   --   --   --  7 08   HEMOGLOBIN g/dL 11 4* 10 9* 11 4* 12 0 12 4  --  13 9  --   --   --  13 7   I STAT HEMOGLOBIN g/dl  --   --   --   --   --  13 9  --    < > 11 6*   < >  --    HEMATOCRIT % 35 0* 33 8* 35 4* 37 9 38 4  --  42 6  --   --   --  42 5   HEMATOCRIT, ISTAT %  --   --   --   --   --  41  --    < > 34*   < >  --    MCV fL 96 96 97 97  --   --   --   --   --   --  95   PLATELETS Thousands/uL 184 126* 127* 147*  --   --  186  --  200  --  220   MCH pg 31 2 31 0 31 2 30 7  --   --   --   --   --   --  30 5   MCHC g/dL 32 6 32 2 32 2 31 7  --   --   --   --   --   --  32 2   RDW % 13 1 13 5 13 4 13 8  --   --   --   --   --   --  13 2   MPV fL 9 8 9 8 9 3 9 2  --   --  9 0  --  9 3  --  8 9   NRBC AUTO /100 WBCs  --   --   --   --   --   --   --   --   --   --  0    < > = values in this interval not displayed         CMP:  Results from last 7 days   Lab Units 05/25/21  0904 05/23/21  0429 05/22/21  0513 05/21/21  0358 05/20/21  2008 05/20/21  1635 05/20/21  1307 05/20/21  1302 05/20/21  1159 05/20/21  1118 05/20/21  1051 05/20/21  1017 05/20/21  0945 05/20/21  0909  05/19/21  0439   POTASSIUM mmol/L 4 2 4 2 4 1 4 8 4 7 4 6  --  3 9  --   --   --   --   --   --   --  3 8 CHLORIDE mmol/L 100 102 105 110*  --   --   --  110*  --   --   --   --   --   --   --  104   CO2 mmol/L 30 31 28 26  --   --   --  24  --   --   --   --   --   --   --  29   CO2, I-STAT mmol/L  --   --   --   --   --   --  26  --  26 30 28 34* 32 27   < >  --    BUN mg/dL 19 16 18 14  --   --   --  10  --   --   --   --   --   --   --  13   CREATININE mg/dL 0 84 0 60 0 62 0 66  --   --   --  0 84  --   --   --   --   --   --   --  0 77   GLUCOSE, ISTAT mg/dl  --   --   --   --   --   --  158*  --  137 145* 138 115 110 110   < >  --    CALCIUM mg/dL 9 9 8 8 8 5 8 1*  --   --   --  8 4  --   --   --   --   --   --   --  9 3   EGFR ml/min/1 73sq m 91 104 103 100  --   --   --  91  --   --   --   --   --   --   --  94    < > = values in this interval not displayed  BMP:  Results from last 7 days   Lab Units 05/25/21  0904 05/23/21  0429 05/22/21  0513 05/21/21  0358 05/20/21 2008 05/20/21  1635 05/20/21  1307 05/20/21  1302 05/20/21  1159  05/19/21  0439   POTASSIUM mmol/L 4 2 4 2 4 1 4 8 4 7 4 6  --  3 9  --   --  3 8   CHLORIDE mmol/L 100 102 105 110*  --   --   --  110*  --   --  104   CO2 mmol/L 30 31 28 26  --   --   --  24  --   --  29   CO2, I-STAT mmol/L  --   --   --   --   --   --  26  --  26   < >  --    BUN mg/dL 19 16 18 14  --   --   --  10  --   --  13   CREATININE mg/dL 0 84 0 60 0 62 0 66  --   --   --  0 84  --   --  0 77   GLUCOSE, ISTAT mg/dl  --   --   --   --   --   --  158*  --  137   < >  --    CALCIUM mg/dL 9 9 8 8 8 5 8 1*  --   --   --  8 4  --   --  9 3    < > = values in this interval not displayed         Lab Results   Component Value Date    NTBNP 4,493 (H) 05/11/2021    NTBNP 3,228 (H) 05/10/2021        Results from last 7 days   Lab Units 05/25/21  0904 05/23/21  0429 05/22/21  0513 05/21/21  0358   MAGNESIUM mg/dL 2 2 1 9 2 3 2 6                   Results from last 7 days   Lab Units 05/25/21  0532 05/24/21  0927   INR  1 12 1 07       Lipid Profile:   Lab Results   Component Value Date    CHOL 209 2014     Lab Results   Component Value Date    HDL 49 2021    HDL 39 2014     Lab Results   Component Value Date    LDLCALC 28 2021    LDLCALC 126 (H) 2014     Lab Results   Component Value Date    TRIG 129 2021    TRIG 220 2014       Cardiac testing:   Results for orders placed during the hospital encounter of 21   Echo complete with contrast if indicated    Narrative [address] [place] [address] 35  [facility] [address]  [phone]    Transthoracic Echocardiogram  2D, M-mode, Doppler, and Color Doppler    Study date:  12-May-2021    Patient: Javi Rodriguez  MR number: [id number]  Account number: [de-identified]  : 1955  Age: 78 years  Gender: Male  Status: Outpatient  Location: Bedside  Height: 74 in  Weight: 245 5 lb  BP: 102/ 70 mmHg    Indications: Chest pain  Diagnoses: R07 9 - Chest pain, unspecified    Sonographer:  George Peck RDCS  Primary Physician:  Zayra Kaiser DO  Referring Physician:  Bro Terrazas DO  Group:  [id number]  Interpreting Physician:  Rachel Cm MD    SUMMARY    LEFT VENTRICLE:  Moderate decreased left ventricular systolic function, EF 63%  Significantly decreased left ventricular global longitudinal strain, minus 7 4%  Severe concentric left ventricular hypertrophy, wall thickness 21 mm  Mildly increased left  ventricular cavity size  Grade 3 left ventricular diastolic dysfunction  Severely elevated left ventricular filling pressures  RIGHT VENTRICLE:  Mild right ventricular enlargement with well preserved right ventricular systolic function  LEFT ATRIUM:  Moderate left atrial enlargement  RIGHT ATRIUM:  Mild right atrial enlargement  MITRAL VALVE:  Moderate possibly severe mitral regurgitation  No mitral stenosis  Mitral ERO 0 5 cm2  Mitral valve regurgitant volume 85 mL  The mitral valve annulus was heavily calcified   The mitral valve leaflets were calcified but have well preserved  mobility  There was some calcification in the chordae  AORTIC VALVE:  Heavily calcified aortic valve with severe, probably critical aortic stenosis  No significant aortic regurgitation  Aortic valve maximum velocity 5 3 M/S  Aortic valve mean gradient 81 mmHg  Aortic valve area 0 3 cm2  TRICUSPID VALVE:  There was mild regurgitation  AORTA:  Aortic root at the upper limits of normal to mildly enlarged at 3 8 cm    PULMONARY ARTERIES:  Moderate pulmonary hypertension  Pulmonary artery systolic pressures estimated 54 mmHg  PERICARDIUM:  Mild pericardial effusion without hemodynamic significance  SUMMARY MEASUREMENTS  2D measurements:  Unspecified Anatomy:   %FS was 25 4 %  AA PSSL Full was -8 3 %  AAS PSSL Full was -15 1 %  AI PSSL Full was -12 5 %  AL PSSL Full was -5 3 %  AP PSSL Full was -5 4 %  AS PSSL Full was -20 9 %  AVC was 378 3 ms  Ao Diam was 3 4 cm  Ao asc was 3 8 cm  BA PSSL Full was -2 8 %  BAS PSSL Full was -2 6 %  BI PSSL Full was -13 5 %  BL PSSL Full was -6 9 %  BP PSSL Full was -4 2 %  BS PSSL Full was -4 1 %  EDV(Teich) was 136 1 ml   EF(Teich) was 49 7 %  ESV(Teich)  was 68 5 ml   G peak SL Full(A2C) was -7 5 %  G peak SL Full(A4C) was -8 5 %  G peak SL Full(APLAX) was -6 1 %  G peak SL Full(Avg) was -7 4 %  IVSd was 2 1 cm  LA Area was 31 1 cm2  LA Diam was 6 3 cm  LAAs A4C was 30 2 cm2  LAESV  A-L A4C was 122 9 ml  LAESV MOD A4C was 116 1 ml  LALs A4C was 6 3 cm  LVEDV MOD A4C was 217 9 ml  LVEF MOD A4C was 42 2 %  LVESV MOD A4C was 126 ml  LVIDd was 5 3 cm  LVIDs was 4 cm  LVLd A4C was 10 9 cm  LVLs A4C was 10 cm  LVOT Diam was 2 cm  LVPWd was 2 1 cm  MA PSSL Full was -4 3 %  MAS PSSL Full was -10 5 %  MI PSSL Full was -5 %  ML PSSL Full was -3 1 %  MP PSSL Full was 3 2 %  MS PSSL Full was -10 8 %  Peak SL Dispersion Full was 61 5 ms  RA  Area was 21 1 cm2  RVIDd was 5 cm  RWT was 0 8   SV MOD A4C was 91 9 ml   SV(Teich) was 67 6 ml   CF measurements:  Unspecified Anatomy:   MR Als  Marito was 0 4 m/s  MR Flow was 255 2 ml/s  MR Rad was 1 1 cm  MR Trace was 8 9 cm2   CW measurements:  Unspecified Anatomy:   AV Env  Ti was 287 3 ms   AV VTI was 125 6 cm   AV Vmax was 5 3 m/s  AV Vmean was 4 4 m/s  AV maxPG was 114 3 mmHg  AV meanPG was 80 6 mmHg  MR VTI was 162 5 cm  MR Vmax was 4 9 m/s   TR Vmax was 3 1 m/s  TR  maxPG was 39 3 mmHg  MM measurements:  Unspecified Anatomy:   TAPSE was 2 cm  PW measurements:  Unspecified Anatomy:   HUMBERTO (VTI) was 0 3 cm2  HUMBERTO Vmax was 0 3 cm2  AVAI (VTI) was 0 cm2/m2  AVAI Vmax was 0 cm2/m2  DVI was 0 1   E' Sept was 0 m/s  E/E' Sept was 44 1   LVOT Env  Ti was 303 2 ms  LVOT VTI was 13 7 cm  LVOT Vmax  was 0 6 m/s  LVOT Vmean was 0 5 m/s  LVOT maxPG was 1 4 mmHg  LVOT meanPG was 0 9 mmHg  LVSI Dopp was 17 8 ml/m2  LVSV Dopp was 42 3 ml   MR ERO was 0 5 cm2  MR RV was 85 2 ml   MV A Marito was 0 6 m/s  MV Dec Fajardo was 11 4 m/s2  MV  DecT was 136 5 ms   MV E Marito was 1 6 m/s  MV E/A Ratio was 2 4   MV PHT was 39 6 ms  MVA By PHT was 5 6 cm2  HISTORY: PRIOR HISTORY: Severe AS  COPD  Hyperlipidemia  Hypertension  Shortness of breath  Chest pain  GERD  Smoker  LVH  PROCEDURE: The procedure was performed at the bedside  This was a routine study  The transthoracic approach was used  The study included complete 2D imaging, M-mode, complete spectral Doppler, and color Doppler  The heart rate was 80 bpm,  at the start of the study  Images were obtained from the parasternal, apical, subcostal, and suprasternal notch acoustic windows  Image quality was adequate  LEFT VENTRICLE: Moderate decreased left ventricular systolic function, EF 87%  Significantly decreased left ventricular global longitudinal strain, minus 7 4%  Severe concentric left ventricular hypertrophy, wall thickness 21 mm   Mildly  increased left ventricular cavity size  Grade 3 left ventricular diastolic dysfunction  Severely elevated left ventricular filling pressures  RIGHT VENTRICLE: Mild right ventricular enlargement with well preserved right ventricular systolic function  LEFT ATRIUM: Moderate left atrial enlargement  RIGHT ATRIUM: Mild right atrial enlargement  MITRAL VALVE: Moderate possibly severe mitral regurgitation  No mitral stenosis  Mitral ERO 0 5 cm2  Mitral valve regurgitant volume 85 mL  The mitral valve annulus was heavily calcified  The mitral valve leaflets were calcified but have  well preserved mobility  There was some calcification in the chordae  AORTIC VALVE: Heavily calcified aortic valve with severe, probably critical aortic stenosis  No significant aortic regurgitation  Aortic valve maximum velocity 5 3 M/S  Aortic valve mean gradient 81 mmHg  Aortic valve area 0 3 cm2  TRICUSPID VALVE: The valve structure was normal  There was normal leaflet separation  DOPPLER: The transtricuspid velocity was within the normal range  There was no evidence for stenosis  There was mild regurgitation  PULMONIC VALVE: DOPPLER: The transpulmonic velocity was within the normal range  There was no regurgitation  PERICARDIUM: Mild pericardial effusion without hemodynamic significance  AORTA: Aortic root at the upper limits of normal to mildly enlarged at 3 8 cm    PULMONARY ARTERY: Moderate pulmonary hypertension  Pulmonary artery systolic pressures estimated 54 mmHg      SYSTEM MEASUREMENT TABLES    2D  %FS: 25 4 %  AA PSSL Full: -8 3 %  AAS PSSL Full: -15 1 %  AI PSSL Full: -12 5 %  AL PSSL Full: -5 3 %  AP PSSL Full: -5 4 %  AS PSSL Full: -20 9 %  AVC: 378 3 ms  Ao Diam: 3 4 cm  Ao asc: 3 8 cm  BA PSSL Full: -2 8 %  BAS PSSL Full: -2 6 %  BI PSSL Full: -13 5 %  BL PSSL Full: -6 9 %  BP PSSL Full: -4 2 %  BS PSSL Full: -4 1 %  EDV(Teich): 136 1 ml  EF(Teich): 49 7 %  ESV(Teich): 68 5 ml  G peak SL Full(A2C): -7 5 %  G peak SL Full(A4C): -8 5 %  G peak SL Full(APLAX): -6 1 %  G peak SL Full(Avg): -7 4 %  IVSd: 2 1 cm  LA Area: 31 1 cm2  LA Diam: 6 3 cm  LAAs A4C: 30 2 cm2  LAESV A-L A4C: 122 9 ml  LAESV MOD A4C: 116 1 ml  LALs A4C: 6 3 cm  LVEDV MOD A4C: 217 9 ml  LVEF MOD A4C: 42 2 %  LVESV MOD A4C: 126 ml  LVIDd: 5 3 cm  LVIDs: 4 cm  LVLd A4C: 10 9 cm  LVLs A4C: 10 cm  LVOT Diam: 2 cm  LVPWd: 2 1 cm  LVd Mass: 718 1 g  LVd Mass Index: 303 g/m2  MA PSSL Full: -4 3 %  MAS PSSL Full: -10 5 %  MI PSSL Full: -5 %  ML PSSL Full: -3 1 %  MP PSSL Full: 3 2 %  MS PSSL Full: -10 8 %  Peak SL Dispersion Full: 61 5 ms  RA Area: 21 1 cm2  RVIDd: 5 cm  RWT: 0 8  SV MOD A4C: 91 9 ml  SV(Teich): 67 6 ml    CF  MR Als  Marito: 0 4 m/s  MR Flow: 255 2 ml/s  MR Rad: 1 1 cm  MR Trace: 8 9 cm2    CW  AV Env  Ti: 287 3 ms  AV VTI: 125 6 cm  AV Vmax: 5 3 m/s  AV Vmean: 4 4 m/s  AV maxP 3 mmHg  AV meanP 6 mmHg  MR VTI: 162 5 cm  MR Vmax: 4 9 m/s  TR Vmax: 3 1 m/s  TR maxP 3 mmHg    MM  TAPSE: 2 cm    PW  HUMBERTO (VTI): 0 3 cm2  HUMBERTO Vmax: 0 3 cm2  AVAI (VTI): 0 cm2/m2  AVAI Vmax: 0 cm2/m2  DVI: 0 1  E' Sept: 0 m/s  E/E' Sept: 44 1  LVOT Env  Ti: 303 2 ms  LVOT VTI: 13 7 cm  LVOT Vmax: 0 6 m/s  LVOT Vmean: 0 5 m/s  LVOT maxP 4 mmHg  LVOT meanP 9 mmHg  LVSI Dopp: 17 8 ml/m2  LVSV Dopp: 42 3 ml  MR ERO: 0 5 cm2  MR RV: 85 2 ml  MV A Marito: 0 6 m/s  MV Dec Payne: 11 4 m/s2  MV DecT: 136 5 ms  MV E Marito: 1 6 m/s  MV E/A Ratio: 2 4  MV PHT: 39 6 ms  MVA By PHT: 5 6 cm2    [facility] Accredited Echocardiography Laboratory    Prepared and electronically signed by    Consuello Olszewski, MD  Signed 12-May-2021 12:22:39       Results for orders placed during the hospital encounter of 21   KATIA    Narrative [address]  [phone]    Transesophageal Echocardiogram  2D, 3D, Doppler, and Color Doppler    Study date:  14-May-2021    Patient: Fani Be [facility]  MR number: [id number]  Account number: [id number]  : 1955  Age: 78 years  Gender: Male  Status: Inpatient  Location: Echo lab  Height: 74 in  Weight: 233 lb  BP: 98/ 70 mmHg    Indications: Mitral valve disease  Diagnoses: I34 9 - Nonrheumatic mitral valve disorder, unspecified    Sonographer:  Lino Cheng RDCS  Interpreting Physician:  Brittney Brunson MD  Primary Physician:  Radha Blanc DO  Referring Physician:  Kami Duarte PA-C  Group:  [facility]  Cardiology Fellow:  Nataliia Castañeda MD  RN:  Ran Fink RN    SUMMARY    LEFT VENTRICLE:  The ventricle was mildly dilated  Systolic function was moderately reduced  Ejection fraction was estimated to be 45 %  There was moderate diffuse hypokinesis  Wall thickness was moderately increased  The changes were consistent with eccentric hypertrophy  Features were consistent with a pseudonormal left ventricular filling pattern, with concomitant abnormal relaxation and increased filling pressure (grade 2 diastolic dysfunction)  RIGHT VENTRICLE:  The ventricle was mildly dilated  Systolic function was moderately reduced  LEFT ATRIUM:  The atrium was mildly dilated  LEFT ATRIAL APPENDAGE:  No thrombus was identified  ATRIAL SEPTUM:  There was a patent foramen ovale measuring 5 mm  Doppler evaluation was performed  There was a left-to-right shunt, in the baseline state  RIGHT ATRIUM:  The atrium was mildly dilated  MITRAL VALVE:  There was mild to moderate annular calcification  There was an area of image dropout at the base of the posterior leaflet of the mitral valve in the P1- P2 segment  This was possibly artifact but a perforation or sinus cannot be entirely ruled out  There appeared to be flow into but not  out of this region  There was mild to moderate regurgitation  The regurgitant jet was centrally directed  AORTIC VALVE:  The valve was not visualized well enough to rule out a bicuspid morphology   Leaflets exhibited marked calcification and immobility  There was severe stenosis  There was mild to moderate regurgitation  Valve mean gradient was 44 mmHg  TRICUSPID VALVE:  There was mild regurgitation  PERICARDIUM:  A small pericardial effusion was identified circumferential to the heart  HISTORY: PRIOR HISTORY: Aortic stenosis  HLD  Hypertension  NSTEMI  LVH  Tobacco use  GERD  Shortness of breath  COPD  PROCEDURE: The procedure was performed in the echo lab  This was a routine study  The risks and alternatives of the procedure were explained to the patient and informed consent was obtained  The transesophageal approach was used  The study  included complete 2D imaging, 3D imaging, complete spectral Doppler, and color Doppler  The heart rate was 74 bpm, at the start of the study  An adult omniplane probe was inserted by the cardiology fellow under direct supervision of the  attending cardiologist  Images were obtained from the parasternal and apical acoustic windows  Intubated with ease  One intubation attempt(s)  There was no blood detected on the probe  Image quality was adequate  There were no  complications during the procedure  MEDICATIONS: Anesthesia administered by anesthesia team     LEFT VENTRICLE: The ventricle was mildly dilated  Systolic function was moderately reduced  Ejection fraction was estimated to be 45 %  There was moderate diffuse hypokinesis  Wall thickness was moderately increased  The changes were  consistent with eccentric hypertrophy  DOPPLER: Features were consistent with a pseudonormal left ventricular filling pattern, with concomitant abnormal relaxation and increased filling pressure (grade 2 diastolic dysfunction)  RIGHT VENTRICLE: The ventricle was mildly dilated  Systolic function was moderately reduced  LEFT ATRIUM: The atrium was mildly dilated  APPENDAGE: The size was normal  No thrombus was identified   DOPPLER: The function was mildly reduced (mildly reduced emptying velocity)  ATRIAL SEPTUM: There was a patent foramen ovale measuring 5 mm  Doppler evaluation was performed  There was a left-to-right shunt, in the baseline state  RIGHT ATRIUM: The atrium was mildly dilated  MITRAL VALVE: There was mild to moderate annular calcification  There was an area of image dropout at the base of the posterior leaflet of the mitral valve in the P1- P2 segment  This was possibly artifact but a perforation or sinus cannot  be entirely ruled out  There appeared to be flow into but not out of this region  There was no echocardiographic evidence of vegetation  DOPPLER: There was mild to moderate regurgitation  The regurgitant jet was centrally directed  AORTIC VALVE: The valve was not visualized well enough to rule out a bicuspid morphology  Leaflets exhibited marked calcification and immobility  DOPPLER: There was severe stenosis  There was mild to moderate regurgitation  TRICUSPID VALVE: The valve structure was normal  There was normal leaflet separation  DOPPLER: There was mild regurgitation  The regurgitant jet was toward the septum  The findings suggest at least mild pulmonary hypertension  PULMONIC VALVE: Not well visualized  PERICARDIUM: A small pericardial effusion was identified circumferential to the heart  AORTA: The root exhibited normal size  Discrete areas of mild to moderate atherosclerosis were present  There was no evidence for dissection  There was no evidence for aneurysm  PULMONARY VEINS: DOPPLER: There was systolic blunting in the pulmonary vein(s)      MEASUREMENT TABLES    DOPPLER MEASUREMENTS  LVOT   (Reference normals)  Peak russell   79 cm/s   (--)  Mean russell   47 cm/s   (--)  VTI   21 cm   (--)  Peak gradient   2 mmHg   (--)  Mean gradient   1 mmHg   (--)  Aortic valve   (Reference normals)  Peak russell   459 cm/s   (--)  Mean russell   331 cm/s   (--)  VTI   110 cm   (--)  Peak gradient   84 27 mmHg   (--)  Mean gradient   44 mmHg   (--)  Obstr index, VTI   0 19    (--)  Obstr index, Vmax   0 17    (--)  Obstr index, Vmean   0 14    (--)  Mitral valve   (Reference normals)  Regurg alias russell   31 cm/s   (--)  Regurg PISA radius   6 mm   (--)  Max MR flow rate   70 12 ml/s   (--)    [facility]    Prepared and electronically signed by    Oleksandr Quiñones MD  Signed 14-May-2021 15:17:03       No results found for this or any previous visit  No procedure found  No results found for this or any previous visit      Meds/Allergies   all current active meds have been reviewed and current meds:   Current Facility-Administered Medications   Medication Dose Route Frequency    acetaminophen (TYLENOL) tablet 975 mg  975 mg Oral Q8H    albuterol (PROVENTIL HFA,VENTOLIN HFA) inhaler 2 puff  2 puff Inhalation Q4H PRN    amiodarone tablet 200 mg  200 mg Oral Q8H [address]    aspirin tablet 325 mg  325 mg Oral Daily    atorvastatin (LIPITOR) tablet 40 mg  40 mg Oral QPM    bisacodyl (DULCOLAX) rectal suppository 10 mg  10 mg Rectal Daily PRN    Diclofenac Sodium (VOLTAREN) 1 % topical gel 2 g  2 g Topical TID PRN    docusate sodium (COLACE) capsule 100 mg  100 mg Oral BID    fluticasone-vilanterol (BREO ELLIPTA) 100-25 mcg/inh inhaler 1 puff  1 puff Inhalation Daily    fondaparinux (ARIXTRA) subcutaneous injection 2 5 mg  2 5 mg Subcutaneous Q24H    furosemide (LASIX) injection 40 mg  40 mg Intravenous Daily    insulin lispro (HumaLOG) 100 units/mL subcutaneous injection 1-6 Units  1-6 Units Subcutaneous TID AC    insulin lispro (HumaLOG) 100 units/mL subcutaneous injection 1-6 Units  1-6 Units Subcutaneous HS    metoprolol tartrate (LOPRESSOR) partial tablet 12 5 mg  12 5 mg Oral Q12H GEORGES    ondansetron (ZOFRAN) injection 4 mg  4 mg Intravenous Q6H PRN    oxyCODONE (ROXICODONE) IR tablet 2 5 mg  2 5 mg Oral Q4H PRN    oxyCODONE (ROXICODONE) IR tablet 5 mg  5 mg Oral Q4H PRN    pantoprazole (PROTONIX) EC tablet 40 mg 40 mg Oral Daily    polyethylene glycol (MIRALAX) packet 17 g  17 g Oral Daily    potassium chloride (K-DUR,KLOR-CON) CR tablet 20 mEq  20 mEq Oral Daily    temazepam (RESTORIL) capsule 15 mg  15 mg Oral HS PRN    warfarin (COUMADIN) tablet 5 mg  5 mg Oral Once (warfarin)          EKG personally reviewed by BITA Dias    Assessment:  Principal Problem: Aortic valve stenosis - severe  Active Problems:    Chronic obstructive lung disease ([facility])    Hyperlipidemia    Essential hypertension    NSTEMI (non-ST elevated myocardial infarction) ([facility])    GERD (gastroesophageal reflux disease)    Acute combined systolic and diastolic CHF, NYHA class 1 ([facility])    S/P AVR    PFO (patent foramen ovale)    S/P MVR (mitral valve repair)    Counseling / Coordination of Care  Total floor / unit time spent today 20 minutes  Greater than 50% of total time was spent with the patient and / or family counseling and / or coordination of care  ** Please Note: Dragon 360 Dictation voice to text software may have been used in the creation of this document   **

## 2021-05-25 NOTE — PHYSICAL THERAPY NOTE
PHYSICAL THERAPY NOTE          Patient Name: Layne Joseph  TYEQH'K Date: 5/25/2021 05/25/21 3717   PT Last Visit   PT Visit Date 05/25/21   Note Type   Note Type Treatment   Pain Assessment   Pain Assessment Tool Pain Assessment not indicated - pt denies pain   Pain Score No Pain   Restrictions/Precautions   Other Precautions Cardiac/sternal;Telemetry   General   Chart Reviewed Yes   Additional Pertinent History cleared for Tx session (spoke to cleo)   Response to Previous Treatment Patient with no complaints from previous session  Cognition   Overall Cognitive Status WFL   Arousal/Participation Alert; Cooperative   Attention Within functional limits   Orientation Level Oriented to person;Oriented to place;Oriented to situation   Memory Within functional limits   Following Commands Follows all commands and directions without difficulty   Subjective   Subjective Pt is reclined in the chair; agreeable to amb and try steps;   Transfers   Sit to Stand 6  Modified independent  (3 trials)   Stand to Sit 6  Modified independent  (3 trials)   Ambulation/Elevation   Gait pattern Inconsistent raul  (no overt LOB or excessive swaying)   Gait Assistance 6  Modified independent   Assistive Device None   Distance 2 x 20 ft w/ steps negotiation in between; after seated rest period, another 2 x 150 ft w/ seated rest period in between; O2 sat at 92-93% for the most part during mobilization w/ 90 % post steps and 95 % post amb     Stair Management Assistance 5  Supervision   Additional items Verbal cues  (reviewed sequencing and UE precautions in the hand rail)   Stair Management Technique One rail R;Step to pattern;Nonreciprocal   Number of Stairs 7   Balance   Static Sitting Good   Static Standing Fair +   Ambulatory Fair   Activity Tolerance   Activity Tolerance Patient limited by fatigue   Medical Staff Made Aware spoke to Nela Mcnair w/ CT sx   Nurse Made Aware spoke to [address]   Exercises   Knee AROM Long Arc Quad Sitting;15 reps;AROM; Bilateral  (HEP)   Ankle Pumps Sitting;15 reps;AROM; Bilateral  (HEP)   Marching Sitting;10 reps;AROM; Bilateral  (HEP)   Equipment Use   Comments Pt was reclined in the chair w/ LE elevated at the end of session; call bell w/in reach   Assessment   Prognosis Good   Problem List Decreased endurance   Assessment Pt demonstrated significant overall improvement in balance, endurance and all aspects of observed mobility progressing to (I)/mod (I) level on level surfaces (incl amb w/o AD) and (S) on the steps; no overt uncorrected LOB, gross knee buckling, or excessive swaying observed during the session; no increased discomfort expressed; some fatigue noted after bouts of mobilization w/ rest periods provided as needed; LE therex/HEP reviewed and performed in an AROM mode; pt appeared to be comfortable at the end of session/reclined; O2 sat remained in the 90s % t/o the session; overall, cont to anticipate pt will return home w/ available family support upon D/C from PT/mobility stand point and when medically cleared; home PT follow up is recommended; will follow  Barriers to Discharge None   Goals   Patient Goals to go home   STG Expiration Date 05/31/21   PT Treatment Day 1   Plan   Treatment/Interventions Elevations; Endurance training;Bed mobility;Spoke to nursing;Spoke to advanced practitioner;OT   Progress Improving as expected   PT Frequency Other (Comment)  (4-6x/wk)   Recommendation   PT Discharge Recommendation Home with home health rehabilitation  (home PT)   Equipment Recommended Other (Comment)  (none)   Walker Package Recommended   (none)   AM-PAC Basic Mobility Inpatient   Turning in Bed Without Bedrails 4   Lying on Back to Sitting on Edge of Flat Bed 4   Moving Bed to Chair 4   Standing Up From Chair 4   Walk in Room 4   Climb 3-5 Stairs 3   Basic Mobility Inpatient Raw Score 23   Basic Mobility Standardized Score 50 88       Toby Mon, PT

## 2021-05-26 VITALS
TEMPERATURE: 97.4 F | OXYGEN SATURATION: 91 % | DIASTOLIC BLOOD PRESSURE: 72 MMHG | RESPIRATION RATE: 16 BRPM | WEIGHT: 227.4 LBS | SYSTOLIC BLOOD PRESSURE: 112 MMHG | BODY MASS INDEX: 29.18 KG/M2 | HEART RATE: 69 BPM | HEIGHT: 74 IN

## 2021-05-26 LAB
GLUCOSE SERPL-MCNC: 103 MG/DL (ref 65–140)
INR PPP: 1.22 (ref 0.84–1.19)
PROTHROMBIN TIME: 15.4 SECONDS (ref 11.6–14.5)

## 2021-05-26 PROCEDURE — 97530 THERAPEUTIC ACTIVITIES: CPT

## 2021-05-26 PROCEDURE — 99024 POSTOP FOLLOW-UP VISIT: CPT | Performed by: THORACIC SURGERY (CARDIOTHORACIC VASCULAR SURGERY)

## 2021-05-26 PROCEDURE — 99024 POSTOP FOLLOW-UP VISIT: CPT | Performed by: PHYSICIAN ASSISTANT

## 2021-05-26 PROCEDURE — 82948 REAGENT STRIP/BLOOD GLUCOSE: CPT

## 2021-05-26 PROCEDURE — 85610 PROTHROMBIN TIME: CPT | Performed by: PHYSICIAN ASSISTANT

## 2021-05-26 RX ORDER — AMIODARONE HYDROCHLORIDE 200 MG/1
200 TABLET ORAL DAILY
Qty: 30 TABLET | Refills: 0 | Status: SHIPPED | OUTPATIENT
Start: 2021-05-26 | End: 2021-06-22 | Stop reason: SDUPTHER

## 2021-05-26 RX ORDER — POTASSIUM CHLORIDE 20 MEQ/1
20 TABLET, EXTENDED RELEASE ORAL DAILY
Qty: 7 TABLET | Refills: 2 | Status: SHIPPED | OUTPATIENT
Start: 2021-05-26 | End: 2021-06-07 | Stop reason: CLARIF

## 2021-05-26 RX ORDER — DOCUSATE SODIUM 100 MG/1
100 CAPSULE, LIQUID FILLED ORAL 2 TIMES DAILY
Qty: 60 CAPSULE | Refills: 0 | Status: SHIPPED | OUTPATIENT
Start: 2021-05-26 | End: 2021-06-22 | Stop reason: ALTCHOICE

## 2021-05-26 RX ORDER — TORSEMIDE 20 MG/1
20 TABLET ORAL DAILY
Qty: 7 TABLET | Refills: 2 | Status: SHIPPED | OUTPATIENT
Start: 2021-05-26 | End: 2021-06-07 | Stop reason: CLARIF

## 2021-05-26 RX ORDER — POLYETHYLENE GLYCOL 3350 17 G/17G
17 POWDER, FOR SOLUTION ORAL DAILY
Qty: 510 G | Refills: 0 | Status: SHIPPED | OUTPATIENT
Start: 2021-05-26 | End: 2021-06-22 | Stop reason: ALTCHOICE

## 2021-05-26 RX ORDER — ATORVASTATIN CALCIUM 40 MG/1
40 TABLET, FILM COATED ORAL EVERY EVENING
Qty: 90 TABLET | Refills: 2 | Status: SHIPPED | OUTPATIENT
Start: 2021-05-26 | End: 2022-03-15 | Stop reason: SDUPTHER

## 2021-05-26 RX ORDER — OXYCODONE HYDROCHLORIDE 5 MG/1
5 TABLET ORAL EVERY 6 HOURS PRN
Qty: 28 TABLET | Refills: 0 | Status: SHIPPED | OUTPATIENT
Start: 2021-05-26 | End: 2021-06-02

## 2021-05-26 RX ORDER — SENNOSIDES 8.6 MG
650 CAPSULE ORAL EVERY 8 HOURS PRN
Qty: 30 TABLET | Refills: 0 | Status: SHIPPED | OUTPATIENT
Start: 2021-05-26 | End: 2021-06-25

## 2021-05-26 RX ORDER — ASPIRIN 325 MG
325 TABLET ORAL DAILY
Qty: 90 TABLET | Refills: 2 | Status: SHIPPED | OUTPATIENT
Start: 2021-05-27 | End: 2021-08-04

## 2021-05-26 RX ADMIN — FONDAPARINUX SODIUM 2.5 MG: 2.5 INJECTION, SOLUTION SUBCUTANEOUS at 05:36

## 2021-05-26 RX ADMIN — ACETAMINOPHEN 975 MG: 325 TABLET, FILM COATED ORAL at 05:36

## 2021-05-26 RX ADMIN — ASPIRIN 325 MG ORAL TABLET 325 MG: 325 PILL ORAL at 09:18

## 2021-05-26 RX ADMIN — FUROSEMIDE 40 MG: 10 INJECTION, SOLUTION INTRAMUSCULAR; INTRAVENOUS at 09:19

## 2021-05-26 RX ADMIN — FLUTICASONE FUROATE AND VILANTEROL TRIFENATATE 1 PUFF: 100; 25 POWDER RESPIRATORY (INHALATION) at 09:24

## 2021-05-26 RX ADMIN — POTASSIUM CHLORIDE 20 MEQ: 1500 TABLET, EXTENDED RELEASE ORAL at 09:18

## 2021-05-26 RX ADMIN — AMIODARONE HYDROCHLORIDE 200 MG: 200 TABLET ORAL at 05:36

## 2021-05-26 RX ADMIN — Medication 12.5 MG: at 09:18

## 2021-05-26 RX ADMIN — PANTOPRAZOLE SODIUM 40 MG: 40 TABLET, DELAYED RELEASE ORAL at 09:18

## 2021-05-26 NOTE — DISCHARGE SUMMARY
Discharge Summary - Cardiothoracic Surgery   Delvin Correa 78 y o  male MRN: [id number]  [facility] Encounter: [id number]    Admission Date: 5/12/2021     Discharge Date: 05/26/21    Admitting Diagnosis: NSTEMI (non-ST elevated myocardial infarction) [facility]) [I21 4]    Primary Discharge Diagnosis:   Aortic stenosis, Non-Rheumatic, Mitral regurgitation  S/P AVR (#25mm Montgomery Inspiris), MV repair (#32mm Lynford Antonio) w/ PFO closure;    Secondary Discharge Diagnosis:   COPD, Tobacco abuse, HTN     Attending: SAAD Armijo  Consulting Physician(s):   Cardiology  Medical/Critical Care    Procedures Performed:   Procedure(s):  REPLACEMENT VALVE AORTIC (AVR) TISSUE with 25mm montgomery inspiris tissue valve  VALVE MITRAL (MVR) REPAIR with 32mm physio II annuloplasty ring  TRANSESOPHAGEAL ECHOCARDIOGRAM (KATIA)     Hospital Course:   1113: 78Year old male with known severe aortic stenosis diagnosed in 2019  At that time he presented for evaluation with Cardiology  Surgical intervention was not pursued at that time as the patient was not particularly symptomatic  He currently presents with NSTEMI and severe AS  Transferred to [facility] for cardiac catheterization and cardiac surgery consultation  Pre Op work up initiated  5/14: KATIA reported  PFTs complete  OMFS consult complete, tentative OR for Monday  No other events  5/15: No issues  5/16: No issues  5/17: OMFS extractions today  5/18: No events  Denies angina/dyspnea  Surgical consent signed  5/19: Preop orders placed  Ready for OR tomorrow  5/20:  AVR (#25mm Montgomery Inspiris), MV repair (#32mm Montgomery Physio II Ring) w/ PFO closure  Transferred to ICU supported with epi @ 2 and levo @ 2  No significant postoperative bleeding  Wean towards extubation  NSR 86 w/ 1st degree AVB, prolong QTc 548      5/21: Given post-op fluid for hypotension   Weaned offf epi & levo, levo restarted at 2 QHS for hypotension, hold beta blocker, wean levo to off then discontinue a line, delined QHS  Extubated to [address], wean as tolerated  EKG w/ incomplete LBBB & T wave inversions stable from post-op  CXR w/ small to moderate left PTX, PA/lateral this AM then IR consult for left pigtail placement  Start Lasix 40mg IV QDay, discontinue danielle catheter  Discontinue insulin gtt, transition to [facility]  Transfer to telem once off levo      5/22: Sumit @ 30mgc/min on 2LNC in NSR  Increased Lasix to bid for +500ml/24h balance  Maintain chest tubes  Transfer to telemetry when bed availble  5/23: Sumit @ 20mcg/min  No PTX  D/C mediastinal CT and keep pleural to waterseal   D/C mona, monitor BP with cuff pressures  Labs stable  5/24: Transferred from ICU to telemetry  Remains on oxygen at 2L; Wean off  No recurrent pneumothorax, s/p left pigtail catheter  No air leak  F/U CXR and D/c  Sumit weaned off  Add Lopressor, 12 5mg PO BID  Thrombocytopenia; 126, from 127  From 147; CBC in AM  Late morning; Rapid AF with ambulation  Load with IV amiodarone and dose coumadin, 2 5 mg       5/25: Developed rapid AF yesterday at 0900; Converted back to NSR at 1700  No further AF overnight  IV Amiodarone infusing at 0 5; D/C now  Continue PO Amiodarone  HR 57-61; Decrease Lopressor, 12 5mg PO q 12 hours  Remains on oxygen at 2L; Wean off  Repeat electrolyte labs today  INR 1 1; Coumadin, 5 mg dosed  5/26: Remains in NSR  No events  Fit for discharge to home without coumadin therapy    Condition at Discharge:   good     Discharge Physical Exam:    Please see the documented physical exam from this morning's progress note for details      Discharge Data:  Results from last 7 days   Lab Units 05/25/21  0532 05/23/21  0429 05/22/21  0513   WBC Thousand/uL 10 10 11 98* 14 17*   HEMOGLOBIN g/dL 11 4* 10 9* 11 4*   HEMATOCRIT % 35 0* 33 8* 35 4*   PLATELETS Thousands/uL 184 126* 127*     Results from last 7 days   Lab Units 05/25/21  0904 05/23/21  0429 05/22/21  0513  05/20/21  1307   POTASSIUM mmol/L 4 2 4 2 4 1   < >  --    CHLORIDE mmol/L 100 102 105   < >  --    CO2 mmol/L 30 31 28   < >  --    CO2, I-STAT mmol/L  --   --   --   --  26   BUN mg/dL 19 16 18   < >  --    CREATININE mg/dL 0 84 0 60 0 62   < >  --    GLUCOSE, ISTAT mg/dl  --   --   --   --  158*   CALCIUM mg/dL 9 9 8 8 8 5   < >  --     < > = values in this interval not displayed  Results from last 7 days   Lab Units 05/26/21  0444 05/25/21  0532 05/24/21  0927   INR  1 22* 1 12 1 07       Discharge instructions/Information to patient and family:   See after visit summary for information provided to patient and family  Aneta Monae was educated on restrictions regarding driving and lifting, and techniques of proper incisional care  They were specifically counselled on signs and symptoms of an incisional infection, and advised to contact our service immediately should they develop fevers, sweats, chill, redness or drainage at the site of any incisions  Provisions for Follow-Up Care:  See after visit summary for information related to follow-up care and any pertinent home health orders  Disposition:  Home    Planned Readmission:   No    Discharge Medications:  See after visit summary for reconciled discharge medications provided to patient and family  Aneta Monae was provided contact information and scheduled a follow up appointment with SAAD Manning  Additionally, follow up appointments have been scheduled for their primary care physician and primary cardiologist   Contact information was provided  Aneta Monae was counseled on the importance of avoiding tobacco products  As with all patients whom have undergone open heart surgery, tobacco cessation medication was contraindicated at the time of discharge       ACE/ARB was Contraindicated secondary to hypotension    Beta Blocker was Prescribed at discharge      The patient was discharged on ongoing diuretic therapy with Torsemide 20 mg, PO QD and Potassium Chloride 20 mEq, PO QD  They were advised to continue these medications for 7 days, unless otherwise directed  Narcotic pain medication was prescribed in the form of Oxycodone  Prior to prescribing, their prescription profile was reviewed on the [facility] of health prescription drug monitoring program     The patient was informed that following their postoperative surgical evaluation, they will be referred to outpatient cardiac rehabilitation  They were counseled that this program is run by specialists who will help them safely strengthen their heart and prevent more heart disease  Cardiac rehabilitation will include exercise, relaxation, stress management, and heart-healthy nutrition  Caregivers will also check to make sure their medication regimen is working  During this admission, the patient was questioned on their use of tobacco, alcohol, and illicit/non-prescription drug use in the  previous 24 months  During this time frame they admit to using tobacco  As such they have been counseled on the importance of cessation and abstinence  I spent 20 minutes discharging the patient  This time was spent on the day of discharge  I had direct contact with the patient on the day of discharge  Additional documentation is required if more than 30 minutes were spent on discharge       SIGNATURE: Alissa Alatorre PA-C  DATE: May 26, 2021  TIME: 9:28 AM

## 2021-05-26 NOTE — PLAN OF CARE
Problem: OCCUPATIONAL THERAPY ADULT  Goal: Performs self-care activities at highest level of function for planned discharge setting  See evaluation for individualized goals  Description: Treatment Interventions: ADL retraining, Functional transfer training, UE strengthening/ROM, Endurance training, Patient/family training, Equipment evaluation/education, Compensatory technique education, Continued evaluation, Cardiac education, Energy conservation, Activityengagement          See flowsheet documentation for full assessment, interventions and recommendations  Outcome: Adequate for Discharge  Note: Limitation: Decreased ADL status, Decreased endurance, Decreased self-care trans, Decreased high-level ADLs  Prognosis: Good  Assessment: Patient participated in Skilled OT session this date with interventions consisting of ADL re training with the use of correct body mechanics, Energy Conservation techniques, maintaining sternal precautions and  therapeutic activities to: increase activity tolerance   Patient agreeable to OT treatment session, upon arrival patient was found seated OOB to Chair  In comparison to previous session, patient with improvements in activity tolerance, ADLS, and functional mobility  From OT standpoint, recommendation at time of d/c would be Home with family support  Pt reports that he has no concerns about returning home and has good support upon d/c  Recommend continued participation in [facility] and functional mobility with staff  No further acute OT needs, d/c OT        OT Discharge Recommendation: No rehabilitation needs(Home with increased social support as needed)  OT - OK to Discharge: Yes(When medically appropriate)

## 2021-05-26 NOTE — CASE MANAGEMENT
Pt is cleared for d/c by Cardiothoracic Surgery TIFF Watson  RN Bryant Kumar was notified of pt's d/c order  Pt is accepted for services by [facility] for his aftercare plan  The pt and his wife Sofy Delgadillo were both informed of pt's d/c  Wife will transport pt home later this day, pickup time TBD  No IMM required  No chart copy required  CM to follow

## 2021-05-26 NOTE — OCCUPATIONAL THERAPY NOTE
OccupationalTherapy Progress Note     Patient Name: Dakota Mcneil  ALZXJ'W Date: 5/26/2021  Problem List  Principal Problem: Aortic valve stenosis - severe  Active Problems:    Chronic obstructive lung disease ([facility])    Hyperlipidemia    Essential hypertension    NSTEMI (non-ST elevated myocardial infarction) ([facility])    GERD (gastroesophageal reflux disease)    Acute combined systolic and diastolic CHF, NYHA class 1 ([facility])    S/P AVR    PFO (patent foramen ovale)    S/P MVR (mitral valve repair)            05/26/21 0858   OT Last Visit   OT Visit Date 05/26/21   Note Type   Note Type Treatment   Restrictions/Precautions   Other Precautions Cardiac/sternal   General   Response to Previous Treatment Patient with no complaints from previous session   Lifestyle   Autonomy Pt reports being I with ADLS, IADLS and mobility without device PTA  (+)     Reciprocal Relationships Pt lives with his wife who he reports is able to assist as needed upon d/c    Service to Others Works as an     Intrinsic Gratification Enjoys working and being active   Pain Assessment   Pain Assessment Tool Pain Assessment not indicated - pt denies pain   Pain Score No Pain   ADL   Where Assessed Chair   LB Dressing Assistance 5  Supervision/Setup   LB Dressing Deficit Setup;Supervision/safety   Transfers   Sit to Stand 5  Supervision   Stand to Sit 5  Supervision   Functional Mobility   Functional Mobility 5  Supervision   Additional Comments Pt demonstrated household mobility with no device  Cognition   Overall Cognitive Status WFL   Arousal/Participation Alert; Cooperative   Attention Within functional limits   Orientation Level Oriented X4   Memory Within functional limits   Following Commands Follows all commands and directions without difficulty   Comments Pt is pleasant and cooperative  Able to recall sternal precautions      Activity Tolerance   Activity Tolerance Patient tolerated treatment well   Medical Staff Made Aware RN confirmed okay to see pt   Assessment   Assessment Patient participated in Skilled OT session this date with interventions consisting of ADL re training with the use of correct body mechanics, Energy Conservation techniques, maintaining sternal precautions and  therapeutic activities to: increase activity tolerance   Patient agreeable to OT treatment session, upon arrival patient was found seated OOB to Chair  In comparison to previous session, patient with improvements in activity tolerance, ADLS, and functional mobility  From OT standpoint, recommendation at time of d/c would be Home with family support  Pt reports that he has no concerns about returning home and has good support upon d/c  Recommend continued participation in [facility] and functional mobility with staff  No further acute OT needs, d/c OT      Plan   Treatment Interventions ADL retraining;Functional transfer training;Cardiac education   Goal Expiration Date 06/04/21   OT Treatment Day 2   OT Frequency 3-5x/wk   Recommendation   OT Discharge Recommendation No rehabilitation needs  (Home with increased social support as needed)   OT - OK to Discharge Yes  (When medically appropriate)   AM-PAC Daily Activity Inpatient   Lower Body Dressing 3   Bathing 4   Toileting 4   Upper Body Dressing 4   Grooming 4   Eating 4   Daily Activity Raw Score 23   Daily Activity Standardized Score (Calc for Raw Score >=11) 51 12   AM-PAC Applied Cognition Inpatient   Following a Speech/Presentation 4   Understanding Ordinary Conversation 4   Taking Medications 4   Remembering Where Things Are Placed or Put Away 4   Remembering List of 4-5 Errands 4   Taking Care of Complicated Tasks 4   Applied Cognition Raw Score 24   Applied Cognition Standardized Score 62 21   Modified Miles Scale   Modified Miles Scale 4       Tisha Aguillon, MOT, OTR/L

## 2021-05-26 NOTE — PROGRESS NOTES
Progress Note - Cardiothoracic Surgery   Flor Burger 78 y o  male MRN: [id number]  Unit/Bed#: [facility] 420-01 Encounter: [id number]     Aortic stenosis, Non-Rheumatic, Mitral regurgitation  S/P AVR (#25mm Bauer Inspiris), MV repair (#32mm Bauer Physio II Ring) w/ PFO closure  ; POD # 6      24 Hour Events: Developed rapid AF yesterday at 0900; Converted back to NSR at 1700  No further AF overnight  Remains on oxygen at 2L       Medications:   Scheduled Meds:  Current Facility-Administered Medications   Medication Dose Route Frequency Provider Last Rate    acetaminophen  975 mg Oral Q8H Tony Montalvo, PA-BENJAMIN      albuterol  2 puff Inhalation Q4H PRN Tony Montalvo, TIFF      amiodarone  200 mg Oral [facility] Tony Montalvo, TIFF      aspirin  325 mg Oral Daily Tony Montalvo, TIFF      atorvastatin  40 mg Oral QPM Tony Montalvo, PA-BENJAMIN      bisacodyl  10 mg Rectal Daily PRN Tony Montalvo, TIFF      Diclofenac Sodium  2 g Topical TID PRN Tony Montalvo, TIFF      docusate sodium  100 mg Oral BID Tony Montalvo, TIFF      fluticasone-vilanterol  1 puff Inhalation Daily Tony Montalvo, TIFF      fondaparinux  2 5 mg Subcutaneous Q24H Tony Montalvo, TIFF      furosemide  40 mg Intravenous Daily Tony Montalvo, PA-BENJAMIN      insulin lispro  1-6 Units Subcutaneous TID AC Tony Montalvo, PA-BENJAMIN      insulin lispro  1-6 Units Subcutaneous HS Tony Montalvo, TIFF      metoprolol tartrate  12 5 mg Oral Q12H [address] Tony Montalvo, TIFF      ondansetron  4 mg Intravenous Q6H PRN Tony Montalvo, TIFF      oxyCODONE  2 5 mg Oral Q4H PRN Tony Montalvo, TIFF      oxyCODONE  5 mg Oral Q4H PRN Tony Montalvo, TIFF      pantoprazole  40 mg Oral Daily Tony Montalvo, TIFF      polyethylene glycol  17 g Oral Daily Tony Montalvo, PA-C      potassium chloride  20 mEq Oral Daily Tony Montalvo, PA-C      temazepam  15 mg Oral HS PRN Tony Montalvo, TIFF       Continuous Infusions:   PRN Meds: albuterol    bisacodyl   Diclofenac Sodium    ondansetron    oxyCODONE    oxyCODONE    temazepam    Vitals:   Vitals:    05/25/21 2252 05/26/21 0240 05/26/21 0542 05/26/21 0655   BP: 111/71 119/73  112/72   BP Location: Right arm Right arm     Pulse: 68 67  69   Resp: 18 18  16   Temp: 97 5 °F (36 4 °C) 98 3 °F (36 8 °C)  (!) 97 4 °F (36 3 °C)   TempSrc: Oral Oral     SpO2: 94% 94%  91%   Weight:   103 kg (227 lb 6 4 oz)    Height:           Telemetry: NSR; Heart Rate: 68    Respiratory:   SpO2: SpO2: 91 %, SpO2 Activity: SpO2 Activity: At Rest; 2 LPM    Intake/Output:   I/O       05/22 0701 - 05/23 0700 05/23 0701 - 05/24 0700 05/24 0701 - 05/25 0700    P  O  1200 360     I V  (mL/kg) 207 (1 9) 17 5 (0 2)     IV Piggyback       Total Intake(mL/kg) 1407 (13 1) 377 5 (3 6)     Urine (mL/kg/hr) 3400 (1 3) 2750 (1 1)     Chest Tube 140 0     Total Output 3540 2750     Net -2133 -2372 5                I/O: -321 mL/24 hours with 1 unmeasured occurences    Weights:   Weight (last 2 days)     Date/Time   Weight    05/26/21 0542   103 (227 4)    05/25/21 0600   105 (231 26)    05/24/21 0600   106 (234 2)                Results:   Results from last 7 days   Lab Units 05/25/21  0532 05/23/21  0429 05/22/21  0513   WBC Thousand/uL 10 10 11 98* 14 17*   HEMOGLOBIN g/dL 11 4* 10 9* 11 4*   HEMATOCRIT % 35 0* 33 8* 35 4*   PLATELETS Thousands/uL 184 126* 127*     Results from last 7 days   Lab Units 05/25/21  0904 05/23/21  0429 05/22/21  0513  05/20/21  1307   SODIUM mmol/L 134* 137 137   < >  --    POTASSIUM mmol/L 4 2 4 2 4 1   < >  --    CHLORIDE mmol/L 100 102 105   < >  --    CO2 mmol/L 30 31 28   < >  --    CO2, I-STAT mmol/L  --   --   --   --  26   BUN mg/dL 19 16 18   < >  --    CREATININE mg/dL 0 84 0 60 0 62   < >  --    GLUCOSE, ISTAT mg/dl  --   --   --   --  158*   CALCIUM mg/dL 9 9 8 8 8 5   < >  --     < > = values in this interval not displayed       Results from last 7 days   Lab Units 05/26/21  0444 05/25/21  0532 05/24/21  0664 INR  1 22* 1 12 1 07     Point of care glucose: 103-114    Invasive Lines/Tubes:  Invasive Devices     Central Venous Catheter Line            CVC Central Lines 05/20/21 Triple 5 days              Physical Exam:    HEENT/NECK:  Normocephalic  Atraumatic  No jugular venous distention  Cardiac: Regular rate and rhythm  Pulmonary:  Breath sounds clear bilaterally  Abdomen:  Non-tender, Non-distended and Normal bowel sounds  Incisions: Sternum is stable  Incision is clean, dry, and intact  Extremities: Extremities warm/dry  Neuro: Alert and oriented X 3  Skin: Warm/Dry, without rashes or lesions  Assessment:  Principal Problem: Aortic valve stenosis - severe  Active Problems:    Chronic obstructive lung disease ([facility])    Hyperlipidemia    Essential hypertension    NSTEMI (non-ST elevated myocardial infarction) ([facility])    GERD (gastroesophageal reflux disease)    Acute combined systolic and diastolic CHF, NYHA class 1 ([facility])    S/P AVR    PFO (patent foramen ovale)    S/P MVR (mitral valve repair)       Aortic stenosis, Non-Rheumatic, Mitral regurgitation  S/P AVR (#25mm Bauer Inspiris), MV repair (#32mm Bauer Physio II Ring) w/ PFO closure  ; POD # 6    Plan:    1  Cardiac:   S/p PAF;    Now in NSR for 48 hours   IV Amiodarone D/C yesterday    Continue PO Amiodarone   INR 1 22, after 2 mg Coumadin yesterday    Hopeful for no Coumadin at discharge   Continue Lopressor, 12 5mg PO q 12 hours,  Continue ASA and Statin therapy  Epicardial pacing wires out  Central IV access no longer required; Remove central venous catheter today  Continue DVT prophylaxis    2  Pulmonary:   Good room air oxygenation    CXR clear, after chest tube has been removed    3  Renal:   Intake/Output net: -300 mL/24 hours, with 1 unmeasured occurrences     Continue diuresis   Lasix 40 mg IV QD  Potassium Chloride 20 mEq PO QD  Post op Creatinine stable; Follow up labs prn    4  Neuro:  Neurologically intact;  No active issues  Incisional pain well-controlled  Continue Tylenol, 975 mg PO q 8, standing dose  Continue Oxycodone, 2 5 to 5 mg PO q 4 hours prn pain    5  GI:  Tolerating TLC 2 3 gm sodium diet  Maintain 1800 mL daily fluid restriction   Continue stool softeners and prn suppository  Continue GI prophylaxis    6  Endo:   Glucose well-controlled with sliding scale coverage    7    Hematology:    Post-operative blood count acceptable; Trend prn    Thrombocytopenia; Resolved;     184, from 126, from 127  From 147      8  Disposition:      Home today    VTE Pharmacologic Prophylaxis: Sequential compression device (Venodyne)  and Fondaparinux (Arixtra)  VTE Mechanical Prophylaxis: sequential compression device    Collaborative rounds completed with SAAD Lai    Plan of care discussed with bedside nurse    SIGNATURE: Carlos Alberto Jiménez PA-C  DATE: May 26, 2021  TIME: 7:10 AM

## 2021-05-27 ENCOUNTER — TELEPHONE (OUTPATIENT)
Dept: FAMILY MEDICINE CLINIC | Facility: CLINIC | Age: 66
End: 2021-05-27

## 2021-05-27 PROCEDURE — 93000 ELECTROCARDIOGRAM COMPLETE: CPT | Performed by: FAMILY MEDICINE

## 2021-05-27 NOTE — TELEPHONE ENCOUNTER
rec'd paperwork for pt's shortterm disability  Recommendation that it be completed - at a visit  Please inform pt  I see that he has appt with dr Oren Hough scheduled

## 2021-05-28 NOTE — QUICK NOTE
Diagnosis clarification:    Non MI trop elevation due to severe AS     troponin 0 06->0 24->0 39    Lena Novak PA-C  05/28/21  2:21 PM

## 2021-05-30 DIAGNOSIS — K21.9 GASTROESOPHAGEAL REFLUX DISEASE WITHOUT ESOPHAGITIS: ICD-10-CM

## 2021-05-30 RX ORDER — PANTOPRAZOLE SODIUM 40 MG/1
TABLET, DELAYED RELEASE ORAL
Qty: 90 TABLET | Refills: 0 | Status: SHIPPED | OUTPATIENT
Start: 2021-05-30 | End: 2021-07-11

## 2021-06-01 ENCOUNTER — TELEPHONE (OUTPATIENT)
Dept: CARDIAC SURGERY | Facility: CLINIC | Age: 66
End: 2021-06-01

## 2021-06-01 NOTE — TELEPHONE ENCOUNTER
PO date: 5/20  D/C home: 5/26    Patient doing well, no complaints  Denies CP, SOB, weight gain, LE edema  Staying active with walking  Denies fever, chills, redness/drainage from chest incision  Abiding by weight lifting restrictions  Taking all medication as prescribed  Reminded of upcoming apts  All questions answered and concerns addressed

## 2021-06-01 NOTE — TELEPHONE ENCOUNTER
Patient scheduled for [facility] visit on 6/3/2021 for [employer] Disability Paperwork, as it is due before 6/22

## 2021-06-02 ENCOUNTER — TELEMEDICINE (OUTPATIENT)
Dept: FAMILY MEDICINE CLINIC | Facility: CLINIC | Age: 66
End: 2021-06-02
Payer: COMMERCIAL

## 2021-06-02 DIAGNOSIS — I21.4 NSTEMI (NON-ST ELEVATED MYOCARDIAL INFARCTION) (HCC): Primary | ICD-10-CM

## 2021-06-02 DIAGNOSIS — K59.03 DRUG-INDUCED CONSTIPATION: ICD-10-CM

## 2021-06-02 DIAGNOSIS — I10 ESSENTIAL HYPERTENSION: ICD-10-CM

## 2021-06-02 DIAGNOSIS — J44.0 CHRONIC OBSTRUCTIVE PULMONARY DISEASE WITH ACUTE LOWER RESPIRATORY INFECTION (HCC): ICD-10-CM

## 2021-06-02 DIAGNOSIS — I35.0 NONRHEUMATIC AORTIC VALVE STENOSIS: ICD-10-CM

## 2021-06-02 DIAGNOSIS — Z95.2 S/P AVR: ICD-10-CM

## 2021-06-02 DIAGNOSIS — K21.9 GASTROESOPHAGEAL REFLUX DISEASE, UNSPECIFIED WHETHER ESOPHAGITIS PRESENT: ICD-10-CM

## 2021-06-02 DIAGNOSIS — E78.5 HYPERLIPIDEMIA, UNSPECIFIED HYPERLIPIDEMIA TYPE: ICD-10-CM

## 2021-06-02 DIAGNOSIS — Z98.890 S/P MVR (MITRAL VALVE REPAIR): ICD-10-CM

## 2021-06-02 PROCEDURE — 99214 OFFICE O/P EST MOD 30 MIN: CPT | Performed by: FAMILY MEDICINE

## 2021-06-02 PROCEDURE — 1111F DSCHRG MED/CURRENT MED MERGE: CPT | Performed by: FAMILY MEDICINE

## 2021-06-02 NOTE — PROGRESS NOTES
Virtual Regular Visit      Assessment/Plan:  1  NSTEMI (non-ST elevated myocardial infarction) ([facility] [address] )  Pt will be starting cardiac rehab soon  And has appt with cardio set up  Also - return with pcp - as scheduled for formal TCM  Pt feeling improvement overall - however, discussed that at any time if symptoms decline - pt experiences CP (outside of usual chest wall incisional pain) - SOB, nausea, dizziness, vomiting,    - to call 911      2  Essential hypertension  Urge pt to check BP at home and bring to next visit along with cuff  3  Nonrheumatic aortic valve stenosis  S/p replacement    4  Chronic obstructive pulmonary disease with acute lower respiratory infection (HCC)  Pt is currently stable and in fact pt reports that it is greatly improved  Still using anoro daily        5  Gastroesophageal reflux disease, unspecified whether esophagitis present  Controlled with PPI  Full 325 mg ASA is not bothering pt    6  Hyperlipidemia, unspecified hyperlipidemia type  On statin therapy      7  S/P AVR  Pt to discuss with cardio possibility of needing SBE prior to procedures    8  S/P MVR (mitral valve repair)      9  Drug-induced constipation  Remain on colace  Plenty of fiber and fluids      FORM FOR DISABILITY TO BE SCANNED INTO CHART AND PT WILL PERSONALLY   Note - after visit I found another form to be completed - attending physician's statement of work capacity and impairment  Completed while on another phone call with patient at 12  Will need to also scan this form and attach meds, allergies, dx's studies including CXR from 5/24/2021, echo  Will give to staff to handle  And then pt will p/u tomorrow        Problem List Items Addressed This Visit     None               Reason for visit is   Chief Complaint   Patient presents with    Virtual Regular Visit        Encounter provider Beba Bagley DO    Provider located at [place] [address] [address] [id number] [phone]      Recent Visits  Date Type Provider Dept   05/27/21 Telephone Shamika Cadena, [facility]   Showing recent visits within past 7 days and meeting all other requirements     Future Appointments  No visits were found meeting these conditions  Showing future appointments within next 150 days and meeting all other requirements        The patient was identified by name and date of birth  Aneta Monae was informed that this is a telemedicine visit and that the visit is being conducted through [address] [facility] and patient was informed that this is a secure, HIPAA-compliant platform  He agrees to proceed     My office door was closed  No one else was in the room  He acknowledged consent and understanding of privacy and security of the video platform  The patient has agreed to participate and understands they can discontinue the visit at any time  Patient is aware this is a billable service  Subjective  Aneta Monae is a 78 y o  male    HPI   Pt presents today for virtual visit s/p hospitalization - admitted on 5/11/2021 - to [facility] and was transferred to [facility] -   On 5/12/2021  Admitted with NSTEMI and severe aortic stenosis  Prior to heart surgery - pt had dental extractions  Pt did get a cardiac cath and eventual AVR AND MITRAL VALVE REPAIR  Pt is not taking antibiotics  Reviewed med list - as there were a # of med changes  New meds:  Amiodarone   mg  Lopressor 25 mg po bid  Oxycodone - 5 mg up to q 6 hours prn chest pains where incision was  Pt has been taking q 6 hours still and sometimes q 8 hours  Colace  k-dur 20 meq daily  demadex 20 mg daily    Pt has a f/u with pcp - dr Botello Morning on 6/22/2021  And with cardio 6/7/2021    Pt has a home pulse ox and Bp cuff    Pt quit tob use no longer using nicoderm    Pt's d/c weight - 231#  And pt's weight at home this am 235#  Not on fluid restriction      PT REQUESTING TODAY COMPLETION OF DISABILITY PAPERWORK  (TO BE SCANNED INTO CHART)  AND THEN FORMAL TCM WITH DR WILSON AS ABOVE      Past Medical History:   Diagnosis Date    Broken humerus     COPD (chronic obstructive pulmonary disease) ([facility] [address] )     Pneumonia        Past Surgical History:   Procedure Laterality Date    IR CHEST TUBE PLACEMENT  5/21/2021    MULTIPLE TOOTH EXTRACTIONS N/A 5/17/2021    Procedure: EXTRACTION TEETH MULTIPLE 1, 3, 18, 30, EXCISION OF LEFT BUCCAL FIBROMA;  Surgeon: Aishwarya Georges DMD;  [facility]: [facility];  Service: Maxillofacial    NH ECHO TRANSESOPHAG R-T 2D W/PRB IMG ACQUISJ I&R N/A 5/20/2021    Procedure: TRANSESOPHAGEAL ECHOCARDIOGRAM (KATIA);   Surgeon: Demond Hampton MD;  [facility];  Service: Cardiac Surgery    NH REPLACEMENT OF MITRAL VALVE N/A 5/20/2021    Procedure: VALVE MITRAL (MVR) REPAIR with 32mm physio II annuloplasty ring;  Surgeon: Demond Hampton MD;  [facility];  Service: Cardiac Surgery    NH RPLCMT AORTIC VALVE OPN W/STENTLESS TISSUE VALVE N/A 5/20/2021    Procedure: REPLACEMENT VALVE AORTIC (AVR) TISSUE with 25mm montgomery inspiris tissue valve;  Surgeon: Demond Hampton MD;  [facility];  Service: Cardiac Surgery       Current Outpatient Medications   Medication Sig Dispense Refill    acetaminophen (TYLENOL) 650 mg CR tablet Take 1 tablet (650 mg total) by mouth every 8 (eight) hours as needed for mild pain 30 tablet 0    albuterol (PROVENTIL HFA,VENTOLIN HFA) 90 mcg/act inhaler INHALE 1-2 PUFFS EVERY 4 (FOUR) HOURS AS NEEDED FOR WHEEZING 18 Inhaler 0    amiodarone 200 mg tablet Take 1 tablet (200 mg total) by mouth daily 30 tablet 0    Anoro Ellipta 62 5-25 MCG/INH inhaler INHALE 1 PUFF BY MOUTH EVERY DAY 1 Inhaler 5    aspirin 325 mg tablet Take 1 tablet (325 mg total) by mouth daily 90 tablet 2    Aspirin-Acetaminophen-Caffeine (EXCEDRIN MIGRAINE PO) Take by mouth      atorvastatin (LIPITOR) 40 mg tablet Take 1 tablet (40 mg total) by mouth every evening 90 tablet 2    dextromethorphan-guaifenesin (MUCINEX DM)  MG per 12 hr tablet Take 1 tablet by mouth every 12 (twelve) hours 20 tablet 0    docusate sodium (COLACE) 100 mg capsule Take 1 capsule (100 mg total) by mouth 2 (two) times a day 60 capsule 0    metoprolol tartrate (LOPRESSOR) 25 mg tablet Take 0 5 tablets (12 5 mg total) by mouth every 12 (twelve) hours 90 tablet 2    Multiple Vitamin (MULTI-VITAMIN DAILY PO) Take 1 tablet by mouth daily      oxyCODONE (ROXICODONE) 5 mg immediate release tablet Take 1 tablet (5 mg total) by mouth every 6 (six) hours as needed for severe pain for up to 7 days Ongoing therapy  Max Daily Amount: 20 mg 28 tablet 0    pantoprazole (PROTONIX) 40 mg tablet TAKE 1 TABLET BY MOUTH EVERY DAY 90 tablet 0    polyethylene glycol (GLYCOLAX) 17 GM/SCOOP powder Take 17 g by mouth daily 510 g 0    potassium chloride (K-DUR,KLOR-CON) 20 mEq tablet Take 1 tablet (20 mEq total) by mouth daily for 7 days 7 tablet 2    torsemide (DEMADEX) 20 mg tablet Take 1 tablet (20 mg total) by mouth daily for 7 days 7 tablet 2     No current facility-administered medications for this visit  No Known Allergies    Review of Systems   Constitutional: Positive for fatigue  Negative for chills, diaphoresis and fever  Weight - is running a little higher as he is feeling constipated (even with stool softener) - 235#    Endurance is slightly dampened but improving daily   Respiratory: Negative for cough, shortness of breath and wheezing  Pulse ox 94%  Pt had coughing which resolved  Pt is currently stable and in fact pt reports that it is greatly improved  Still using anoro daily     Cardiovascular: Negative for chest pain, palpitations and leg swelling  Pulse 70     Gastrointestinal: Positive for constipation  Negative for abdominal pain  No GERD with PPI use  Pt having constipation with oxycodone  Taking colace    Pt is finishing up with oxycodone  Genitourinary: Negative for decreased urine volume, dysuria and hematuria  Musculoskeletal:        Still taking pain meds for chest wall pain s/p open heart surgery   Neurological: Negative for syncope  Psychiatric/Behavioral: Negative for dysphoric mood, self-injury, sleep disturbance and suicidal ideas  The patient is not nervous/anxious  Video Exam    There were no vitals filed for this visit  Physical Exam  Vitals signs and nursing note reviewed  Constitutional:       General: He is not in acute distress  Appearance: Normal appearance  He is not ill-appearing  Comments: Pt appeared well on camera  Wife heard in background at times  No distress     Pulmonary:      Effort: Pulmonary effort is normal  No respiratory distress  Comments: Conversing normally without shortness of breath or coughing      Skin:     Coloration: Skin is not jaundiced  Neurological:      Mental Status: He is alert  Psychiatric:         Mood and Affect: Mood normal          Behavior: Behavior normal          Thought Content: Thought content normal          Judgment: Judgment normal                 VIRTUAL VISIT DISCLAIMER    Akshat Barahona acknowledges that he has consented to an online visit or consultation  He understands that the online visit is based solely on information provided by him, and that, in the absence of a face-to-face physical evaluation by the physician, the diagnosis he receives is both limited and provisional in terms of accuracy and completeness  This is not intended to replace a full medical face-to-face evaluation by the physician  Akshat Barahona understands and accepts these terms

## 2021-06-07 ENCOUNTER — OFFICE VISIT (OUTPATIENT)
Dept: CARDIOLOGY CLINIC | Facility: CLINIC | Age: 66
End: 2021-06-07
Payer: COMMERCIAL

## 2021-06-07 VITALS
SYSTOLIC BLOOD PRESSURE: 124 MMHG | OXYGEN SATURATION: 96 % | WEIGHT: 236.3 LBS | HEART RATE: 70 BPM | BODY MASS INDEX: 30.32 KG/M2 | DIASTOLIC BLOOD PRESSURE: 74 MMHG | HEIGHT: 74 IN

## 2021-06-07 DIAGNOSIS — Z98.890 S/P MVR (MITRAL VALVE REPAIR): ICD-10-CM

## 2021-06-07 DIAGNOSIS — I10 HYPERTENSION, UNSPECIFIED TYPE: ICD-10-CM

## 2021-06-07 DIAGNOSIS — E78.5 DYSLIPIDEMIA: ICD-10-CM

## 2021-06-07 DIAGNOSIS — I48.0 PAROXYSMAL ATRIAL FIBRILLATION (HCC): ICD-10-CM

## 2021-06-07 DIAGNOSIS — Z72.0 TOBACCO ABUSE: ICD-10-CM

## 2021-06-07 DIAGNOSIS — Q21.1 PFO (PATENT FORAMEN OVALE): ICD-10-CM

## 2021-06-07 DIAGNOSIS — Z95.2 S/P AVR: Primary | ICD-10-CM

## 2021-06-07 DIAGNOSIS — I42.0 DILATED CARDIOMYOPATHY (HCC): ICD-10-CM

## 2021-06-07 PROCEDURE — 4004F PT TOBACCO SCREEN RCVD TLK: CPT | Performed by: NURSE PRACTITIONER

## 2021-06-07 PROCEDURE — 99215 OFFICE O/P EST HI 40 MIN: CPT | Performed by: NURSE PRACTITIONER

## 2021-06-07 PROCEDURE — 3078F DIAST BP <80 MM HG: CPT | Performed by: NURSE PRACTITIONER

## 2021-06-07 PROCEDURE — 3074F SYST BP LT 130 MM HG: CPT | Performed by: NURSE PRACTITIONER

## 2021-06-07 PROCEDURE — 1160F RVW MEDS BY RX/DR IN RCRD: CPT | Performed by: NURSE PRACTITIONER

## 2021-06-07 PROCEDURE — 3008F BODY MASS INDEX DOCD: CPT | Performed by: NURSE PRACTITIONER

## 2021-06-07 NOTE — PROGRESS NOTES
Cardiology Follow Up    Amy Bergman  1955  [id number]  [address] [place] [address] Dr OOTOLE [facility]  [facility]  [address]  [phone]  [phone]    1  S/P AVR     2  S/P MVR (mitral valve repair)     3  PFO (patent foramen ovale)         Interval History:   Mr Jaimie Sauceda was admitted to [facility] on 5/11- 5/12/21 with NSTEMI  He presented to the emergency room via EMS from his PCP office  He experienced 2 episodes of substernal burning non radiating chest pain lasting 5 minutes  Chest pain associated with shortness of he was given aspirin and nitroglycerin in the emergency room  EKG with anterolateral ST depressions  He was started on aspirin and  IV heparin  5/12/21 TTE showed LV  Systolic function mildly decreased LVEF 83% grade 3 diastolic dysfunction, left atrium moderately dilated right atrium mildly dilated,  Severe aortic valve calcification aortic valve area 0 3 centimeter subcutaneously, mild TR, aortic root at the upper limits of normal mildly enlarged at 3 8 cm  Moderate pulmonary hypertension  PA pressure 54 mmHg  Cardiology team recommended transfer to [facility] for cardiac catheterization  Mr Jaimie Sauceda was admitted to [facility] on 5/12 - 5/26/21 with aortic valve stenosis  5/13/21 C   Left main normal, lad showed moderate nonobstructive atherosclerosis, circumflex large-sized dominant giving rise to several OM and posterolateral branches in the PDA  Diffuse atherosclerosis of the branch  RCA non dominant normal   5/17/21 underwent OMFS extractions  On 5/20/21 Mr Genie Avina underwent AVR #25mm German Aayla, MV repair #32 mm Bauer Physio II Ring with PFO closure by dr Phil Gutierrez  Postop EKG showed normal sinus rhythm with first-degree AV block and prolonged  milliseconds  Postop day 1   He was given IV fluids for hypotension EKG with incomplete left bundle branch block T-wave inversion stable from postop  Chest x-ray was small to moderate left pneumothorax  IR consulted for left pigtail placement  On POD#4 he experienced atrial fibrillation with RVR  He was loaded with IV Amiodarone and started on Coumadin  That evening he converted to NSR  On POD #5 NSR no further AF  On POD#6 he was discharged home on Coumadin  Mr Leann Gómez presents to our office for a recent hospitalization follow up visit  He is accompanied by his wife  He denies dyspnea with minimal moderate exertion chest pain or palpitations  He admits to  Muscle skeletal pain in the sternum from the surgery and and occasional lightheadedness and dizziness with walking  Mr Calderon Sees  Refilled the prescription for  potassium and torsemide  He has been taking both medications  HPI:  Severe AS   Dyslipidemia 5/13/21 , , HDL 49, LDL 28    COPD  Tobacco abuse    Patient Active Problem List   Diagnosis    Aortic valve stenosis - severe    Chronic obstructive lung disease ([facility] )    Erectile dysfunction of non-organic origin    Hyperlipidemia    Essential hypertension    Rotator cuff tendinitis Left    NSTEMI (non-ST elevated myocardial infarction) ([facility])    GERD (gastroesophageal reflux disease)    Acute combined systolic and diastolic CHF, NYHA class 1 ([facility])    Arthritis    S/P AVR    PFO (patent foramen ovale)    S/P MVR (mitral valve repair)     Past Medical History:   Diagnosis Date    Broken humerus     [facility]     Pneumonia      Social History     Socioeconomic History    Marital status: /Civil Union     Spouse name: Not on file    Number of children: Not on file    Years of education: Not on file    Highest education level: Not on file   Occupational History    Occupation: Machinery Repair     Comment: packaging [employer]  - full time employment   Social Needs    Financial resource strain: Not on file    Food insecurity Worry: Not on file     Inability: Not on file    Transportation needs     Medical: Not on file     Non-medical: Not on file   Tobacco Use    Smoking status: Current Some Day Smoker     Packs/day: 0 50     Types: Cigarettes     Start date: 45    Smokeless tobacco: Never Used   Substance and Sexual Activity    Alcohol use: Yes     Frequency: 2-4 times a month     Comment: 1-2/wk    Drug use: No    Sexual activity: Not on file   Lifestyle    Physical activity     Days per week: Not on file     Minutes per session: Not on file    Stress: Not on file   Relationships    Social connections     Talks on phone: Not on file     Gets together: Not on file     Attends Bahai service: Not on file     Active member of club or organization: Not on file     Attends meetings of clubs or organizations: Not on file     Relationship status: Not on file    Intimate partner violence     Fear of current or ex partner: Not on file     Emotionally abused: Not on file     Physically abused: Not on file     Forced sexual activity: Not on file   Other Topics Concern    Not on file   Social History Narrative    Not on file      Family History   Problem Relation Age of Onset    Prostate cancer Father      Past Surgical History:   Procedure Laterality Date    IR CHEST TUBE PLACEMENT  5/21/2021    MULTIPLE TOOTH EXTRACTIONS N/A 5/17/2021    Procedure: EXTRACTION TEETH MULTIPLE 1, 3, 18, 30, EXCISION OF LEFT BUCCAL FIBROMA;  Surgeon: Edinson Carrillo DMD;  Location: [facility];  Service: Maxillofacial    NC ECHO TRANSESOPHAG R-T 2D W/PRB IMG ACQUISJ I&R N/A 5/20/2021    Procedure: TRANSESOPHAGEAL ECHOCARDIOGRAM (KATIA);   Surgeon: Veena Weinstein MD;  [facility];  Service: Cardiac Surgery    NC REPLACEMENT OF MITRAL VALVE N/A 5/20/2021    Procedure: VALVE MITRAL (MVR) REPAIR with 32mm physio II annuloplasty ring;  Surgeon: Veena Weinstein MD;  [facility];  Service: Cardiac Surgery    NC RPLCMT AORTIC VALVE OPN W/STENTLESS TISSUE VALVE N/A 5/20/2021    Procedure: REPLACEMENT VALVE AORTIC (AVR) TISSUE with 25mm montgomery inspiris tissue valve;  Surgeon: Kermit Valdes MD;  Location[facility];  Service: Cardiac Surgery       Current Outpatient Medications:     acetaminophen (TYLENOL) 650 mg CR tablet, Take 1 tablet (650 mg total) by mouth every 8 (eight) hours as needed for mild pain, Disp: 30 tablet, Rfl: 0    albuterol (PROVENTIL HFA,VENTOLIN HFA) 90 mcg/act inhaler, INHALE 1-2 PUFFS EVERY 4 (FOUR) HOURS AS NEEDED FOR WHEEZING, Disp: 18 Inhaler, Rfl: 0    amiodarone 200 mg tablet, Take 1 tablet (200 mg total) by mouth daily, Disp: 30 tablet, Rfl: 0    Anoro Ellipta 62 5-25 MCG/INH inhaler, INHALE 1 PUFF BY MOUTH EVERY DAY, Disp: 1 Inhaler, Rfl: 5    aspirin 325 mg tablet, Take 1 tablet (325 mg total) by mouth daily, Disp: 90 tablet, Rfl: 2    Aspirin-Acetaminophen-Caffeine (EXCEDRIN MIGRAINE PO), Take by mouth, Disp: , Rfl:     atorvastatin (LIPITOR) 40 mg tablet, Take 1 tablet (40 mg total) by mouth every evening, Disp: 90 tablet, Rfl: 2    dextromethorphan-guaifenesin (MUCINEX DM)  MG per 12 hr tablet, Take 1 tablet by mouth every 12 (twelve) hours, Disp: 20 tablet, Rfl: 0    docusate sodium (COLACE) 100 mg capsule, Take 1 capsule (100 mg total) by mouth 2 (two) times a day, Disp: 60 capsule, Rfl: 0    metoprolol tartrate (LOPRESSOR) 25 mg tablet, Take 0 5 tablets (12 5 mg total) by mouth every 12 (twelve) hours, Disp: 90 tablet, Rfl: 2    Multiple Vitamin (MULTI-VITAMIN DAILY PO), Take 1 tablet by mouth daily, Disp: , Rfl:     pantoprazole (PROTONIX) 40 mg tablet, TAKE 1 TABLET BY MOUTH EVERY DAY, Disp: 90 tablet, Rfl: 0    polyethylene glycol (GLYCOLAX) 17 GM/SCOOP powder, Take 17 g by mouth daily, Disp: 510 g, Rfl: 0    potassium chloride (K-DUR,KLOR-CON) 20 mEq tablet, Take 1 tablet (20 mEq total) by mouth daily for 7 days, Disp: 7 tablet, Rfl: 2    torsemide (DEMADEX) 20 mg tablet, Take 1 tablet (20 mg total) by mouth daily for 7 days, Disp: 7 tablet, Rfl: 2  No Known Allergies    Labs:  No results displayed because visit has over 200 results        Admission on 05/11/2021, Discharged on 05/12/2021   Component Date Value    WBC 05/11/2021 12 76*    RBC 05/11/2021 4 38     Hemoglobin 05/11/2021 13 8     Hematocrit 05/11/2021 41 5     MCV 05/11/2021 95     MCH 05/11/2021 31 5     MCHC 05/11/2021 33 3     RDW 05/11/2021 13 3     MPV 05/11/2021 9 1     Platelets 45/68/3755 252     nRBC 05/11/2021 0     Protime 05/11/2021 13 5     INR 05/11/2021 1 05     PTT 05/11/2021 28     Sodium 05/11/2021 140     Potassium 05/11/2021 4 3     Chloride 05/11/2021 106     CO2 05/11/2021 27     ANION GAP 05/11/2021 7     BUN 05/11/2021 14     Creatinine 05/11/2021 0 84     Glucose 05/11/2021 131     Calcium 05/11/2021 9 4     AST 05/11/2021 14     ALT 05/11/2021 23     Alkaline Phosphatase 05/11/2021 73     Total Protein 05/11/2021 7 1     Albumin 05/11/2021 3 9     Total Bilirubin 05/11/2021 0 78     eGFR 05/11/2021 91     Troponin I 05/11/2021 0 06*    NT-proBNP 05/11/2021 4,493*    Segmented % 05/11/2021 92*    Lymphocytes % 05/11/2021 4*    Monocytes % 05/11/2021 4     Eosinophils, % 05/11/2021 0     Basophils % 05/11/2021 0     Absolute Neutrophils 05/11/2021 11 74*    Lymphocytes Absolute 05/11/2021 0 51*    Monocytes Absolute 05/11/2021 0 51     Eosinophils Absolute 05/11/2021 0 00     Basophils Absolute 05/11/2021 0 00     Total Counted 05/11/2021 100     Anisocytosis 05/11/2021 Present     Platelet Estimate 87/92/0135 Adequate     Troponin I 05/11/2021 0 16*    PTT 05/12/2021 47*    Troponin I 05/12/2021 0 24*    PTT 05/12/2021 37     Sodium 05/12/2021 142     Potassium 05/12/2021 4 4     Chloride 05/12/2021 108     CO2 05/12/2021 28     ANION GAP 05/12/2021 6     BUN 05/12/2021 13     Creatinine 05/12/2021 0 80     Glucose 05/12/2021 116     Glucose, Fasting 05/12/2021 116*    Calcium 05/12/2021 8 9     eGFR 05/12/2021 92     WBC 05/12/2021 12 66*    RBC 05/12/2021 4 05     Hemoglobin 05/12/2021 12 7     Hematocrit 05/12/2021 40 3     MCV 05/12/2021 100*    MCH 05/12/2021 31 4     MCHC 05/12/2021 31 5     RDW 05/12/2021 13 7     Platelets 23/60/5818 223     MPV 05/12/2021 9 4     Troponin I 05/12/2021 0 39*    Protime 05/12/2021 14 3     INR 05/12/2021 1 13     Ventricular Rate 05/12/2021 83     Atrial Rate 05/12/2021 83     MI Interval 05/12/2021 168     QRSD Interval 05/12/2021 100     QT Interval 05/12/2021 386     QTC Interval 05/12/2021 453     P Axis 05/12/2021 14     QRS Axis 05/12/2021 -49     T Wave Axis 05/12/2021 174     Ventricular Rate 05/11/2021 100     Atrial Rate 05/11/2021 100     MI Interval 05/11/2021 168     QRSD Interval 05/11/2021 100     QT Interval 05/11/2021 312     QTC Interval 05/11/2021 402     P Axis 05/11/2021 66     QRS Axis 05/11/2021 -57     T Wave Axis 05/11/2021 156     Ventricular Rate 05/11/2021 115     Atrial Rate 05/11/2021 115     MI Interval 05/11/2021 164     QRSD Interval 05/11/2021 96     QT Interval 05/11/2021 314     QTC Interval 05/11/2021 434     P Axis 05/11/2021 67     QRS Axis 05/11/2021 -78     T Wave Axis 05/11/2021 147     PTT 05/12/2021 53*   Lab on 05/10/2021   Component Date Value    NT-proBNP 05/10/2021 3,228*   Appointment on 04/28/2021   Component Date Value    WBC 04/28/2021 8 30     RBC 04/28/2021 4 30*    Hemoglobin 04/28/2021 13 1*    Hematocrit 04/28/2021 40 9*    MCV 04/28/2021 95     MCH 04/28/2021 30 5     MCHC 04/28/2021 32 1     RDW 04/28/2021 14 3     MPV 04/28/2021 7 5*    Platelets 24/36/5329 267     Neutrophils Relative 04/28/2021 70*    Lymphocytes Relative 04/28/2021 18*    Monocytes Relative 04/28/2021 10     Eosinophils Relative 04/28/2021 2     Basophils Relative 04/28/2021 1     Neutrophils Absolute 04/28/2021 5 80     Lymphocytes Absolute 04/28/2021 1 50     Monocytes Absolute 04/28/2021 0 80     Eosinophils Absolute 04/28/2021 0 20     Basophils Absolute 04/28/2021 0 00     Procalcitonin 04/28/2021 <0 05      Imaging: Xr Chest Portable    Result Date: 5/24/2021  Narrative: CHEST INDICATION:   Pneumothorax  COMPARISON:  Chest x-ray 5/23/2021 0606 hours EXAM PERFORMED/VIEWS:  XR CHEST PORTABLE  5/23/2021 1440 hours FINDINGS:  Right IJ line tip overlies the SVC  Prior sternotomy and cardiac valve replacement  Left upper lung zone chest tube again seen  Heart size is not well evaluated on this single portable AP view  Mild bibasilar discoid atelectasis  No pneumothorax or pleural effusion  Degenerative spurring at both shoulders  Mild subcutaneous emphysema at the left upper chest wall, stable  Impression: Left-sided chest tube again seen  No pneumothorax  Mild bibasilar discoid atelectasis  Workstation performed: [id number]     Xr Chest Portable    Result Date: 5/24/2021  Narrative: CHEST INDICATION:   pTX  COMPARISON:  Chest x-ray 5/21/2021 EXAM PERFORMED/VIEWS:  XR CHEST PORTABLE FINDINGS:  Interval placement of a small-caliber chest tube with tip overlying the left lung apex  Right IJ line tip overlies the SVC  Prior sternotomy  Cardiac silhouette is enlarged  No residual pneumothorax seen  Azygos lobe on the right  Lung fields are clear  Mild subcutaneous emphysema in the left chest wall  Degenerative spurring at the left shoulder  Impression: 1  Status post left chest tube placement  No residual pneumothorax  Workstation performed: [id number]     Xr Chest Portable    Result Date: 5/21/2021  Narrative: CHEST INDICATION:   Post Open Heart Surgey  COMPARISON:  One view chest 5/20/2021 EXAM PERFORMED/VIEWS:  XR CHEST PORTABLE FINDINGS:  Interval removal of enteric and endotracheal tubes and right [place]-Arvind catheter catheter   Right central line in place tip in the vicinity of the superior cavoatrial junction  Mediastinal drain in place  Post median sternotomy  Prosthetic aortic and mitral valves  Cardiac silhouette is enlarged  Aortic calcification is present  Small to moderate left upper pneumothorax has enlarged  Progressive left upper lobe and lingular atelectasis  Stable mild prominence of right pulmonary vasculature  No large pleural effusion  Right azygous fissure, normal variant  Degenerative changes bilateral shoulders and spine  Impression: Left upper pneumothorax has enlarged with progressive left upper lobe and lingular atelectasis    Tubes and lines as above  The study was marked in [facility] for immediate notification  Workstation performed: [id number]     Xr Chest Pa & Lateral    Result Date: 5/25/2021  Narrative: CHEST INDICATION:   rule out pneumothorax, after chest tube removal  COMPARISON:  5/23/2021 EXAM PERFORMED/VIEWS:  XR CHEST PA & LATERAL  The frontal view was performed utilizing dual energy radiographic technique  Images: 4 FINDINGS:  Right-sided central line extends to the cavoatrial junction  Sternotomy wires are intact  Prosthetic heart valves are present  Left chest tube has been removed  Stable mild cardiomegaly  No congestive failure  No pneumothorax  Incidentally noted azygos lobe within the medial right apex  Small left basilar effusion, grossly unchanged  Osseous structures appear within normal limits for patient age  Impression: Small left basilar effusion  No pneumothorax status post chest tube removal  Workstation performed: [id number]     Xr Chest Pa & Lateral    Result Date: 5/21/2021  Narrative: CHEST INDICATION:   left upper lobe pneumo  COMPARISON:  5/21/2021 EXAM PERFORMED/VIEWS:  XR CHEST PA & LATERAL Images: 3 FINDINGS: Small to moderate left upper pneumothorax, unchanged Persistent right IJ CVP line  Status post sternotomy and mitral valve replacement   Cardiomediastinal silhouette remains mildly enlarged Persistent left upper lobe and lingular atelectasis  No pleural effusion  Osseous structures appear within normal limits for patient age  Impression: Persistent small-to-moderate left upper pneumothorax with upper lobe and lingular atelectasis, similar to previous study Workstation performed: [id number]     Xr Chest 2 Views    Result Date: 5/12/2021  Narrative: CHEST INDICATION:   Dyspnea  COMPARISON:  April 7, 2021 EXAM PERFORMED/VIEWS:  XR CHEST PA & LATERAL  The frontal view was performed utilizing dual energy radiographic technique  FINDINGS: Heart enlarged  Mild prominence of upper lobe pulmonary vessels  Mild lingular subsegmental atelectasis  No consolidation  No evidence of pleural effusion or pneumothorax  Osseous structures appear within normal limits for patient age  Impression: Cardiomegaly and mild pulmonary vascular congestion  Lingular subsegmental atelectasis  Workstation performed: [id number]     Ct Facial Bones Wo Contrast    Result Date: 5/14/2021  Narrative: CT FACIAL BONES WITHOUT INTRAVENOUS CONTRAST INDICATION:   Preop open heart  COMPARISON: None  TECHNIQUE:  Axial CT images were obtained through the facial bones with additional sagittal and coronal reconstructions  Radiation dose length product (DLP) for this visit:  417 79 mGy-cm   This examination, like all CT scans performed in the [facility], was performed utilizing techniques to minimize radiation dose exposure, including the use of iterative  reconstruction and automated exposure control  IMAGE QUALITY:  Diagnostic  FINDINGS: FACIAL BONES:   No facial bone fracture identified  Normal alignment of the temporomandibular joints  No lytic or blastic lesion  Carious left mandibular second molar tooth  ORBITS:  Orbital globes, optic nerves, and extraocular muscles appear symmetric and normal  There is no evidence of retrobulbar mass, abscess, or hematoma   SINUSES:  Normal  SOFT TISSUES:  Normal      Impression: Carious left mandibular second molar tooth  Clear paranasal sinuses  Workstation performed: [id number]     Vas Carotid Complete Study    Result Date: 5/13/2021  Narrative:  [facility] REPORT CLINICAL: Indications: Patient presents for a general health evaluation secondary to future open heart surgery  Patient is asymptomatic at this time  Risk Factors The patient has history of HTN, HLD, CAD and smoking (current) 0 5 ppd  Clinical Right Pressure:  87/59 mm Hg, Left Pressure:  / mm Hg  FINDINGS:  Right        Impression  PSV  EDV (cm/s)  Direction of Flow  Ratio  Dist  ICA                 33          15                      0 97  Mid  ICA                  52          18                      1 57  Prox  ICA    1 - 49%      54          27                      1 60  Dist CCA                  47          14                            Mid CCA                   33          13                      0 72  Prox CCA                  46          11                            Ext Carotid               33           0                      0 99  Prox Vert                 19           5  Antegrade                 Subclavian                78           3                             Left         Impression  PSV  EDV (cm/s)  Direction of Flow  Ratio  Dist  ICA                 26          11                      0 67  Mid  ICA                  57          17                      1 48  Prox   ICA    1 - 49%      60          23                      1 57  Dist CCA                  37          14                            Mid CCA                   38          11                      0 76  Prox CCA                  51          13                            Ext Carotid               56           9                      1 45  Prox Vert                 18           0  Antegrade                 Subclavian                59           1                               CONCLUSION:  Impression RIGHT: There is <50% stenosis noted in the internal carotid artery  Plaque is heterogenous/calcified and irregular  Vertebral artery flow is antegrade  There is no significant subclavian artery disease  LEFT: There is <50% stenosis noted in the internal carotid artery  Plaque is heterogenous/calcified and irregular  Vertebral artery flow is antegrade  There is no significant subclavian artery disease  SIGNATURE: Electronically Signed by: Vladimir Hannah on 2021-05-13 04:30:41 PM    Xr Chest Portable Icu    Result Date: 5/20/2021  Narrative: CHEST INDICATION:   S/P open heart  COMPARISON:  5/11/2021 EXAM PERFORMED/VIEWS:  XR CHEST PORTABLE ICU FINDINGS: Peridot-Arvind catheter tip is in the proximal right main pulmonary outflow tract  Right IJ catheter tip is at the caval atrial junction  ET tube 4 cm above aldo  NG tube is coiled upon itself in the stomach with the proximal port at the level of the gastric cardia and the distal tip redirected cephalad in the proximal esophagus  Mediastinal drain  Median sternotomy  Aortic and mitral valve prostheses  Pneumomediastinum with lower left neck slight subcutaneous emphysema Medial left upper lung periaortic small loculated pneumothorax  Cardiomediastinal silhouette appears enlarged  Right vascular redistribution  No pleural effusion  Osseous structures appear within normal limits for patient age  Impression: Lines, drains and catheters as noted  Coiled gastric NG tube with tip redirected cephalad in the proximal esophagus  Small pneumomediastinum and slight lateral left neck subcutaneous emphysema Medial left upper lung periaortic loculated small pneumothorax Right vascular redistribution  Median sternotomy, aortic and mitral valve prostheses  The study was marked in [facility] for immediate notification  Workstation performed: [id number]     Ir Chest Tube Placement    Result Date: 5/21/2021  Narrative: CT guided chest tube placement Clinical History: Recent cardiac surgery  Left pneumothorax  Dyspnea  Anesthesia: Local Procedure: After explaining the risks and benefits of the procedure to the patient, informed consent was obtained  CT was used to localize the chest   Radiation dose length product (DLP) for this visit:  932 56 mGy   This examination, like all CT scans performed in the [facility], was performed utilizing techniques to minimize radiation dose exposure, including the use of iterative reconstruction and automated exposure control  The overlying skin was prepped and draped in the usual sterile fashion  Local anesthesia was obtained with a 1% lidocaine solution  Using a 19-gauge needle access was gained to the left pleural space  An Amplatz wire was placed  The tract was dilated and a 10 Turkish chest tube inserted  Air was removed and final imaging performed  The catheter was sutured to the skin  Findings: Very large left pneumothorax with compressive atelectasis  Wire then catheter within the pleural space  Resolution of the pneumothorax  Some new left chest wall subcutaneous emphysema related to tract dilation  Additional findings including sternotomy, cardiac valves, mediastinal chest tube  Azygous lobe  Left lung base consolidative changes  Pacemaker wires  The patient tolerated the procedure well without immediate complication  Impression: Impression: Image guided placement of a left apical 10 Turkish chest tube Recommendations: Maintain wall suction, x-ray tomorrow morning Pain control for pleural pain related to the tube and reexpansion Workstation performed: [id number]       Review of Systems:  Review of Systems   Constitutional: Positive for fatigue  Musculoskeletal: Positive for arthralgias and myalgias  All other systems reviewed and are negative  Physical Exam:  Physical Exam  Vitals signs reviewed  Constitutional:       Appearance: Normal appearance  Eyes:      Pupils: Pupils are equal, round, and reactive to light     Neck:      Musculoskeletal: Normal range of motion  Cardiovascular:      Rate and Rhythm: Normal rate and regular rhythm  Pulses: Normal pulses  Heart sounds: Murmur present  Comments: 1/6 TERESA  Pulmonary:      Effort: Pulmonary effort is normal       Breath sounds: Normal breath sounds  Abdominal:      General: Bowel sounds are normal    Musculoskeletal: Normal range of motion  Right lower leg: Edema present  Left lower leg: Edema present  Comments: Trace bilateral lower extremity edema   Skin:     General: Skin is warm and dry  Capillary Refill: Capillary refill takes less than 2 seconds  Comments: Sternal incision appears clean dry intact approximated without erythema or ecchymosis   Neurological:      General: No focal deficit present  Mental Status: He is alert and oriented to person, place, and time  Psychiatric:         Mood and Affect: Mood normal          Behavior: Behavior normal          Discussion/Summary:  1  5/20/21 sp AVR #25mm Bauer Inspiris, MV repair #32 mm Bauer Physio II Ring with PFO closure by dr Phil Gutierrez  Continue on aspirin 325 mg daily, Lipitor 40 mg daily, metoprolol tartrate 12 5 mg q 12 hours amiodarone 200 mg daily  He was instructed on lifelong premedication prior to dental procedure with antibiotics  2  Paroxysmal atrial fibrillation-post operative , [id number],( age) EKG confirms continued NSR-  Continue on  Metoprolol tartrate 12 5 mg q 12 hours,  He was not discharged on Coumadin  ZIO Patch was suggested in 2-3 months to assess for any recurrence of atrial fibrillation  3  NICM LVEF 42% possibly related due to severe AS- follow up TTE  4  Hypertension- RUE sitting 108/70, controlled on Metoprolol tartrate 12 5mg Q12 hours  5  Dyslipidemia 5/13/21 , , HDL 49, LDL 28-   Continue Lipitor 40 mg daily  6   Tobacco abuse- no longer smoking

## 2021-06-07 NOTE — PATIENT INSTRUCTIONS
2gm sodium low fat low cholesterol diet, eating fresh is best    Stop Torsemide and K dur  Life long  pre medication with antibiotic prior to dental visit

## 2021-06-14 ENCOUNTER — TELEPHONE (OUTPATIENT)
Dept: CARDIAC REHAB | Facility: CLINIC | Age: 66
End: 2021-06-14

## 2021-06-15 ENCOUNTER — CLINICAL SUPPORT (OUTPATIENT)
Dept: CARDIAC REHAB | Facility: CLINIC | Age: 66
End: 2021-06-15
Payer: COMMERCIAL

## 2021-06-15 DIAGNOSIS — Z95.2 S/P AVR: ICD-10-CM

## 2021-06-15 DIAGNOSIS — Z95.2 S/P MVR (MITRAL VALVE REPLACEMENT): ICD-10-CM

## 2021-06-15 PROCEDURE — 93797 PHYS/QHP OP CAR RHAB WO ECG: CPT

## 2021-06-15 NOTE — PROGRESS NOTES
Cardiac Rehabilitation Plan of Care   Initial Care Plan          Today's date: 6/15/2021   # of Exercise Sessions Completed: initial evaluation  Patient name: Serina Bills      : 1955  Age: 78 y o  MRN: [id number]  Referring Physician: Yoselyn Wilson PA-C  Cardiologist: Dr Laney Becker  Provider: [facility]  Clinician: Carter Barnes MS, CEP    Dx:   Encounter Diagnoses   Name Primary?  S/P AVR     S/P MVR (mitral valve replacement)      Date of onset: 2021    SUMMARY OF PROGRESS:  Today is Seth's initial evaluation to begin Cardiac Rehab now 4 wks post AVR, mitral valve repair, and PFO closure 21  Elena Milligan reports being diagnoses with a heart murmur and told he would eventually need a valve replacement 3 years ago  He admits to letting his symptoms drag on a little too long  He was admitted for NSTEMI after EKG from his PCP office and experiences of chest pain and shortness of breath  Today, he noted that he had been feeling SOB, lightheaded and headaches for some time but they started to occur together as time went on  Elena Milligan has a history of COPD, chronic pain in his back, and arthritis in his knees and shoulders  He does not currently follow a formal exercise program at home besides occasional walks with his dogs and he has resumed light ADLs, following sternal precautions  Elena Milligan completed an initial submaximal TM ETT  He completed 2:30 minutes of stage 2 (3 1METs) with test termination of RPE 6 and foot numbness  Resting  BP of 152/94 with appropriate hemodynamic response to exercise BP reaching 166/90 and peak HR of 75 bpm   Seth c/o 3/10 sternal pain at rest that did not change with exercise and states that this pain has been constant but improving  Time also states that his MATTHEW is much improved since surgery  Telemetry revealed NSR, RBBB, and rare PVCs   We discussed heart heathy diet and Elena Milligan reports reducing his added salt, starting to look sodium on the food label, and reducing his red meat intake  He is currently 242 lbs with a goal to get close to 210 lb  Depression and anxiety were assessed with PHQ-9 and SPENCER-7 questionnaires and Luc scored 3 and 0 respectively, suggesting  1-4 = Minimal Depression and  0-4  = Not anxious  When addressed, Mahsa Vaughan denies having depression/anxiety symptoms and reports very good social/emotional support from his wife and dogs  Information to begin using [employer] was provided as well as contact information for counseling through [facility]  Mahsa Vaughan is a recent smoking, quit 5/10/21 and is doing well abstaining  Mahsa Vaughan will attend 35 monitored exercise sessions, 3x/wk for 12-18 weeks beginning June 28 th after he sees cardiothoracic surgery 6/25  Medication compliance: Yes   Comments: Pt reports to be compliant with medications  Fall Risk: Low   Comments: Ambulates with a steady gait with no assist device and Denies a fall in the past 6 months    EKG Interpretation: NSR, RBBB, rare PVCs      EXERCISE ASSESSMENT and PLAN    Current Exercise Program in Rehab:       Frequency: 3 days/week        Minutes: 30-40         METS: 1 8-3            HR:    RPE: 3-5         Modalities: Treadmill, UBE, NuStep and Recumbent bike      Exercise Progression 30 Day Goals :    Frequency: 3 days/week of cardiac rehab     Supplement with home exercise 2+ days/wk as tolerated    Minutes: 40                            >150 mins/wk of moderate intensity exercise   METS: 1 8-3   HR:     RPE: 4-6   Modalities: Treadmill, UBE, Lifecycle and Recumbent bike    Strength training:   Will be added following at least 8 weeks post surgery and 8-10 monitored sessions   Modalities: Leg Press, Chest Press, Pull Downs and Arm Curl    Home Exercise: occasional dog walking iwth dog stopping frequently    Goals: 10% improvement in functional capacity - based on max METs achieved in fitness assessment, improved DASI score by 10%, Increase in exercise capacity by 40% - based on peak METs tolerated in cardiac rehab exercise session, Exercise 5 days/wk, >150mins/wk of moderate intensity exercise, Resume ADLs with increased strength, Return to work unrestricted, Attend Rehab regularly and Decrease sitting time    Progression Toward Goals:  Reviewed Pt goals and determined plan of care    Education: benefit of exercise for CAD risk factors, home exercise guidelines, AHA guidelines to achieve >150 mins/wk of moderate exercise and RPE scale   Plan:Education class: Risk Factors for Heart Disease  Readiness to change: Preparation:  (Getting ready to change)       NUTRITION ASSESSMENT AND PLAN    Weight control:    Starting weight: 242 2 lb   Current weight:     Waist circumference:    Startin in   Current:      Diabetes: N/A    Lipid management: Discussed diet and lipid management and Last lipid profile 21  Chol 103    HDL 49  LDL 28    Goals:reduced BMI to < 25, reduced waist circumference <40 inches (M), Improved Rate Your Plate score  >04, Wt  loss 1-2 ppw,  goal of 210 lb lbs , choose lean meat (93-95%), increase intake of fish, shellfish, eat 3 or more servings of whole grains a day, Eat 4-5 cups of fruits and vegetables daily, choose healthy snacks: light popcorn, plain pretzels, Increase intake of nuts and seeds, seldom eat or choose low fat ice-cream, fruit juice bars or frozen yogurt  and eliminate or choose low-fat sweets    Progression Toward Goals: Reviewed Pt goals and determined plan of care    Education: heart healthy eating  low sodium diet  hydration  wt  loss   portion control  Plan: Education class: Reading Food Labels and Education Class: Heart Healthy Eating  Readiness to change: Preparation:  (Getting ready to change)       PSYCHOSOCIAL ASSESSMENT AND PLAN    Emotional:  Depression assessment:  PHQ-9 =3 1-4 = Minimal Depression            Anxiety measure:  SPENCER-7 =0 0-4  = Not anxious  Self-reported stress level:  1 - reports normal stressors with some added stress from worrying about health and finances  Social support: Patient reports excellent emotional/social support from family - particularly from his wife    Goals:  PHQ-9 - reduced severity by one level, Physical Fitness in [place] Score < 3, Daily Activity in [facility] Score < 3, Social Activities in [facility] Score < 3, Pain in [facility] Score < 3, Overall Health in [facility] Score < 3, Quality of Life in [facility] Score < 3 , Increased interest in doing things, improved sleep, improved positive thoughts of well being and stop worrying    Progression Toward Goals: Reviewed Pt goals and determined plan of care    Education: benefits of a positive support system and stress management techniques  Plan: Class: Stress and Your Health, Class: Relaxation, Practice relaxation techniques and Exercise  Readiness to change: Preparation:  (Getting ready to change)       OTHER CORE COMPONENTS     Tobacco:   Social History     Tobacco Use   Smoking Status Current Some Day Smoker    Packs/day: 0 50    Types: Cigarettes    Start date:    Smokeless Tobacco Never Used       Tobacco Use Intervention:   Pt quit 5/10/21   and has abstained    Anginal Symptoms:  MATTHEW, lightheadedness and headaches   NTG use: No prescription    Blood pressure:    Restin/94   Exercise: 166/90 -182/108     Goals: consistent BP < 130/80, reduced dietary sodium <2300mg, moderate intensity exercise >150 mins/wk and Abstain from smoking    Progression Toward Goals: Reviewed Pt goals and determined plan of care    Education:  understanding high blood pressure and it's relationship to CAD, low sodium diet and HTN and relapse education  Plan: Class: Understanding Heart Disease and Class: Common Heart Medications  Readiness to change: Preparation:  (Getting ready to change)

## 2021-06-15 NOTE — PROGRESS NOTES
CARDIAC REHAB ASSESSMENT    Today's date: Nona 15, 2021  Patient name: Suzan Lundberg     :        MRN: [id number]  PCP: Maninder Patel DO  Referring Physician: Tang Weber PA-C  Cardiologist: Dr Paris Cristobal  Surgeon: Dr Luz Dubon    Dx:   Encounter Diagnoses   Name Primary?  S/P AVR     S/P MVR (mitral valve replacement)        Date of onset: 2021  Cultural needs: None    Height:    Wt Readings from Last 1 Encounters:   21 107 kg (236 lb 4 8 oz)      Weight:   Ht Readings from Last 1 Encounters:   21 6' 2" (1 88 m)     Medical History:   Past Medical History:   Diagnosis Date    Broken humerus     COPD (chronic obstructive pulmonary disease) (HCC)     Pneumonia          Physical Limitations: chronic arthritis;  shoulders and knees; chronic back pain    Fall Risk: Low   Comments: Ambulates with a steady gait with no assist device and Denies a fall in the past 6 months    Anginal Equivalent: Dyspnea, Lightheadedness and headaches   NTG use: No prescription    Risk Factors   Cholesterol: Yes  Smoking: Recent user, quit in last 6 months, doing well abstaining  HTN: Yes  DM: No  Obesity: Yes   Inactivity: Yes  Stress:  perceived  stress: 1/10   Stressors:"normal life stressors"; occasional worries about health and finances   Goals for Stress Management:Practice Relaxation Techniques, Read, Exercise, Computer and TV    Family History:  Family History   Problem Relation Age of Onset    Prostate cancer Father        Allergies: Patient has no known allergies    ETOH:   Social History     Substance and Sexual Activity   Alcohol Use Yes    Comment: 1-2/wk         Current Medications:   Current Outpatient Medications   Medication Sig Dispense Refill    acetaminophen (TYLENOL) 650 mg CR tablet Take 1 tablet (650 mg total) by mouth every 8 (eight) hours as needed for mild pain 30 tablet 0    albuterol (PROVENTIL HFA,VENTOLIN HFA) 90 mcg/act inhaler INHALE 1-2 PUFFS EVERY 4 (FOUR) HOURS AS NEEDED FOR WHEEZING (Patient not taking: Reported on 6/7/2021) 18 Inhaler 0    amiodarone 200 mg tablet Take 1 tablet (200 mg total) by mouth daily 30 tablet 0    Anoro Ellipta 62 5-25 MCG/INH inhaler INHALE 1 PUFF BY MOUTH EVERY DAY 1 Inhaler 5    aspirin 325 mg tablet Take 1 tablet (325 mg total) by mouth daily 90 tablet 2    Aspirin-Acetaminophen-Caffeine (EXCEDRIN MIGRAINE PO) Take by mouth      atorvastatin (LIPITOR) 40 mg tablet Take 1 tablet (40 mg total) by mouth every evening 90 tablet 2    dextromethorphan-guaifenesin (MUCINEX DM)  MG per 12 hr tablet Take 1 tablet by mouth every 12 (twelve) hours (Patient not taking: Reported on 6/7/2021) 20 tablet 0    docusate sodium (COLACE) 100 mg capsule Take 1 capsule (100 mg total) by mouth 2 (two) times a day 60 capsule 0    metoprolol tartrate (LOPRESSOR) 25 mg tablet Take 0 5 tablets (12 5 mg total) by mouth every 12 (twelve) hours 90 tablet 2    Multiple Vitamin (MULTI-VITAMIN DAILY PO) Take 1 tablet by mouth daily      pantoprazole (PROTONIX) 40 mg tablet TAKE 1 TABLET BY MOUTH EVERY DAY 90 tablet 0    polyethylene glycol (GLYCOLAX) 17 GM/SCOOP powder Take 17 g by mouth daily (Patient not taking: Reported on 6/7/2021) 510 g 0     No current facility-administered medications for this visit  Functional Status Prior to Diagnosis for Treatment   Occupation: full time job   Recreation:   ADLs: no limitations  [place]: no limitations  Exercise: None    Current Functional Status  Occupation: wants to returns to work - occasional heavy lifting and frequent walking  Recreation: nothing much  ADLs:resumed light ADLs within sternal precautions  [place]: resumed light ADLs within sternal precautions  Exercise: None    Patient Specific Goals:  Return to work;   Increase strength; pain management with arthritis    Short Term Program Goals: dietary modifications increased strength improved energy/stamina with ADLs exercise 120-150 mins/wk wt loss 1-2 ppw return to work    Long Term Goals: Improved [facility] Activity Status score  Improved functional capacity  Improved Quality of Life - [facility] score reduced  Abstain from smoking  improved Rate Your Plate Score    Ability to reach goals/rehabilitation potential:  Good    Projected return to function: 12 weeks  Objective tests: sub-max TM ETT      Nutritional   Reviewed details of Rate your Plate  Discussed key elements of heart healthy eating  Reviewed patient goals for dietary modifications and their clinical implications  Reviewed most recent lipid profile       Goals for dietary modification: choose lean cuts of meat  low fat ground meat and poultry  eliminate processed meats  increase fish intake  more meatless meals  reduced fat cheese  increase whole grains  increase fruits and vegetables  improved snack choices  more nuts/seeds  reduce sweets/frozen desserts      Emotional/Social  Reports sufficient emotional support  Denies depression/anxiety    Marital status:     Domestic Violence Screening: No

## 2021-06-22 ENCOUNTER — OFFICE VISIT (OUTPATIENT)
Dept: FAMILY MEDICINE CLINIC | Facility: CLINIC | Age: 66
End: 2021-06-22
Payer: COMMERCIAL

## 2021-06-22 VITALS
WEIGHT: 246 LBS | SYSTOLIC BLOOD PRESSURE: 142 MMHG | DIASTOLIC BLOOD PRESSURE: 88 MMHG | OXYGEN SATURATION: 94 % | BODY MASS INDEX: 31.57 KG/M2 | TEMPERATURE: 97.8 F | HEART RATE: 79 BPM | HEIGHT: 74 IN

## 2021-06-22 DIAGNOSIS — R25.1 TREMOR OF BOTH HANDS: ICD-10-CM

## 2021-06-22 DIAGNOSIS — Z12.5 SCREENING PSA (PROSTATE SPECIFIC ANTIGEN): ICD-10-CM

## 2021-06-22 DIAGNOSIS — Z11.59 ENCOUNTER FOR HEPATITIS C SCREENING TEST FOR LOW RISK PATIENT: ICD-10-CM

## 2021-06-22 DIAGNOSIS — Z98.890 S/P MVR (MITRAL VALVE REPAIR): ICD-10-CM

## 2021-06-22 DIAGNOSIS — I10 ESSENTIAL HYPERTENSION: ICD-10-CM

## 2021-06-22 DIAGNOSIS — Z95.2 S/P AVR: Primary | ICD-10-CM

## 2021-06-22 DIAGNOSIS — M19.90 ARTHRITIS: ICD-10-CM

## 2021-06-22 DIAGNOSIS — J44.9 CHRONIC OBSTRUCTIVE PULMONARY DISEASE, UNSPECIFIED COPD TYPE (HCC): ICD-10-CM

## 2021-06-22 PROCEDURE — 1036F TOBACCO NON-USER: CPT | Performed by: FAMILY MEDICINE

## 2021-06-22 PROCEDURE — 1111F DSCHRG MED/CURRENT MED MERGE: CPT | Performed by: FAMILY MEDICINE

## 2021-06-22 PROCEDURE — 1160F RVW MEDS BY RX/DR IN RCRD: CPT | Performed by: FAMILY MEDICINE

## 2021-06-22 PROCEDURE — 99214 OFFICE O/P EST MOD 30 MIN: CPT | Performed by: FAMILY MEDICINE

## 2021-06-22 NOTE — PATIENT INSTRUCTIONS
He looks well here today overall, is status post May 20th AV replacement, MV repair, PFO closure  Significant improvement regarding shortness of breath  Regular rate here today  BP today 142/88  He sees Surgeon later this week, starts rehab next week  Wt is up 10 lbs but he has no increased SOB, good appetite, not smoking  BP lower than this at home  He did stop Lasix, potassium few weeks ago  Note sent to Cardiology regarding if they wish to continue amiodarone, he completed 1 month, ran out  He will continue on metoprolol 12 5 twice daily, defer to Cardiology adding blood pressure medication  Does have significant ongoing arthritis, is using sleeves on knees, that appears to be the reason he has some mild swelling in ankles, no swelling when he does not use those  He will continue on Tylenol for now, avoid other medication at present  Most recent blood work showed BMP to be unremarkable May 25th, hemoglobin 11 4, 1 month ago A1c 5 8  Cholesterol last month 103 with HDL 49, LDL 28   He still needs PSA along with hepatitis C screening -  also include TSH      I would like to see him in 3 to 4 months with a physical, call sooner if needed

## 2021-06-22 NOTE — PROGRESS NOTES
BMI Counseling: Body mass index is 31 58 kg/m²  The BMI is above normal  Nutrition recommendations include encouraging healthy choices of fruits and vegetables and moderation in carbohydrate intake  FAMILY PRACTICE OFFICE VISIT  [facility]  [address]  [address], [place], [address]      NAME: Marcel Paulson  AGE: 78 y o  SEX: male  : 1955   MRN: [id number]    DATE: 2021  TIME: 12:44 PM    Assessment and Plan     Problem List Items Addressed This Visit        Respiratory    Chronic obstructive lung disease ([facility] [address] )       Cardiovascular and Mediastinum    Essential hypertension       Musculoskeletal and Integument    Arthritis       Other    S/P AVR - Primary    S/P MVR (mitral valve repair)      Other Visit Diagnoses     Tremor of both hands        Relevant Orders    TSH, 3rd generation with Free T4 reflex    Screening PSA (prostate specific antigen)        Relevant Orders    PSA, Total Screen    Encounter for hepatitis C screening test for low risk patient        Relevant Orders    Hepatitis C antibody    BMI 31 0-31 9,adult              Patient Instructions   He looks well here today overall, is status post May 20th AV replacement, MV repair, PFO closure  Significant improvement regarding shortness of breath  Regular rate here today  BP today 142/88  He sees Surgeon later this week, starts rehab next week  Wt is up 10 lbs but he has no increased SOB, good appetite, not smoking  BP lower than this at home  He did stop Lasix, potassium few weeks ago  Note sent to Cardiology regarding if they wish to continue amiodarone, he completed 1 month, ran out  He will continue on metoprolol 12 5 twice daily, defer to Cardiology adding blood pressure medication      Does have significant ongoing arthritis, is using sleeves on knees, that appears to be the reason he has some mild swelling in ankles, no swelling when he does not use those  He will continue on Tylenol for now, avoid other medication at present  Most recent blood work showed BMP to be unremarkable May 25th, hemoglobin 11 4, 1 month ago A1c 5 8  Cholesterol last month 103 with HDL 49, LDL 28   He still needs PSA along with hepatitis C screening -  also include TSH  I would like to see him in 3 to 4 months with a physical, call sooner if needed        Chief Complaint     Chief Complaint   Patient presents with    Follow-up       History of Present Illness   Veda Nieves is a 78y o -year-old male who I had last seen back in February  He is status post hospital admission May 12th through 26th, NSTEMI, subsequently underwent AV replacement, MV repair, PFO closure  Had postop AFib, was discharged on amiodarone, also received prescription for potassium along with diuretic  He did see Cardiology in follow-up earlier this month, potassium/diuretic were stopped  He is in today accompanied by his wife, he relates he has been feeling much better, fatigue is lessening, strength is returning, he will be starting cardiac rehab next week  He relates his breathing is much better since surgery, he remains off smoking  Note his wife does smoke but she smokes outdoors  He had received 1 month of amiodarone, he relates he completed that this week, he is unsure if he should continue this  He has no palpitations, no lightheadedness  Appetite is good, he has no abdominal pain, he is moving his bowels, urinating okay  He does have ongoing arthritic pain, has been avoiding any medication other than Tylenol but is frustrated with pain, is using sleeves on knees  This does cause some ankle swelling, no swelling when he does not use these  Review of Systems   Review of Systems   Constitutional: Positive for fatigue (Improving)  Negative for appetite change (Improving), fever and unexpected weight change     HENT: Negative for sore throat and trouble swallowing  Eyes: Negative for visual disturbance  Respiratory: Negative for cough, shortness of breath and wheezing  Cardiovascular: Positive for leg swelling (Mild when he uses knee sleeves)  Negative for palpitations  Incisional tenderness but no other chest pain   Gastrointestinal: Negative for abdominal pain, blood in stool, nausea and vomiting  No acid reflux     No change in bowel   Genitourinary: Negative for dysuria and hematuria  Neurological: Positive for tremors (Does note mild tremor both hands)  Negative for dizziness, syncope, light-headedness and headaches  Psychiatric/Behavioral: Negative for behavioral problems and confusion  Active Problem List     Patient Active Problem List   Diagnosis    Aortic valve stenosis - severe    Chronic obstructive lung disease ([facility] )    Erectile dysfunction of non-organic origin    Hyperlipidemia    Essential hypertension    Rotator cuff tendinitis Left    NSTEMI (non-ST elevated myocardial infarction) ([facility])    GERD (gastroesophageal reflux disease)    Acute combined systolic and diastolic CHF, NYHA class 1 ([facility] )    Arthritis    S/P AVR    PFO (patent foramen ovale)    S/P MVR (mitral valve repair)       Past Medical History:  Reviewed    Past Surgical History:  Reviewed    Family History:  Reviewed    Social History:  Reviewed    Objective     Vitals:    06/22/21 1027   BP: 142/88   BP Location: Right arm   Patient Position: Sitting   Cuff Size: Standard   Pulse: 79   Temp: 97 8 °F (36 6 °C)   SpO2: 94%   Weight: 112 kg (246 lb)   Height: 6' 2" (1 88 m)     Body mass index is 31 58 kg/m²  BP Readings from Last 3 Encounters:   06/22/21 142/88   06/07/21 124/74   05/26/21 112/72       Wt Readings from Last 3 Encounters:   06/22/21 112 kg (246 lb)   06/07/21 107 kg (236 lb 4 8 oz)   05/26/21 103 kg (227 lb 6 4 oz)       Physical Exam  Constitutional:       General: He is not in acute distress       Appearance: Normal appearance  He is well-developed  He is not ill-appearing  Eyes:      General: No scleral icterus  Cardiovascular:      Rate and Rhythm: Normal rate and regular rhythm  Heart sounds: Murmur heard  Pulmonary:      Effort: Pulmonary effort is normal  No respiratory distress  Breath sounds: Normal breath sounds  No wheezing, rhonchi or rales  Abdominal:      Palpations: Abdomen is soft  Tenderness: There is no abdominal tenderness  There is no guarding  Musculoskeletal:      Right lower leg: No edema  Left lower leg: No edema  Comments: Very slight trace pitting edema both ankles, knee sleeves are on   Lymphadenopathy:      Cervical: No cervical adenopathy  Neurological:      Mental Status: He is alert and oriented to person, place, and time     Psychiatric:         Mood and Affect: Mood normal          Behavior: Behavior normal          ALLERGIES:  No Known Allergies    Current Medications     Current Outpatient Medications   Medication Sig Dispense Refill    acetaminophen (TYLENOL) 650 mg CR tablet Take 1 tablet (650 mg total) by mouth every 8 (eight) hours as needed for mild pain 30 tablet 0    Anoro Ellipta 62 5-25 MCG/INH inhaler INHALE 1 PUFF BY MOUTH EVERY DAY 1 Inhaler 5    aspirin 325 mg tablet Take 1 tablet (325 mg total) by mouth daily 90 tablet 2    atorvastatin (LIPITOR) 40 mg tablet Take 1 tablet (40 mg total) by mouth every evening 90 tablet 2    dextromethorphan-guaifenesin (MUCINEX DM)  MG per 12 hr tablet Take 1 tablet by mouth every 12 (twelve) hours 20 tablet 0    metoprolol tartrate (LOPRESSOR) 25 mg tablet Take 0 5 tablets (12 5 mg total) by mouth every 12 (twelve) hours 90 tablet 2    Multiple Vitamin (MULTI-VITAMIN DAILY PO) Take 1 tablet by mouth daily      pantoprazole (PROTONIX) 40 mg tablet TAKE 1 TABLET BY MOUTH EVERY DAY 90 tablet 0    albuterol (PROVENTIL HFA,VENTOLIN HFA) 90 mcg/act inhaler INHALE 1-2 PUFFS EVERY 4 (FOUR) HOURS AS NEEDED FOR WHEEZING 18 Inhaler 0    amiodarone 200 mg tablet Take 1 tablet (200 mg total) by mouth daily 30 tablet 3    Aspirin-Acetaminophen-Caffeine (EXCEDRIN MIGRAINE PO) Take by mouth (Patient not taking: Reported on 6/22/2021)       No current facility-administered medications for this visit              Orders Placed This Encounter   Procedures    PSA, Total Screen    Hepatitis C antibody    TSH, 3rd generation with Free T4 reflex         Beth Xiong, DO

## 2021-06-23 PROBLEM — Z48.89 ENCOUNTER FOR POSTOPERATIVE CARE: Status: ACTIVE | Noted: 2021-06-23

## 2021-06-23 NOTE — PROGRESS NOTES
POST OP FOLLOW UP VISIT    Procedure: 5/20/21  1  Aortic valve replacement with a 25 mm Bauer Inspiris Resilia bioprosthetic valve  2  Mitral valve repair with 32 mm Bauer Physio II ring annuloplasty  3  Closure of patent foramen ovale  History: Jc Jones is a 78y o  year old male who presents to our office today for routine follow up care from aortic valve replacement, mitral valve repair and closure of PFO  Patient initially presented on 5/13/21 with NSTEMI  Cardiac w/u revealed NICMP, severe AS & AI, MR & PFO  He tolerated his procedure well, performed on 5/20/21  Post op he developed Afib with RVR that lasted less than 24 hours, successfully converted to NSR with Amiodarone and Beta Blocker  He was not systemically anticoagulated  H was discharged to home in stable condition on POD # 6  Patient has had out patient follow up with PCP & cardiology  Today he reports he is feeling well  He denies SOB, angina, lightheadedness, palpitations, weight gain, edema, fever or chills  He hs mild musculoskeletal chest wall discomfort  He is tolerating activity and ambulation  He is eating and drinking well, no GI issues  He is checking his BP at home and has brought is monitor along today for comparison since he is getting consistently  high readings (160-170/ ) the last week or so  He states he was on Amlodipine 10 daily and Losartan 100 mg daily prior to surgery for HTN management, but is not taking these meds currently       Vital Signs:   Vitals:    06/25/21 1102 06/25/21 1105   BP: 154/90 134/88   BP Location: Left arm Right arm   Patient Position: Sitting    Cuff Size: Standard    Pulse: 68    Resp: 18    Temp: 97 8 °F (36 6 °C)    TempSrc: Tympanic    SpO2: 96%    Weight: 111 kg (244 lb 14 4 oz)    Height: 6' 2" (1 88 m)      BP CHECK COMPARISON:  RUE, SITTING:   OFFICE SPHYGMOMANOMETRY: 160/90   HOME MONITOR: 157/105     Home Medications:   Prior to Admission medications    Medication Sig Start Date End Date Taking? Authorizing Provider   acetaminophen (TYLENOL) 650 mg CR tablet Take 1 tablet (650 mg total) by mouth every 8 (eight) hours as needed for mild pain 5/26/21 6/25/21  Srinivasa Jaimes PA-C   albuterol (PROVENTIL HFA,VENTOLIN HFA) 90 mcg/act inhaler INHALE 1-2 PUFFS EVERY 4 (FOUR) HOURS AS NEEDED FOR WHEEZING 3/8/21   Joseline Rojas DO   amiodarone 200 mg tablet Take 1 tablet (200 mg total) by mouth daily 6/22/21 7/22/21  BITA Toro   Anoro Ellipta 62 5-25 MCG/INH inhaler INHALE 1 PUFF BY MOUTH EVERY DAY 1/19/21   Joseline Rojas DO   aspirin 325 mg tablet Take 1 tablet (325 mg total) by mouth daily 5/27/21   Srinivasa Jaimes PA-C   Aspirin-Acetaminophen-Caffeine (EXCEDRIN MIGRAINE PO) Take by mouth  Patient not taking: Reported on 6/22/2021    Historical Provider, MD   atorvastatin (LIPITOR) 40 mg tablet Take 1 tablet (40 mg total) by mouth every evening 5/26/21   Srinivasa Jaimes PA-C   dextromethorphan-guaifenesin [facility] DM)  MG per 12 hr tablet Take 1 tablet by mouth every 12 (twelve) hours 4/7/21   Lionel Ramírez MD   metoprolol tartrate (LOPRESSOR) 25 mg tablet Take 0 5 tablets (12 5 mg total) by mouth every 12 (twelve) hours 5/26/21   Srinivasa Jaimes PA-C   Multiple Vitamin (MULTI-VITAMIN DAILY PO) Take 1 tablet by mouth daily 7/1/14   Historical Provider, MD   pantoprazole (PROTONIX) 40 mg tablet TAKE 1 TABLET BY MOUTH EVERY DAY 5/30/21   Joseline Rojas DO   pantoprazole (PROTONIX) 40 mg tablet TAKE 1 TABLET BY MOUTH EVERY DAY 2/16/21   Joseline Rojas DO       Physical Exam:  General: Alert, oriented, well developed, no acute idstress  HEENT/NECK:  PERRLA  No jugular venous distention  Cardiac:Regular rate and rhythm, No murmurs rubs or gallops  Pulmonary:Breath sounds clear bilaterally  Abdomen:  Non-tender, Non-distended  Positive bowel sounds  Upper extremities: 2+ radial pulses; brisk capillary refill  Lower extremities: Extremities warm/dry   PT/DP pules 2+ bilaterally  No edema B/L  Incisions: Sternum is stable  Incision is clean, dry, and intact  CT sites clean, dry, and intact  Musculoskeletal: MAEE, no deficits, stable gait  Neuro: Alert and oriented X 3  Sensation is grossly intact  No focal deficits  Skin: Warm/Dry, without rashes or lesions  Lab Results:     Lab Results   Component Value Date    HGBA1C 5 8 (H) 05/13/2021     Lab Results   Component Value Date    TROPONINI 0 39 (H) 05/12/2021       Assessment: Aortic stenosis & insufficiency, Non-Rheumatic, Mitral regurgitation, PFO  S/P aortic valve replacement, mitral valve repair and PFO closure    Thereasa Brood is 5 weeks s/p aortic valve replacement, mitral valve repair and PFO closure and making good progress in post-op recovery  Incisions are well-healed and the sternum is stable  Weight and VS are stable  Plan:   Patients BP checked with office cuff and compared to patients home monitor and readings similar  Medications reviewed with patient, associated questions answered  Advised to resume Losartan 50 mg daily ( half the preop dose), continue home BP monitoring and contact cardiology office within the week to review readings for ongoing medication adjustments  Benefits of participating in cardiac rehab have been discussed and they are cleared to begin the outpatient  program      They may resume driving  They have a lifting restriction of no more than 25 lbs until 8/12/21 which is 12 weeks from their surgical date  No further follow up in our office is needed; call with questions or concerns  They should maintain routine follow-up with Cardiology and Primary Care for ongoing medical care  Patient verbalizes understanding of recommendations and all questions were answered to their satisfaction        BITA Rodrigues  6/25/21  11:00AM

## 2021-06-25 ENCOUNTER — OFFICE VISIT (OUTPATIENT)
Dept: CARDIAC SURGERY | Facility: CLINIC | Age: 66
End: 2021-06-25

## 2021-06-25 VITALS
DIASTOLIC BLOOD PRESSURE: 88 MMHG | HEIGHT: 74 IN | SYSTOLIC BLOOD PRESSURE: 134 MMHG | WEIGHT: 244.9 LBS | OXYGEN SATURATION: 96 % | BODY MASS INDEX: 31.43 KG/M2 | RESPIRATION RATE: 18 BRPM | HEART RATE: 68 BPM | TEMPERATURE: 97.8 F

## 2021-06-25 DIAGNOSIS — Z95.2 S/P AVR: Primary | ICD-10-CM

## 2021-06-25 DIAGNOSIS — Z48.89 ENCOUNTER FOR POSTOPERATIVE CARE: ICD-10-CM

## 2021-06-25 DIAGNOSIS — Q21.1 PFO (PATENT FORAMEN OVALE): ICD-10-CM

## 2021-06-25 DIAGNOSIS — Z98.890 S/P MVR (MITRAL VALVE REPAIR): ICD-10-CM

## 2021-06-25 PROCEDURE — 3008F BODY MASS INDEX DOCD: CPT | Performed by: FAMILY MEDICINE

## 2021-06-25 PROCEDURE — 99024 POSTOP FOLLOW-UP VISIT: CPT | Performed by: NURSE PRACTITIONER

## 2021-06-25 RX ORDER — DOCUSATE SODIUM 100 MG/1
100 CAPSULE, LIQUID FILLED ORAL 2 TIMES DAILY
COMMUNITY
End: 2021-08-04

## 2021-06-25 NOTE — LETTER
June 25, 2021     Marian Olivarez, [address]  [address] [address]    Patient: Jc Jones   YOB: 1955   Date of Visit: 6/25/2021       Marian Olivarez:    Rhonda Carranza was seen by me today for his routine post-op appt, s/p AVR, MVrepair & PFO closure  He is monitoring his BP at home and is now consistently hypertensive  He brought his monitor to the appt today and it does correlate with our readings here in the office  He was on Amlodipine 10 mg daily and Losartan 100 mg daily pre-op but not currently  I advised him to resume Losartan 50 mg to start, check BP's and notify your office for follow up/ further medication adjustments  Below is the office note for his visit today       If you have questions, please do not hesitate to call me  Sincerely,        BITA Hopkins        CC: No Recipients  BITA Hopkins  6/25/2021 11:53 AM  Sign when Signing Visit   POST OP FOLLOW UP VISIT    Procedure: 5/20/21  1  Aortic valve replacement with a 25 mm Bauer Inspiris Resilia bioprosthetic valve  2  Mitral valve repair with 32 mm Bauer Physio II ring annuloplasty  3  Closure of patent foramen ovale  History: Jc Jones is a 78y o  year old male who presents to our office today for routine follow up care from aortic valve replacement, mitral valve repair and closure of PFO  Patient initially presented on 5/13/21 with NSTEMI  Cardiac w/u revealed NICMP, severe AS & AI, MR & PFO  He tolerated his procedure well, performed on 5/20/21  Post op he developed Afib with RVR that lasted less than 24 hours, successfully converted to NSR with Amiodarone and Beta Blocker  He was not systemically anticoagulated  H was discharged to home in stable condition on POD # 6  Patient has had out patient follow up with PCP & cardiology  Today he reports he is feeling well  He denies SOB, angina, lightheadedness, palpitations, weight gain, edema, fever or chills   He hs mild musculoskeletal chest wall discomfort  He is tolerating activity and ambulation  He is eating and drinking well, no GI issues  He is checking his BP at home and has brought is monitor along today for comparison since he is getting high readings the last week or so  He states he was on Amlodipine 10 daily and Losartan 100 mg daily prior to surgery for HTN management, but is not taking these meds currently  Vital Signs:   Vitals:    06/25/21 1102 06/25/21 1105   BP: 154/90 134/88   BP Location: Left arm Right arm   Patient Position: Sitting    Cuff Size: Standard    Pulse: 68    Resp: 18    Temp: 97 8 °F (36 6 °C)    TempSrc: Tympanic    SpO2: 96%    Weight: 111 kg (244 lb 14 4 oz)    Height: 6' 2" (1 88 m)      BP CHECK COMPARISON:  WM SMITH:   OFFICE SPHYGMOMANOMETRY: 160/90   HOME MONITOR: 157/105     Home Medications:   Prior to Admission medications    Medication Sig Start Date End Date Taking?  Authorizing Provider   acetaminophen (TYLENOL) 650 mg CR tablet Take 1 tablet (650 mg total) by mouth every 8 (eight) hours as needed for mild pain 5/26/21 6/25/21  Vale Fillers, PA-C   albuterol (PROVENTIL HFA,VENTOLIN HFA) 90 mcg/act inhaler INHALE 1-2 PUFFS EVERY 4 (FOUR) HOURS AS NEEDED FOR WHEEZING 3/8/21   Arjv Gomez, DO   amiodarone 200 mg tablet Take 1 tablet (200 mg total) by mouth daily 6/22/21 7/22/21  BITA Welsh   Anoro Ellipta 62 5-25 MCG/INH inhaler INHALE 1 PUFF BY MOUTH EVERY DAY 1/19/21   Jack Serkandi, DO   aspirin 325 mg tablet Take 1 tablet (325 mg total) by mouth daily 5/27/21   Vale Fillers, PA-C   Aspirin-Acetaminophen-Caffeine (EXCEDRIN MIGRAINE PO) Take by mouth  Patient not taking: Reported on 6/22/2021    Historical Provider, MD   atorvastatin (LIPITOR) 40 mg tablet Take 1 tablet (40 mg total) by mouth every evening 5/26/21   Vale Fillers, PA-C   dextromethorphan-guaifenesin [facility] DM)  MG per 12 hr tablet Take 1 tablet by mouth every 12 (twelve) hours 4/7/21   Letty Olivares MD   metoprolol tartrate (LOPRESSOR) 25 mg tablet Take 0 5 tablets (12 5 mg total) by mouth every 12 (twelve) hours 5/26/21   Carlos Alberto Jiménez PA-C   Multiple Vitamin (MULTI-VITAMIN DAILY PO) Take 1 tablet by mouth daily 7/1/14   Historical Provider, MD   pantoprazole (PROTONIX) 40 mg tablet TAKE 1 TABLET BY MOUTH EVERY DAY 5/30/21   Kallie Vivas DO   pantoprazole (PROTONIX) 40 mg tablet TAKE 1 TABLET BY MOUTH EVERY DAY 2/16/21   Kallie Vivas DO       Physical Exam:  General: Alert, oriented, well developed, no acute idstress  HEENT/NECK:  PERRLA  No jugular venous distention  Cardiac:Regular rate and rhythm, No murmurs rubs or gallops  Pulmonary:Breath sounds clear bilaterally  Abdomen:  Non-tender, Non-distended  Positive bowel sounds  Upper extremities: 2+ radial pulses; brisk capillary refill  Lower extremities: Extremities warm/dry  PT/DP pules 2+ bilaterally  No edema B/L  Incisions: Sternum is stable  Incision is clean, dry, and intact  CT sites clean, dry, and intact  Musculoskeletal: MAEE, no deficits, stable gait  Neuro: Alert and oriented X 3  Sensation is grossly intact  No focal deficits  Skin: Warm/Dry, without rashes or lesions  Lab Results:     Lab Results   Component Value Date    HGBA1C 5 8 (H) 05/13/2021     Lab Results   Component Value Date    TROPONINI 0 39 (H) 05/12/2021       Assessment: Aortic stenosis & insufficiency, Non-Rheumatic, Mitral regurgitation, PFO  S/P aortic valve replacement, mitral valve repair and PFO closure    Aneta Monae is 5 weeks s/p aortic valve replacement, mitral valve repair and PFO closure and making good progress in post-op recovery  Incisions are well-healed and the sternum is stable  Weight and VS are stable  Plan:   Patients BP checked with office cuff and compared to patients home monitor and readings similar  Medications reviewed with patient, associated questions answered    Advised to resume Losartan 50 mg daily ( half the preop dose), continue home BP monitoring and contact cardiology office within the week to review readings for ongoing medication adjustments  Benefits of participating in cardiac rehab have been discussed and they are cleared to begin the outpatient  program      They may resume driving  They have a lifting restriction of no more than 25 lbs until 8/12/21 which is 12 weeks from their surgical date  No further follow up in our office is needed; call with questions or concerns  They should maintain routine follow-up with Cardiology and Primary Care for ongoing medical care  Patient verbalizes understanding of recommendations and all questions were answered to their satisfaction        BITA Rai  6/25/21  11:00AM

## 2021-06-28 ENCOUNTER — CLINICAL SUPPORT (OUTPATIENT)
Dept: CARDIAC REHAB | Facility: CLINIC | Age: 66
End: 2021-06-28
Payer: COMMERCIAL

## 2021-06-28 ENCOUNTER — TELEPHONE (OUTPATIENT)
Dept: CARDIOLOGY CLINIC | Facility: CLINIC | Age: 66
End: 2021-06-28

## 2021-06-28 DIAGNOSIS — Z95.2 S/P AVR: ICD-10-CM

## 2021-06-28 DIAGNOSIS — I10 HYPERTENSION, UNSPECIFIED TYPE: Primary | ICD-10-CM

## 2021-06-28 DIAGNOSIS — Z98.890 S/P MVR (MITRAL VALVE REPAIR): ICD-10-CM

## 2021-06-28 PROCEDURE — 93798 PHYS/QHP OP CAR RHAB W/ECG: CPT

## 2021-06-28 RX ORDER — AMLODIPINE BESYLATE 5 MG/1
5 TABLET ORAL DAILY
Qty: 90 TABLET | Refills: 3 | Status: SHIPPED | OUTPATIENT
Start: 2021-06-28 | End: 2022-06-14

## 2021-06-28 NOTE — TELEPHONE ENCOUNTER
Please ask the patient if he is taking his BP one hour after his am medications or before his am medications?

## 2021-06-28 NOTE — TELEPHONE ENCOUNTER
I spoke to Courtney Fischer again, states he takes his BP at least one hour after AM meds, not before

## 2021-06-28 NOTE — TELEPHONE ENCOUNTER
Chireno Chetan was seen on 6/7 for a hospital follow up  S /P AVR  S/P MVR, PFO closure  He had a follow up with cardiac surgery on 6/25, where he c/o hypertension on his BP cuff at home  He brought his cuff to that ov for comparison , and readings were similar  Ward Salazar NP restarted his Losartan at  50 mg daily, as August Benton reported /  Today August Benton said the addition of Losartan has not been effective as of yet, as BP consistently 155/100, 158/102  Prior to surgery, he was also on Amlodipine 10 mg daily  Currently also taking lopressor 12 5 mg BID, along with restarting losartan 50 mg daily  Next ov with Dr Ayo Arizmendi on 8/4  Please advise

## 2021-06-30 ENCOUNTER — APPOINTMENT (OUTPATIENT)
Dept: CARDIAC REHAB | Facility: CLINIC | Age: 66
End: 2021-06-30
Payer: COMMERCIAL

## 2021-07-02 ENCOUNTER — CLINICAL SUPPORT (OUTPATIENT)
Dept: CARDIAC REHAB | Facility: CLINIC | Age: 66
End: 2021-07-02
Payer: COMMERCIAL

## 2021-07-02 ENCOUNTER — TELEPHONE (OUTPATIENT)
Dept: CARDIOLOGY CLINIC | Facility: CLINIC | Age: 66
End: 2021-07-02

## 2021-07-02 DIAGNOSIS — Z95.2 S/P AVR: ICD-10-CM

## 2021-07-02 PROCEDURE — 93798 PHYS/QHP OP CAR RHAB W/ECG: CPT

## 2021-07-02 RX ORDER — LOSARTAN POTASSIUM 50 MG/1
50 TABLET ORAL DAILY
COMMUNITY
Start: 2021-06-25 | End: 2021-09-29 | Stop reason: SDUPTHER

## 2021-07-06 ENCOUNTER — CLINICAL SUPPORT (OUTPATIENT)
Dept: CARDIAC REHAB | Facility: CLINIC | Age: 66
End: 2021-07-06
Payer: COMMERCIAL

## 2021-07-06 DIAGNOSIS — Z95.2 S/P AVR: ICD-10-CM

## 2021-07-06 PROCEDURE — 93798 PHYS/QHP OP CAR RHAB W/ECG: CPT

## 2021-07-07 ENCOUNTER — CLINICAL SUPPORT (OUTPATIENT)
Dept: CARDIAC REHAB | Facility: CLINIC | Age: 66
End: 2021-07-07
Payer: COMMERCIAL

## 2021-07-07 DIAGNOSIS — Z95.2 S/P AVR: ICD-10-CM

## 2021-07-07 PROCEDURE — 93798 PHYS/QHP OP CAR RHAB W/ECG: CPT

## 2021-07-09 ENCOUNTER — CLINICAL SUPPORT (OUTPATIENT)
Dept: CARDIAC REHAB | Facility: CLINIC | Age: 66
End: 2021-07-09
Payer: COMMERCIAL

## 2021-07-09 DIAGNOSIS — Z95.2 S/P AVR: ICD-10-CM

## 2021-07-09 PROCEDURE — 93798 PHYS/QHP OP CAR RHAB W/ECG: CPT

## 2021-07-11 DIAGNOSIS — K21.9 GASTROESOPHAGEAL REFLUX DISEASE WITHOUT ESOPHAGITIS: ICD-10-CM

## 2021-07-11 RX ORDER — PANTOPRAZOLE SODIUM 40 MG/1
40 TABLET, DELAYED RELEASE ORAL DAILY PRN
Qty: 90 TABLET | Refills: 0 | Status: SHIPPED | OUTPATIENT
Start: 2021-07-11 | End: 2021-11-02

## 2021-07-12 ENCOUNTER — CLINICAL SUPPORT (OUTPATIENT)
Dept: CARDIAC REHAB | Facility: CLINIC | Age: 66
End: 2021-07-12
Payer: COMMERCIAL

## 2021-07-12 DIAGNOSIS — Z95.2 S/P AVR: ICD-10-CM

## 2021-07-12 PROCEDURE — 93798 PHYS/QHP OP CAR RHAB W/ECG: CPT

## 2021-07-14 ENCOUNTER — CLINICAL SUPPORT (OUTPATIENT)
Dept: CARDIAC REHAB | Facility: CLINIC | Age: 66
End: 2021-07-14
Payer: COMMERCIAL

## 2021-07-14 DIAGNOSIS — Z95.2 S/P AVR: ICD-10-CM

## 2021-07-14 PROCEDURE — 93798 PHYS/QHP OP CAR RHAB W/ECG: CPT

## 2021-07-16 ENCOUNTER — CLINICAL SUPPORT (OUTPATIENT)
Dept: CARDIAC REHAB | Facility: CLINIC | Age: 66
End: 2021-07-16
Payer: COMMERCIAL

## 2021-07-16 DIAGNOSIS — Z95.2 S/P AVR: ICD-10-CM

## 2021-07-16 PROCEDURE — 93798 PHYS/QHP OP CAR RHAB W/ECG: CPT

## 2021-07-19 ENCOUNTER — CLINICAL SUPPORT (OUTPATIENT)
Dept: CARDIAC REHAB | Facility: CLINIC | Age: 66
End: 2021-07-19
Payer: COMMERCIAL

## 2021-07-19 DIAGNOSIS — Z95.2 S/P AVR: ICD-10-CM

## 2021-07-19 PROCEDURE — 93798 PHYS/QHP OP CAR RHAB W/ECG: CPT

## 2021-07-21 ENCOUNTER — APPOINTMENT (OUTPATIENT)
Dept: CARDIAC REHAB | Facility: CLINIC | Age: 66
End: 2021-07-21
Payer: COMMERCIAL

## 2021-07-23 ENCOUNTER — CLINICAL SUPPORT (OUTPATIENT)
Dept: CARDIAC REHAB | Facility: CLINIC | Age: 66
End: 2021-07-23
Payer: COMMERCIAL

## 2021-07-23 DIAGNOSIS — Z95.2 S/P AVR: ICD-10-CM

## 2021-07-23 PROCEDURE — 93798 PHYS/QHP OP CAR RHAB W/ECG: CPT

## 2021-07-26 ENCOUNTER — CLINICAL SUPPORT (OUTPATIENT)
Dept: CARDIAC REHAB | Facility: CLINIC | Age: 66
End: 2021-07-26
Payer: COMMERCIAL

## 2021-07-26 DIAGNOSIS — Z95.2 S/P AVR: ICD-10-CM

## 2021-07-26 PROCEDURE — 93798 PHYS/QHP OP CAR RHAB W/ECG: CPT

## 2021-07-26 NOTE — PROGRESS NOTES
Cardiac Rehabilitation Plan of Care   30 Day Reassessment          Today's date: 2021   # of Exercise Sessions Completed: 12  Patient name: Sourav Minaya      : 1955  Age: 78 y o  MRN: [id number]  Referring Physician: Fredrick Ortiz PA-C  Cardiologist: Dr Anshu Orlando  Provider: [facility]  Clinician: Rip Santos MS    Dx:   Encounter Diagnosis   Name Primary?  S/P AVR      Date of onset: 2021    SUMMARY OF PROGRESS:  Malia Peters is compliant attending cardiac rehab exercise sessions 3x/wk  He tolerates 40 mins at 2 2 - 3 2 METs plus wt training  Resting BP has been elevated, ranging from 140-160/80-90 and increases with exercise reaching 150-190/80-90  NSR on tele with occ PVCs observed  RHR 75 - 85 ExHR 90 - 105  He is tolerating progression of intensity levels to maintain RPE 4-6  No cardiac complaints  Malia Peters states that his SOB and lightheadedness has resolved  He is not progressing toward wt loss goals with a gain of 8 pounds  Patient has been working on  dietary modifications with the goal of rare red/processed meats, low fat dairy, reduced added sugars and refined flours  The patient is a recent user, quit 5/10/21, and is doing well abstaining  He has abstained since quitting  Depression/ anxiety screening using the PHQ-9 and SPENCER 7 was not reassessed  When addressed, the patient denies  feelings of depression/anxiety  Patient reports excellent social/emotional support  Patient attends group educational classes on cardiac risk factor modification  He has not added formal home exercise but states that he walks his dog 4x/week  His exercise program will be progressed as tolerated to maintain RPE 4-6  The patient has the following personal goals he hopes to achieved by discharge: weight loss, continue abstaining from smoking   Pt will continue to be educated on lifestyle modifications and encouraged to supplement with a home exercise program and will continue to progress over the next 30 days  Medication compliance: Yes   Comments: Pt reports to be compliant with medications  Fall Risk: Low   Comments: Ambulates with a steady gait with no assist device and Denies a fall in the past 6 months    EKG Interpretation: NSR, RBBB, rare PVCs      EXERCISE ASSESSMENT and PLAN    Current Exercise Program in Rehab:       Frequency: 3 days/week        Minutes: 40        METS: 2 2-3 2            HR:    RPE: 3-5         Modalities: Treadmill, UBE and NuStep      Exercise Progression 30 Day Goals :    Frequency: 3 days/week of cardiac rehab     Supplement with home exercise 2+ days/wk as tolerated    Minutes: 50                         >150 mins/wk of moderate intensity exercise   METS: 2 5-3 5   HR:     RPE: 4-6   Modalities: Treadmill, UBE, Lifecycle and Recumbent bike    Strength trainin-3 days / week  12-15 repetitions  1-2 sets per modality    Modalities: Leg Press, Chest Press, Arm Extension and Arm Curl    Home Exercise: occasional dog walking with dog stopping frequently    Goals: 10% improvement in functional capacity - based on max METs achieved in fitness assessment, improved DASI score by 10%, Increase in exercise capacity by 40% - based on peak METs tolerated in cardiac rehab exercise session, Exercise 5 days/wk, >150mins/wk of moderate intensity exercise, Resume ADLs with increased strength, Return to work unrestricted, Attend Rehab regularly and Decrease sitting time    Progression Toward Goals:  Pt is progressing and showing improvement  toward the following goals:  attends rehab regularly  , Will continue to educate and progress as tolerated      Education: benefit of exercise for CAD risk factors, home exercise guidelines, AHA guidelines to achieve >150 mins/wk of moderate exercise, RPE scale and class: Risk Factors for Heart Disease   Plan:education on home exercise guidelines and home exercise 30+ mins 2 days opposite CR  Readiness to change: Action:  (Changing behavior)      NUTRITION ASSESSMENT AND PLAN    Weight control:    Starting weight: 242 2 lb   Current weight:   250 6  Waist circumference:    Startin in   Current:      Diabetes: N/A    Lipid management: Discussed diet and lipid management and Last lipid profile 21  Chol 103    HDL 49  LDL 28    Goals:reduced BMI to < 25, reduced waist circumference <40 inches (M), Improved Rate Your Plate score  >21, Wt  loss 1-2 ppw,  goal of 210 lb lbs , choose lean meat (93-95%), increase intake of fish, shellfish, eat 3 or more servings of whole grains a day, Eat 4-5 cups of fruits and vegetables daily, choose healthy snacks: light popcorn, plain pretzels, Increase intake of nuts and seeds, seldom eat or choose low fat ice-cream, fruit juice bars or frozen yogurt  and eliminate or choose low-fat sweets    Progression Toward Goals: Will continue to educate and progress as tolerated      Education: heart healthy eating  low sodium diet  hydration  wt  loss   portion control  Plan: Education class: Reading Food Labels and Education Class: Heart Healthy Eating  Readiness to change: Preparation:  (Getting ready to change)       PSYCHOSOCIAL ASSESSMENT AND PLAN    Emotional:  Depression assessment:  PHQ-9 =3 1-4 = Minimal Depression            Anxiety measure:  SPENCER-7 =0 0-4  = Not anxious  Self-reported stress level:  1 - reports normal stressors with some added stress from worrying about health and finances  Social support: Patient reports excellent emotional/social support from family - particularly from his wife    Goals:  PHQ-9 - reduced severity by one level, Physical Fitness in [place] Score < 3, Daily Activity in [place] Score < 3, Social Activities in [place] Score < 3, Pain in [place] Score < 3, [facility] Health in [facility] Score < 3, Quality of Life in [facility] Score < 3 , Increased interest in doing things, improved sleep, improved positive thoughts of well being and stop worrying    Progression Toward Goals: Will continue to educate and progress as tolerated  Education: benefits of a positive support system and stress management techniques  Plan: Class: Stress and Your Health, Class: Relaxation, Practice relaxation techniques and Exercise  Readiness to change: Preparation:  (Getting ready to change)       OTHER CORE COMPONENTS     Tobacco:   Social History     Tobacco Use   Smoking Status Former Smoker    Packs/day: 0 50    Types: Cigarettes    Start date: 45    Quit date: 2021    Years since quittin 2   Smokeless Tobacco Never Used       Tobacco Use Intervention:   Pt quit 5/10/21   and has abstained    Anginal Symptoms:  None   NTG use: No prescription    Blood pressure:    Restin-160/80-90   Exercise: 150-190/80-90     Goals: consistent BP < 130/80, reduced dietary sodium <2300mg, moderate intensity exercise >150 mins/wk and Abstain from smoking    Progression Toward Goals: Pt is progressing and showing improvement  toward the following goals:  has abstained so far from smoking   , Will continue to educate and progress as tolerated      Education:  understanding high blood pressure and it's relationship to CAD, low sodium diet and HTN and relapse education  Plan: Class: Understanding Heart Disease and Class: Common Heart Medications  Readiness to change: Action:  (Changing behavior)

## 2021-07-28 ENCOUNTER — CLINICAL SUPPORT (OUTPATIENT)
Dept: CARDIAC REHAB | Facility: CLINIC | Age: 66
End: 2021-07-28
Payer: COMMERCIAL

## 2021-07-28 DIAGNOSIS — Z95.2 S/P AVR: ICD-10-CM

## 2021-07-28 PROCEDURE — 93798 PHYS/QHP OP CAR RHAB W/ECG: CPT

## 2021-07-30 ENCOUNTER — CLINICAL SUPPORT (OUTPATIENT)
Dept: CARDIAC REHAB | Facility: CLINIC | Age: 66
End: 2021-07-30
Payer: COMMERCIAL

## 2021-07-30 ENCOUNTER — APPOINTMENT (OUTPATIENT)
Dept: CARDIAC REHAB | Facility: CLINIC | Age: 66
End: 2021-07-30
Payer: COMMERCIAL

## 2021-07-30 DIAGNOSIS — Z95.2 S/P AVR: ICD-10-CM

## 2021-07-30 PROCEDURE — 93798 PHYS/QHP OP CAR RHAB W/ECG: CPT

## 2021-08-02 ENCOUNTER — APPOINTMENT (OUTPATIENT)
Dept: CARDIAC REHAB | Facility: CLINIC | Age: 66
End: 2021-08-02
Payer: COMMERCIAL

## 2021-08-02 ENCOUNTER — CLINICAL SUPPORT (OUTPATIENT)
Dept: CARDIAC REHAB | Facility: CLINIC | Age: 66
End: 2021-08-02
Payer: COMMERCIAL

## 2021-08-02 DIAGNOSIS — Z95.2 S/P AVR: ICD-10-CM

## 2021-08-02 PROCEDURE — 93798 PHYS/QHP OP CAR RHAB W/ECG: CPT

## 2021-08-04 ENCOUNTER — OFFICE VISIT (OUTPATIENT)
Dept: CARDIOLOGY CLINIC | Facility: CLINIC | Age: 66
End: 2021-08-04
Payer: COMMERCIAL

## 2021-08-04 ENCOUNTER — CLINICAL SUPPORT (OUTPATIENT)
Dept: CARDIAC REHAB | Facility: CLINIC | Age: 66
End: 2021-08-04
Payer: COMMERCIAL

## 2021-08-04 ENCOUNTER — APPOINTMENT (OUTPATIENT)
Dept: CARDIAC REHAB | Facility: CLINIC | Age: 66
End: 2021-08-04
Payer: COMMERCIAL

## 2021-08-04 VITALS
SYSTOLIC BLOOD PRESSURE: 132 MMHG | WEIGHT: 250 LBS | DIASTOLIC BLOOD PRESSURE: 84 MMHG | HEART RATE: 79 BPM | HEIGHT: 74 IN | BODY MASS INDEX: 32.08 KG/M2

## 2021-08-04 DIAGNOSIS — Z95.2 S/P AVR: ICD-10-CM

## 2021-08-04 DIAGNOSIS — I35.0 NONRHEUMATIC AORTIC VALVE STENOSIS: Primary | ICD-10-CM

## 2021-08-04 DIAGNOSIS — I10 ESSENTIAL HYPERTENSION: ICD-10-CM

## 2021-08-04 DIAGNOSIS — Z98.890 S/P MVR (MITRAL VALVE REPAIR): ICD-10-CM

## 2021-08-04 DIAGNOSIS — Q21.1 PFO (PATENT FORAMEN OVALE): ICD-10-CM

## 2021-08-04 DIAGNOSIS — Z95.2 S/P MVR (MITRAL VALVE REPLACEMENT): ICD-10-CM

## 2021-08-04 PROBLEM — I21.4 NSTEMI (NON-ST ELEVATED MYOCARDIAL INFARCTION) (HCC): Status: RESOLVED | Noted: 2021-05-11 | Resolved: 2021-08-04

## 2021-08-04 PROCEDURE — 93798 PHYS/QHP OP CAR RHAB W/ECG: CPT

## 2021-08-04 PROCEDURE — 99214 OFFICE O/P EST MOD 30 MIN: CPT | Performed by: INTERNAL MEDICINE

## 2021-08-04 PROCEDURE — 1160F RVW MEDS BY RX/DR IN RCRD: CPT | Performed by: INTERNAL MEDICINE

## 2021-08-04 PROCEDURE — 93000 ELECTROCARDIOGRAM COMPLETE: CPT | Performed by: INTERNAL MEDICINE

## 2021-08-04 PROCEDURE — 3008F BODY MASS INDEX DOCD: CPT | Performed by: INTERNAL MEDICINE

## 2021-08-04 PROCEDURE — 3079F DIAST BP 80-89 MM HG: CPT | Performed by: INTERNAL MEDICINE

## 2021-08-04 PROCEDURE — 1036F TOBACCO NON-USER: CPT | Performed by: INTERNAL MEDICINE

## 2021-08-04 PROCEDURE — 3075F SYST BP GE 130 - 139MM HG: CPT | Performed by: INTERNAL MEDICINE

## 2021-08-04 RX ORDER — ASPIRIN 81 MG/1
81 TABLET ORAL DAILY
Start: 2021-08-04

## 2021-08-04 NOTE — PROGRESS NOTES
Cardiology Follow Up    Hodan Hall  [id number]  1955  [facility] [address]  [phone]      1  Nonrheumatic aortic valve stenosis  POCT ECG   2  Essential hypertension     3  PFO (patent foramen ovale)     4  S/P AVR  metoprolol tartrate (LOPRESSOR) 25 mg tablet    aspirin (ECOTRIN LOW STRENGTH) 81 mg EC tablet   5  S/P MVR (mitral valve repair)     6  S/P MVR (mitral valve replacement)  metoprolol tartrate (LOPRESSOR) 25 mg tablet       Discussion/Summary:      60-year-old man with severe aortic stenosis, I suspect a bicuspid valve  Had been lost to follow-up, but recently presented in heart failure and is now status post bioprosthetic AVR and mitral valve repair  His LV function was mild-to-moderately decreased  There was no significant  Obstructive CAD identified on his preoperative cardiac catheterization  Postoperatively, he is doing very well    he had  Brief postoperative atrial fibrillation, but seems to have been maintaining sinus rhythm since then  Patient is aware that he will need antibiotic prophylaxis moving forward prior to any dental procedures  We will periodically get echocardiogram to evaluate his mitral valve ring and the aortic valve bioprosthesis  Currently, appears to be functioning well  With regards to atrial fibrillation, he seems to be maintaining sinus rhythm  He had very brief postoperative atrial fibrillation, is not on systemic anticoagulation  Indicated if he had any recurrence of atrial fibrillation in the future, long-term anticoagulation would be recommended  I would like to stop his amiodarone  I will increase his metoprolol to 25 mg twice a day  Ejection fraction is estimated in the range of 40-45%  He is on losartan as well as metoprolol    His volume status is currently stable despite not being on any loop diuretics  Incidental finding of a PFO noted on the KATIA  He is status post closure, there was a small residual shunt, but not clinically significant  BP is controlled, monitor at rehab with increase of metoprolol, can make further adjustments if needed  He has no children, but has a living sister (his brother was older and has passed away)  Did advise he recommend his sister to have echo if she hasn't in the past     He was asked by his employer to return to work within the confines of his lifting restrictions, and he would like to do so (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)  Paperwork was sent to his surgeon's office, I think he is fine to return to work  Previous History:    78year old man  Seen in 2019 for an evaluation of aortic stenosis, which was severe but asymptomatic  He was lost to follow up and then had seen his PCP for progressive shortness of breath, was in heart failure and sent to the hospital     At that time in 5/2021, Was found to have progression of his aortic stenosis but also depression in LV function and moderate to severe mitral regurgitation  He was transferred to the [facility] and underwent preoperative testing  No obstructive CAD on his cardiac catheterization  On 5/20/2021, he underwent bioprosthetic aortic valve replacement with a 25 mm valve, mitral valve repair with 32 mm ring, PFO closure but still had mild residual left-to-right passage postoperatively  Ejection fraction on the intraoperative KATIA after his surgery was in the range of 40-45%  He did have brief postoperative atrial fibrillation, but converted to sinus rhythm and was not started on systemic anticoagulation  He is now been home for a few months  He has seen our nurse practitioner as well as the cardiac surgeons  He is recovering quite well and feeling much better than before  His volume status has remained stable though his diuretics have been weaned off  At this point, he remains on amiodarone, but denies any palpitations and has been in sinus rhythm  He is doing cardiac rehab and doing very well with this  His blood pressure had been elevated, so he was restarted on losartan at a lower dose than he was on prior to his surgery  He is on a very low dose of metoprolol  Patient Active Problem List   Diagnosis    Aortic valve stenosis - severe    [facility]    Erectile dysfunction of non-organic origin    Hyperlipidemia    Essential hypertension    Rotator cuff tendinitis Left    GERD (gastroesophageal reflux disease)    Acute combined systolic and diastolic CHF, NYHA class 1 (HCC)    Arthritis    S/P AVR    PFO (patent foramen ovale)    S/P MVR (mitral valve repair)    Encounter for postoperative care     Past Medical History:   Diagnosis Date    Broken humerus     COPD [facility]chronic obstructive pulmonary disease) [facility]     Pneumonia      Social History     Tobacco Use    Smoking status: Former Smoker     Packs/day: 0 50     Types: Cigarettes     Start date:      Quit date: 2021     Years since quittin 2    Smokeless tobacco: Never Used   Vaping Use    Vaping Use: Never used   Substance Use Topics    Alcohol use: Yes     Comment: once a month    Drug use: No      Family History   Problem Relation Age of Onset    Prostate cancer Father      Past Surgical History:   Procedure Laterality Date    IR CHEST TUBE PLACEMENT  2021    MULTIPLE TOOTH EXTRACTIONS N/A 2021    Procedure: EXTRACTION TEETH MULTIPLE 1, 3, 18, 30, EXCISION OF LEFT BUCCAL FIBROMA;  Surgeon: Killian Lanier DMD;  [facility];  Service: Maxillofacial    NV ECHO TRANSESOPHAG R-T 2D W/PRB IMG ACQUISJ I&R N/A 2021    Procedure: TRANSESOPHAGEAL ECHOCARDIOGRAM (KATIA);   Surgeon: Yvan Whitaker MD;  Location[facility];  Service: Cardiac Surgery    NV REPLACEMENT OF MITRAL VALVE N/A 2021 Procedure: VALVE MITRAL (MVR) REPAIR with 32mm physio II annuloplasty ring;  Surgeon: Sridhar Cruz MD;  [facility];  Service: Cardiac Surgery    MA RPLCMT AORTIC VALVE OPN W/STENTLESS TISSUE VALVE N/A 5/20/2021    Procedure: REPLACEMENT VALVE AORTIC (AVR) TISSUE with 25mm montgomery inspiris tissue valve;  Surgeon: Sridhar Cruz MD;  [facility];  Service: Cardiac Surgery       Current Outpatient Medications:     amLODIPine (NORVASC) 5 mg tablet, Take 1 tablet (5 mg total) by mouth daily, Disp: 90 tablet, Rfl: 3    Anoro Ellipta 62 5-25 MCG/INH inhaler, INHALE 1 PUFF BY MOUTH EVERY DAY, Disp: 1 Inhaler, Rfl: 5    atorvastatin (LIPITOR) 40 mg tablet, Take 1 tablet (40 mg total) by mouth every evening, Disp: 90 tablet, Rfl: 2    dextromethorphan-guaifenesin (MUCINEX DM)  MG per 12 hr tablet, Take 1 tablet by mouth every 12 (twelve) hours (Patient taking differently: Take 1 tablet by mouth every 12 (twelve) hours as needed ), Disp: 20 tablet, Rfl: 0    losartan (COZAAR) 50 mg tablet, Take 50 mg by mouth daily, Disp: , Rfl:     metoprolol tartrate (LOPRESSOR) 25 mg tablet, Take 1 tablet (25 mg total) by mouth every 12 (twelve) hours, Disp: 180 tablet, Rfl: 3    Multiple Vitamin (MULTI-VITAMIN DAILY PO), Take 1 tablet by mouth daily, Disp: , Rfl:     pantoprazole (PROTONIX) 40 mg tablet, Take 1 tablet (40 mg total) by mouth daily as needed (heartburn or acid reflux), Disp: 90 tablet, Rfl: 0    albuterol (PROVENTIL HFA,VENTOLIN HFA) 90 mcg/act inhaler, INHALE 1-2 PUFFS EVERY 4 (FOUR) HOURS AS NEEDED FOR WHEEZING (Patient not taking: Reported on 8/4/2021), Disp: 18 Inhaler, Rfl: 0    aspirin (ECOTRIN LOW STRENGTH) 81 mg EC tablet, Take 1 tablet (81 mg total) by mouth daily, Disp: , Rfl:   No Known Allergies    Vitals:    08/04/21 1513   BP: 132/84   BP Location: Right arm   Patient Position: Sitting   Cuff Size: Adult   Pulse: 79   Weight: 113 kg (250 lb)   Height: 6' 2" (1 88 m)     Vitals:    08/04/21 1513   Weight: 113 kg (250 lb)      Height: 6' 2" (188 cm)   Body mass index is 32 1 kg/m²  Physical Exam:  GEN: Alondra Mccray appears well, alert and oriented x 3, pleasant and cooperative   HEENT: pupils equal, round, and reactive to light; extraocular muscles intact  NECK: supple, no carotid bruits   HEART: regular rhythm, Bio AVR sounds   LUNGS: clear to auscultation bilaterally; no wheezes, rales, or rhonchi   ABDOMEN: normal bowel sounds, soft, no tenderness, no distention  EXTREMITIES: trace LE edema  NEURO: no focal findings   SKIN: normal without suspicious lesions on exposed skin    ROS:  Positive for shoulder pain, knee pain, difficulty sleeping, heartburn, arthritis, back pain, hearing problems, kidney stones, erectile dysfunction  Except as noted in HPI, is otherwise reviewed in detail and a 12 point review of systems is negative  ROS reviewed and is unchanged    Labs:  Lab Results   Component Value Date     08/07/2014    K 4 2 05/25/2021     05/25/2021    CREATININE 0 84 05/25/2021    BUN 19 05/25/2021    CO2 30 05/25/2021    ALT 23 05/11/2021    AST 14 05/11/2021    INR 1 22 (H) 05/26/2021    GLUF 116 (H) 05/12/2021    HGBA1C 5 8 (H) 05/13/2021    WBC 10 10 05/25/2021    HGB 11 4 (L) 05/25/2021    HCT 35 0 (L) 05/25/2021     05/25/2021       Lab Results   Component Value Date    CHOL 209 08/07/2014     Lab Results   Component Value Date    HDL 49 05/13/2021    HDL 39 08/07/2014     Lab Results   Component Value Date    LDLCALC 28 05/13/2021    LDLCALC 126 (H) 08/07/2014     Lab Results   Component Value Date    TRIG 129 05/13/2021    TRIG 220 08/07/2014       Testing:  Cardiac Cath 5/2021:  CORONARY CIRCULATION:  Left main: Normal   LAD: The vessel was large sized  Angiography showed moderate non-obstructive atherosclerosis  Circumflex: The vessel was large sized and dominant, giving rise to several OM and posterolateral branches and the PDA  There was diffuse atherosclerosis of the branch vessels  RCA: Non-dominant; normal   KATIA 5/2021:  LEFT VENTRICLE:  The ventricle was mildly dilated  Systolic function was moderately reduced  Ejection fraction was estimated to be 45 %  There was moderate diffuse hypokinesis  Wall thickness was moderately increased  The changes were consistent with eccentric hypertrophy  Features were consistent with a pseudonormal left ventricular filling pattern, with concomitant abnormal relaxation and increased filling pressure (grade 2 diastolic dysfunction)      RIGHT VENTRICLE:  The ventricle was mildly dilated  Systolic function was moderately reduced      LEFT ATRIUM:  The atrium was mildly dilated      LEFT ATRIAL APPENDAGE:  No thrombus was identified      ATRIAL SEPTUM:  There was a patent foramen ovale measuring 5 mm  Doppler evaluation was performed  There was a left-to-right shunt, in the baseline state      RIGHT ATRIUM:  The atrium was mildly dilated      MITRAL VALVE:  There was mild to moderate annular calcification  There was an area of image dropout at the base of the posterior leaflet of the mitral valve in the P1- P2 segment  This was possibly artifact but a perforation or sinus cannot be entirely ruled out  There appeared to be flow into but not  out of this region  There was mild to moderate regurgitation  The regurgitant jet was centrally directed      AORTIC VALVE:  The valve was not visualized well enough to rule out a bicuspid morphology  Leaflets exhibited marked calcification and immobility  There was severe stenosis  There was mild to moderate regurgitation  Valve mean gradient was 44 mmHg      TRICUSPID VALVE:  There was mild regurgitation      PERICARDIUM:  A small pericardial effusion was identified circumferential to the heart  TTE 5/2021:  LEFT VENTRICLE:  Moderate decreased left ventricular systolic function, EF 23%   Significantly decreased left ventricular global longitudinal strain, minus 7 4%  Severe concentric left ventricular hypertrophy, wall thickness 21 mm  Mildly increased left  ventricular cavity size  Grade 3 left ventricular diastolic dysfunction  Severely elevated left ventricular filling pressures      RIGHT VENTRICLE:  Mild right ventricular enlargement with well preserved right ventricular systolic function      LEFT ATRIUM:  Moderate left atrial enlargement      RIGHT ATRIUM:  Mild right atrial enlargement      MITRAL VALVE:  Moderate possibly severe mitral regurgitation  No mitral stenosis  Mitral ERO 0 5 cm2  Mitral valve regurgitant volume 85 mL  The mitral valve annulus was heavily calcified  The mitral valve leaflets were calcified but have well preserved  mobility  There was some calcification in the chordae      AORTIC VALVE:  Heavily calcified aortic valve with severe, probably critical aortic stenosis  No significant aortic regurgitation  Aortic valve maximum velocity 5 3 M/S  Aortic valve mean gradient 81 mmHg  Aortic valve area 0 3 cm2      TRICUSPID VALVE:  There was mild regurgitation      AORTA:  Aortic root at the upper limits of normal to mildly enlarged at 3 8 cm     PULMONARY ARTERIES:  Moderate pulmonary hypertension  Pulmonary artery systolic pressures estimated 54 mmHg      PERICARDIUM:  Mild pericardial effusion without hemodynamic significance  Echo 6/2019:  1  Severe concentric left ventricular hypertrophy, normal left ventricular systolic function, grade 1 diastolic dysfunction  EF around 60 percent  2  Mild left atrial cavity enlargement  3  Severe aortic valve stenosis  Peak transaortic velocity is 4 2 meters/seconds, mean gradient is 45 mmHg and calculated aortic valve area is 0 0 6 cm2  No aortic valve regurgitation noted  4  Mitral valve leaflet sclerosis, trace to mild mitral valve regurgitation noted  5  Trace tricuspid valve regurgitation  6  No obvious pulmonary hypertension  7  No pericardial effusion  EKG:    Sinus rhythm  79 beats per minute  Left axis deviation  Incomplete right bundle branch block  Nonspecific ST T-wave abnormality

## 2021-08-06 ENCOUNTER — APPOINTMENT (OUTPATIENT)
Dept: CARDIAC REHAB | Facility: CLINIC | Age: 66
End: 2021-08-06
Payer: COMMERCIAL

## 2021-08-06 ENCOUNTER — CLINICAL SUPPORT (OUTPATIENT)
Dept: CARDIAC REHAB | Facility: CLINIC | Age: 66
End: 2021-08-06
Payer: COMMERCIAL

## 2021-08-06 DIAGNOSIS — Z95.2 S/P AVR: ICD-10-CM

## 2021-08-06 PROCEDURE — 93798 PHYS/QHP OP CAR RHAB W/ECG: CPT

## 2021-08-09 ENCOUNTER — APPOINTMENT (OUTPATIENT)
Dept: CARDIAC REHAB | Facility: CLINIC | Age: 66
End: 2021-08-09
Payer: COMMERCIAL

## 2021-08-11 ENCOUNTER — CLINICAL SUPPORT (OUTPATIENT)
Dept: CARDIAC REHAB | Facility: CLINIC | Age: 66
End: 2021-08-11
Payer: COMMERCIAL

## 2021-08-11 ENCOUNTER — APPOINTMENT (OUTPATIENT)
Dept: CARDIAC REHAB | Facility: CLINIC | Age: 66
End: 2021-08-11
Payer: COMMERCIAL

## 2021-08-11 DIAGNOSIS — Z95.2 S/P AVR: ICD-10-CM

## 2021-08-11 PROCEDURE — 93798 PHYS/QHP OP CAR RHAB W/ECG: CPT

## 2021-08-13 ENCOUNTER — APPOINTMENT (OUTPATIENT)
Dept: CARDIAC REHAB | Facility: CLINIC | Age: 66
End: 2021-08-13
Payer: COMMERCIAL

## 2021-08-13 ENCOUNTER — CLINICAL SUPPORT (OUTPATIENT)
Dept: CARDIAC REHAB | Facility: CLINIC | Age: 66
End: 2021-08-13
Payer: COMMERCIAL

## 2021-08-13 DIAGNOSIS — Z95.2 S/P AVR: ICD-10-CM

## 2021-08-13 PROCEDURE — 93798 PHYS/QHP OP CAR RHAB W/ECG: CPT

## 2021-08-16 ENCOUNTER — APPOINTMENT (OUTPATIENT)
Dept: CARDIAC REHAB | Facility: CLINIC | Age: 66
End: 2021-08-16
Payer: COMMERCIAL

## 2021-08-16 ENCOUNTER — CLINICAL SUPPORT (OUTPATIENT)
Dept: CARDIAC REHAB | Facility: CLINIC | Age: 66
End: 2021-08-16
Payer: COMMERCIAL

## 2021-08-16 DIAGNOSIS — Z95.2 S/P AVR: ICD-10-CM

## 2021-08-16 PROCEDURE — 93798 PHYS/QHP OP CAR RHAB W/ECG: CPT

## 2021-08-18 ENCOUNTER — CLINICAL SUPPORT (OUTPATIENT)
Dept: CARDIAC REHAB | Facility: CLINIC | Age: 66
End: 2021-08-18
Payer: COMMERCIAL

## 2021-08-18 ENCOUNTER — APPOINTMENT (OUTPATIENT)
Dept: CARDIAC REHAB | Facility: CLINIC | Age: 66
End: 2021-08-18
Payer: COMMERCIAL

## 2021-08-18 DIAGNOSIS — Z95.2 S/P AVR: ICD-10-CM

## 2021-08-18 PROCEDURE — 93798 PHYS/QHP OP CAR RHAB W/ECG: CPT

## 2021-08-20 ENCOUNTER — APPOINTMENT (OUTPATIENT)
Dept: CARDIAC REHAB | Facility: CLINIC | Age: 66
End: 2021-08-20
Payer: COMMERCIAL

## 2021-08-20 ENCOUNTER — CLINICAL SUPPORT (OUTPATIENT)
Dept: CARDIAC REHAB | Facility: CLINIC | Age: 66
End: 2021-08-20
Payer: COMMERCIAL

## 2021-08-20 DIAGNOSIS — Z95.2 S/P AVR: ICD-10-CM

## 2021-08-20 PROCEDURE — 93798 PHYS/QHP OP CAR RHAB W/ECG: CPT

## 2021-08-23 ENCOUNTER — CLINICAL SUPPORT (OUTPATIENT)
Dept: CARDIAC REHAB | Facility: CLINIC | Age: 66
End: 2021-08-23
Payer: COMMERCIAL

## 2021-08-23 ENCOUNTER — APPOINTMENT (OUTPATIENT)
Dept: CARDIAC REHAB | Facility: CLINIC | Age: 66
End: 2021-08-23
Payer: COMMERCIAL

## 2021-08-23 DIAGNOSIS — Z95.2 S/P AVR: ICD-10-CM

## 2021-08-23 PROCEDURE — 93798 PHYS/QHP OP CAR RHAB W/ECG: CPT

## 2021-08-23 NOTE — PROGRESS NOTES
Cardiac Rehabilitation Plan of Care   60 Day Reassessment          Today's date: 2021   # of Exercise Sessions Completed: 23  Patient name: Damaris Barajas      : 1955  Age: 78 y o  MRN: [id number]  Referring Physician: Liliya Padgett PA-C  Cardiologist: Dr Antony Galarza  Provider: [facility]  Clinician: Deneen Pak MS    Dx:   Encounter Diagnosis   Name Primary?  S/P AVR      Date of onset: 2021    SUMMARY OF PROGRESS:  Kathie Reveles is compliant attending cardiac rehab exercise sessions 3x/wk  He tolerates 45 mins at 2 4 - 3 0 METs plus wt training  Resting BP is still slightly elevated, ranging from 128-140/80-90 at rest and increases with exercise reaching 150-160/80-90  BP has improved since his last progress note  NSR on tele with occ PVCs observed  RHR 70 - 80 ExHR 85-90  He is tolerating progression of intensity levels to maintain RPE 4-6  No cardiac complaints  He still reports back pain but mostly on the treadmill  Kathie Reveles states that since returning to work recently, he is always tired  He is not progressing toward wt loss goals with a gain of 7 pounds  Patient has been working on  dietary modifications with the goal of rare red/processed meats, low fat dairy, reduced added sugars and refined flours  Kathie Reveles states that he recently cut out late night snacking  He also reports not adding salt to any of his foods but was encouraged to check the sodium content on the food label of each itemThe patient is a recent user, quit 5/10/21, and is doing well abstaining  He has abstained since quitting  Depression/ anxiety screening using the PHQ-9 and SPENCER 7 was not reassessed but will be in the future if needed and upon discharge  When addressed, the patient denies  feelings of depression/anxiety  Patient reports excellent social/emotional support  Patient attends group educational classes on cardiac risk factor modification    He has not added formal home exercise but states that he walks his dog 4x/week and is on his feet all day at work  The difference between exercise and PA was discussed with Kostas Milligan today  His exercise program will be progressed as tolerated to maintain RPE 4-6  The patient has the following personal goals he hopes to achieved by discharge: weight loss, continue abstaining from smoking  Pt will continue to be educated on lifestyle modifications and encouraged to supplement with a home exercise program and will continue to progress over the next 30 days      Medication compliance: Yes   Comments: Pt reports to be compliant with medications  Fall Risk: Low   Comments: Ambulates with a steady gait with no assist device and Denies a fall in the past 6 months    EKG Interpretation: NSR, RBBB, rare PVCs      EXERCISE ASSESSMENT and PLAN    Current Exercise Program in Rehab:       Frequency: 3 days/week        Minutes: 45        METS: 2 4-3 0           HR:    RPE: 3-5         Modalities: Treadmill, UBE, NuStep and Recumbent bike      Exercise Progression 30 Day Goals :    Frequency: 3 days/week of cardiac rehab     Supplement with home exercise 2+ days/wk as tolerated    Minutes: 50                         >150 mins/wk of moderate intensity exercise   METS: 2 5-3 5   HR:     RPE: 4-6   Modalities: Treadmill, UBE, Lifecycle, Rower, NuStep and Recumbent bike    Strength trainin-3 days / week  12-15 repetitions  1-2 sets per modality    Modalities: Leg Press, Chest Press, Arm Extension and Arm Curl    Home Exercise: occasional dog walking with dog stopping frequently    Goals: 10% improvement in functional capacity - based on max METs achieved in fitness assessment, improved DASI score by 10%, Increase in exercise capacity by 40% - based on peak METs tolerated in cardiac rehab exercise session, Exercise 5 days/wk, >150mins/wk of moderate intensity exercise, Resume ADLs with increased strength, Return to work unrestricted, Attend Rehab regularly and Decrease sitting time    Progression Toward Goals:  Pt is progressing and showing improvement  toward the following goals:  attends rehab regularly  , Will continue to educate and progress as tolerated  Education: benefit of exercise for CAD risk factors, home exercise guidelines, AHA guidelines to achieve >150 mins/wk of moderate exercise, RPE scale and class: Risk Factors for Heart Disease   Plan:education on home exercise guidelines and home exercise 30+ mins 2 days opposite CR  Readiness to change: Action:  (Changing behavior)      NUTRITION ASSESSMENT AND PLAN    Weight control:    Starting weight: 242 2 lb   Current weight:   249 6   Waist circumference:    Startin in   Current:      Diabetes: N/A    Lipid management: Discussed diet and lipid management and Last lipid profile 21  Chol 103    HDL 49  LDL 28    Goals:reduced BMI to < 25, reduced waist circumference <40 inches (M), Improved Rate Your Plate score  >71, Wt  loss 1-2 ppw,  goal of 210 lb lbs , choose lean meat (93-95%), increase intake of fish, shellfish, eat 3 or more servings of whole grains a day, Eat 4-5 cups of fruits and vegetables daily, choose healthy snacks: light popcorn, plain pretzels, Increase intake of nuts and seeds, seldom eat or choose low fat ice-cream, fruit juice bars or frozen yogurt  and eliminate or choose low-fat sweets    Progression Toward Goals: Pt is progressing and showing improvement  toward the following goals:  no added salt to foods, decreased late night snacking   , Patient will begin checking labels for sodium content in the next 30 days, Will continue to educate and progress as tolerated      Education: heart healthy eating  low sodium diet  hydration  wt  loss   portion control  Plan: Education class: Reading Food Labels and Education Class: Heart Healthy Eating  Readiness to change: Action:  (Changing behavior)      PSYCHOSOCIAL ASSESSMENT AND PLAN    Emotional:  Depression assessment:  PHQ-9 =3 1-4 = Minimal Depression            Anxiety measure:  SPENCER-7 =0 0-4  = Not anxious  Self-reported stress level:  1 - reports normal stressors with some added stress from worrying about health and finances  Social support: Patient reports excellent emotional/social support from family - particularly from his wife    Goals:  PHQ-9 - reduced severity by one level, Physical Fitness in [place] Score < 3, Daily Activity in [place] Score < 3, Social Activities in [place] Score < 3, Pain in [place] Score < 3, Overall Health in [facility] Score < 3, Quality of Life in [facility] Score < 3 , Increased interest in doing things, improved sleep, improved positive thoughts of well being and stop worrying    Progression Toward Goals: Will continue to educate and progress as tolerated  Education: signs/sxs of depression, benefits of a positive support system, stress management techniques and depression and CAD  Plan: Class: Stress and Your Health, Class: Relaxation, Practice relaxation techniques and Exercise  Readiness to change: Preparation:  (Getting ready to change)       OTHER CORE COMPONENTS     Tobacco:   Social History     Tobacco Use   Smoking Status Former Smoker    Packs/day: 0 50    Types: Cigarettes    Start date: 45    Quit date: 2021    Years since quittin 2   Smokeless Tobacco Never Used       Tobacco Use Intervention:   Pt quit 5/10/21   and has abstained    Anginal Symptoms:  None   NTG use: No prescription    Blood pressure:    Restin-140/80-90   Exercise: 150-160/80-90     Goals: consistent BP < 130/80, reduced dietary sodium <2300mg, moderate intensity exercise >150 mins/wk and Abstain from smoking    Progression Toward Goals: Pt is progressing and showing improvement  toward the following goals:  has abstained so far from smoking; improved BP since last progress note   , Will continue to educate and progress as tolerated      Education:  understanding high blood pressure and it's relationship to CAD, low sodium diet and HTN, Education class:  Common Heart Medications, smoking and heart disease and relapse education  Plan: Class: Understanding Heart Disease, engage in regular exercise and check labels for sodium content  Readiness to change: Action:  (Changing behavior)

## 2021-08-25 ENCOUNTER — CLINICAL SUPPORT (OUTPATIENT)
Dept: CARDIAC REHAB | Facility: CLINIC | Age: 66
End: 2021-08-25
Payer: COMMERCIAL

## 2021-08-25 ENCOUNTER — APPOINTMENT (OUTPATIENT)
Dept: CARDIAC REHAB | Facility: CLINIC | Age: 66
End: 2021-08-25
Payer: COMMERCIAL

## 2021-08-25 DIAGNOSIS — Z95.2 S/P AVR: ICD-10-CM

## 2021-08-25 PROCEDURE — 93798 PHYS/QHP OP CAR RHAB W/ECG: CPT

## 2021-08-27 ENCOUNTER — APPOINTMENT (OUTPATIENT)
Dept: CARDIAC REHAB | Facility: CLINIC | Age: 66
End: 2021-08-27
Payer: COMMERCIAL

## 2021-08-30 ENCOUNTER — APPOINTMENT (OUTPATIENT)
Dept: CARDIAC REHAB | Facility: CLINIC | Age: 66
End: 2021-08-30
Payer: COMMERCIAL

## 2021-09-01 ENCOUNTER — CLINICAL SUPPORT (OUTPATIENT)
Dept: CARDIAC REHAB | Facility: CLINIC | Age: 66
End: 2021-09-01
Payer: COMMERCIAL

## 2021-09-01 ENCOUNTER — APPOINTMENT (OUTPATIENT)
Dept: CARDIAC REHAB | Facility: CLINIC | Age: 66
End: 2021-09-01
Payer: COMMERCIAL

## 2021-09-01 DIAGNOSIS — Z95.2 S/P AVR: ICD-10-CM

## 2021-09-01 PROCEDURE — 93798 PHYS/QHP OP CAR RHAB W/ECG: CPT

## 2021-09-03 ENCOUNTER — APPOINTMENT (OUTPATIENT)
Dept: CARDIAC REHAB | Facility: CLINIC | Age: 66
End: 2021-09-03
Payer: COMMERCIAL

## 2021-09-07 ENCOUNTER — CLINICAL SUPPORT (OUTPATIENT)
Dept: CARDIAC REHAB | Facility: CLINIC | Age: 66
End: 2021-09-07
Payer: COMMERCIAL

## 2021-09-07 DIAGNOSIS — Z95.2 S/P AVR: ICD-10-CM

## 2021-09-07 PROCEDURE — 93798 PHYS/QHP OP CAR RHAB W/ECG: CPT

## 2021-09-08 ENCOUNTER — APPOINTMENT (OUTPATIENT)
Dept: CARDIAC REHAB | Facility: CLINIC | Age: 66
End: 2021-09-08
Payer: COMMERCIAL

## 2021-09-10 ENCOUNTER — CLINICAL SUPPORT (OUTPATIENT)
Dept: CARDIAC REHAB | Facility: CLINIC | Age: 66
End: 2021-09-10
Payer: COMMERCIAL

## 2021-09-10 ENCOUNTER — APPOINTMENT (OUTPATIENT)
Dept: CARDIAC REHAB | Facility: CLINIC | Age: 66
End: 2021-09-10
Payer: COMMERCIAL

## 2021-09-10 DIAGNOSIS — Z95.2 S/P AVR: ICD-10-CM

## 2021-09-10 PROCEDURE — 93798 PHYS/QHP OP CAR RHAB W/ECG: CPT

## 2021-09-13 ENCOUNTER — APPOINTMENT (OUTPATIENT)
Dept: CARDIAC REHAB | Facility: CLINIC | Age: 66
End: 2021-09-13
Payer: COMMERCIAL

## 2021-09-13 ENCOUNTER — CLINICAL SUPPORT (OUTPATIENT)
Dept: CARDIAC REHAB | Facility: CLINIC | Age: 66
End: 2021-09-13
Payer: COMMERCIAL

## 2021-09-13 DIAGNOSIS — Z95.2 S/P AVR: ICD-10-CM

## 2021-09-13 PROCEDURE — 93798 PHYS/QHP OP CAR RHAB W/ECG: CPT

## 2021-09-15 ENCOUNTER — APPOINTMENT (OUTPATIENT)
Dept: CARDIAC REHAB | Facility: CLINIC | Age: 66
End: 2021-09-15
Payer: COMMERCIAL

## 2021-09-15 ENCOUNTER — CLINICAL SUPPORT (OUTPATIENT)
Dept: CARDIAC REHAB | Facility: CLINIC | Age: 66
End: 2021-09-15
Payer: COMMERCIAL

## 2021-09-15 DIAGNOSIS — Z95.2 S/P AVR: ICD-10-CM

## 2021-09-15 PROCEDURE — 93798 PHYS/QHP OP CAR RHAB W/ECG: CPT

## 2021-09-15 NOTE — PROGRESS NOTES
Patient has been having significantly more PVCs the past week with some runs of bigeminy and trigeminy PVCs  Patient denies symptoms or medications changes  He does feel significant stress, fatigue, and back pain from returning to work Aug 8th  Please see attached Exercise Session Detail for today's exercise telemetry

## 2021-09-16 ENCOUNTER — TELEPHONE (OUTPATIENT)
Dept: CARDIOLOGY CLINIC | Facility: CLINIC | Age: 66
End: 2021-09-16

## 2021-09-16 NOTE — TELEPHONE ENCOUNTER
Per Dr Fitz Lancaster, "Please have patient increase his metoprolol to 50mg twice a day  Having more PVCs with exercise at cardiac rehab   "    Attempted to call patient to make him aware  Provided name and call back number for patient to return call

## 2021-09-17 ENCOUNTER — APPOINTMENT (OUTPATIENT)
Dept: CARDIAC REHAB | Facility: CLINIC | Age: 66
End: 2021-09-17
Payer: COMMERCIAL

## 2021-09-20 ENCOUNTER — CLINICAL SUPPORT (OUTPATIENT)
Dept: CARDIAC REHAB | Facility: CLINIC | Age: 66
End: 2021-09-20
Payer: COMMERCIAL

## 2021-09-20 DIAGNOSIS — Z95.2 S/P AVR: ICD-10-CM

## 2021-09-20 PROCEDURE — 93798 PHYS/QHP OP CAR RHAB W/ECG: CPT

## 2021-09-21 DIAGNOSIS — Z95.2 S/P MVR (MITRAL VALVE REPLACEMENT): ICD-10-CM

## 2021-09-21 DIAGNOSIS — Z95.2 S/P AVR: ICD-10-CM

## 2021-09-21 RX ORDER — METOPROLOL TARTRATE 50 MG/1
50 TABLET, FILM COATED ORAL EVERY 12 HOURS SCHEDULED
Qty: 180 TABLET | Refills: 3 | Status: SHIPPED | OUTPATIENT
Start: 2021-09-21

## 2021-09-21 NOTE — TELEPHONE ENCOUNTER
Pt called the nurse line  I advised to increase Metoprolol to 50 mg BID  He verbalized understanding and will double up for now   New script sent to pharmacy

## 2021-09-22 ENCOUNTER — CLINICAL SUPPORT (OUTPATIENT)
Dept: CARDIAC REHAB | Facility: CLINIC | Age: 66
End: 2021-09-22
Payer: COMMERCIAL

## 2021-09-22 DIAGNOSIS — Z95.2 S/P AVR: ICD-10-CM

## 2021-09-22 PROCEDURE — 93798 PHYS/QHP OP CAR RHAB W/ECG: CPT

## 2021-09-22 NOTE — PROGRESS NOTES
Cardiac Rehabilitation Plan of Care   90 Day Reassessment          Today's date: 2021   # of Exercise Sessions Completed: 31  Patient name: Alondra Mccray      : 1955  Age: 78 y o  MRN: [id number]  Referring Physician: Isadora Palma PA-C  Cardiologist: Dr Fabrice Kurtz  Provider: [facility]  Clinician: Mendez Ngo MS, CEP    Dx:   Encounter Diagnosis   Name Primary?  S/P AVR      Date of onset: 2021    SUMMARY OF PROGRESS:  This is Seth's 90 day progress note  He tolerates 45-50 min at 2 4 - 3 0 METs plus wt training  Resting BP remains is slightly elevated, ranging from 124-140/80-90 at rest and increases with exercise reaching 140-154/80-90 with some days reporting more pain and more stress than others  Increased frequency of PVCs, bigeminy and trigeminy PVCs had been noted in cardiac rehab; metoprolol increased from 25mg BID to 50mg BID yesterday  Less frequent PVCs noted today  Ti Hall denies any symptoms from medication change in the first day  Ti Hall has stopped using the treadmill due to the added walking at work and his back pain with walking  He wears knee wraps to exercise and at work to help alleviate knee pain  Ti Hall denies cardiac complaints but does report increased fatigue since returning to work  He is not progressing toward wt loss goals with a gain of 9 pounds since starting cardiac rehab  Patient had been working on dietary modifications with the goal of reduced red/processed meats, low fat dairy, reduced added sugars and refined flours; however, his refrigerator broke and he has resorted to more processed foods the past few days  He has been asking his wife to cook healthier food but she has not done that  Time has stopped added salt to his food besides when he has raw vegetables  Ti Hall denies depression but does report worrying about his wife with recent stress she has been suffering  He reports his own stress at work at a 2/10   Repeat PHQ9 and GAD7 will be reassessed at discharge  Seth's personal goals include: weight loss, continue abstaining from smoking  Exercise intensity will be progressed at tolerated to maintain RPE 4-6 over the next 30 days  Medication compliance: Yes   Comments: Pt reports to be compliant with medications  Fall Risk: Low   Comments: Ambulates with a steady gait with no assist device and Denies a fall in the past 6 months    EKG Interpretation: NSR, RBBB, recent frequent PVCs, bigeminy and trigeminy PVCs (less PVCs today)      EXERCISE ASSESSMENT and PLAN    Current Exercise Program in Rehab:       Frequency: 3 days/week        Minutes: 45-50        METS: 2 4-3 0           HR:    RPE: 3-5         Modalities: Treadmill, UBE, Elliptical, NuStep and Recumbent bike      Exercise Progression 30 Day Goals :    Frequency: 3 days/week of cardiac rehab     Supplement with home exercise 2+ days/wk as tolerated    Minutes: 50                         >150 mins/wk of moderate intensity exercise   METS: 2 5-3 5   HR:     RPE: 4-6   Modalities: Treadmill, UBE, Lifecycle, Rower, NuStep and Recumbent bike    Strength trainin-3 days / week  12-15 repetitions  1-2 sets per modality    Modalities: Leg Press, Chest Press, Arm Extension and Arm Curl    Home Exercise: occasional dog walking with dog stopping frequently    Goals: 10% improvement in functional capacity - based on max METs achieved in fitness assessment, improved DASI score by 10%, Increase in exercise capacity by 40% - based on peak METs tolerated in cardiac rehab exercise session, Exercise 5 days/wk, >150mins/wk of moderate intensity exercise, Resume ADLs with increased strength, Return to work unrestricted, Attend Rehab regularly and Decrease sitting time    Progression Toward Goals:  Pt is progressing and showing improvement  toward the following goals:  attends rehab regularly, increased exercise duration  , Will continue to educate and progress as tolerated      Education: benefit of exercise for CAD risk factors, home exercise guidelines, AHA guidelines to achieve >150 mins/wk of moderate exercise, RPE scale and class: Risk Factors for Heart Disease   Plan:education on home exercise guidelines and home exercise 30+ mins 2 days opposite CR  Readiness to change: Action:  (Changing behavior)      NUTRITION ASSESSMENT AND PLAN    Weight control:    Starting weight: 242 2 lb   Current weight:   251 lb  Waist circumference:    Startin in   Current:      Diabetes: N/A    Lipid management: Discussed diet and lipid management and Last lipid profile 21  Chol 103    HDL 49  LDL 28    Goals:reduced BMI to < 25, reduced waist circumference <40 inches (M), Improved Rate Your Plate score  >48, Wt  loss 1-2 ppw,  goal of 210 lb lbs , choose lean meat (93-95%), increase intake of fish, shellfish, eat 3 or more servings of whole grains a day, Eat 4-5 cups of fruits and vegetables daily, choose healthy snacks: light popcorn, plain pretzels, Increase intake of nuts and seeds, seldom eat or choose low fat ice-cream, fruit juice bars or frozen yogurt  and eliminate or choose low-fat sweets    Progression Toward Goals: Pt is progressing and showing improvement  toward the following goals:  no added salt to foods, decreased late night snacking   , Pt has not made progress toward the following goals: has been eating more processed foor with broken fridge  , Will continue to educate and progress as tolerated      Education: heart healthy eating  low sodium diet  hydration  wt  loss   portion control  Plan: Education class: Reading Food Labels and Education Class: Heart Healthy Eating  Readiness to change: Action:  (Changing behavior)      PSYCHOSOCIAL ASSESSMENT AND PLAN    Emotional:  Depression assessment:  PHQ-9 =3 1-4 = Minimal Depression            Anxiety measure:  SPENCER-7 =0 0-4  = Not anxious  Self-reported stress level:  1 - reports normal stressors with some added stress from worrying about health and finances  Social support: Patient reports excellent emotional/social support from family - particularly from his wife    Goals:  PHQ-9 - reduced severity by one level, Physical Fitness in [place] Score < 3, Daily Activity in [facility] Score < 3, Social Activities in [facility] Score < 3, [facility] Score < 3, Overall Health in [facility] Score < 3, Quality of Life in [facility] Score < 3 , Increased interest in doing things, improved sleep, improved positive thoughts of well being and stop worrying    Progression Toward Goals: Pt is progressing and showing improvement  toward the following goals:  reports 2/10 stress at work and denies depression for himsself but worries about his wifes stress  , Will continue to educate and progress as tolerated  Education: signs/sxs of depression, benefits of a positive support system, stress management techniques and depression and CAD  Plan: Class: Stress and Your Health, Class: Relaxation, Practice relaxation techniques and Exercise  Readiness to change: Action:  (Changing behavior)      OTHER CORE COMPONENTS     Tobacco:   Social History     Tobacco Use   Smoking Status Former Smoker    Packs/day: 0 50    Types: Cigarettes    Start date: 45    Quit date: 2021    Years since quittin 3   Smokeless Tobacco Never Used       Tobacco Use Intervention:   Pt quit 5/10/21   and has abstained    Anginal Symptoms:  None   NTG use: No prescription    Blood pressure:    Restin-140/80-90   Exercise: 150-160/80-90     Goals: consistent BP < 130/80, reduced dietary sodium <2300mg, moderate intensity exercise >150 mins/wk and Abstain from smoking    Progression Toward Goals: Pt is progressing and showing improvement  toward the following goals:  has abstained so far from smoking   , Will continue to educate and progress as tolerated      Education:  understanding high blood pressure and it's relationship to CAD, low sodium diet and HTN, Education class: Common Heart Medications, smoking and heart disease and relapse education  Plan: Class: Understanding Heart Disease, engage in regular exercise and check labels for sodium content  Readiness to change: Action:  (Changing behavior)

## 2021-09-24 ENCOUNTER — CLINICAL SUPPORT (OUTPATIENT)
Dept: CARDIAC REHAB | Facility: CLINIC | Age: 66
End: 2021-09-24
Payer: COMMERCIAL

## 2021-09-24 DIAGNOSIS — Z95.2 S/P AVR: ICD-10-CM

## 2021-09-24 PROCEDURE — 93798 PHYS/QHP OP CAR RHAB W/ECG: CPT

## 2021-09-27 ENCOUNTER — CLINICAL SUPPORT (OUTPATIENT)
Dept: CARDIAC REHAB | Facility: CLINIC | Age: 66
End: 2021-09-27
Payer: COMMERCIAL

## 2021-09-27 DIAGNOSIS — Z95.2 S/P AVR: ICD-10-CM

## 2021-09-27 PROCEDURE — 93798 PHYS/QHP OP CAR RHAB W/ECG: CPT

## 2021-09-29 ENCOUNTER — APPOINTMENT (OUTPATIENT)
Dept: CARDIAC REHAB | Facility: CLINIC | Age: 66
End: 2021-09-29
Payer: COMMERCIAL

## 2021-09-29 DIAGNOSIS — I10 ESSENTIAL HYPERTENSION: Primary | ICD-10-CM

## 2021-09-29 RX ORDER — LOSARTAN POTASSIUM 50 MG/1
50 TABLET ORAL DAILY
Qty: 90 TABLET | Refills: 3 | Status: SHIPPED | OUTPATIENT
Start: 2021-09-29

## 2021-10-01 ENCOUNTER — APPOINTMENT (OUTPATIENT)
Dept: CARDIAC REHAB | Facility: CLINIC | Age: 66
End: 2021-10-01
Payer: COMMERCIAL

## 2021-10-04 ENCOUNTER — APPOINTMENT (OUTPATIENT)
Dept: CARDIAC REHAB | Facility: CLINIC | Age: 66
End: 2021-10-04
Payer: COMMERCIAL

## 2021-10-04 DIAGNOSIS — J43.9 PULMONARY EMPHYSEMA, UNSPECIFIED EMPHYSEMA TYPE (HCC): ICD-10-CM

## 2021-10-04 RX ORDER — UMECLIDINIUM BROMIDE AND VILANTEROL TRIFENATATE 62.5; 25 UG/1; UG/1
POWDER RESPIRATORY (INHALATION)
Qty: 60 BLISTER | Refills: 5 | Status: SHIPPED | OUTPATIENT
Start: 2021-10-04 | End: 2022-05-13

## 2021-10-06 ENCOUNTER — CLINICAL SUPPORT (OUTPATIENT)
Dept: CARDIAC REHAB | Facility: CLINIC | Age: 66
End: 2021-10-06
Payer: COMMERCIAL

## 2021-10-06 DIAGNOSIS — Z95.2 S/P AVR: ICD-10-CM

## 2021-10-06 PROCEDURE — 93798 PHYS/QHP OP CAR RHAB W/ECG: CPT

## 2021-10-08 ENCOUNTER — HOSPITAL ENCOUNTER (EMERGENCY)
Facility: HOSPITAL | Age: 66
Discharge: HOME | End: 2021-10-08
Attending: EMERGENCY MEDICINE
Payer: OTHER MISCELLANEOUS

## 2021-10-08 ENCOUNTER — CLINICAL SUPPORT (OUTPATIENT)
Dept: CARDIAC REHAB | Facility: CLINIC | Age: 66
End: 2021-10-08
Payer: COMMERCIAL

## 2021-10-08 VITALS
DIASTOLIC BLOOD PRESSURE: 72 MMHG | HEART RATE: 70 BPM | OXYGEN SATURATION: 97 % | RESPIRATION RATE: 16 BRPM | SYSTOLIC BLOOD PRESSURE: 140 MMHG | TEMPERATURE: 98.3 F

## 2021-10-08 DIAGNOSIS — Z95.2 S/P AVR: ICD-10-CM

## 2021-10-08 DIAGNOSIS — T75.4XXA ELECTROCUTION: Primary | ICD-10-CM

## 2021-10-08 LAB
ALBUMIN SERPL BCP-MCNC: 3.8 G/DL (ref 3.5–5)
ALP SERPL-CCNC: 69 U/L (ref 46–116)
ALT SERPL W P-5'-P-CCNC: 24 U/L (ref 12–78)
ANION GAP SERPL CALCULATED.3IONS-SCNC: 6 MMOL/L (ref 4–13)
AST SERPL W P-5'-P-CCNC: 18 U/L (ref 5–45)
BASOPHILS # BLD AUTO: 0.03 THOUSANDS/ΜL (ref 0–0.1)
BASOPHILS NFR BLD AUTO: 0 % (ref 0–1)
BILIRUB SERPL-MCNC: 1.03 MG/DL (ref 0.2–1)
BUN SERPL-MCNC: 22 MG/DL (ref 5–25)
CALCIUM SERPL-MCNC: 9 MG/DL (ref 8.3–10.1)
CHLORIDE SERPL-SCNC: 105 MMOL/L (ref 100–108)
CK MB SERPL-MCNC: 1.7 % (ref 0–2.5)
CK MB SERPL-MCNC: 3.2 NG/ML (ref 0–5)
CK SERPL-CCNC: 186 U/L (ref 39–308)
CO2 SERPL-SCNC: 27 MMOL/L (ref 21–32)
CREAT SERPL-MCNC: 0.82 MG/DL (ref 0.6–1.3)
EOSINOPHIL # BLD AUTO: 0.18 THOUSAND/ΜL (ref 0–0.61)
EOSINOPHIL NFR BLD AUTO: 3 % (ref 0–6)
ERYTHROCYTE [DISTWIDTH] IN BLOOD BY AUTOMATED COUNT: 13.7 % (ref 11.6–15.1)
GFR SERPL CREATININE-BSD FRML MDRD: 92 ML/MIN/1.73SQ M
GLUCOSE SERPL-MCNC: 93 MG/DL (ref 65–140)
HCT VFR BLD AUTO: 40.3 % (ref 36.5–49.3)
HGB BLD-MCNC: 13.3 G/DL (ref 12–17)
IMM GRANULOCYTES # BLD AUTO: 0.02 THOUSAND/UL (ref 0–0.2)
IMM GRANULOCYTES NFR BLD AUTO: 0 % (ref 0–2)
LYMPHOCYTES # BLD AUTO: 1.61 THOUSANDS/ΜL (ref 0.6–4.47)
LYMPHOCYTES NFR BLD AUTO: 22 % (ref 14–44)
MCH RBC QN AUTO: 31.4 PG (ref 26.8–34.3)
MCHC RBC AUTO-ENTMCNC: 33 G/DL (ref 31.4–37.4)
MCV RBC AUTO: 95 FL (ref 82–98)
MONOCYTES # BLD AUTO: 0.9 THOUSAND/ΜL (ref 0.17–1.22)
MONOCYTES NFR BLD AUTO: 12 % (ref 4–12)
NEUTROPHILS # BLD AUTO: 4.57 THOUSANDS/ΜL (ref 1.85–7.62)
NEUTS SEG NFR BLD AUTO: 63 % (ref 43–75)
NRBC BLD AUTO-RTO: 0 /100 WBCS
PLATELET # BLD AUTO: 197 THOUSANDS/UL (ref 149–390)
PMV BLD AUTO: 9.3 FL (ref 8.9–12.7)
POTASSIUM SERPL-SCNC: 3.8 MMOL/L (ref 3.5–5.3)
PROT SERPL-MCNC: 6.8 G/DL (ref 6.4–8.2)
RBC # BLD AUTO: 4.23 MILLION/UL (ref 3.88–5.62)
SODIUM SERPL-SCNC: 138 MMOL/L (ref 136–145)
TROPONIN I SERPL-MCNC: 0.02 NG/ML
TROPONIN I SERPL-MCNC: 0.03 NG/ML
WBC # BLD AUTO: 7.31 THOUSAND/UL (ref 4.31–10.16)

## 2021-10-08 PROCEDURE — 82553 CREATINE MB FRACTION: CPT | Performed by: EMERGENCY MEDICINE

## 2021-10-08 PROCEDURE — 99285 EMERGENCY DEPT VISIT HI MDM: CPT | Performed by: EMERGENCY MEDICINE

## 2021-10-08 PROCEDURE — 80053 COMPREHEN METABOLIC PANEL: CPT | Performed by: EMERGENCY MEDICINE

## 2021-10-08 PROCEDURE — 36415 COLL VENOUS BLD VENIPUNCTURE: CPT | Performed by: EMERGENCY MEDICINE

## 2021-10-08 PROCEDURE — 93005 ELECTROCARDIOGRAM TRACING: CPT

## 2021-10-08 PROCEDURE — 82550 ASSAY OF CK (CPK): CPT | Performed by: EMERGENCY MEDICINE

## 2021-10-08 PROCEDURE — 93798 PHYS/QHP OP CAR RHAB W/ECG: CPT

## 2021-10-08 PROCEDURE — 84484 ASSAY OF TROPONIN QUANT: CPT | Performed by: EMERGENCY MEDICINE

## 2021-10-08 PROCEDURE — 99285 EMERGENCY DEPT VISIT HI MDM: CPT

## 2021-10-08 PROCEDURE — 85025 COMPLETE CBC W/AUTO DIFF WBC: CPT | Performed by: EMERGENCY MEDICINE

## 2021-10-09 LAB
ATRIAL RATE: 69 BPM
ATRIAL RATE: 70 BPM
P AXIS: 36 DEGREES
P AXIS: 44 DEGREES
PR INTERVAL: 182 MS
PR INTERVAL: 184 MS
QRS AXIS: -47 DEGREES
QRS AXIS: -50 DEGREES
QRSD INTERVAL: 100 MS
QRSD INTERVAL: 98 MS
QT INTERVAL: 452 MS
QT INTERVAL: 486 MS
QTC INTERVAL: 488 MS
QTC INTERVAL: 520 MS
T WAVE AXIS: 103 DEGREES
T WAVE AXIS: 104 DEGREES
VENTRICULAR RATE: 69 BPM
VENTRICULAR RATE: 70 BPM

## 2021-10-09 PROCEDURE — 93010 ELECTROCARDIOGRAM REPORT: CPT | Performed by: INTERNAL MEDICINE

## 2021-10-11 ENCOUNTER — APPOINTMENT (OUTPATIENT)
Dept: CARDIAC REHAB | Facility: CLINIC | Age: 66
End: 2021-10-11
Payer: COMMERCIAL

## 2021-10-13 ENCOUNTER — APPOINTMENT (OUTPATIENT)
Dept: URGENT CARE | Facility: MEDICAL CENTER | Age: 66
End: 2021-10-13
Payer: OTHER MISCELLANEOUS

## 2021-10-13 PROCEDURE — 99283 EMERGENCY DEPT VISIT LOW MDM: CPT | Performed by: PHYSICIAN ASSISTANT

## 2021-10-13 PROCEDURE — G0382 LEV 3 HOSP TYPE B ED VISIT: HCPCS | Performed by: PHYSICIAN ASSISTANT

## 2021-10-28 ENCOUNTER — OFFICE VISIT (OUTPATIENT)
Dept: FAMILY MEDICINE CLINIC | Facility: CLINIC | Age: 66
End: 2021-10-28
Payer: COMMERCIAL

## 2021-10-28 VITALS
HEIGHT: 74 IN | BODY MASS INDEX: 31.83 KG/M2 | OXYGEN SATURATION: 97 % | SYSTOLIC BLOOD PRESSURE: 126 MMHG | DIASTOLIC BLOOD PRESSURE: 82 MMHG | HEART RATE: 75 BPM | WEIGHT: 248 LBS | TEMPERATURE: 97.7 F

## 2021-10-28 DIAGNOSIS — F17.200 CURRENT SMOKER ON SOME DAYS: ICD-10-CM

## 2021-10-28 DIAGNOSIS — M54.16 LEFT LUMBAR RADICULOPATHY: ICD-10-CM

## 2021-10-28 DIAGNOSIS — Z12.11 COLON CANCER SCREENING: ICD-10-CM

## 2021-10-28 DIAGNOSIS — Z95.2 S/P AVR: ICD-10-CM

## 2021-10-28 DIAGNOSIS — M51.36 LUMBAR DEGENERATIVE DISC DISEASE: ICD-10-CM

## 2021-10-28 DIAGNOSIS — T75.4XXA ELECTRICAL INJURY IN ADULT: ICD-10-CM

## 2021-10-28 DIAGNOSIS — I10 ESSENTIAL HYPERTENSION: ICD-10-CM

## 2021-10-28 DIAGNOSIS — J44.9 CHRONIC OBSTRUCTIVE PULMONARY DISEASE, UNSPECIFIED COPD TYPE (HCC): ICD-10-CM

## 2021-10-28 DIAGNOSIS — K21.9 GASTROESOPHAGEAL REFLUX DISEASE WITHOUT ESOPHAGITIS: ICD-10-CM

## 2021-10-28 DIAGNOSIS — Z98.890 S/P MVR (MITRAL VALVE REPAIR): ICD-10-CM

## 2021-10-28 DIAGNOSIS — Z00.00 ENCOUNTER FOR ANNUAL PHYSICAL EXAM: Primary | ICD-10-CM

## 2021-10-28 DIAGNOSIS — Q21.1 PFO (PATENT FORAMEN OVALE): ICD-10-CM

## 2021-10-28 DIAGNOSIS — M17.0 PRIMARY OSTEOARTHRITIS OF BOTH KNEES: ICD-10-CM

## 2021-10-28 PROBLEM — M51.369 LUMBAR DEGENERATIVE DISC DISEASE: Status: ACTIVE | Noted: 2021-10-28

## 2021-10-28 PROBLEM — Z48.89 ENCOUNTER FOR POSTOPERATIVE CARE: Status: RESOLVED | Noted: 2021-06-23 | Resolved: 2021-10-28

## 2021-10-28 PROCEDURE — 3008F BODY MASS INDEX DOCD: CPT | Performed by: FAMILY MEDICINE

## 2021-10-28 PROCEDURE — 1160F RVW MEDS BY RX/DR IN RCRD: CPT | Performed by: FAMILY MEDICINE

## 2021-10-28 PROCEDURE — 99397 PER PM REEVAL EST PAT 65+ YR: CPT | Performed by: FAMILY MEDICINE

## 2021-10-28 PROCEDURE — 1036F TOBACCO NON-USER: CPT | Performed by: FAMILY MEDICINE

## 2021-11-02 DIAGNOSIS — K21.9 GASTROESOPHAGEAL REFLUX DISEASE WITHOUT ESOPHAGITIS: ICD-10-CM

## 2021-11-02 RX ORDER — PANTOPRAZOLE SODIUM 40 MG/1
40 TABLET, DELAYED RELEASE ORAL DAILY PRN
Qty: 90 TABLET | Refills: 0 | Status: SHIPPED | OUTPATIENT
Start: 2021-11-02 | End: 2022-02-21 | Stop reason: SDUPTHER

## 2021-12-20 PROCEDURE — 87636 SARSCOV2 & INF A&B AMP PRB: CPT | Performed by: FAMILY MEDICINE

## 2021-12-21 ENCOUNTER — TELEMEDICINE (OUTPATIENT)
Dept: FAMILY MEDICINE CLINIC | Facility: CLINIC | Age: 66
End: 2021-12-21
Payer: COMMERCIAL

## 2021-12-21 VITALS — HEART RATE: 73 BPM | OXYGEN SATURATION: 96 % | DIASTOLIC BLOOD PRESSURE: 83 MMHG | SYSTOLIC BLOOD PRESSURE: 121 MMHG

## 2021-12-21 DIAGNOSIS — Z95.2 S/P AVR: ICD-10-CM

## 2021-12-21 DIAGNOSIS — U07.1 COVID-19 VIRUS INFECTION: Primary | ICD-10-CM

## 2021-12-21 DIAGNOSIS — Z98.890 S/P MVR (MITRAL VALVE REPAIR): ICD-10-CM

## 2021-12-21 DIAGNOSIS — J44.9 CHRONIC OBSTRUCTIVE PULMONARY DISEASE, UNSPECIFIED COPD TYPE (HCC): ICD-10-CM

## 2021-12-21 PROCEDURE — 99213 OFFICE O/P EST LOW 20 MIN: CPT | Performed by: FAMILY MEDICINE

## 2021-12-21 PROCEDURE — 1160F RVW MEDS BY RX/DR IN RCRD: CPT | Performed by: FAMILY MEDICINE

## 2021-12-21 PROCEDURE — 1036F TOBACCO NON-USER: CPT | Performed by: FAMILY MEDICINE

## 2021-12-21 RX ORDER — ONDANSETRON 2 MG/ML
4 INJECTION INTRAMUSCULAR; INTRAVENOUS ONCE AS NEEDED
Status: CANCELLED | OUTPATIENT
Start: 2021-12-21

## 2021-12-21 RX ORDER — ACETAMINOPHEN 325 MG/1
650 TABLET ORAL ONCE AS NEEDED
Status: CANCELLED | OUTPATIENT
Start: 2021-12-21

## 2021-12-21 RX ORDER — ALBUTEROL SULFATE 90 UG/1
3 AEROSOL, METERED RESPIRATORY (INHALATION) ONCE AS NEEDED
Status: CANCELLED | OUTPATIENT
Start: 2021-12-21

## 2021-12-21 RX ORDER — SODIUM CHLORIDE 9 MG/ML
20 INJECTION, SOLUTION INTRAVENOUS ONCE AS NEEDED
Status: CANCELLED | OUTPATIENT
Start: 2021-12-21

## 2021-12-22 ENCOUNTER — HOSPITAL ENCOUNTER (OUTPATIENT)
Dept: INFUSION CENTER | Facility: HOSPITAL | Age: 66
Discharge: HOME | End: 2021-12-22
Payer: COMMERCIAL

## 2021-12-22 VITALS
HEART RATE: 74 BPM | TEMPERATURE: 98.4 F | RESPIRATION RATE: 14 BRPM | OXYGEN SATURATION: 94 % | SYSTOLIC BLOOD PRESSURE: 143 MMHG | DIASTOLIC BLOOD PRESSURE: 85 MMHG

## 2021-12-22 DIAGNOSIS — J44.9 CHRONIC OBSTRUCTIVE PULMONARY DISEASE, UNSPECIFIED COPD TYPE (HCC): ICD-10-CM

## 2021-12-22 DIAGNOSIS — Z95.2 S/P AVR: Primary | ICD-10-CM

## 2021-12-22 DIAGNOSIS — Z98.890 S/P MVR (MITRAL VALVE REPAIR): ICD-10-CM

## 2021-12-22 DIAGNOSIS — U07.1 COVID-19 VIRUS INFECTION: ICD-10-CM

## 2021-12-22 PROCEDURE — M0245 HB BAMLAN AND ETESEV INF ADMIN: HCPCS | Performed by: FAMILY MEDICINE

## 2021-12-22 RX ORDER — ACETAMINOPHEN 325 MG/1
650 TABLET ORAL ONCE AS NEEDED
Status: DISCONTINUED | OUTPATIENT
Start: 2021-12-22 | End: 2021-12-25 | Stop reason: HOSPADM

## 2021-12-22 RX ORDER — ALBUTEROL SULFATE 90 UG/1
3 AEROSOL, METERED RESPIRATORY (INHALATION) ONCE AS NEEDED
Status: CANCELLED | OUTPATIENT
Start: 2021-12-22

## 2021-12-22 RX ORDER — ONDANSETRON 2 MG/ML
4 INJECTION INTRAMUSCULAR; INTRAVENOUS ONCE AS NEEDED
Status: DISCONTINUED | OUTPATIENT
Start: 2021-12-22 | End: 2021-12-25 | Stop reason: HOSPADM

## 2021-12-22 RX ORDER — ONDANSETRON 2 MG/ML
4 INJECTION INTRAMUSCULAR; INTRAVENOUS ONCE AS NEEDED
Status: CANCELLED | OUTPATIENT
Start: 2021-12-22

## 2021-12-22 RX ORDER — SODIUM CHLORIDE 9 MG/ML
20 INJECTION, SOLUTION INTRAVENOUS ONCE AS NEEDED
Status: DISCONTINUED | OUTPATIENT
Start: 2021-12-22 | End: 2021-12-25 | Stop reason: HOSPADM

## 2021-12-22 RX ORDER — ACETAMINOPHEN 325 MG/1
650 TABLET ORAL ONCE AS NEEDED
Status: CANCELLED | OUTPATIENT
Start: 2021-12-22

## 2021-12-22 RX ORDER — SODIUM CHLORIDE 9 MG/ML
20 INJECTION, SOLUTION INTRAVENOUS ONCE AS NEEDED
Status: CANCELLED | OUTPATIENT
Start: 2021-12-22

## 2021-12-22 RX ORDER — ALBUTEROL SULFATE 90 UG/1
3 AEROSOL, METERED RESPIRATORY (INHALATION) ONCE AS NEEDED
Status: DISCONTINUED | OUTPATIENT
Start: 2021-12-22 | End: 2021-12-25 | Stop reason: HOSPADM

## 2021-12-22 RX ADMIN — SODIUM CHLORIDE 20 ML/HR: 9 INJECTION, SOLUTION INTRAVENOUS at 13:01

## 2021-12-22 RX ADMIN — SODIUM CHLORIDE 2100 MG COMBINED: 9 INJECTION, SOLUTION INTRAVENOUS at 13:02

## 2021-12-23 ENCOUNTER — DOCUMENTATION (OUTPATIENT)
Dept: FAMILY MEDICINE CLINIC | Facility: CLINIC | Age: 66
End: 2021-12-23

## 2021-12-29 ENCOUNTER — DOCUMENTATION (OUTPATIENT)
Dept: FAMILY MEDICINE CLINIC | Facility: CLINIC | Age: 66
End: 2021-12-29

## 2022-01-13 ENCOUNTER — TELEPHONE (OUTPATIENT)
Dept: PULMONOLOGY | Facility: CLINIC | Age: 67
End: 2022-01-13

## 2022-01-13 NOTE — TELEPHONE ENCOUNTER
Cardiac Rehab Follow Up: Dejon Troncoso was discharged from cardiac rehab 3 months ago  He reports doing well since discharge; although was diagnosed with COVID-19 in December 2021 and reports continued fatigue and congestion since then  He has not continued exercise and reports working 6 days/week  Dejon Troncoso continues to try watching what he eats but has not gained or lost any weight  He also reports some stress but handles it well  Dejon Troncoso plans to continue with checking his BP at home, medication compliance and will follow up with cardiology or PCP with any concerns

## 2022-01-18 ENCOUNTER — APPOINTMENT (OUTPATIENT)
Dept: LAB | Facility: MEDICAL CENTER | Age: 67
End: 2022-01-18
Payer: COMMERCIAL

## 2022-01-18 ENCOUNTER — OFFICE VISIT (OUTPATIENT)
Dept: FAMILY MEDICINE CLINIC | Facility: CLINIC | Age: 67
End: 2022-01-18
Payer: COMMERCIAL

## 2022-01-18 ENCOUNTER — APPOINTMENT (OUTPATIENT)
Dept: RADIOLOGY | Facility: MEDICAL CENTER | Age: 67
End: 2022-01-18
Payer: COMMERCIAL

## 2022-01-18 VITALS
SYSTOLIC BLOOD PRESSURE: 130 MMHG | BODY MASS INDEX: 33.8 KG/M2 | HEART RATE: 77 BPM | HEIGHT: 73 IN | DIASTOLIC BLOOD PRESSURE: 72 MMHG | WEIGHT: 255 LBS | OXYGEN SATURATION: 94 % | TEMPERATURE: 97.5 F

## 2022-01-18 DIAGNOSIS — U07.1 COVID-19 VIRUS INFECTION: Primary | ICD-10-CM

## 2022-01-18 DIAGNOSIS — I38 VALVULAR HEART DISEASE: ICD-10-CM

## 2022-01-18 DIAGNOSIS — Z12.5 SCREENING PSA (PROSTATE SPECIFIC ANTIGEN): ICD-10-CM

## 2022-01-18 DIAGNOSIS — Z72.0 TOBACCO ABUSE DISORDER: ICD-10-CM

## 2022-01-18 DIAGNOSIS — I10 ESSENTIAL HYPERTENSION: ICD-10-CM

## 2022-01-18 DIAGNOSIS — Z11.59 ENCOUNTER FOR HEPATITIS C SCREENING TEST FOR LOW RISK PATIENT: ICD-10-CM

## 2022-01-18 DIAGNOSIS — Z98.890 S/P MVR (MITRAL VALVE REPAIR): ICD-10-CM

## 2022-01-18 DIAGNOSIS — Z95.2 S/P AVR: ICD-10-CM

## 2022-01-18 DIAGNOSIS — Z86.79 HISTORY OF CHF (CONGESTIVE HEART FAILURE): ICD-10-CM

## 2022-01-18 DIAGNOSIS — U07.1 COVID-19 VIRUS INFECTION: ICD-10-CM

## 2022-01-18 DIAGNOSIS — J44.1 CHRONIC OBSTRUCTIVE PULMONARY DISEASE WITH ACUTE EXACERBATION (HCC): ICD-10-CM

## 2022-01-18 LAB
ALBUMIN SERPL BCP-MCNC: 4.1 G/DL (ref 3.5–5)
ALP SERPL-CCNC: 84 U/L (ref 46–116)
ALT SERPL W P-5'-P-CCNC: 39 U/L (ref 12–78)
ANION GAP SERPL CALCULATED.3IONS-SCNC: 4 MMOL/L (ref 4–13)
AST SERPL W P-5'-P-CCNC: 27 U/L (ref 5–45)
BILIRUB SERPL-MCNC: 0.93 MG/DL (ref 0.2–1)
BUN SERPL-MCNC: 22 MG/DL (ref 5–25)
CALCIUM SERPL-MCNC: 9.2 MG/DL (ref 8.3–10.1)
CHLORIDE SERPL-SCNC: 105 MMOL/L (ref 100–108)
CO2 SERPL-SCNC: 28 MMOL/L (ref 21–32)
CREAT SERPL-MCNC: 0.81 MG/DL (ref 0.6–1.3)
ERYTHROCYTE [DISTWIDTH] IN BLOOD BY AUTOMATED COUNT: 13.3 % (ref 11.6–15.1)
GFR SERPL CREATININE-BSD FRML MDRD: 91 ML/MIN/1.73SQ M
GLUCOSE P FAST SERPL-MCNC: 101 MG/DL (ref 65–99)
HCT VFR BLD AUTO: 44.4 % (ref 36.5–49.3)
HCV AB SER QL: NORMAL
HGB BLD-MCNC: 14.6 G/DL (ref 12–17)
MCH RBC QN AUTO: 32.1 PG (ref 26.8–34.3)
MCHC RBC AUTO-ENTMCNC: 32.9 G/DL (ref 31.4–37.4)
MCV RBC AUTO: 98 FL (ref 82–98)
NT-PROBNP SERPL-MCNC: 160 PG/ML
PLATELET # BLD AUTO: 231 THOUSANDS/UL (ref 149–390)
PMV BLD AUTO: 8.9 FL (ref 8.9–12.7)
POTASSIUM SERPL-SCNC: 4.4 MMOL/L (ref 3.5–5.3)
PROT SERPL-MCNC: 7.7 G/DL (ref 6.4–8.2)
RBC # BLD AUTO: 4.55 MILLION/UL (ref 3.88–5.62)
SODIUM SERPL-SCNC: 137 MMOL/L (ref 136–145)
TSH SERPL DL<=0.05 MIU/L-ACNC: 1.03 UIU/ML (ref 0.36–3.74)
WBC # BLD AUTO: 8.89 THOUSAND/UL (ref 4.31–10.16)

## 2022-01-18 PROCEDURE — 99214 OFFICE O/P EST MOD 30 MIN: CPT | Performed by: FAMILY MEDICINE

## 2022-01-18 PROCEDURE — 1036F TOBACCO NON-USER: CPT | Performed by: FAMILY MEDICINE

## 2022-01-18 PROCEDURE — G0103 PSA SCREENING: HCPCS

## 2022-01-18 PROCEDURE — 71046 X-RAY EXAM CHEST 2 VIEWS: CPT

## 2022-01-18 PROCEDURE — 86803 HEPATITIS C AB TEST: CPT

## 2022-01-18 PROCEDURE — 83880 ASSAY OF NATRIURETIC PEPTIDE: CPT

## 2022-01-18 PROCEDURE — 84443 ASSAY THYROID STIM HORMONE: CPT | Performed by: FAMILY MEDICINE

## 2022-01-18 PROCEDURE — 80053 COMPREHEN METABOLIC PANEL: CPT | Performed by: FAMILY MEDICINE

## 2022-01-18 PROCEDURE — 1160F RVW MEDS BY RX/DR IN RCRD: CPT | Performed by: FAMILY MEDICINE

## 2022-01-18 PROCEDURE — 85027 COMPLETE CBC AUTOMATED: CPT | Performed by: FAMILY MEDICINE

## 2022-01-18 PROCEDURE — 36415 COLL VENOUS BLD VENIPUNCTURE: CPT | Performed by: FAMILY MEDICINE

## 2022-01-18 PROCEDURE — 3008F BODY MASS INDEX DOCD: CPT | Performed by: FAMILY MEDICINE

## 2022-01-18 RX ORDER — CALCIUM CARBONATE 260MG(650)
TABLET,CHEWABLE ORAL
COMMUNITY

## 2022-01-18 RX ORDER — AZITHROMYCIN 250 MG/1
TABLET, FILM COATED ORAL
Qty: 6 TABLET | Refills: 0 | Status: SHIPPED | OUTPATIENT
Start: 2022-01-18 | End: 2022-01-25

## 2022-01-18 NOTE — PATIENT INSTRUCTIONS
Reviewed health history along with medication, he did test positive for COVID December 20th, did do vaccination x2 in the past   Did receive monoclonal antibodies December 22nd  With his significant known lung disease I would like him to use azithromycin  He will continue on his Anoro inhaler, does have albuterol to use if needed  He will do blood work along with chest x-ray  He is status post valve replacement last year, had done cardiac rehab, has not seen Cardiology in quite some time, I would like him to have follow-up with his cardiologist   He denies any chest pain, I held off on EKG here today  He is in a regular rhythm here today  Last CT scan of chest was in April of 2021, long smoking history, has been smoking about 1/4 pack per day, really needs to quit  With upcoming blood work also include PSA, hep C screen along with TSH which were not done last year  He does have a regular follow-up April 29th

## 2022-01-18 NOTE — PROGRESS NOTES
FAMILY PRACTICE OFFICE VISIT  [facility]  [address]  [address], [place], [facility]      NAME: Nory Goldman  AGE: 78 y o  SEX: male  : 1955   MRN: [id number]    DATE: 2022  TIME: 2:26 PM    Assessment and Plan     Problem List Items Addressed This Visit     Chronic obstructive lung disease ([address] )    Relevant Medications    azithromycin (ZITHROMAX) 250 mg tablet    Other Relevant Orders    XR chest pa & lateral    CBC    Comprehensive metabolic panel    Essential hypertension    S/P AVR    Relevant Orders    Ambulatory Referral to Cardiology    NT-BNP PRO    S/P MVR (mitral valve repair)    Relevant Orders    Ambulatory Referral to Cardiology    NT-BNP PRO    COVID-19 virus infection - Primary    Relevant Orders    XR chest pa & lateral      Other Visit Diagnoses     Valvular heart disease        Relevant Orders    Ambulatory Referral to Cardiology    NT-BNP PRO    CBC    Comprehensive metabolic panel    TSH, 3rd generation with Free T4 reflex    History of CHF (congestive heart failure)        Relevant Orders    Ambulatory Referral to Cardiology    XR chest pa & lateral    NT-BNP PRO    Tobacco abuse disorder        Screening PSA (prostate specific antigen)        Relevant Orders    PSA, Total Screen    Encounter for hepatitis C screening test for low risk patient        Relevant Orders    Hepatitis C antibody          Patient Instructions   Reviewed health history along with medication, he did test positive for COVID , did do vaccination x2 in the past   Did receive monoclonal antibodies   With his significant known lung disease I would like him to use azithromycin  He will continue on his Anoro inhaler, does have albuterol to use if needed  He will do blood work along with chest x-ray    He is status post valve replacement last year, had done cardiac rehab, has not seen Cardiology in quite some time, I would like him to have follow-up with his cardiologist   He denies any chest pain, I held off on EKG here today  He is in a regular rhythm here today  Last CT scan of chest was in April of 2021, long smoking history, has been smoking about 1/4 pack per day, really needs to quit  With upcoming blood work also include PSA, hep C screen along with TSH which were not done last year  He does have a regular follow-up April 29th  Chief Complaint     Chief Complaint   Patient presents with    chest discomfort     since Covid infection    Fatigue       History of Present Illness   Philly Dias is a 78y o -year-old male who is in for a re-evaluation, see previous telemedicine visits regarding testing positive for COVID December 20th  He did undergo monoclonal treatment December 22nd  He is vaccinated x2  He responded to treatment, does continue with significant malaise, fatigue, cough with green mucus/chest congestion  he does sleep on his back, does elevate head of bed a bit, has no increased swelling but has gained 5 lb  Did have valvular heart surgery last year, history CHF, AFib postop  Has no increased palpitations  He is doing well with Anoro, did do cardiac rehab  Did use rescue inhaler yesterday, otherwise has not required  Wife also had covid      Review of Systems   Review of Systems   Constitutional: Positive for fatigue and unexpected weight change  Negative for appetite change and fever  HENT: Positive for congestion  Negative for sore throat and trouble swallowing  Respiratory: Positive for cough and chest tightness  Negative for shortness of breath  O2 sat 94-96% at home   Cardiovascular: Negative for chest pain, palpitations and leg swelling  Gastrointestinal: Negative for abdominal pain, blood in stool, nausea and vomiting  No acid reflux     No change in bowel   Genitourinary: Negative for dysuria and hematuria     Neurological: Negative for dizziness, syncope, light-headedness and headaches  Psychiatric/Behavioral: Negative for behavioral problems and confusion  Active Problem List     Patient Active Problem List   Diagnosis    Aortic valve stenosis - severe    Chronic obstructive lung disease ([address] )    Erectile dysfunction of non-organic origin    Hyperlipidemia    Essential hypertension    Rotator cuff tendinitis Left    GERD (gastroesophageal reflux disease)    Acute combined systolic and diastolic CHF, NYHA class 1 (HCC)    Osteoarthritis of both knees    S/P AVR    PFO (patent foramen ovale)    S/P MVR (mitral valve repair)    Lumbar degenerative disc disease    Current smoker    COVID-19 virus infection       Past Medical History:  Reviewed    Past Surgical History:  Reviewed    Family History:  Reviewed    Social History:  Reviewed    Objective     Vitals:    01/18/22 1401   BP: 130/72   BP Location: Left arm   Patient Position: Sitting   Cuff Size: Large   Pulse: 77   Temp: 97 5 °F (36 4 °C)   SpO2: 94%   Weight: 116 kg (255 lb)   Height: 6' 1" (1 854 m)     Body mass index is 33 64 kg/m²  BP Readings from Last 3 Encounters:   01/18/22 130/72   12/22/21 143/85   12/21/21 121/83       Wt Readings from Last 3 Encounters:   01/18/22 116 kg (255 lb)   10/28/21 112 kg (248 lb)   08/04/21 113 kg (250 lb)       Physical Exam  Constitutional:       General: He is not in acute distress  Appearance: Normal appearance  He is well-developed  He is not ill-appearing  Eyes:      General: No scleral icterus  Cardiovascular:      Rate and Rhythm: Normal rate and regular rhythm  Heart sounds: Murmur heard  Pulmonary:      Effort: Pulmonary effort is normal  No respiratory distress  Comments: Slightly decreased breath sounds bilateral but clear, no rhonchi, rales, wheezes  Abdominal:      Palpations: Abdomen is soft  Tenderness: There is no abdominal tenderness     Musculoskeletal:      Right lower leg: No edema  Left lower leg: No edema  Lymphadenopathy:      Cervical: No cervical adenopathy  Skin:     Coloration: Skin is not jaundiced  Neurological:      Mental Status: He is alert and oriented to person, place, and time  Mental status is at baseline     Psychiatric:         Mood and Affect: Mood normal          Behavior: Behavior normal          ALLERGIES:  No Known Allergies    Current Medications     Current Outpatient Medications   Medication Sig Dispense Refill    amLODIPine (NORVASC) 5 mg tablet Take 1 tablet (5 mg total) by mouth daily 90 tablet 3    Anoro Ellipta 62 5-25 MCG/INH inhaler INHALE 1 PUFF BY MOUTH EVERY DAY 60 blister 5    aspirin (ECOTRIN LOW STRENGTH) 81 mg EC tablet Take 1 tablet (81 mg total) by mouth daily      atorvastatin (LIPITOR) 40 mg tablet Take 1 tablet (40 mg total) by mouth every evening 90 tablet 2    B Complex Vitamins (VITAMIN B COMPLEX 100 IJ) Inject as directed      dextromethorphan-guaifenesin (MUCINEX DM)  MG per 12 hr tablet Take 1 tablet by mouth every 12 (twelve) hours (Patient taking differently: Take 1 tablet by mouth every 12 (twelve) hours as needed ) 20 tablet 0    losartan (COZAAR) 50 mg tablet Take 1 tablet (50 mg total) by mouth daily 90 tablet 3    metoprolol tartrate (LOPRESSOR) 50 mg tablet Take 1 tablet (50 mg total) by mouth every 12 (twelve) hours 180 tablet 3    Multiple Minerals-Vitamins (Calcium Citrate-Mag-Minerals) TABS Take by mouth      Multiple Vitamin (MULTI-VITAMIN DAILY PO) Take 1 tablet by mouth daily      pantoprazole (PROTONIX) 40 mg tablet TAKE 1 TABLET (40 MG TOTAL) BY MOUTH DAILY AS NEEDED (HEARTBURN OR ACID REFLUX) 90 tablet 0    Zinc 10 MG LOZG Apply to the mouth or throat      albuterol (PROVENTIL HFA,VENTOLIN HFA) 90 mcg/act inhaler INHALE 1-2 PUFFS EVERY 4 (FOUR) HOURS AS NEEDED FOR WHEEZING 18 Inhaler 0    azithromycin (ZITHROMAX) 250 mg tablet 2 today, 1 daily x 4 days after 6 tablet 0     No current facility-administered medications for this visit              Orders Placed This Encounter   Procedures    XR chest pa & lateral    NT-BNP PRO    CBC    Comprehensive metabolic panel    TSH, 3rd generation with Free T4 reflex    Hepatitis C antibody    PSA, Total Screen    Ambulatory Referral to Cardiology         Yemi Maier DO

## 2022-01-19 LAB — PSA SERPL-MCNC: 0.3 NG/ML (ref 0–4)

## 2022-02-21 DIAGNOSIS — K21.9 GASTROESOPHAGEAL REFLUX DISEASE WITHOUT ESOPHAGITIS: ICD-10-CM

## 2022-02-21 RX ORDER — PANTOPRAZOLE SODIUM 40 MG/1
40 TABLET, DELAYED RELEASE ORAL DAILY PRN
Qty: 90 TABLET | Refills: 0 | Status: SHIPPED | OUTPATIENT
Start: 2022-02-21 | End: 2022-05-17 | Stop reason: SDUPTHER

## 2022-03-15 DIAGNOSIS — Z95.2 S/P AVR: ICD-10-CM

## 2022-03-15 DIAGNOSIS — Z95.2 S/P MVR (MITRAL VALVE REPLACEMENT): ICD-10-CM

## 2022-03-15 RX ORDER — ATORVASTATIN CALCIUM 40 MG/1
40 TABLET, FILM COATED ORAL EVERY EVENING
Qty: 90 TABLET | Refills: 3 | Status: SHIPPED | OUTPATIENT
Start: 2022-03-15

## 2022-04-20 ENCOUNTER — OFFICE VISIT (OUTPATIENT)
Dept: FAMILY MEDICINE CLINIC | Facility: CLINIC | Age: 67
End: 2022-04-20
Payer: COMMERCIAL

## 2022-04-20 VITALS
RESPIRATION RATE: 12 BRPM | HEART RATE: 80 BPM | DIASTOLIC BLOOD PRESSURE: 90 MMHG | BODY MASS INDEX: 33.72 KG/M2 | OXYGEN SATURATION: 97 % | SYSTOLIC BLOOD PRESSURE: 142 MMHG | HEIGHT: 73 IN | WEIGHT: 254.4 LBS

## 2022-04-20 DIAGNOSIS — M25.561 ACUTE PAIN OF RIGHT KNEE: Primary | ICD-10-CM

## 2022-04-20 PROCEDURE — 1036F TOBACCO NON-USER: CPT | Performed by: FAMILY MEDICINE

## 2022-04-20 PROCEDURE — 99214 OFFICE O/P EST MOD 30 MIN: CPT | Performed by: FAMILY MEDICINE

## 2022-04-20 PROCEDURE — 3288F FALL RISK ASSESSMENT DOCD: CPT | Performed by: FAMILY MEDICINE

## 2022-04-20 PROCEDURE — 1160F RVW MEDS BY RX/DR IN RCRD: CPT | Performed by: FAMILY MEDICINE

## 2022-04-20 PROCEDURE — 3725F SCREEN DEPRESSION PERFORMED: CPT | Performed by: FAMILY MEDICINE

## 2022-04-20 PROCEDURE — 1101F PT FALLS ASSESS-DOCD LE1/YR: CPT | Performed by: FAMILY MEDICINE

## 2022-04-20 NOTE — LETTER
April 20, 2022     Patient: María Alba  YOB: 1955  Date of Visit: 4/20/2022      To Whom it May Concern:    Marciano Culp is under my professional care  Cindy Lozoya was seen in my office on 4/20/2022  Cindy Lozoya may return to work on 4/25/2022  If you have any questions or concerns, please don't hesitate to call           Sincerely,          Dominique Rose, DO

## 2022-04-20 NOTE — PATIENT INSTRUCTIONS
Begin icing 3x/day for 30 min at a time  Limit far walks  If pain/stiffness - persists into early next week - call and will consider PT

## 2022-04-20 NOTE — PROGRESS NOTES
FAMILY PRACTICE OFFICE VISIT    NAME: Charo Morales    AGE: 75 y o  SEX: male  : 1955   MRN: [id number]    DATE: 2022  TIME: 1:20 PM    Assessment and Plan     1   Acute pain of right knee  Suspect sprain of biceps femoris tendon    Supportive and conservative care to start as pt is noticing some improvement today  Tylenol prn    Note - pt has been out of work X 3 days   Return to work on 2022 as long as improvement ongoing    Patient Instructions   Begin icing 3x/day for 30 min at a time  Limit far walks  If pain/stiffness - persists into early next week - call and will consider PT              Chief Complaint     Chief Complaint   Patient presents with    Knee Pain       History of Present Illness   Charo Morales is a 75y o -year-old male who presents today with right knee pain  Acute on chronic  3 days ago - pt was unable to fully straighten his right leg  And had pain going up the stairs    Usually has b/l knee pain - and wears compression - when doing a lot of walking  Pain worse with walking - so patient had to call out of work the past 3 days as he walks a lot at work    [employer] - which he normally takes  So did not take anything additional  Is starting to notice some improvement today  Cannot take nsaids as pt is taking ASA        Review of Systems   Review of Systems   Musculoskeletal:        Pain worse with stepping up , straightening knee/leg completely - which he was unable to do at onset - is now able to bend; and worse with prolonged standing and walking         Active Problem List     Patient Active Problem List   Diagnosis    Aortic valve stenosis - severe    Chronic obstructive lung disease ([facility] )    Erectile dysfunction of non-organic origin    Hyperlipidemia    Essential hypertension    Rotator cuff tendinitis Left    GERD (gastroesophageal reflux disease)    Acute combined systolic and diastolic CHF, NYHA class 1 ([facility] )    Osteoarthritis of both knees    S/P AVR    PFO (patent foramen ovale)    S/P MVR (mitral valve repair)    Lumbar degenerative disc disease    Current smoker    COVID-19 virus infection         Past Medical History:  Past Medical History:   Diagnosis Date    Broken humerus     COPD (chronic obstructive pulmonary disease) ([facility] [address] [facility]     Pneumonia        Past Surgical History:  Past Surgical History:   Procedure Laterality Date    IR CHEST TUBE PLACEMENT  2021    MULTIPLE TOOTH EXTRACTIONS N/A 2021    Procedure: EXTRACTION TEETH MULTIPLE 1, 3, 18, 30, EXCISION OF LEFT BUCCAL FIBROMA;  Surgeon: Matthew Gan DMD;  [facility];  Service: Maxillofacial    WY ECHO TRANSESOPHAG R-T 2D W/PRB IMG ACQUISJ I&R N/A 2021    Procedure: TRANSESOPHAGEAL ECHOCARDIOGRAM (KATIA);   Surgeon: Hermann Horn MD;  [facility];  Service: Cardiac Surgery    WY REPLACEMENT OF MITRAL VALVE N/A 2021    Procedure: VALVE MITRAL (MVR) REPAIR with 32mm physio II annuloplasty ring;  Surgeon: Hermann Horn MD;  [facility];  Service: Cardiac Surgery    WY RPLCMT AORTIC VALVE OPN W/STENTLESS TISSUE VALVE N/A 2021    Procedure: REPLACEMENT VALVE AORTIC (AVR) TISSUE with 25mm montgomery inspiris tissue valve;  Surgeon: Hermann Horn MD;  [facility];  Service: Cardiac Surgery       Family History:  Family History   Problem Relation Age of Onset    Prostate cancer Father        Social History:  Social History     Socioeconomic History    Marital status: /Civil Union     Spouse name: Not on file    Number of children: Not on file    Years of education: Not on file    Highest education level: Not on file   Occupational History    Occupation: Machinery Repair     Comment: packaging [employer]  - full time employment   Tobacco Use    Smoking status: Former Smoker     Packs/day: 0 50     Types: Cigarettes     Start date:      Quit date: 2021     Years since quittin 9    Smokeless tobacco: Never Used   Vaping Use    Vaping Use: Never used   Substance and Sexual Activity    Alcohol use: Yes     Comment: once a month    Drug use: No    Sexual activity: Not on file   Other Topics Concern    Not on file   Social History Narrative    Not on file     Social Determinants of Health     Financial Resource Strain: Not on file   Food Insecurity: Not on file   Transportation Needs: Not on file   Physical Activity: Not on file   Stress: Not on file   Social Connections: Not on file   Intimate Partner Violence: Not on file   Housing Stability: Not on file       Objective     Vitals:    04/20/22 1313   BP: 142/90   Pulse: 80   Resp: 12   SpO2: 97%     Wt Readings from Last 3 Encounters:   04/20/22 115 kg (254 lb 6 4 oz)   01/18/22 116 kg (255 lb)   10/28/21 112 kg (248 lb)       Physical Exam  Vitals and nursing note reviewed  Constitutional:       General: He is not in acute distress  Appearance: Normal appearance  He is not ill-appearing or toxic-appearing  Cardiovascular:      Rate and Rhythm: Normal rate and regular rhythm  Pulses: Normal pulses  Heart sounds: Normal heart sounds  No murmur heard  Pulmonary:      Effort: Pulmonary effort is normal  No respiratory distress  Breath sounds: Normal breath sounds  No wheezing, rhonchi or rales  Musculoskeletal:      Comments: Pt moving gingerly - to avoid pain    Right lower ext -   Tenderness with palpation along the postero-lateral aspect of right knee - particularly as the tendon cross knee joint    No tenderness along the medial or lateral meniscus, medial /lateral collateral ligaments, posterior popliteal fossa; (-) anterior drawer sign    Pt able to flex right knee but not fully due to pain    No overt edema or increased warmth over right knee   Neurological:      General: No focal deficit present  Mental Status: He is alert and oriented to person, place, and time     Psychiatric:         Mood and Affect: Mood normal          Behavior: Behavior normal          Thought Content: Thought content normal          Judgment: Judgment normal          Pertinent Laboratory/Diagnostic Studies:  Lab Results   Component Value Date    GLUCOSE 158 (H) 05/20/2021    BUN 22 01/18/2022    CREATININE 0 81 01/18/2022    CALCIUM 9 2 01/18/2022     08/07/2014    K 4 4 01/18/2022    CO2 28 01/18/2022     01/18/2022     Lab Results   Component Value Date    ALT 39 01/18/2022    AST 27 01/18/2022    ALKPHOS 84 01/18/2022    BILITOT 0 75 08/07/2014       Lab Results   Component Value Date    WBC 8 89 01/18/2022    HGB 14 6 01/18/2022    HCT 44 4 01/18/2022    MCV 98 01/18/2022     01/18/2022       No results found for: TSH    Lab Results   Component Value Date    CHOL 209 08/07/2014     Lab Results   Component Value Date    TRIG 129 05/13/2021     Lab Results   Component Value Date    HDL 49 05/13/2021     Lab Results   Component Value Date    LDLCALC 28 05/13/2021     Lab Results   Component Value Date    HGBA1C 5 8 (H) 05/13/2021       Results for orders placed or performed in visit on 01/18/22   NT-BNP PRO   Result Value Ref Range    NT-proBNP 160 (H) <125 pg/mL   Hepatitis C antibody   Result Value Ref Range    Hepatitis C Ab Non-reactive Non-reactive   PSA, Total Screen   Result Value Ref Range    PSA 0 3 0 0 - 4 0 ng/mL       No orders of the defined types were placed in this encounter        ALLERGIES:  No Known Allergies    Current Medications     Current Outpatient Medications   Medication Sig Dispense Refill    albuterol (PROVENTIL HFA,VENTOLIN HFA) 90 mcg/act inhaler INHALE 1-2 PUFFS EVERY 4 (FOUR) HOURS AS NEEDED FOR WHEEZING 18 Inhaler 0    amLODIPine (NORVASC) 5 mg tablet Take 1 tablet (5 mg total) by mouth daily 90 tablet 3    Anoro Ellipta 62 5-25 MCG/INH inhaler INHALE 1 PUFF BY MOUTH EVERY DAY 60 blister 5    aspirin (ECOTRIN LOW STRENGTH) 81 mg EC tablet Take 1 tablet (81 mg total) by mouth daily      atorvastatin (LIPITOR) 40 mg tablet Take 1 tablet (40 mg total) by mouth every evening 90 tablet 3    B Complex Vitamins (VITAMIN B COMPLEX 100 IJ) Inject as directed      dextromethorphan-guaifenesin (MUCINEX DM)  MG per 12 hr tablet Take 1 tablet by mouth every 12 (twelve) hours (Patient taking differently: Take 1 tablet by mouth every 12 (twelve) hours as needed ) 20 tablet 0    losartan (COZAAR) 50 mg tablet Take 1 tablet (50 mg total) by mouth daily 90 tablet 3    metoprolol tartrate (LOPRESSOR) 50 mg tablet Take 1 tablet (50 mg total) by mouth every 12 (twelve) hours 180 tablet 3    Multiple Minerals-Vitamins (Calcium Citrate-Mag-Minerals) TABS Take by mouth      Multiple Vitamin (MULTI-VITAMIN DAILY PO) Take 1 tablet by mouth daily      pantoprazole (PROTONIX) 40 mg tablet Take 1 tablet (40 mg total) by mouth daily as needed (heartburn or acid reflux) 90 tablet 0    Zinc 10 MG LOZG Apply to the mouth or throat       No current facility-administered medications for this visit           Health Maintenance     Health Maintenance   Topic Date Due    Colorectal Cancer Screening  Never done    COVID-19 Vaccine (3 - Booster for Moderna series) 09/29/2021    BMI: Followup Plan  10/28/2022    Annual Physical  10/28/2022    Fall Risk  04/20/2023    Depression Screening  04/20/2023    BMI: Adult  04/20/2023    Pneumococcal Vaccine: 65+ Years (2 of 2 - PPSV23) 04/16/2024    DTaP,Tdap,and Td Vaccines (2 - Td or Tdap) 02/22/2027    Hepatitis C Screening  Completed    Influenza Vaccine  Completed    HIB Vaccine  Aged Out    Hepatitis B Vaccine  Aged Out    IPV Vaccine  Aged Out    Hepatitis A Vaccine  Aged Out    Meningococcal ACWY Vaccine  Aged Out    HPV Vaccine  Aged Out     Immunization History   Administered Date(s) Administered    COVID-19 MODERNA VACC 0 5 ML IM 03/29/2021, 04/29/2021    INFLUENZA 10/13/2017, 10/14/2020, 10/26/2021    Influenza Quadrivalent 3 years and older 10/15/2018    Influenza, injectable, quadrivalent, preservative free 0 5 mL 10/25/2019    Influenza, seasonal, injectable 10/27/2014, 10/27/2015, 10/28/2016    Pneumococcal Conjugate 13-Valent 04/16/2019    Pneumococcal Polysaccharide PPV23 08/30/2016    Tdap 02/22/2017          Connie Mercer, DO

## 2022-04-25 ENCOUNTER — OFFICE VISIT (OUTPATIENT)
Dept: CARDIOLOGY CLINIC | Facility: CLINIC | Age: 67
End: 2022-04-25
Payer: OTHER MISCELLANEOUS

## 2022-04-25 VITALS
DIASTOLIC BLOOD PRESSURE: 84 MMHG | WEIGHT: 254.7 LBS | BODY MASS INDEX: 33.76 KG/M2 | HEIGHT: 73 IN | SYSTOLIC BLOOD PRESSURE: 128 MMHG | HEART RATE: 76 BPM | OXYGEN SATURATION: 96 %

## 2022-04-25 DIAGNOSIS — I50.42 CHRONIC COMBINED SYSTOLIC (CONGESTIVE) AND DIASTOLIC (CONGESTIVE) HEART FAILURE (HCC): ICD-10-CM

## 2022-04-25 DIAGNOSIS — Z98.890 S/P MVR (MITRAL VALVE REPAIR): ICD-10-CM

## 2022-04-25 DIAGNOSIS — I10 ESSENTIAL HYPERTENSION: ICD-10-CM

## 2022-04-25 DIAGNOSIS — Q21.1 PFO (PATENT FORAMEN OVALE): ICD-10-CM

## 2022-04-25 DIAGNOSIS — Z95.2 S/P AVR: ICD-10-CM

## 2022-04-25 DIAGNOSIS — I35.0 NONRHEUMATIC AORTIC VALVE STENOSIS: Primary | ICD-10-CM

## 2022-04-25 PROCEDURE — 99214 OFFICE O/P EST MOD 30 MIN: CPT | Performed by: INTERNAL MEDICINE

## 2022-04-25 PROCEDURE — 3008F BODY MASS INDEX DOCD: CPT | Performed by: FAMILY MEDICINE

## 2022-04-25 NOTE — PROGRESS NOTES
Cardiology Follow Up    Francie Ruiz  [id number]  1955  [facility] [address]  [phone]      1  Nonrheumatic aortic valve stenosis  Echo complete w/ contrast if indicated   2  S/P AVR  Echo complete w/ contrast if indicated   3  S/P MVR (mitral valve repair)  Echo complete w/ contrast if indicated   4  PFO (patent foramen ovale)     5  Chronic combined systolic (congestive) and diastolic (congestive) heart failure ([address] )     6  Essential hypertension         Discussion/Summary:    Status post bioprosthetic aortic valve replacement and mitral valve repair  Combined systolic and diastolic congestive heart failure  Very brief postoperative atrial fibrillation without evidence of recurrence  Nonobstructive CAD with plaque noted on the preoperative cardiac catheterization but did not require any bypass or intervention  Returns for follow-up  His unfortunate electrical shock in October does not seem to have created any cardiac issues  He has not had echocardiogram since the time of his surgery and I would like to re-evaluate his LV function as well as his bioprosthesis and repair  Echocardiogram is ordered  Volume status remains stable  Is on metoprolol, losartan  Not requiring any diuretics, and his volume status is stable  As long his ejection fraction has not dropped significantly, there should be no changes necessary based on the results of his LV function  On amlodipine in addition to above with good control of his blood pressure  Had nonobstructive CAD on the catheterization so is on 40 mg of atorvastatin with a favorable lipid panel  Previous History:    75year old man  Seen in 2019 for an evaluation of aortic stenosis, which was severe but asymptomatic    He was lost to follow up and then had seen his PCP for progressive shortness of breath, was in heart failure and sent to the hospital     At that time in 5/2021, Was found to have progression of his aortic stenosis but also depression in LV function and moderate to severe mitral regurgitation  He was transferred to the [facility] and underwent preoperative testing  No obstructive CAD on his cardiac catheterization  On 5/20/2021, he underwent bioprosthetic aortic valve replacement with a 25 mm valve, mitral valve repair with 32 mm ring, PFO closure but still had mild residual left-to-right passage postoperatively  Ejection fraction on the intraoperative KATIA after his surgery was in the range of 40-45%  He did have brief postoperative atrial fibrillation, but converted to sinus rhythm and was not started on systemic anticoagulation  He has done well since  Interval history:  Returns for follow up  He had an electrical shock and was in the ER in Oct   Did not require hospitalization  His volume status has remained stable  He is not having any shortness of breath, chest pain, edema  BP has been controlled  Still working as   Doing a lot of training as well        Patient Active Problem List   Diagnosis    Nonrheumatic aortic valve stenosis    Chronic obstructive lung disease ([facility] )    Erectile dysfunction of non-organic origin    Hyperlipidemia    Essential hypertension    Rotator cuff tendinitis Left    GERD (gastroesophageal reflux disease)    Chronic combined systolic (congestive) and diastolic (congestive) heart failure (HCC)    Osteoarthritis of both knees    S/P AVR    PFO (patent foramen ovale)    S/P MVR (mitral valve repair)    Lumbar degenerative disc disease    Current smoker    COVID-19 virus infection     Past Medical History:   Diagnosis Date    Broken humerus     COPD (chronic obstructive pulmonary disease) ([facility] )     Pneumonia      Social History     Tobacco Use    Smoking status: Former Smoker     Packs/day: 0 50     Types: Cigarettes     Start date: 45     Quit date: 2021     Years since quittin 9    Smokeless tobacco: Never Used   Vaping Use    Vaping Use: Never used   Substance Use Topics    Alcohol use: Yes     Comment: once a month    Drug use: No      Family History   Problem Relation Age of Onset    Prostate cancer Father      Past Surgical History:   Procedure Laterality Date    IR CHEST TUBE PLACEMENT  2021    MULTIPLE TOOTH EXTRACTIONS N/A 2021    Procedure: EXTRACTION TEETH MULTIPLE 1, 3, 18, 30, EXCISION OF LEFT BUCCAL FIBROMA;  Surgeon: Mai He DMD;  [facility];  Service: Maxillofacial    AK ECHO TRANSESOPHAG R-T 2D W/PRB IMG ACQUISJ I&R N/A 2021    Procedure: TRANSESOPHAGEAL ECHOCARDIOGRAM (KATIA);   Surgeon: Leslie Hinds MD;  [facility];  Service: Cardiac Surgery    AK REPLACEMENT OF MITRAL VALVE N/A 2021    Procedure: VALVE MITRAL (MVR) REPAIR with 32mm physio II annuloplasty ring;  Surgeon: Leslie Hinds MD;  [facility];  Service: Cardiac Surgery    AK RPLCMT AORTIC VALVE OPN W/STENTLESS TISSUE VALVE N/A 2021    Procedure: REPLACEMENT VALVE AORTIC (AVR) TISSUE with 25mm montgomery inspiris tissue valve;  Surgeon: Leslie Hinds MD;  [facility];  Service: Cardiac Surgery       Current Outpatient Medications:     albuterol (PROVENTIL HFA,VENTOLIN HFA) 90 mcg/act inhaler, INHALE 1-2 PUFFS EVERY 4 (FOUR) HOURS AS NEEDED FOR WHEEZING, Disp: 18 Inhaler, Rfl: 0    amLODIPine (NORVASC) 5 mg tablet, Take 1 tablet (5 mg total) by mouth daily, Disp: 90 tablet, Rfl: 3    Anoro Ellipta 62 5-25 MCG/INH inhaler, INHALE 1 PUFF BY MOUTH EVERY DAY, Disp: 60 blister, Rfl: 5    aspirin (ECOTRIN LOW STRENGTH) 81 mg EC tablet, Take 1 tablet (81 mg total) by mouth daily, Disp: , Rfl:     atorvastatin (LIPITOR) 40 mg tablet, Take 1 tablet (40 mg total) by mouth every evening, Disp: 90 tablet, Rfl: 3    B Complex Vitamins (VITAMIN B COMPLEX 100 IJ), Inject as directed, Disp: , Rfl:     dextromethorphan-guaifenesin (MUCINEX DM)  MG per 12 hr tablet, Take 1 tablet by mouth every 12 (twelve) hours (Patient taking differently: Take 1 tablet by mouth every 12 (twelve) hours as needed ), Disp: 20 tablet, Rfl: 0    losartan (COZAAR) 50 mg tablet, Take 1 tablet (50 mg total) by mouth daily, Disp: 90 tablet, Rfl: 3    metoprolol tartrate (LOPRESSOR) 50 mg tablet, Take 1 tablet (50 mg total) by mouth every 12 (twelve) hours, Disp: 180 tablet, Rfl: 3    Multiple Minerals-Vitamins (Calcium Citrate-Mag-Minerals) TABS, Take by mouth, Disp: , Rfl:     Multiple Vitamin (MULTI-VITAMIN DAILY PO), Take 1 tablet by mouth daily, Disp: , Rfl:     pantoprazole (PROTONIX) 40 mg tablet, Take 1 tablet (40 mg total) by mouth daily as needed (heartburn or acid reflux), Disp: 90 tablet, Rfl: 0    Zinc 10 MG LOZG, Apply to the mouth or throat, Disp: , Rfl:   No Known Allergies    Vitals:    04/25/22 1127   BP: 128/84   BP Location: Right arm   Patient Position: Sitting   Cuff Size: Large   Pulse: 76   SpO2: 96%   Weight: 116 kg (254 lb 11 2 oz)   Height: 6' 1" (1 854 m)     Vitals:    04/25/22 1127   Weight: 116 kg (254 lb 11 2 oz)      Height: 6' 1" (185 4 cm)   Body mass index is 33 6 kg/m²  Physical Exam:  GEN: Evon Gooden appears well, alert and oriented x 3, pleasant and cooperative   HEENT: pupils equal, round, and reactive to light; extraocular muscles intact  NECK: supple, no carotid bruits   HEART: regular rhythm, normal S1 and S2, bio AVR sounds     LUNGS: clear to auscultation bilaterally; no wheezes, rales, or rhonchi   ABDOMEN: normal bowel sounds, soft, no tenderness, no distention  EXTREMITIES: trace LE edema  NEURO: no focal findings   SKIN: normal without suspicious lesions on exposed skin    ROS:  Positive for shoulder pain, knee pain, difficulty sleeping, heartburn, arthritis, back pain, hearing problems, kidney stones, erectile dysfunction  Except as noted in HPI, is otherwise reviewed in detail and a 12 point review of systems is negative  ROS reviewed and is unchanged    Labs:  Lab Results   Component Value Date     08/07/2014    K 4 4 01/18/2022     01/18/2022    CREATININE 0 81 01/18/2022    BUN 22 01/18/2022    CO2 28 01/18/2022    ALT 39 01/18/2022    AST 27 01/18/2022    INR 1 22 (H) 05/26/2021    GLUF 101 (H) 01/18/2022    HGBA1C 5 8 (H) 05/13/2021    WBC 8 89 01/18/2022    HGB 14 6 01/18/2022    HCT 44 4 01/18/2022     01/18/2022       Lab Results   Component Value Date    CHOL 209 08/07/2014     Lab Results   Component Value Date    HDL 49 05/13/2021    HDL 39 08/07/2014     Lab Results   Component Value Date    LDLCALC 28 05/13/2021    LDLCALC 126 (H) 08/07/2014     Lab Results   Component Value Date    TRIG 129 05/13/2021    TRIG 220 08/07/2014       Testing:  Cardiac Cath 5/2021:  CORONARY CIRCULATION:  Left main: Normal   LAD: The vessel was large sized  Angiography showed moderate non-obstructive atherosclerosis  Circumflex: The vessel was large sized and dominant, giving rise to several OM and posterolateral branches and the PDA  There was diffuse atherosclerosis of the branch vessels  RCA: Non-dominant; normal   KATIA 5/2021:  LEFT VENTRICLE:  The ventricle was mildly dilated  Systolic function was moderately reduced  Ejection fraction was estimated to be 45 %  There was moderate diffuse hypokinesis  Wall thickness was moderately increased  The changes were consistent with eccentric hypertrophy  Features were consistent with a pseudonormal left ventricular filling pattern, with concomitant abnormal relaxation and increased filling pressure (grade 2 diastolic dysfunction)      RIGHT VENTRICLE:  The ventricle was mildly dilated    Systolic function was moderately reduced      LEFT ATRIUM:  The atrium was mildly dilated      LEFT ATRIAL APPENDAGE:  No thrombus was identified      ATRIAL SEPTUM:  There was a patent foramen ovale measuring 5 mm  Doppler evaluation was performed  There was a left-to-right shunt, in the baseline state      RIGHT ATRIUM:  The atrium was mildly dilated      MITRAL VALVE:  There was mild to moderate annular calcification  There was an area of image dropout at the base of the posterior leaflet of the mitral valve in the P1- P2 segment  This was possibly artifact but a perforation or sinus cannot be entirely ruled out  There appeared to be flow into but not  out of this region  There was mild to moderate regurgitation  The regurgitant jet was centrally directed      AORTIC VALVE:  The valve was not visualized well enough to rule out a bicuspid morphology  Leaflets exhibited marked calcification and immobility  There was severe stenosis  There was mild to moderate regurgitation  Valve mean gradient was 44 mmHg      TRICUSPID VALVE:  There was mild regurgitation      PERICARDIUM:  A small pericardial effusion was identified circumferential to the heart  TTE 5/2021:  LEFT VENTRICLE:  Moderate decreased left ventricular systolic function, EF 85%  Significantly decreased left ventricular global longitudinal strain, minus 7 4%  Severe concentric left ventricular hypertrophy, wall thickness 21 mm  Mildly increased left  ventricular cavity size  Grade 3 left ventricular diastolic dysfunction  Severely elevated left ventricular filling pressures      RIGHT VENTRICLE:  Mild right ventricular enlargement with well preserved right ventricular systolic function      LEFT ATRIUM:  Moderate left atrial enlargement      RIGHT ATRIUM:  Mild right atrial enlargement      MITRAL VALVE:  Moderate possibly severe mitral regurgitation  No mitral stenosis  Mitral ERO 0 5 cm2  Mitral valve regurgitant volume 85 mL  The mitral valve annulus was heavily calcified  The mitral valve leaflets were calcified but have well preserved  mobility   There was some calcification in the chordae      AORTIC VALVE:  Heavily calcified aortic valve with severe, probably critical aortic stenosis  No significant aortic regurgitation  Aortic valve maximum velocity 5 3 M/S  Aortic valve mean gradient 81 mmHg  Aortic valve area 0 3 cm2      TRICUSPID VALVE:  There was mild regurgitation      AORTA:  Aortic root at the upper limits of normal to mildly enlarged at 3 8 cm     PULMONARY ARTERIES:  Moderate pulmonary hypertension  Pulmonary artery systolic pressures estimated 54 mmHg      PERICARDIUM:  Mild pericardial effusion without hemodynamic significance  Echo 6/2019:  1  Severe concentric left ventricular hypertrophy, normal left ventricular systolic function, grade 1 diastolic dysfunction  EF around 60 percent  2  Mild left atrial cavity enlargement  3  Severe aortic valve stenosis  Peak transaortic velocity is 4 2 meters/seconds, mean gradient is 45 mmHg and calculated aortic valve area is 0 0 6 cm2  No aortic valve regurgitation noted  4  Mitral valve leaflet sclerosis, trace to mild mitral valve regurgitation noted  5  Trace tricuspid valve regurgitation  6  No obvious pulmonary hypertension  7  No pericardial effusion

## 2022-05-13 DIAGNOSIS — J43.9 PULMONARY EMPHYSEMA, UNSPECIFIED EMPHYSEMA TYPE (HCC): ICD-10-CM

## 2022-05-13 RX ORDER — UMECLIDINIUM BROMIDE AND VILANTEROL TRIFENATATE 62.5; 25 UG/1; UG/1
POWDER RESPIRATORY (INHALATION)
Qty: 60 BLISTER | Refills: 5 | Status: SHIPPED | OUTPATIENT
Start: 2022-05-13

## 2022-05-17 ENCOUNTER — OFFICE VISIT (OUTPATIENT)
Dept: FAMILY MEDICINE CLINIC | Facility: CLINIC | Age: 67
End: 2022-05-17
Payer: COMMERCIAL

## 2022-05-17 VITALS
BODY MASS INDEX: 33.13 KG/M2 | HEIGHT: 73 IN | DIASTOLIC BLOOD PRESSURE: 84 MMHG | HEART RATE: 77 BPM | WEIGHT: 250 LBS | SYSTOLIC BLOOD PRESSURE: 132 MMHG | TEMPERATURE: 98 F | OXYGEN SATURATION: 95 %

## 2022-05-17 DIAGNOSIS — E78.5 HYPERLIPIDEMIA, UNSPECIFIED HYPERLIPIDEMIA TYPE: ICD-10-CM

## 2022-05-17 DIAGNOSIS — F17.200 CURRENT SMOKER: ICD-10-CM

## 2022-05-17 DIAGNOSIS — K21.9 GASTROESOPHAGEAL REFLUX DISEASE WITHOUT ESOPHAGITIS: ICD-10-CM

## 2022-05-17 DIAGNOSIS — I10 ESSENTIAL HYPERTENSION: ICD-10-CM

## 2022-05-17 DIAGNOSIS — I50.42 CHRONIC COMBINED SYSTOLIC (CONGESTIVE) AND DIASTOLIC (CONGESTIVE) HEART FAILURE (HCC): Primary | ICD-10-CM

## 2022-05-17 DIAGNOSIS — Z98.890 S/P MVR (MITRAL VALVE REPAIR): ICD-10-CM

## 2022-05-17 DIAGNOSIS — J43.9 PULMONARY EMPHYSEMA, UNSPECIFIED EMPHYSEMA TYPE (HCC): ICD-10-CM

## 2022-05-17 DIAGNOSIS — Z95.2 S/P AVR: ICD-10-CM

## 2022-05-17 PROBLEM — U07.1 COVID-19 VIRUS INFECTION: Status: RESOLVED | Noted: 2021-12-21 | Resolved: 2022-05-17

## 2022-05-17 PROCEDURE — 1036F TOBACCO NON-USER: CPT | Performed by: FAMILY MEDICINE

## 2022-05-17 PROCEDURE — 1160F RVW MEDS BY RX/DR IN RCRD: CPT | Performed by: FAMILY MEDICINE

## 2022-05-17 PROCEDURE — 99214 OFFICE O/P EST MOD 30 MIN: CPT | Performed by: FAMILY MEDICINE

## 2022-05-17 PROCEDURE — 3008F BODY MASS INDEX DOCD: CPT | Performed by: FAMILY MEDICINE

## 2022-05-17 RX ORDER — ALBUTEROL SULFATE 90 UG/1
1-2 AEROSOL, METERED RESPIRATORY (INHALATION) EVERY 4 HOURS PRN
Qty: 18 G | Refills: 0 | Status: SHIPPED | OUTPATIENT
Start: 2022-05-17

## 2022-05-17 RX ORDER — PANTOPRAZOLE SODIUM 40 MG/1
40 TABLET, DELAYED RELEASE ORAL DAILY PRN
Qty: 90 TABLET | Refills: 1 | Status: SHIPPED | OUTPATIENT
Start: 2022-05-17

## 2022-05-17 NOTE — PROGRESS NOTES
FAMILY PRACTICE OFFICE VISIT  [facility]  [address]  [address], [place], [address]      NAME: Roni Keen  AGE: 75 y o  SEX: male  : 1955   MRN: [id number]    DATE: 2022  TIME: 12:26 PM    Assessment and Plan     Problem List Items Addressed This Visit     Chronic obstructive lung disease ([address] )    Relevant Medications    albuterol (PROVENTIL HFA,VENTOLIN HFA) 90 mcg/act inhaler    Hyperlipidemia    Essential hypertension    GERD (gastroesophageal reflux disease)    Relevant Medications    pantoprazole (PROTONIX) 40 mg tablet    Chronic combined systolic (congestive) and diastolic (congestive) heart failure (HCC) - Primary    S/P AVR    S/P MVR (mitral valve repair)    Current smoker    Relevant Orders    CT lung screening program          Patient Instructions   He is doing well here today, we did review visit with Cardiology late April, he does have slip from them to redo echocardiogram   Blood pressure acceptable today, continue aspirin 81 mg daily, atorvastatin 40 mg every evening, losartan 50 mg daily, metoprolol tartrate 50 mg every 12 hours  No increased respiratory issues, uses albuterol once or twice weekly, I did refill that  He slowly recovered from [place] back in December  Continue on Anoro, call us if anything worsens  He is still smoking about 1/4 pack per day, I would like him to redo CT screening of lung, his last was in 2021  Keep trying to cut down and quit with smoking, well aware of risks associated with smoking  He does plan to do COVID booster  Blood work in January showed CMP, CBC to be okay, TSH 1 03, negative hep C screen, PSA 0 3  We will see him again in 6 months with a routine physical, call us sooner if needed  Plan redo blood work at f/up, redo lipids at follow-up also     Plans to work into next year at least      Chief Complaint     Chief Complaint   Patient presents with  Follow-up       History of Present Illness   Roni Keen is a 75y o -year-old male who is in for a routine follow-up, he had significant episode of COVID back in December, did do monoclonal antibody, had significant fatigue, has recovered  He did see Cardiology recently, has had no chest pain, no new palpitations, is using medication as directed  Still smokes about 1/4 pack per day  Does use pantoprazole most days, no increased GI symptoms  Review of Systems   Review of Systems   Constitutional: Negative for appetite change, fatigue (Much improved), fever and unexpected weight change  HENT: Negative for sore throat and trouble swallowing  Respiratory:        He uses rescue inhaler few times weekly, is on Anoro  No current shortness of breath, no increased cough, no hemoptysis  Cardiovascular: Negative for chest pain, palpitations and leg swelling  Gastrointestinal: Negative for abdominal pain, blood in stool, nausea and vomiting  No increased acid reflux -see HPI    No change in bowel   Genitourinary: Negative for dysuria and hematuria  Neurological: Negative for dizziness, syncope, light-headedness and headaches  Psychiatric/Behavioral: Negative for behavioral problems and confusion         Active Problem List     Patient Active Problem List   Diagnosis    Nonrheumatic aortic valve stenosis    Chronic obstructive lung disease ([facility] [address] )    Erectile dysfunction of non-organic origin    Hyperlipidemia    Essential hypertension    Rotator cuff tendinitis Left    GERD (gastroesophageal reflux disease)    Chronic combined systolic (congestive) and diastolic (congestive) heart failure (HCC)    Osteoarthritis of both knees    S/P AVR    PFO (patent foramen ovale)    S/P MVR (mitral valve repair)    Lumbar degenerative disc disease    Current smoker       Past Medical History:  Reviewed    Past Surgical History:  Reviewed    Family History:  Reviewed    Social History:  Reviewed    Objective     Vitals:    05/17/22 1141   BP: 132/84   BP Location: Left arm   Patient Position: Sitting   Cuff Size: Large   Pulse: 77   Temp: 98 °F (36 7 °C)   SpO2: 95%   Weight: 113 kg (250 lb)   Height: 6' 1" (1 854 m)     Body mass index is 32 98 kg/m²  BP Readings from Last 3 Encounters:   05/17/22 132/84   04/25/22 128/84   04/20/22 142/90       Wt Readings from Last 3 Encounters:   05/17/22 113 kg (250 lb)   04/25/22 116 kg (254 lb 11 2 oz)   04/20/22 115 kg (254 lb 6 4 oz)       Physical Exam  Constitutional:       General: He is not in acute distress  Appearance: Normal appearance  He is well-developed  He is not ill-appearing  Eyes:      General: No scleral icterus  Cardiovascular:      Rate and Rhythm: Normal rate and regular rhythm  Heart sounds: Murmur heard  Pulmonary:      Effort: Pulmonary effort is normal  No respiratory distress  Breath sounds: Normal breath sounds  No wheezing, rhonchi or rales  Abdominal:      Palpations: Abdomen is soft  Tenderness: There is no abdominal tenderness  Musculoskeletal:      Right lower leg: No edema  Left lower leg: No edema  Lymphadenopathy:      Cervical: No cervical adenopathy  Skin:     Coloration: Skin is not jaundiced  Neurological:      Mental Status: He is alert  Mental status is at baseline     Psychiatric:         Behavior: Behavior normal          ALLERGIES:  No Known Allergies    Current Medications     Current Outpatient Medications   Medication Sig Dispense Refill    albuterol (PROVENTIL HFA,VENTOLIN HFA) 90 mcg/act inhaler Inhale 1-2 puffs every 4 (four) hours as needed for wheezing 18 g 0    amLODIPine (NORVASC) 5 mg tablet Take 1 tablet (5 mg total) by mouth daily 90 tablet 3    Anoro Ellipta 62 5-25 MCG/INH inhaler INHALE 1 PUFF BY MOUTH EVERY DAY 60 blister 5    aspirin (ECOTRIN LOW STRENGTH) 81 mg EC tablet Take 1 tablet (81 mg total) by mouth daily      atorvastatin (LIPITOR) 40 mg tablet Take 1 tablet (40 mg total) by mouth every evening 90 tablet 3    B Complex Vitamins (VITAMIN B COMPLEX 100 IJ) Inject as directed      dextromethorphan-guaifenesin (MUCINEX DM)  MG per 12 hr tablet Take 1 tablet by mouth every 12 (twelve) hours (Patient taking differently: Take 1 tablet by mouth every 12 (twelve) hours as needed) 20 tablet 0    losartan (COZAAR) 50 mg tablet Take 1 tablet (50 mg total) by mouth daily 90 tablet 3    metoprolol tartrate (LOPRESSOR) 50 mg tablet Take 1 tablet (50 mg total) by mouth every 12 (twelve) hours 180 tablet 3    Multiple Minerals-Vitamins (Calcium Citrate-Mag-Minerals) TABS Take by mouth      Multiple Vitamin (MULTI-VITAMIN DAILY PO) Take 1 tablet by mouth daily      pantoprazole (PROTONIX) 40 mg tablet Take 1 tablet (40 mg total) by mouth as needed in the morning (heartburn or acid reflux)  90 tablet 1    Zinc 10 MG LOZG Apply to the mouth or throat       No current facility-administered medications for this visit              Orders Placed This Encounter   Procedures    CT lung screening program         Kyara Kennedy DO

## 2022-05-17 NOTE — PATIENT INSTRUCTIONS
He is doing well here today, we did review visit with Cardiology late April, he does have slip from them to redo echocardiogram   Blood pressure acceptable today, continue aspirin 81 mg daily, atorvastatin 40 mg every evening, losartan 50 mg daily, metoprolol tartrate 50 mg every 12 hours  No increased respiratory issues, uses albuterol once or twice weekly, I did refill that  He slowly recovered from [facility] back in December  Continue on Anoro, call us if anything worsens  He is still smoking about 1/4 pack per day, I would like him to redo CT screening of lung, his last was in April of 2021  Keep trying to cut down and quit with smoking, well aware of risks associated with smoking  He does plan to do COVID booster  Blood work in January showed CMP, CBC to be okay, TSH 1 03, negative hep C screen, PSA 0 3  We will see him again in 6 months with a routine physical, call us sooner if needed  Plan redo blood work at f/up, redo lipids at follow-up also     Plans to work into next year at least

## 2022-06-13 DIAGNOSIS — I10 HYPERTENSION, UNSPECIFIED TYPE: ICD-10-CM

## 2022-06-14 RX ORDER — AMLODIPINE BESYLATE 5 MG/1
TABLET ORAL
Qty: 90 TABLET | Refills: 3 | Status: SHIPPED | OUTPATIENT
Start: 2022-06-14

## 2022-06-15 ENCOUNTER — DOCUMENTATION (OUTPATIENT)
Dept: CARDIOLOGY CLINIC | Facility: CLINIC | Age: 67
End: 2022-06-15

## 2022-06-15 NOTE — PROGRESS NOTES
FMLA FORM COMPLETED AND SIGNED BY DR FINNEY    FAXED DIRECTLY TO PATIENT'S  WORK, [employer]: [phone]

## 2022-06-21 ENCOUNTER — OFFICE VISIT (OUTPATIENT)
Dept: URGENT CARE | Facility: MEDICAL CENTER | Age: 67
End: 2022-06-21
Payer: COMMERCIAL

## 2022-06-21 VITALS
SYSTOLIC BLOOD PRESSURE: 91 MMHG | BODY MASS INDEX: 32.08 KG/M2 | HEIGHT: 74 IN | DIASTOLIC BLOOD PRESSURE: 56 MMHG | RESPIRATION RATE: 18 BRPM | HEART RATE: 80 BPM | WEIGHT: 250 LBS | OXYGEN SATURATION: 97 % | TEMPERATURE: 98 F

## 2022-06-21 DIAGNOSIS — M25.561 CHRONIC PAIN OF RIGHT KNEE: Primary | ICD-10-CM

## 2022-06-21 DIAGNOSIS — G89.29 CHRONIC PAIN OF RIGHT KNEE: Primary | ICD-10-CM

## 2022-06-21 PROCEDURE — 99213 OFFICE O/P EST LOW 20 MIN: CPT | Performed by: EMERGENCY MEDICINE

## 2022-06-21 NOTE — LETTER
June 21, 2022     Patient: Janine Gunderson   YOB: 1955   Date of Visit: 6/21/2022       To Whom it May Concern:    Efren Huntley was seen in my clinic on 6/21/2022  He may return to work on 06/23/2022  If you have any questions or concerns, please don't hesitate to call           Sincerely,          Benny Holder DO        CC: No Recipients

## 2022-06-21 NOTE — PATIENT INSTRUCTIONS
Osteoarthritis   WHAT YOU NEED TO KNOW:   Osteoarthritis (OA) occurs when cartilage (tissue that cushions a joint) wears away slowly and causes the bones to rub together  OA is a long-term condition that often affects the hands, neck, lower back, knees, and hips  OA is also called arthrosis or degenerative joint disease  DISCHARGE INSTRUCTIONS:   Call your doctor or specialist if:   You have severe pain  You cannot move your joint  You have a fever  Your joint is red and tender  You have questions or concerns about your condition or care  Medicines: You may need any of the following:  Acetaminophen  decreases pain and fever  It is available without a doctor's order  Ask how much to take and how often to take it  Follow directions  Read the labels of all other medicines you are using to see if they also contain acetaminophen, or ask your doctor or pharmacist  Acetaminophen can cause liver damage if not taken correctly  Do not use more than 4 grams (4,000 milligrams) total of acetaminophen in one day  NSAIDs , such as ibuprofen, help decrease swelling, pain, and fever  This medicine is available with or without a doctor's order  NSAIDs can cause stomach bleeding or kidney problems in certain people  If you take blood thinner medicine, always ask your healthcare provider if NSAIDs are safe for you  Always read the medicine label and follow directions  Capsaicin cream  may help decrease pain in your joint  Prescription pain medicine  may be given  Ask your healthcare provider how to take this medicine safely  Some prescription pain medicines contain acetaminophen  Do not take other medicines that contain acetaminophen without talking to your healthcare provider  Too much acetaminophen may cause liver damage  Prescription pain medicine may cause constipation  Ask your healthcare provider how to prevent or treat constipation  Take your medicine as directed    Contact your healthcare provider if you think your medicine is not helping or if you have side effects  Tell him or her if you are allergic to any medicine  Keep a list of the medicines, vitamins, and herbs you take  Include the amounts, and when and why you take them  Bring the list or the pill bottles to follow-up visits  Carry your medicine list with you in case of an emergency  Follow up with your healthcare provider as directed:  Write down your questions so you remember to ask them during your visits  Go to physical therapy as directed:  A physical therapist teaches you exercises to help improve movement and strength, and to decrease pain in your joints  The exercises also help lower your risk for loss of function  A physical therapist may move an area with his or her hands  For example, he or she may move your leg in certain ways to treat osteoarthritis in your hip  Manage your symptoms:   Stay active  Physical activity may reduce your pain and improve your ability to do daily activities  Avoid activities that cause pain  Ask your healthcare provider what type of exercise would be best for you  Maintain a healthy weight  This helps decrease the strain on the joints in your back, hips, knees, ankles, and feet  Ask your healthcare provider what a healthy weight is for you  He or she can help you create a weight loss plan if you are overweight  Use heat or ice on your joints as directed  Heat and ice help decrease pain, swelling, and muscle spasms  For heat, use a heating pad on a low setting for 20 minutes, or take a warm bath  For ice, use an ice pack, or put crushed ice in a plastic bag  Cover it with a towel before you place it on your joint  Use ice for 15 minutes every hour  Massage the muscles around the joint  Massage helps relieve pain and stiffness  Your healthcare provider or a physical therapist can show you how to do this  If you have hip OA, another person may need to help you massage the area      Use a cane, crutches, or a walker if directed  These help protect and relieve pressure on your ankle, knee, and hip joints  You may also be prescribed shoe inserts to decrease pressure in your joints  Wear flat or low-heeled shoes  This will help decrease pain and reduce pressure on your ankle, knee, and hip joints  © Copyright [employer] 2022 Information is for End User's use only and may not be sold, redistributed or otherwise used for commercial purposes  All illustrations and images included in CareNotes® are the copyrighted property of [employer]  or [facility] Laz[facility]   The above information is an  only  It is not intended as medical advice for individual conditions or treatments  Talk to your doctor, nurse or pharmacist before following any medical regimen to see if it is safe and effective for you

## 2022-06-21 NOTE — PROGRESS NOTES
[address]        NAME: Anna Torres is a 75 y o  male  : 1955    MRN: [id number]  DATE: 2022  TIME: 11:47 AM    Assessment and Plan   Chronic pain of right knee [M25 561, G89 29]  1  Chronic pain of right knee  Ambulatory Referral to Orthopedic Surgery     No need for acute intervention  Referral given for orthopedics  Patient Instructions   Osteoarthritis   WHAT YOU NEED TO KNOW:   Osteoarthritis (OA) occurs when cartilage (tissue that cushions a joint) wears away slowly and causes the bones to rub together  OA is a long-term condition that often affects the hands, neck, lower back, knees, and hips  OA is also called arthrosis or degenerative joint disease  DISCHARGE INSTRUCTIONS:   Call your doctor or specialist if:   · You have severe pain  · You cannot move your joint  · You have a fever  · Your joint is red and tender  · You have questions or concerns about your condition or care  Medicines: You may need any of the following:  · Acetaminophen  decreases pain and fever  It is available without a doctor's order  Ask how much to take and how often to take it  Follow directions  Read the labels of all other medicines you are using to see if they also contain acetaminophen, or ask your doctor or pharmacist  Acetaminophen can cause liver damage if not taken correctly  Do not use more than 4 grams (4,000 milligrams) total of acetaminophen in one day  · NSAIDs , such as ibuprofen, help decrease swelling, pain, and fever  This medicine is available with or without a doctor's order  NSAIDs can cause stomach bleeding or kidney problems in certain people  If you take blood thinner medicine, always ask your healthcare provider if NSAIDs are safe for you  Always read the medicine label and follow directions  · Capsaicin cream  may help decrease pain in your joint  · Prescription pain medicine  may be given   Ask your healthcare provider how to take this medicine safely  Some prescription pain medicines contain acetaminophen  Do not take other medicines that contain acetaminophen without talking to your healthcare provider  Too much acetaminophen may cause liver damage  Prescription pain medicine may cause constipation  Ask your healthcare provider how to prevent or treat constipation  · Take your medicine as directed  Contact your healthcare provider if you think your medicine is not helping or if you have side effects  Tell him or her if you are allergic to any medicine  Keep a list of the medicines, vitamins, and herbs you take  Include the amounts, and when and why you take them  Bring the list or the pill bottles to follow-up visits  Carry your medicine list with you in case of an emergency  Follow up with your healthcare provider as directed:  Write down your questions so you remember to ask them during your visits  Go to physical therapy as directed:  A physical therapist teaches you exercises to help improve movement and strength, and to decrease pain in your joints  The exercises also help lower your risk for loss of function  A physical therapist may move an area with his or her hands  For example, he or she may move your leg in certain ways to treat osteoarthritis in your hip  Manage your symptoms:   · Stay active  Physical activity may reduce your pain and improve your ability to do daily activities  Avoid activities that cause pain  Ask your healthcare provider what type of exercise would be best for you  · Maintain a healthy weight  This helps decrease the strain on the joints in your back, hips, knees, ankles, and feet  Ask your healthcare provider what a healthy weight is for you  He or she can help you create a weight loss plan if you are overweight  · Use heat or ice on your joints as directed  Heat and ice help decrease pain, swelling, and muscle spasms  For heat, use a heating pad on a low setting for 20 minutes, or take a warm bath  For ice, use an ice pack, or put crushed ice in a plastic bag  Cover it with a towel before you place it on your joint  Use ice for 15 minutes every hour  · Massage the muscles around the joint  Massage helps relieve pain and stiffness  Your healthcare provider or a physical therapist can show you how to do this  If you have hip OA, another person may need to help you massage the area  · Use a cane, crutches, or a walker if directed  These help protect and relieve pressure on your ankle, knee, and hip joints  You may also be prescribed shoe inserts to decrease pressure in your joints  · Wear flat or low-heeled shoes  This will help decrease pain and reduce pressure on your ankle, knee, and hip joints  © Copyright [employer] 2022 Information is for End User's use only and may not be sold, redistributed or otherwise used for commercial purposes  All illustrations and images included in CareNotes® are the copyrighted property of A D A M , Inc  or [employer]   The above information is an  only  It is not intended as medical advice for individual conditions or treatments  Talk to your doctor, nurse or pharmacist before following any medical regimen to see if it is safe and effective for you  Follow up with PCP in 3-5 days  Proceed to  ER if symptoms worsen  Chief Complaint     Chief Complaint   Patient presents with    Knee Pain     Patietnt has chronic arthritic pain in his knees  He started last Thursday with R knee pain he denies any injury, he states he is having difficulty walking and his R knee won't straighten to stand         History of Present Illness       75year old male presents today for evaluation of right-sided knee pain  He states the pain is a chronic issue but worsened over the last week  He denies specific injury  He states he is taking Tylenol without relief  He states he is here today because he can not go to work due to the pain in his knee  Is requesting a work note because he missed yesterday as well  No fever  He is not followed by an orthopedist for this problem  Review of Systems   Review of Systems   Constitutional: Negative for chills, fatigue and fever  HENT: Negative for postnasal drip, sore throat and trouble swallowing  Respiratory: Negative for chest tightness and shortness of breath  Gastrointestinal: Negative for abdominal pain  Genitourinary: Negative for dysuria  Musculoskeletal: Positive for arthralgias  Skin: Negative for rash  Allergic/Immunologic: Negative for immunocompromised state  Neurological: Negative for dizziness, light-headedness and headaches  Psychiatric/Behavioral: Negative for confusion           Current Medications       Current Outpatient Medications:     albuterol (PROVENTIL HFA,VENTOLIN HFA) 90 mcg/act inhaler, Inhale 1-2 puffs every 4 (four) hours as needed for wheezing, Disp: 18 g, Rfl: 0    amLODIPine (NORVASC) 5 mg tablet, TAKE 1 TABLET BY MOUTH EVERY DAY, Disp: 90 tablet, Rfl: 3    Anoro Ellipta 62 5-25 MCG/INH inhaler, INHALE 1 PUFF BY MOUTH EVERY DAY, Disp: 60 blister, Rfl: 5    aspirin (ECOTRIN LOW STRENGTH) 81 mg EC tablet, Take 1 tablet (81 mg total) by mouth daily, Disp: , Rfl:     atorvastatin (LIPITOR) 40 mg tablet, Take 1 tablet (40 mg total) by mouth every evening, Disp: 90 tablet, Rfl: 3    B Complex Vitamins (VITAMIN B COMPLEX 100 IJ), Inject as directed, Disp: , Rfl:     dextromethorphan-guaifenesin (MUCINEX DM)  MG per 12 hr tablet, Take 1 tablet by mouth every 12 (twelve) hours (Patient taking differently: Take 1 tablet by mouth every 12 (twelve) hours as needed), Disp: 20 tablet, Rfl: 0    losartan (COZAAR) 50 mg tablet, Take 1 tablet (50 mg total) by mouth daily, Disp: 90 tablet, Rfl: 3    metoprolol tartrate (LOPRESSOR) 50 mg tablet, Take 1 tablet (50 mg total) by mouth every 12 (twelve) hours, Disp: 180 tablet, Rfl: 3    Multiple Minerals-Vitamins (Calcium Citrate-Mag-Minerals) TABS, Take by mouth, Disp: , Rfl:     Multiple Vitamin (MULTI-VITAMIN DAILY PO), Take 1 tablet by mouth daily, Disp: , Rfl:     pantoprazole (PROTONIX) 40 mg tablet, Take 1 tablet (40 mg total) by mouth as needed in the morning (heartburn or acid reflux)  , Disp: 90 tablet, Rfl: 1    Zinc 10 MG LOZG, Apply to the mouth or throat, Disp: , Rfl:     Current Allergies     Allergies as of 06/21/2022    (No Known Allergies)            The following portions of the patient's history were reviewed and updated as appropriate: allergies, current medications, past family history, past medical history, past social history, past surgical history and problem list      Past Medical History:   Diagnosis Date    Broken humerus     COPD (chronic obstructive pulmonary disease) ([facility] [address] )     COVID-19 virus infection 12/21/2021    Pneumonia        Past Surgical History:   Procedure Laterality Date    IR CHEST TUBE PLACEMENT  5/21/2021    MULTIPLE TOOTH EXTRACTIONS N/A 5/17/2021    Procedure: EXTRACTION TEETH MULTIPLE 1, 3, 18, 30, EXCISION OF LEFT BUCCAL FIBROMA;  Surgeon: Michele Schilling DMD;  [facility];  Service: Maxillofacial    IN ECHO TRANSESOPHAG R-T 2D W/PRB IMG ACQUISJ I&R N/A 5/20/2021    Procedure: TRANSESOPHAGEAL ECHOCARDIOGRAM (KATIA);   Surgeon: Bill Walker MD;  [facility];  Service: Cardiac Surgery    IN REPLACEMENT OF MITRAL VALVE N/A 5/20/2021    Procedure: VALVE MITRAL (MVR) REPAIR with 32mm physio II annuloplasty ring;  Surgeon: Bill Walker MD;  [facility];  Service: Cardiac Surgery    IN RPLCMT AORTIC VALVE OPN W/STENTLESS TISSUE VALVE N/A 5/20/2021    Procedure: REPLACEMENT VALVE AORTIC (AVR) TISSUE with 25mm montgomery inspiris tissue valve;  Surgeon: Bill Walker MD;  [facility];  Service: Cardiac Surgery       Family History   Problem Relation Age of Onset    Prostate cancer Father          Medications have been verified  Objective   BP 91/56   Pulse 80   Temp 98 °F (36 7 °C)   Resp 18   Ht 6' 2" (1 88 m)   Wt 113 kg (250 lb)   SpO2 97%   BMI 32 10 kg/m²        Physical Exam     Physical Exam  Vitals and nursing note reviewed  Constitutional:       Appearance: Normal appearance  HENT:      Head: Normocephalic and atraumatic  Musculoskeletal:      Right knee: No swelling or bony tenderness  Tenderness (over the pes anserine ) present over the lateral joint line  No LCL laxity, MCL laxity, ACL laxity or PCL laxity  Normal patellar mobility  Normal pulse  Left knee: Normal  No swelling  Skin:     General: Skin is warm and dry  Capillary Refill: Capillary refill takes less than 2 seconds  Neurological:      General: No focal deficit present  Mental Status: He is alert and oriented to person, place, and time     Psychiatric:         Mood and Affect: Mood normal          Behavior: Behavior normal

## 2022-07-11 ENCOUNTER — HOSPITAL ENCOUNTER (OUTPATIENT)
Dept: RADIOLOGY | Facility: HOSPITAL | Age: 67
Discharge: HOME | End: 2022-07-11
Payer: COMMERCIAL

## 2022-07-11 ENCOUNTER — OFFICE VISIT (OUTPATIENT)
Dept: OBGYN CLINIC | Facility: HOSPITAL | Age: 67
End: 2022-07-11
Attending: EMERGENCY MEDICINE
Payer: COMMERCIAL

## 2022-07-11 VITALS
SYSTOLIC BLOOD PRESSURE: 97 MMHG | DIASTOLIC BLOOD PRESSURE: 69 MMHG | HEIGHT: 74 IN | HEART RATE: 77 BPM | BODY MASS INDEX: 32.08 KG/M2 | WEIGHT: 250 LBS

## 2022-07-11 DIAGNOSIS — M25.561 RIGHT KNEE PAIN, UNSPECIFIED CHRONICITY: Primary | ICD-10-CM

## 2022-07-11 DIAGNOSIS — M25.561 RIGHT KNEE PAIN, UNSPECIFIED CHRONICITY: ICD-10-CM

## 2022-07-11 DIAGNOSIS — M25.561 CHRONIC PAIN OF RIGHT KNEE: ICD-10-CM

## 2022-07-11 DIAGNOSIS — M17.11 PRIMARY OSTEOARTHRITIS OF RIGHT KNEE: ICD-10-CM

## 2022-07-11 DIAGNOSIS — G89.29 CHRONIC PAIN OF RIGHT KNEE: ICD-10-CM

## 2022-07-11 PROCEDURE — 73560 X-RAY EXAM OF KNEE 1 OR 2: CPT

## 2022-07-11 PROCEDURE — 20610 DRAIN/INJ JOINT/BURSA W/O US: CPT | Performed by: PHYSICIAN ASSISTANT

## 2022-07-11 PROCEDURE — 1160F RVW MEDS BY RX/DR IN RCRD: CPT | Performed by: PHYSICIAN ASSISTANT

## 2022-07-11 PROCEDURE — 99203 OFFICE O/P NEW LOW 30 MIN: CPT | Performed by: PHYSICIAN ASSISTANT

## 2022-07-11 RX ORDER — BUPIVACAINE HYDROCHLORIDE 2.5 MG/ML
4 INJECTION, SOLUTION INFILTRATION; PERINEURAL
Status: COMPLETED | OUTPATIENT
Start: 2022-07-11 | End: 2022-07-11

## 2022-07-11 RX ORDER — BETAMETHASONE SODIUM PHOSPHATE AND BETAMETHASONE ACETATE 3; 3 MG/ML; MG/ML
12 INJECTION, SUSPENSION INTRA-ARTICULAR; INTRALESIONAL; INTRAMUSCULAR; SOFT TISSUE
Status: COMPLETED | OUTPATIENT
Start: 2022-07-11 | End: 2022-07-11

## 2022-07-11 RX ADMIN — BUPIVACAINE HYDROCHLORIDE 4 ML: 2.5 INJECTION, SOLUTION INFILTRATION; PERINEURAL at 13:23

## 2022-07-11 RX ADMIN — BETAMETHASONE SODIUM PHOSPHATE AND BETAMETHASONE ACETATE 12 MG: 3; 3 INJECTION, SUSPENSION INTRA-ARTICULAR; INTRALESIONAL; INTRAMUSCULAR; SOFT TISSUE at 13:23

## 2022-07-11 NOTE — PROGRESS NOTES
Assessment:    Chronic right knee pain secondary to osteoarthritis    Plan:    Right knee steroid injection provided today in the patient tolerated well   Post injection ice is recommended   Outpatient physical therapy referral provided  Can continue wearing knee sleeves for compression and comfort   Follow-up with one of our orthopedic joint surgeons 3 months for re-evaluation          Problem List Items Addressed This Visit        Other    Chronic pain of right knee      Other Visit Diagnoses     Right knee pain, unspecified chronicity    -  Primary                   Subjective:     HPI    Patient ID:  Davon Puente is a 75 y o  male presenting for evaluation of the right knee  According to the patient, he has a longstanding history of chronic right knee pain that has been present for roughly 30 years  He states he has never got it checked out until now  He describes the pain as mostly dull and achy and worse with weight-bearing activities and he does get relief with rest     He used to take over-the-counter Excedrin/Aleve which provided great pain relief however given his recent cardiac valve surgery he is unable to take NSAID medications and since this time, his symptoms have slightly worsened  Additionally he mentions sharp and shooting pain that happens occasionally when he goes to extend his knee located posterior lateral and associated with stiffness  He feels like it gets locked up and he has to stretch it out for him to regain full range of motion  This does not happen often   There has been no recent injury or direct trauma to the knee  He has no history of surgery to the right knee  He states he had a week off of work for vacation last week and with rest, his knee feels significantly improved        The following portions of the patient's history were reviewed and updated as appropriate: allergies, current medications, past family history, past medical history, past social history, past surgical history and problem list     Review of Systems     Objective:    Imaging:  Right knee x-rays demonstrate moderate degenerative joint space narrowing  No acute findings  Physical Exam     Vitals:    07/11/22 1254   BP: 97/69   Pulse: 77       Orthopedic Examination:  Patient ambulates without assistance  Right knee     Inspection:  No open wounds or erythema  There is no joint effusion  No ecchymosis  Palpation:  There is mild lateral joint space tenderness to palpation  There is no medial joint line tenderness, no quadriceps or patellar tendon tenderness, without gapping   Patella is nontender  There is no warmth of the skin  Range-of-motion:  0 to 130, no extensor lag, no pain with deep flexion    Strength:  5/5 hip flexion knee extension    Sensation:  Intact    Special Tests:  Extensor mechanism intact  Stable to varus and valgus stress   Lower extremities warm and well perfused    Large joint arthrocentesis: R knee  Universal Protocol:  Consent: Verbal consent obtained    Risks and benefits: risks, benefits and alternatives were discussed  Consent given by: patient  Patient identity confirmed: verbally with patient    Supporting Documentation  Indications: pain   Procedure Details  Location: knee - R knee  Preparation: Patient was prepped and draped in the usual sterile fashion  Needle size: 22 G  Approach: anterolateral  Medications administered: 4 mL bupivacaine 0 25 %; 12 mg betamethasone acetate-betamethasone sodium phosphate 6 (3-3) mg/mL    Patient tolerance: patient tolerated the procedure well with no immediate complications  Dressing:  Sterile dressing applied

## 2022-07-22 ENCOUNTER — TELEPHONE (OUTPATIENT)
Dept: PAIN MEDICINE | Facility: MEDICAL CENTER | Age: 67
End: 2022-07-22

## 2022-07-22 NOTE — TELEPHONE ENCOUNTER
Left a message asking the patient to please call the office in regards to his appointment on 10/13 with Dr Saul Howell  If he calls back please reschedule his appointment for the same date just in the [place] location

## 2022-09-15 DIAGNOSIS — I10 ESSENTIAL HYPERTENSION: ICD-10-CM

## 2022-09-15 DIAGNOSIS — Z95.2 S/P MVR (MITRAL VALVE REPLACEMENT): ICD-10-CM

## 2022-09-15 DIAGNOSIS — Z95.2 S/P AVR: ICD-10-CM

## 2022-09-15 RX ORDER — METOPROLOL TARTRATE 50 MG/1
50 TABLET, FILM COATED ORAL EVERY 12 HOURS SCHEDULED
Qty: 180 TABLET | Refills: 3 | Status: SHIPPED | OUTPATIENT
Start: 2022-09-15

## 2022-09-15 RX ORDER — LOSARTAN POTASSIUM 50 MG/1
TABLET ORAL
Qty: 90 TABLET | Refills: 3 | Status: SHIPPED | OUTPATIENT
Start: 2022-09-15

## 2022-09-20 ENCOUNTER — OFFICE VISIT (OUTPATIENT)
Dept: URGENT CARE | Facility: MEDICAL CENTER | Age: 67
End: 2022-09-20
Payer: COMMERCIAL

## 2022-09-20 ENCOUNTER — APPOINTMENT (OUTPATIENT)
Dept: RADIOLOGY | Facility: MEDICAL CENTER | Age: 67
End: 2022-09-20
Payer: COMMERCIAL

## 2022-09-20 VITALS
SYSTOLIC BLOOD PRESSURE: 105 MMHG | HEART RATE: 77 BPM | OXYGEN SATURATION: 95 % | RESPIRATION RATE: 16 BRPM | TEMPERATURE: 98.8 F | DIASTOLIC BLOOD PRESSURE: 81 MMHG

## 2022-09-20 DIAGNOSIS — J06.9 VIRAL URI: ICD-10-CM

## 2022-09-20 DIAGNOSIS — J06.9 VIRAL URI: Primary | ICD-10-CM

## 2022-09-20 PROCEDURE — 99213 OFFICE O/P EST LOW 20 MIN: CPT | Performed by: PHYSICIAN ASSISTANT

## 2022-09-20 PROCEDURE — 71046 X-RAY EXAM CHEST 2 VIEWS: CPT

## 2022-09-20 RX ORDER — BENZONATATE 100 MG/1
100 CAPSULE ORAL 3 TIMES DAILY PRN
Qty: 20 CAPSULE | Refills: 0 | Status: SHIPPED | OUTPATIENT
Start: 2022-09-20 | End: 2022-10-26

## 2022-09-20 NOTE — PATIENT INSTRUCTIONS
Mucinex DM  Tessalon Perles  Tylenol and ibuprofen  Drink plenty of fluids  Follow up family doctor in 3-5 days  Going to ER if symptoms become severe

## 2022-09-20 NOTE — PROGRESS NOTES
[facility]        NAME: Travis Harley is a 75 y o  male  : 1955    MRN: [id number]  DATE: 2022  TIME: 5:45 PM    Assessment and Plan   Viral URI [J06 9]  1  Viral URI  XR chest pa & lateral    benzonatate (TESSALON PERLES) 100 mg capsule         Patient Instructions   Mucinex DM  Tessalon Perles  Tylenol and ibuprofen  Drink plenty of fluids  Follow up with PCP in 3-5 days  Proceed to  ER if symptoms worsen  Chief Complaint     Chief Complaint   Patient presents with    Cold Like Symptoms     Productive (green) cough, chest congestion, shortness of breath, headache, lethargy, and body aches  Negative at-home covid test today  Denies any sick contacts  History of Present Illness       Patient is a 58-year-old male with significant past medical history of COPD, hyperlipidemia, hypertension and smoking presents to the office complaining of fatigue, body aches, headache, chest congestion, productive cough, shortness of breath since last night  He denies fever, chills, sore throat, nasal congestion, CP, nausea, vomiting, abdominal pain or rashes  He did not have COVID test today which was negative  Review of Systems   Review of Systems   Constitutional: Positive for fatigue  Negative for chills and fever  HENT: Positive for congestion and sore throat  Negative for postnasal drip and rhinorrhea  Respiratory: Positive for cough, chest tightness and shortness of breath  Negative for wheezing  Cardiovascular: Negative for chest pain and palpitations  Gastrointestinal: Negative for abdominal pain, diarrhea, nausea and vomiting  Musculoskeletal: Positive for myalgias  Skin: Negative for rash  Neurological: Positive for headaches  Negative for dizziness and light-headedness           Current Medications       Current Outpatient Medications:     albuterol (PROVENTIL HFA,VENTOLIN HFA) 90 mcg/act inhaler, Inhale 1-2 puffs every 4 (four) hours as needed for wheezing, Disp: 18 g, Rfl: 0    amLODIPine (NORVASC) 5 mg tablet, TAKE 1 TABLET BY MOUTH EVERY DAY, Disp: 90 tablet, Rfl: 3    Anoro Ellipta 62 5-25 MCG/INH inhaler, INHALE 1 PUFF BY MOUTH EVERY DAY, Disp: 60 blister, Rfl: 5    aspirin (ECOTRIN LOW STRENGTH) 81 mg EC tablet, Take 1 tablet (81 mg total) by mouth daily, Disp: , Rfl:     atorvastatin (LIPITOR) 40 mg tablet, Take 1 tablet (40 mg total) by mouth every evening, Disp: 90 tablet, Rfl: 3    B Complex Vitamins (VITAMIN B COMPLEX 100 IJ), Inject as directed, Disp: , Rfl:     benzonatate (TESSALON PERLES) 100 mg capsule, Take 1 capsule (100 mg total) by mouth 3 (three) times a day as needed for cough, Disp: 20 capsule, Rfl: 0    dextromethorphan-guaifenesin (MUCINEX DM)  MG per 12 hr tablet, Take 1 tablet by mouth every 12 (twelve) hours (Patient taking differently: Take 1 tablet by mouth every 12 (twelve) hours as needed), Disp: 20 tablet, Rfl: 0    losartan (COZAAR) 50 mg tablet, TAKE 1 TABLET BY MOUTH EVERY DAY, Disp: 90 tablet, Rfl: 3    metoprolol tartrate (LOPRESSOR) 50 mg tablet, TAKE 1 TABLET (50 MG TOTAL) BY MOUTH EVERY 12 (TWELVE) HOURS, Disp: 180 tablet, Rfl: 3    Multiple Minerals-Vitamins (Calcium Citrate-Mag-Minerals) TABS, Take by mouth, Disp: , Rfl:     Multiple Vitamin (MULTI-VITAMIN DAILY PO), Take 1 tablet by mouth daily, Disp: , Rfl:     pantoprazole (PROTONIX) 40 mg tablet, Take 1 tablet (40 mg total) by mouth as needed in the morning (heartburn or acid reflux)  , Disp: 90 tablet, Rfl: 1    Zinc 10 MG LOZG, Apply to the mouth or throat, Disp: , Rfl:     Current Allergies     Allergies as of 09/20/2022    (No Known Allergies)            The following portions of the patient's history were reviewed and updated as appropriate: allergies, current medications, past family history, past medical history, past social history, past surgical history and problem list      Past Medical History:   Diagnosis Date    Broken humerus  COPD (chronic obstructive pulmonary disease) ([facility] )     COVID-19 virus infection 12/21/2021    Pneumonia        Past Surgical History:   Procedure Laterality Date    IR CHEST TUBE PLACEMENT  5/21/2021    MULTIPLE TOOTH EXTRACTIONS N/A 5/17/2021    Procedure: EXTRACTION TEETH MULTIPLE 1, 3, 18, 30, EXCISION OF LEFT BUCCAL FIBROMA;  Surgeon: Sun Amaro DMD;  [facility];  Service: Maxillofacial    NC ECHO TRANSESOPHAG R-T 2D W/PRB IMG ACQUISJ I&R N/A 5/20/2021    Procedure: TRANSESOPHAGEAL ECHOCARDIOGRAM (KATIA); Surgeon: Delmy Arrieta MD;  [facility];  Service: Cardiac Surgery    NC REPLACEMENT OF MITRAL VALVE N/A 5/20/2021    Procedure: VALVE MITRAL (MVR) REPAIR with 32mm physio II annuloplasty ring;  Surgeon: Delmy Arrieta MD;  [facility];  Service: Cardiac Surgery    NC RPLCMT AORTIC VALVE OPN W/STENTLESS TISSUE VALVE N/A 5/20/2021    Procedure: REPLACEMENT VALVE AORTIC (AVR) TISSUE with 25mm montgomery inspiris tissue valve;  Surgeon: Delmy Arrieta MD;  [facility];  Service: Cardiac Surgery       Family History   Problem Relation Age of Onset    Prostate cancer Father          Medications have been verified  Objective   /81   Pulse 77   Temp 98 8 °F (37 1 °C)   Resp 16   SpO2 95%   No LMP for male patient  Physical Exam     Physical Exam  Vitals and nursing note reviewed  Constitutional:       Appearance: Normal appearance  He is well-developed  HENT:      Head: Normocephalic and atraumatic  Right Ear: Tympanic membrane, ear canal and external ear normal       Left Ear: Tympanic membrane, ear canal and external ear normal       Nose: Nose normal       Mouth/Throat:      Mouth: Mucous membranes are dry  Pharynx: Uvula midline  Eyes:      General: Lids are normal       Conjunctiva/sclera: Conjunctivae normal       Pupils: Pupils are equal, round, and reactive to light     Cardiovascular: Rate and Rhythm: Normal rate and regular rhythm  Heart sounds: Normal heart sounds  No murmur heard  No friction rub  No gallop  Pulmonary:      Effort: Pulmonary effort is normal       Breath sounds: No stridor  Examination of the right-lower field reveals rales  Rales present  No wheezing or rhonchi  Musculoskeletal:         General: Normal range of motion  Cervical back: Neck supple  Skin:     General: Skin is warm and dry  Capillary Refill: Capillary refill takes less than 2 seconds  Neurological:      Mental Status: He is alert  Chest x-ray:  No evidence of acute cardiopulmonary etiology  COPD  Radiology interpretation pending

## 2022-09-20 NOTE — LETTER
September 20, 2022     Patient: Nory Goldman   YOB: 1955   Date of Visit: 9/20/2022       To Whom It May Concern: It is my medical opinion that Raul Chauhan should remain out of work until symptoms improve               Sincerely,        Milton Landrum PA-C

## 2022-09-20 NOTE — LETTER
September 20, 2022     Patient: Susanna Angel   YOB: 1955   Date of Visit: 9/20/2022       To Whom It May Concern: It is my medical opinion that Christiano Ari should remain out of work until 9/23/2022             Sincerely,        Hollie Merchant PA-C

## 2022-10-26 ENCOUNTER — OFFICE VISIT (OUTPATIENT)
Dept: FAMILY MEDICINE CLINIC | Facility: CLINIC | Age: 67
End: 2022-10-26
Payer: COMMERCIAL

## 2022-10-26 VITALS
HEART RATE: 78 BPM | HEIGHT: 74 IN | WEIGHT: 254.2 LBS | BODY MASS INDEX: 32.62 KG/M2 | SYSTOLIC BLOOD PRESSURE: 120 MMHG | DIASTOLIC BLOOD PRESSURE: 82 MMHG | OXYGEN SATURATION: 96 %

## 2022-10-26 DIAGNOSIS — J20.9 ACUTE BRONCHITIS, UNSPECIFIED ORGANISM: Primary | ICD-10-CM

## 2022-10-26 PROCEDURE — 99214 OFFICE O/P EST MOD 30 MIN: CPT | Performed by: FAMILY MEDICINE

## 2022-10-26 RX ORDER — FLUTICASONE PROPIONATE 110 UG/1
AEROSOL, METERED RESPIRATORY (INHALATION)
Qty: 12 G | Refills: 0 | Status: SHIPPED | OUTPATIENT
Start: 2022-10-26

## 2022-10-26 RX ORDER — CEFUROXIME AXETIL 500 MG/1
TABLET ORAL
Qty: 14 TABLET | Refills: 0 | Status: SHIPPED | OUTPATIENT
Start: 2022-10-26 | End: 2022-11-02

## 2022-10-26 RX ORDER — BENZONATATE 100 MG/1
100 CAPSULE ORAL 3 TIMES DAILY PRN
Qty: 20 CAPSULE | Refills: 0 | Status: SHIPPED | OUTPATIENT
Start: 2022-10-26

## 2022-10-26 NOTE — PATIENT INSTRUCTIONS
Strongly urge use of ventolin inhaler - 2 puffs every 4 hours while awake - and continue this dose until coughing starts to improve  Remain on anoro one daily  Add flovent (steroid inhaler) - 2 puffs - 2x/day - rinse after use  Take antibiotic as directed - ceftin 500 mg - 2x/day for 7 days  CBC today     Tessalon perles - up to 3 x/day for cough  Can continue with mucinex Dm as needed for coughing    Boost upper body while sleeping  Drink more fluids - water    If symptoms not improving/worsening - call or ER      Discuss bowels with dr Dakota Naik at next visit  Would also schedule colonoscopy

## 2022-10-26 NOTE — PROGRESS NOTES
FAMILY PRACTICE OFFICE VISIT    NAME: Elis Rodriguez    AGE: 75 y o  SEX: male  : 1955   MRN: [id number]    DATE: 10/26/2022  TIME: 11:26 AM    Assessment and Plan   1  Acute bronchitis, unspecified organism  Strongly urge use of ventolin inhaler - 2 puffs every 4 hours while awake - and continue this dose until coughing starts to improve  Remain on anoro one daily  Add flovent (steroid inhaler) - 2 puffs - 2x/day - rinse after use  Take antibiotic as directed - ceftin 500 mg - 2x/day for 7 days  CBC today - as pt c/o fatique and noted some mild facial pallor    In the future - pt advised to use albuterol at the onset of any URI type symptoms - including post-nasal drip      If symptoms not improving/worsening - call or proceed to ED  May need CXR, prednisone,    Pt had flu shot  Pt urged to fully quit tob use  F/u with cardio when due  Tessalon perles - up to 3 x/day for cough  Can continue with mucinex Dm as needed for coughing    Discuss bowels with dr Brown Tompkins at next visit  Would also schedule colonoscopy      Boost upper body while sleeping  Drink more fluids - water    Note - if fatique does not improve upon treatment for bronchitis - pt to f/u with cardio      Chief Complaint     Chief Complaint   Patient presents with   • Chest congestion     Chest congestion and fatigue  History of Present Illness   Elis Rodriguez is a 75y o -year-old male who presents today for acute concern  Developed URI symptoms X 1 month ago and was seen for visit at urgent care  Was told to use supportive otc meds  Symptoms continue with head and congestion (head is improving)  No fever  Tested initially for covid and was (-)    Pt has not been using albuterol  But is using anoro  Pt is back to work  Has not missed work recently due to illness        Review of Systems   Review of Systems   Constitutional: Positive for fatigue  Negative for fever          States he has been going to work - as he does not feel that badly overall - it's more the cough that is bothering patient     HENT: Positive for congestion  Negative for sinus pressure, sinus pain and sore throat  Sore throat resolved  Congestion in head is improving  Taking mucinex DM   Respiratory: Positive for cough, shortness of breath and wheezing  Cough - productive - green phlegm  SOB worse than usual   Wheezing  Cough is persistent thru the entire day  Cardiovascular: Negative for chest pain  Gastrointestinal: Positive for diarrhea  Has loose stools on and off - up to 4-5x/day  For the past couple of mos         Active Problem List     Patient Active Problem List   Diagnosis   • Nonrheumatic aortic valve stenosis   • [facility] ([facility] )   • Erectile dysfunction of non-organic origin   • Hyperlipidemia   • Essential hypertension   • Rotator cuff tendinitis Left   • GERD (gastroesophageal reflux disease)   • Chronic combined systolic (congestive) and diastolic (congestive) heart failure (HCC)   • Osteoarthritis of both knees   • S/P AVR   • PFO (patent foramen ovale)   • S/P MVR (mitral valve repair)   • Lumbar degenerative disc disease   • Current smoker   • Chronic pain of right knee         Past Medical History:  Past Medical History:   Diagnosis Date   • Broken humerus    • COPD [facility]ch[facility]) [facility]    • COVID-19 virus infection 12/21/2021   • Pneumonia        Past Surgical History:  Past Surgical History:   Procedure Laterality Date   • IR CHEST TUBE PLACEMENT  5/21/2021   • MULTIPLE TOOTH EXTRACTIONS N/A 5/17/2021    Procedure: EXTRACTION TEETH MULTIPLE 1, 3, 18, 30, EXCISION OF LEFT BUCCAL FIBROMA;  Surgeon: Jaylan Gallardo DMD;  [facility];  Service: Maxillofacial   • ME ECHO TRANSESOPHAG R-T 2D W/PRB IMG ACQUISJ I&R N/A 5/20/2021    Procedure: TRANSESOPHAGEAL ECHOCARDIOGRAM (KATIA);   Surgeon: Rodney Homans, MD;  [facility];  Service: Cardiac Surgery • UT REPLACEMENT OF MITRAL VALVE N/A 2021    Procedure: VALVE MITRAL (MVR) REPAIR with 32mm physio II annuloplasty ring;  Surgeon: Andrew Leary MD;  [facility];  Service: Cardiac Surgery   • UT RPLCMT AORTIC VALVE OPN W/STENTLESS TISSUE VALVE N/A 2021    Procedure: REPLACEMENT VALVE AORTIC (AVR) TISSUE with 25mm montgomery inspiris tissue valve;  Surgeon: Andrew Leary MD;  [facility];  Service: Cardiac Surgery       Family History:  Family History   Problem Relation Age of Onset   • Prostate cancer Father        Social History:  Social History     Socioeconomic History   • Marital status: /Civil Union     Spouse name: Not on file   • Number of children: Not on file   • Years of education: Not on file   • Highest education level: Not on file   Occupational History   • Occupation: Machinery Repair     Comment: packaging [employer]  - full time employment   Tobacco Use   • Smoking status: Current Every Day Smoker     Packs/day: 0 50     Types: Cigarettes     Start date:      Last attempt to quit: 2021     Years since quittin 4   • Smokeless tobacco: Never Used   Vaping Use   • Vaping Use: Never used   Substance and Sexual Activity   • Alcohol use: Yes     Comment: once a month   • Drug use: No   • Sexual activity: Not on file   Other Topics Concern   • Not on file   Social History Narrative   • Not on file     Social Determinants of Health     Financial Resource Strain: Not on file   Food Insecurity: Not on file   Transportation Needs: Not on file   Physical Activity: Not on file   Stress: Not on file   Social Connections: Not on file   Intimate Partner Violence: Not on file   Housing Stability: Not on file       Objective     Vitals:    10/26/22 1100   BP: 120/82   Pulse: 78   SpO2: 96%     Wt Readings from Last 3 Encounters:   10/26/22 115 kg (254 lb 3 2 oz)   22 113 kg (250 lb)   22 113 kg (250 lb)       Physical Exam  Vitals and nursing note reviewed  Constitutional:       General: He is not in acute distress  Appearance: He is not toxic-appearing  Comments: Pt appears slightly pale - facially  Nontoxic     HENT:      Right Ear: Tympanic membrane normal       Left Ear: Tympanic membrane normal       Ears:      Comments: Small area of friability left external auditory canal - pt does use q-tips     Nose: Nose normal       Mouth/Throat:      Mouth: Mucous membranes are moist       Pharynx: No oropharyngeal exudate or posterior oropharyngeal erythema  Comments: Mild post-nasal drip  Mucous membranes moist and pink      Eyes:      General: No scleral icterus  Cardiovascular:      Rate and Rhythm: Normal rate and regular rhythm  Heart sounds: Murmur heard  Pulmonary:      Effort: Pulmonary effort is normal  No respiratory distress  Breath sounds: Wheezing present  Comments: Decreased breath sounds b/l lung bases  With end expiratory wheezes noted at b/l bases  No use of accessory respiratory muscles  Able to converse without SOB  Lymphadenopathy:      Cervical: No cervical adenopathy  Skin:     Comments: Facial pallor  Pt does not notice   Neurological:      General: No focal deficit present  Mental Status: He is oriented to person, place, and time           Pertinent Laboratory/Diagnostic Studies:  Lab Results   Component Value Date    GLUCOSE 158 (H) 05/20/2021    BUN 22 01/18/2022    CREATININE 0 81 01/18/2022    CALCIUM 9 2 01/18/2022     08/07/2014    K 4 4 01/18/2022    CO2 28 01/18/2022     01/18/2022     Lab Results   Component Value Date    ALT 39 01/18/2022    AST 27 01/18/2022    ALKPHOS 84 01/18/2022    BILITOT 0 75 08/07/2014       Lab Results   Component Value Date    WBC 8 89 01/18/2022    HGB 14 6 01/18/2022    HCT 44 4 01/18/2022    MCV 98 01/18/2022     01/18/2022       No results found for: TSH    Lab Results   Component Value Date    CHOL 209 08/07/2014     Lab Results Component Value Date    TRIG 129 05/13/2021     Lab Results   Component Value Date    HDL 49 05/13/2021     Lab Results   Component Value Date    LDLCALC 28 05/13/2021     Lab Results   Component Value Date    HGBA1C 5 8 (H) 05/13/2021       Results for orders placed or performed in visit on 01/18/22   NT-BNP PRO   Result Value Ref Range    NT-proBNP 160 (H) <125 pg/mL   Hepatitis C antibody   Result Value Ref Range    Hepatitis C Ab Non-reactive Non-reactive   PSA, Total Screen   Result Value Ref Range    PSA 0 3 0 0 - 4 0 ng/mL       No orders of the defined types were placed in this encounter        ALLERGIES:  No Known Allergies    Current Medications     Current Outpatient Medications   Medication Sig Dispense Refill   • albuterol (PROVENTIL HFA,VENTOLIN HFA) 90 mcg/act inhaler Inhale 1-2 puffs every 4 (four) hours as needed for wheezing 18 g 0   • amLODIPine (NORVASC) 5 mg tablet TAKE 1 TABLET BY MOUTH EVERY DAY 90 tablet 3   • Anoro Ellipta 62 5-25 MCG/INH inhaler INHALE 1 PUFF BY MOUTH EVERY DAY 60 blister 5   • aspirin (ECOTRIN LOW STRENGTH) 81 mg EC tablet Take 1 tablet (81 mg total) by mouth daily     • atorvastatin (LIPITOR) 40 mg tablet Take 1 tablet (40 mg total) by mouth every evening 90 tablet 3   • B Complex Vitamins (VITAMIN B COMPLEX 100 IJ) Inject as directed     • dextromethorphan-guaifenesin (MUCINEX DM)  MG per 12 hr tablet Take 1 tablet by mouth every 12 (twelve) hours (Patient taking differently: Take 1 tablet by mouth every 12 (twelve) hours as needed) 20 tablet 0   • losartan (COZAAR) 50 mg tablet TAKE 1 TABLET BY MOUTH EVERY DAY 90 tablet 3   • metoprolol tartrate (LOPRESSOR) 50 mg tablet TAKE 1 TABLET (50 MG TOTAL) BY MOUTH EVERY 12 (TWELVE) HOURS 180 tablet 3   • Multiple Minerals-Vitamins (Calcium Citrate-Mag-Minerals) TABS Take by mouth     • Multiple Vitamin (MULTI-VITAMIN DAILY PO) Take 1 tablet by mouth daily     • pantoprazole (PROTONIX) 40 mg tablet Take 1 tablet (40 mg total) by mouth as needed in the morning (heartburn or acid reflux)  90 tablet 1   • Zinc 10 MG LOZG Apply to the mouth or throat     • benzonatate (TESSALON PERLES) 100 mg capsule Take 1 capsule (100 mg total) by mouth 3 (three) times a day as needed for cough 20 capsule 0     No current facility-administered medications for this visit           Health Maintenance     Health Maintenance   Topic Date Due   • Colorectal Cancer Screening  Never done   • Pneumococcal Vaccine: 65+ Years (3 - PPSV23 or PCV20) 08/30/2021   • COVID-19 Vaccine (3 - Booster for Moderna series) 09/29/2021   • Influenza Vaccine (1) 09/01/2022   • BMI: Followup Plan  10/28/2022   • Annual Physical  10/28/2022   • Depression Screening  04/20/2023   • Fall Risk  04/20/2023   • BMI: Adult  07/11/2023   • DTaP,Tdap,and Td Vaccines (2 - Td or Tdap) 02/22/2027   • Hepatitis C Screening  Completed   • HIB Vaccine  Aged Out   • Hepatitis B Vaccine  Aged Out   • IPV Vaccine  Aged Out   • Hepatitis A Vaccine  Aged Out   • Meningococcal ACWY Vaccine  Aged Out   • HPV Vaccine  Aged Out     Immunization History   Administered Date(s) Administered   • COVID-19 MODERNA VACC 0 5 ML IM 03/29/2021, 04/29/2021   • INFLUENZA 10/13/2017, 10/14/2020, 10/26/2021   • Influenza Quadrivalent 3 years and older 10/15/2018   • Influenza, injectable, quadrivalent, preservative free 0 5 mL 10/25/2019   • Influenza, seasonal, injectable 10/27/2014, 10/27/2015, 10/28/2016   • Pneumococcal Conjugate 13-Valent 04/16/2019   • Pneumococcal Polysaccharide PPV23 08/30/2016   • Tdap 02/22/2017          Jinx Sandy

## 2022-11-04 DIAGNOSIS — K21.9 GASTROESOPHAGEAL REFLUX DISEASE WITHOUT ESOPHAGITIS: ICD-10-CM

## 2022-11-04 RX ORDER — PANTOPRAZOLE SODIUM 40 MG/1
40 TABLET, DELAYED RELEASE ORAL DAILY PRN
Qty: 90 TABLET | Refills: 1 | Status: SHIPPED | OUTPATIENT
Start: 2022-11-04

## 2022-11-17 ENCOUNTER — OFFICE VISIT (OUTPATIENT)
Dept: FAMILY MEDICINE CLINIC | Facility: CLINIC | Age: 67
End: 2022-11-17

## 2022-11-17 VITALS
SYSTOLIC BLOOD PRESSURE: 124 MMHG | BODY MASS INDEX: 32.91 KG/M2 | DIASTOLIC BLOOD PRESSURE: 84 MMHG | HEIGHT: 74 IN | WEIGHT: 256.4 LBS | HEART RATE: 82 BPM | OXYGEN SATURATION: 95 %

## 2022-11-17 DIAGNOSIS — Z98.890 S/P MVR (MITRAL VALVE REPAIR): ICD-10-CM

## 2022-11-17 DIAGNOSIS — Z12.11 COLON CANCER SCREENING: ICD-10-CM

## 2022-11-17 DIAGNOSIS — F17.200 CURRENT SMOKER: ICD-10-CM

## 2022-11-17 DIAGNOSIS — E78.5 HYPERLIPIDEMIA, UNSPECIFIED HYPERLIPIDEMIA TYPE: ICD-10-CM

## 2022-11-17 DIAGNOSIS — J43.9 PULMONARY EMPHYSEMA, UNSPECIFIED EMPHYSEMA TYPE (HCC): ICD-10-CM

## 2022-11-17 DIAGNOSIS — Z95.2 S/P AVR: ICD-10-CM

## 2022-11-17 DIAGNOSIS — I50.42 CHRONIC COMBINED SYSTOLIC (CONGESTIVE) AND DIASTOLIC (CONGESTIVE) HEART FAILURE (HCC): ICD-10-CM

## 2022-11-17 DIAGNOSIS — Z00.00 ENCOUNTER FOR ANNUAL PHYSICAL EXAM: Primary | ICD-10-CM

## 2022-11-17 DIAGNOSIS — I10 ESSENTIAL HYPERTENSION: ICD-10-CM

## 2022-11-17 DIAGNOSIS — N52.9 VASCULOGENIC ERECTILE DYSFUNCTION, UNSPECIFIED VASCULOGENIC ERECTILE DYSFUNCTION TYPE: ICD-10-CM

## 2022-11-17 DIAGNOSIS — E66.09 CLASS 1 OBESITY DUE TO EXCESS CALORIES WITH SERIOUS COMORBIDITY AND BODY MASS INDEX (BMI) OF 32.0 TO 32.9 IN ADULT: ICD-10-CM

## 2022-11-17 RX ORDER — FLUTICASONE PROPIONATE 110 UG/1
AEROSOL, METERED RESPIRATORY (INHALATION)
Qty: 12 G | Refills: 3 | Status: SHIPPED | OUTPATIENT
Start: 2022-11-17

## 2022-11-17 RX ORDER — SILDENAFIL CITRATE 20 MG/1
TABLET ORAL
Qty: 90 TABLET | Refills: 1 | Status: SHIPPED | OUTPATIENT
Start: 2022-11-17

## 2022-11-17 RX ORDER — NICOTINE 21 MG/24HR
1 PATCH, TRANSDERMAL 24 HOURS TRANSDERMAL EVERY 24 HOURS
Qty: 28 PATCH | Refills: 0 | Status: SHIPPED | OUTPATIENT
Start: 2022-11-17

## 2022-11-17 NOTE — PATIENT INSTRUCTIONS
Reviewed health history along with medications  He did use Ceftin last month for increased bronchitis, is somewhat improved but does still have chronic cough with rhonchi/wheezing  He will continue on Anoro daily, he did have Flovent added last month, he will use that in addition to Anoro twice daily, rinse after use  We will try to hold off on oral steroid, if anything worsens we will prescribe prednisone  Does have emphysema, see prior testing  In the past has smoked 1 to 2 packs per day, is currently down to 1/2 pack per day, has been unable to quit  I would like him to try patch, try to taper every 28 days  He should redo CT screening of lung, last was in April of 2021  I would like him to try to do incentive spirometry at home    He saw Cardiology back in April, history valve replacement, heart failure, his weight is up a few lb but he does not appear to have fluid retention, chest x-ray was clear in October  He will do fasting blood work along with urinalysis  He has no increased acid reflux, he can continue with PPI as is  Regarding work he has been limiting to 8 hours daily, at times works up to 6 days weekly  Looking at retiring next year  We did review previous blood work, TSH was okay 1 03 back in January    Immunization History   Administered Date(s) Administered    COVID-19 MODERNA VACC 0 5 ML IM 03/29/2021, 04/29/2021    INFLUENZA 10/13/2017, 10/14/2020, 10/26/2021    Influenza Quadrivalent 3 years and older 10/15/2018    Influenza, injectable, quadrivalent, preservative free 0 5 mL 10/25/2019    Influenza, seasonal, injectable 10/27/2014, 10/27/2015, 10/28/2016    Pneumococcal Conjugate 13-Valent 04/16/2019    Pneumococcal Polysaccharide PPV23 08/30/2016    Tdap 02/22/2017       Discussed Vaccines,    Pneumococcal vax is up to date  He does do yearly Flu shot    He received that at work recently  [address] shot is up to date  (done every 10 yrs for superficial cuts, every 5 yrs for deep wounds)   Can also look into coverage for 'shingles' shot, Shingrix  Can do that at pharmacy  Covid vaccine received x2, should do booster     Regarding Colon Cancer screening, screening is overdue, we again discussed colonoscopy versus Cologuard, he should at least do Cologuard screening     Discussed pros and cons regarding Prostate Cancer screening,   PSA blood test up-to-date-PSA was fine back in January at 0 3, can redo next year  He is requesting prescription for generic Viagra, had used Viagra in the past, has had no chest pains, is not on nitrates  I did place prescription for sildenafil 20 mg, can use 3 to 5 pills 1 hour before need  We discussed end of life planning, does not have a  "LIVING WILL"   "FIVE WISHES" handout was discussed and given to fill out at home    Regarding Hepatitis C Screening,   previously perfomed and was negative    Glaucoma screening is   due - plans to set up  Discussed importance of routine exercise, healthy diet  We will see him again in 6 months, sooner as needed

## 2022-11-17 NOTE — PROGRESS NOTES
BMI Counseling: Body mass index is 32 92 kg/m²  The BMI is above normal  Nutrition recommendations include encouraging healthy choices of fruits and vegetables  Rationale for BMI follow-up plan is due to patient being overweight or obese  FAMILY PRACTICE OFFICE VISIT  [facility]  [address]  [address], [place], [address]      NAME: Katt Gomez  AGE: 75 y o  SEX: male  : 1955   MRN: [id number]    DATE: 2022  TIME: 12:32 PM    Assessment and Plan     Problem List Items Addressed This Visit     S/P MVR (mitral valve repair)    S/P AVR    Chronic obstructive lung disease (HCC) with chronic bronchitis    Relevant Medications    fluticasone (Flovent HFA) 110 MCG/ACT inhaler    Chronic combined systolic (congestive) and diastolic (congestive) heart failure (HCC)    Relevant Medications    sildenafil (REVATIO) 20 mg tablet    Other Relevant Orders    CBC    Comprehensive metabolic panel    Lipid panel    Essential hypertension    Relevant Orders    Comprehensive metabolic panel    Urinalysis with microscopic    Current smoker    Relevant Medications    nicotine (NICODERM CQ) 21 mg/24 hr TD 24 hr patch    nicotine (NICODERM CQ) 14 mg/24hr TD 24 hr patch    nicotine (NICODERM CQ) 7 mg/24hr TD 24 hr patch    Other Relevant Orders    CT lung screening program    Hyperlipidemia    Relevant Orders    Lipid panel   Other Visit Diagnoses     Encounter for annual physical exam    -  Primary    Colon cancer screening        Relevant Orders    Cologuard    Vasculogenic erectile dysfunction        Relevant Medications    sildenafil (REVATIO) 20 mg tablet    BMI 32              Patient Instructions     Reviewed health history along with medications  He did use Ceftin last month for increased bronchitis, is somewhat improved but does still have chronic cough with rhonchi/wheezing    He will continue on Anoro daily, he did have Flovent added last month, he will use that in addition to Anoro twice daily, rinse after use  We will try to hold off on oral steroid, if anything worsens we will prescribe prednisone  Does have emphysema, see prior testing  In the past has smoked 1 to 2 packs per day, is currently down to 1/2 pack per day, has been unable to quit  I would like him to try patch, try to taper every 28 days  He should redo CT screening of lung, last was in April of 2021  I would like him to try to do incentive spirometry at home    He saw Cardiology back in April, history valve replacement, heart failure, his weight is up a few lb but he does not appear to have fluid retention, chest x-ray was clear in October  He will do fasting blood work along with urinalysis  He has no increased acid reflux, he can continue with PPI as is  Regarding work he has been limiting to 8 hours daily, at times works up to 6 days weekly  Looking at retiring next year  We did review previous blood work, TSH was okay 1 03 back in January    Immunization History   Administered Date(s) Administered   • COVID-19 MODERNA VACC 0 5 ML IM 03/29/2021, 04/29/2021   • INFLUENZA 10/13/2017, 10/14/2020, 10/26/2021   • Influenza Quadrivalent 3 years and older 10/15/2018   • Influenza, injectable, quadrivalent, preservative free 0 5 mL 10/25/2019   • Influenza, seasonal, injectable 10/27/2014, 10/27/2015, 10/28/2016   • Pneumococcal Conjugate 13-Valent 04/16/2019   • Pneumococcal Polysaccharide PPV23 08/30/2016   • Tdap 02/22/2017       Discussed Vaccines,    Pneumococcal vax is up to date  He does do yearly Flu shot  He received that at work recently  [address] shot is up to date  (done every 10 yrs for superficial cuts, every 5 yrs for deep wounds)   Can also look into coverage for 'shingles' shot, Shingrix  Can do that at pharmacy    Covid vaccine received x2, should do booster     Regarding Colon Cancer screening, screening is overdue, we again discussed colonoscopy versus Cologuard, he should at least do Cologuard screening     Discussed pros and cons regarding Prostate Cancer screening,   PSA blood test up-to-date-PSA was fine back in January at 0 3, can redo next year  He is requesting prescription for generic Viagra, had used Viagra in the past, has had no chest pains, is not on nitrates  I did place prescription for sildenafil 20 mg, can use 3 to 5 pills 1 hour before need  We discussed end of life planning, does not have a  "LIVING WILL"   "FIVE WISHES" handout was discussed and given to fill out at home    Regarding Hepatitis C Screening,   previously perfomed and was negative    Glaucoma screening is   due - plans to set up  Discussed importance of routine exercise, healthy diet  We will see him again in 6 months, sooner as needed  Chief Complaint     Chief Complaint   Patient presents with   • Annual Exam       History of Present Illness   Shaquille Maurer is a 75y o -year-old male who is in today for a routine follow-up along with regular physical   He had been seen in October, did use Ceftin, is now on Flovent in addition to [employer], has noted some improvement but continues with congestion  No fevers, no chest pain, no increased palpitations, no new swelling  Still smoking 1/2 pack per day  Is using other medication as directed      Review of Systems   Review of Systems   Constitutional: Positive for fatigue (some improvement)  Negative for appetite change, fever and unexpected weight change  HENT: Positive for congestion (green mucus)  Negative for sore throat and trouble swallowing  Respiratory: Positive for cough and wheezing (at night)  Negative for chest tightness and shortness of breath  Albut BID     Is on Flovent BID since last visit, in addition to Anoro -   Improving but still w cough and congestion -  Still 1/2 PPD -   Unable to quit -   Has been up to 1 PPD longterm, occ 2 PPD   Cardiovascular: Negative for chest pain, palpitations and leg swelling  Gastrointestinal: Negative for abdominal pain, blood in stool, nausea and vomiting  No acid reflux    BM QD -   Had loose at times few mo ago but back at baseline -   Still has not done screening      Genitourinary: Negative for dysuria and hematuria  Musculoskeletal: Positive for arthralgias (sees Ortho -  injection helped right knee)  Neurological: Negative for dizziness, syncope, light-headedness and headaches  Hematological: Does not bruise/bleed easily  Psychiatric/Behavioral: Negative for behavioral problems, confusion and sleep disturbance  Active Problem List     Patient Active Problem List   Diagnosis   • Nonrheumatic aortic valve stenosis   • Chronic obstructive lung disease (HCC) with chronic bronchitis   • Erectile dysfunction of non-organic origin   • Hyperlipidemia   • Essential hypertension   • Rotator cuff tendinitis Left   • GERD (gastroesophageal reflux disease)   • Chronic combined systolic (congestive) and diastolic (congestive) heart failure (HCC)   • Osteoarthritis of both knees   • S/P AVR   • PFO (patent foramen ovale)   • S/P MVR (mitral valve repair)   • Lumbar degenerative disc disease   • Current smoker   • Chronic pain of right knee       Past Medical History:  Reviewed    Past Surgical History:  Reviewed    Family History:  Reviewed    Social History:  Reviewed    Objective     Vitals:    11/17/22 1114 11/17/22 1148   BP: 134/92 124/84   BP Location: Left arm    Patient Position: Sitting    Cuff Size: Large    Pulse: 82    SpO2: 95%    Weight: 116 kg (256 lb 6 4 oz)    Height: 6' 2" (1 88 m)      Body mass index is 32 92 kg/m²      BP Readings from Last 3 Encounters:   11/17/22 124/84   10/26/22 120/82   09/20/22 105/81       Wt Readings from Last 3 Encounters:   11/17/22 116 kg (256 lb 6 4 oz)   10/26/22 115 kg (254 lb 3 2 oz)   07/11/22 113 kg (250 lb)       Physical Exam  Constitutional:       General: He is not in acute distress  Appearance: Normal appearance  He is well-developed  He is not ill-appearing  HENT:      Right Ear: Tympanic membrane normal       Left Ear: Tympanic membrane normal       Mouth/Throat:      Pharynx: Oropharynx is clear  Eyes:      General: No scleral icterus  Neck:      Vascular: No carotid bruit  Cardiovascular:      Rate and Rhythm: Normal rate and regular rhythm  Heart sounds: Murmur heard  Pulmonary:      Comments: He does have bilateral scattered rhonchi, no rales  Abdominal:      Palpations: Abdomen is soft  Tenderness: There is no abdominal tenderness  There is no guarding  Musculoskeletal:      Right lower leg: Edema (Trace pitting right ankle) present  Left lower leg: No edema  Lymphadenopathy:      Cervical: No cervical adenopathy  Skin:     Coloration: Skin is not jaundiced  Neurological:      General: No focal deficit present  Mental Status: He is alert and oriented to person, place, and time     Psychiatric:         Mood and Affect: Mood normal          Behavior: Behavior normal          ALLERGIES:  No Known Allergies    Current Medications     Current Outpatient Medications   Medication Sig Dispense Refill   • albuterol (PROVENTIL HFA,VENTOLIN HFA) 90 mcg/act inhaler Inhale 1-2 puffs every 4 (four) hours as needed for wheezing 18 g 0   • amLODIPine (NORVASC) 5 mg tablet TAKE 1 TABLET BY MOUTH EVERY DAY 90 tablet 3   • Anoro Ellipta 62 5-25 MCG/INH inhaler INHALE 1 PUFF BY MOUTH EVERY DAY 60 blister 5   • aspirin (ECOTRIN LOW STRENGTH) 81 mg EC tablet Take 1 tablet (81 mg total) by mouth daily     • atorvastatin (LIPITOR) 40 mg tablet Take 1 tablet (40 mg total) by mouth every evening 90 tablet 3   • B Complex Vitamins (VITAMIN B COMPLEX 100 IJ) Inject as directed     • dextromethorphan-guaifenesin (MUCINEX DM)  MG per 12 hr tablet Take 1 tablet by mouth every 12 (twelve) hours (Patient taking differently: Take 1 tablet by mouth every 12 (twelve) hours as needed) 20 tablet 0   • fluticasone (Flovent HFA) 110 MCG/ACT inhaler 2 puffs po bid - Rinse mouth after use  12 g 3   • losartan (COZAAR) 50 mg tablet TAKE 1 TABLET BY MOUTH EVERY DAY 90 tablet 3   • metoprolol tartrate (LOPRESSOR) 50 mg tablet TAKE 1 TABLET (50 MG TOTAL) BY MOUTH EVERY 12 (TWELVE) HOURS 180 tablet 3   • Multiple Minerals-Vitamins (Calcium Citrate-Mag-Minerals) TABS Take by mouth     • Multiple Vitamin (MULTI-VITAMIN DAILY PO) Take 1 tablet by mouth daily     • pantoprazole (PROTONIX) 40 mg tablet TAKE 1 TABLET (40 MG TOTAL) BY MOUTH AS NEEDED IN THE MORNING (HEARTBURN OR ACID REFLUX)  90 tablet 1   • sildenafil (REVATIO) 20 mg tablet 3-5 pills 1 hr before need 90 tablet 1   • Zinc 10 MG LOZG Apply to the mouth or throat     • nicotine (NICODERM CQ) 14 mg/24hr TD 24 hr patch Place 1 patch on the skin every 24 hours 28 patch 0   • nicotine (NICODERM CQ) 21 mg/24 hr TD 24 hr patch Place 1 patch on the skin every 24 hours 28 patch 0   • nicotine (NICODERM CQ) 7 mg/24hr TD 24 hr patch Place 1 patch on the skin every 24 hours 28 patch 0     No current facility-administered medications for this visit              Orders Placed This Encounter   Procedures   • Cologuard   • CT lung screening program   • CBC   • Comprehensive metabolic panel   • Lipid panel   • Urinalysis with microscopic Gerilyn Hammans, DO

## 2022-12-02 DIAGNOSIS — J43.9 PULMONARY EMPHYSEMA, UNSPECIFIED EMPHYSEMA TYPE (HCC): ICD-10-CM

## 2022-12-02 RX ORDER — UMECLIDINIUM BROMIDE AND VILANTEROL TRIFENATATE 62.5; 25 UG/1; UG/1
POWDER RESPIRATORY (INHALATION)
Qty: 60 EACH | Refills: 5 | Status: SHIPPED | OUTPATIENT
Start: 2022-12-02

## 2022-12-08 ENCOUNTER — DOCUMENTATION (OUTPATIENT)
Dept: CARDIOLOGY CLINIC | Facility: CLINIC | Age: 67
End: 2022-12-08

## 2022-12-08 NOTE — PROGRESS NOTES
Filled out [facility] paperwork and signed by Dr Giovani Maddox  Faxed to [phone]

## 2023-01-03 ENCOUNTER — TELEPHONE (OUTPATIENT)
Dept: CARDIOLOGY CLINIC | Facility: CLINIC | Age: 68
End: 2023-01-03

## 2023-03-20 DIAGNOSIS — Z95.2 S/P MVR (MITRAL VALVE REPLACEMENT): ICD-10-CM

## 2023-03-20 DIAGNOSIS — Z95.2 S/P AVR: ICD-10-CM

## 2023-03-21 RX ORDER — ATORVASTATIN CALCIUM 40 MG/1
40 TABLET, FILM COATED ORAL EVERY EVENING
Qty: 90 TABLET | Refills: 3 | Status: SHIPPED | OUTPATIENT
Start: 2023-03-21

## 2023-03-27 ENCOUNTER — OFFICE VISIT (OUTPATIENT)
Dept: URGENT CARE | Facility: MEDICAL CENTER | Age: 68
End: 2023-03-27

## 2023-03-27 VITALS
RESPIRATION RATE: 18 BRPM | TEMPERATURE: 99.9 F | DIASTOLIC BLOOD PRESSURE: 88 MMHG | SYSTOLIC BLOOD PRESSURE: 146 MMHG | OXYGEN SATURATION: 98 % | HEART RATE: 90 BPM

## 2023-03-27 DIAGNOSIS — M25.562 ACUTE PAIN OF LEFT KNEE: Primary | ICD-10-CM

## 2023-03-27 NOTE — LETTER
March 27, 2023     Patient: Dani Hood   YOB: 1955   Date of Visit: 3/27/2023       To Whom It May Concern: It is my medical opinion that Olesya Ruvalcaba may return to work on 3/30/2023  If you have any questions or concerns, please don't hesitate to call           Sincerely,        Luis Joe MD    CC: No Recipients

## 2023-03-28 NOTE — PROGRESS NOTES
[facility]        NAME: Jan Garcia is a 70 y o  male  : 1955    MRN: [id number]  DATE: 2023  TIME: 10:25 PM    Assessment and Plan   Acute pain of left knee [M25 562]  1  Acute pain of left knee              Patient Instructions       Follow up with PCP in 3-5 days  Proceed to  ER if symptoms worsen  Chief Complaint     Chief Complaint   Patient presents with   • Knee Pain     Patient c/o left knee pain x 3 days          History of Present Illness       54-year-old male here today with complaint of left knee pain for the last 3 days  Denies fall or trauma  He finds it difficult to completely flex his knee  He describes it as a history of osteoarthritis of bilateral knees  However he is concerned because he has noticed that there is some instability in his left knee feels like it gives out  He tried to call the orthopedic office to set up an appointment but has not received a call and decided come to urgent care for assessment  For pain, he can only take Tylenol he was advised not to take NSAIDs since she has cardiac disease  Review of Systems   Review of Systems   Musculoskeletal: Positive for arthralgias           Current Medications       Current Outpatient Medications:   •  albuterol (PROVENTIL HFA,VENTOLIN HFA) 90 mcg/act inhaler, Inhale 1-2 puffs every 4 (four) hours as needed for wheezing, Disp: 18 g, Rfl: 0  •  amLODIPine (NORVASC) 5 mg tablet, TAKE 1 TABLET BY MOUTH EVERY DAY, Disp: 90 tablet, Rfl: 3  •  Anoro Ellipta 62 5-25 MCG/ACT inhaler, INHALE 1 PUFF BY MOUTH EVERY DAY, Disp: 60 each, Rfl: 5  •  aspirin (ECOTRIN LOW STRENGTH) 81 mg EC tablet, Take 1 tablet (81 mg total) by mouth daily, Disp: , Rfl:   •  atorvastatin (LIPITOR) 40 mg tablet, Take 1 tablet (40 mg total) by mouth every evening, Disp: 90 tablet, Rfl: 3  •  B Complex Vitamins (VITAMIN B COMPLEX 100 IJ), Inject as directed, Disp: , Rfl:   •  dextromethorphan-guaifenesin (MUCINEX DM)  MG per 12 hr tablet, Take 1 tablet by mouth every 12 (twelve) hours (Patient taking differently: Take 1 tablet by mouth every 12 (twelve) hours as needed), Disp: 20 tablet, Rfl: 0  •  fluticasone (Flovent HFA) 110 MCG/ACT inhaler, 2 puffs po bid - Rinse mouth after use , Disp: 12 g, Rfl: 3  •  losartan (COZAAR) 50 mg tablet, TAKE 1 TABLET BY MOUTH EVERY DAY, Disp: 90 tablet, Rfl: 3  •  metoprolol tartrate (LOPRESSOR) 50 mg tablet, TAKE 1 TABLET (50 MG TOTAL) BY MOUTH EVERY 12 (TWELVE) HOURS, Disp: 180 tablet, Rfl: 3  •  Multiple Minerals-Vitamins (Calcium Citrate-Mag-Minerals) TABS, Take by mouth, Disp: , Rfl:   •  Multiple Vitamin (MULTI-VITAMIN DAILY PO), Take 1 tablet by mouth daily, Disp: , Rfl:   •  nicotine (NICODERM CQ) 14 mg/24hr TD 24 hr patch, Place 1 patch on the skin every 24 hours, Disp: 28 patch, Rfl: 0  •  nicotine (NICODERM CQ) 21 mg/24 hr TD 24 hr patch, Place 1 patch on the skin every 24 hours, Disp: 28 patch, Rfl: 0  •  nicotine (NICODERM CQ) 7 mg/24hr TD 24 hr patch, Place 1 patch on the skin every 24 hours, Disp: 28 patch, Rfl: 0  •  pantoprazole (PROTONIX) 40 mg tablet, TAKE 1 TABLET (40 MG TOTAL) BY MOUTH AS NEEDED IN THE MORNING (HEARTBURN OR ACID REFLUX)  , Disp: 90 tablet, Rfl: 1  •  sildenafil (REVATIO) 20 mg tablet, 3-5 pills 1 hr before need, Disp: 90 tablet, Rfl: 1  •  Zinc 10 MG LOZG, Apply to the mouth or throat, Disp: , Rfl:     Current Allergies     Allergies as of 03/27/2023   • (No Known Allergies)            The following portions of the patient's history were reviewed and updated as appropriate: allergies, current medications, past family history, past medical history, past social history, past surgical history and problem list      Past Medical History:   Diagnosis Date   • Broken humerus    • COPD (chronic obstructive pulmonary disease) ([facility] [address] )    • COVID-19 virus infection 12/21/2021   • Pneumonia        Past Surgical History:   Procedure Laterality Date   • IR CHEST TUBE PLACEMENT 5/21/2021   • MULTIPLE TOOTH EXTRACTIONS N/A 5/17/2021    Procedure: EXTRACTION TEETH MULTIPLE 1, 3, 18, 30, EXCISION OF LEFT BUCCAL FIBROMA;  Surgeon: Baldo Narvaez DMD;  [facility];  Service: Maxillofacial   • DC ECHO TRANSESOPHAG R-T 2D W/PRB IMG ACQUISJ I&R N/A 5/20/2021    Procedure: TRANSESOPHAGEAL ECHOCARDIOGRAM (KATIA); Surgeon: Desi Gabriel MD;  Location: [facility];  Service: Cardiac Surgery   • DC REPLACEMENT OF MITRAL VALVE N/A 5/20/2021    Procedure: VALVE MITRAL (MVR) REPAIR with 32mm physio II annuloplasty ring;  Surgeon: Desi Gabriel MD;  [facility]: [facility];  Service: Cardiac Surgery   • DC RPLCMT AORTIC VALVE OPN W/STENTLESS TISSUE VALVE N/A 5/20/2021    Procedure: REPLACEMENT VALVE AORTIC (AVR) TISSUE with 25mm montgomery inspiris tissue valve;  Surgeon: Desi Gabriel MD;  [facility];  Service: Cardiac Surgery       Family History   Problem Relation Age of Onset   • Prostate cancer Father          Medications have been verified  Objective   /88   Pulse 90   Temp 99 9 °F (37 7 °C)   Resp 18   SpO2 98%   No LMP for male patient  Physical Exam     Physical Exam  Nursing note reviewed  Constitutional:       Appearance: Normal appearance  Musculoskeletal:         General: Swelling and tenderness present  Comments: Left knee flexion to 75 degrees, extension is 0 degrees, Lachman's test-negative  Gerald's test-negative  No pain elicited on valgus or varus stress  There are some tenderness in the infra patella area tibial plateau with no evidence of ecchymosis  There is some slight effusion appreciated  Gait-antalgic  Neurological:      Mental Status: He is alert

## 2023-03-28 NOTE — PATIENT INSTRUCTIONS
Patient placed in a knee immobilizer for stability and compression  Patient encouraged to continue with Tylenol since he is unable to take NSAIDs  However I recommended topical Voltaren gel to be applied to his left knee 3-4 times as needed  Patient will attempt to contact [facility]  Patient given work excuse  Knee Pain   WHAT YOU NEED TO KNOW:   Knee pain may start suddenly, or it may be a long-term problem  You may have pain on the side, front, or back of your knee  You may have knee stiffness and swelling  You may hear popping sounds or feel like your knee is giving way or locking up as you walk  You may feel pain when you sit, stand, walk, or climb up and down stairs  Knee pain can be caused by conditions such as obesity, inflammation, or strains or tears in ligaments or tendons  DISCHARGE INSTRUCTIONS:   Return to the emergency department if:   Your pain is worse, even after treatment  You cannot bend or straighten your leg completely  The swelling around your knee does not go down even with treatment  Your knee is painful and hot to the touch  Contact your healthcare provider if:   You have questions or concerns about your condition or care  Medicines: You may need any of the following:  NSAIDs  help decrease swelling and pain or fever  This medicine is available with or without a doctor's order  NSAIDs can cause stomach bleeding or kidney problems in certain people  If you take blood thinner medicine, always ask your healthcare provider if NSAIDs are safe for you  Always read the medicine label and follow directions  Acetaminophen  decreases pain and fever  It is available without a doctor's order  Ask how much to take and how often to take it  Follow directions   Read the labels of all other medicines you are using to see if they also contain acetaminophen, or ask your doctor or pharmacist  Acetaminophen can cause liver damage if not taken correctly  Prescription pain medicine  may be given  Ask your healthcare provider how to take this medicine safely  Some prescription pain medicines contain acetaminophen  Do not take other medicines that contain acetaminophen without talking to your healthcare provider  Too much acetaminophen may cause liver damage  Prescription pain medicine may cause constipation  Ask your healthcare provider how to prevent or treat constipation  Take your medicine as directed  Contact your healthcare provider if you think your medicine is not helping or if you have side effects  Tell your provider if you are allergic to any medicine  Keep a list of the medicines, vitamins, and herbs you take  Include the amounts, and when and why you take them  Bring the list or the pill bottles to follow-up visits  Carry your medicine list with you in case of an emergency  What you can do to manage your symptoms:   Rest your knee so it can heal   Limit activities that increase your pain  Do low-impact exercises, such as walking or swimming  Apply ice to help reduce swelling and pain  Use an ice pack, or put crushed ice in a plastic bag  Cover it with a towel before you apply it to your knee  Apply ice for 15 to 20 minutes every hour, or as directed  Apply compression to help reduce swelling  Use a brace or bandage only as directed  Elevate your knee to help decrease pain and swelling  Elevate your knee while you are sitting or lying down  Prop your leg on pillows to keep your knee above the level of your heart  Prevent your knee from moving as directed  Your healthcare provider may put on a cast or splint  You may need to wear a leg brace to stabilize your knee  A leg brace can be adjusted to increase your range of motion as your knee heals  What you can do to prevent knee pain:   Maintain a healthy weight  Extra weight increases your risk for knee pain  Ask your healthcare provider how much you should weigh  He or she can help you create a safe weight loss plan if you need to lose weight  Exercise or train properly  Use the correct equipment for sports  Wear shoes that provide good support  Check your posture often as you exercise, play sports, or train for an event  This can help prevent stress and strain on your knees  Rest between sessions so you do not overwork your knees  Follow up with your healthcare provider within 24 hours or as directed: You may need follow-up treatments, such as steroid injections to decrease pain  Write down your questions so you remember to ask them during your visits  © Copyright Sofia Valderrama 2022 Information is for End User's use only and may not be sold, redistributed or otherwise used for commercial purposes  The above information is an  only  It is not intended as medical advice for individual conditions or treatments  Talk to your doctor, nurse or pharmacist before following any medical regimen to see if it is safe and effective for you

## 2023-03-29 ENCOUNTER — OFFICE VISIT (OUTPATIENT)
Dept: OBGYN CLINIC | Facility: CLINIC | Age: 68
End: 2023-03-29

## 2023-03-29 VITALS
DIASTOLIC BLOOD PRESSURE: 80 MMHG | SYSTOLIC BLOOD PRESSURE: 128 MMHG | BODY MASS INDEX: 32.85 KG/M2 | WEIGHT: 256 LBS | HEIGHT: 74 IN

## 2023-03-29 DIAGNOSIS — M17.12 PRIMARY OSTEOARTHRITIS OF LEFT KNEE: Primary | ICD-10-CM

## 2023-03-29 RX ORDER — TRIAMCINOLONE ACETONIDE 40 MG/ML
40 INJECTION, SUSPENSION INTRA-ARTICULAR; INTRAMUSCULAR
Status: COMPLETED | OUTPATIENT
Start: 2023-03-29 | End: 2023-03-29

## 2023-03-29 RX ORDER — LIDOCAINE HYDROCHLORIDE 10 MG/ML
3 INJECTION, SOLUTION INFILTRATION; PERINEURAL
Status: COMPLETED | OUTPATIENT
Start: 2023-03-29 | End: 2023-03-29

## 2023-03-29 RX ADMIN — TRIAMCINOLONE ACETONIDE 40 MG: 40 INJECTION, SUSPENSION INTRA-ARTICULAR; INTRAMUSCULAR at 14:28

## 2023-03-29 RX ADMIN — LIDOCAINE HYDROCHLORIDE 3 ML: 10 INJECTION, SOLUTION INFILTRATION; PERINEURAL at 14:28

## 2023-03-29 NOTE — PROGRESS NOTES
"Patient Name:  Berenice Cobb  MRN:  [id number]    Assessment & Plan     Left knee DJD  1  Corticosteroid injection performed today into the left knee  2  Activities as tolerated with modification avoid pain  3  Tylenol and knee brace as needed for comfort  4  Advised corticosteroid injections may be performed every 3 months as needed for pain  Patient wishes to follow-up as needed  Chief Complaint     Left knee pain    History of the Present Illness     Berenice Cobb is a 70 y o  male who reports to the office today for evaluation of his left knee  He notes an onset of pain a few months ago  At that time pain was tolerable  He noted worsening pain this past weekend  He denies any injury or trauma  He notes generalized pain with swelling and stiffness  He has been utilizing an over-the-counter knee brace and taking over-the-counter Tylenol with mild improvement  He is unable to take oral NSAIDs due to history of heart valve replacement  He notes episodes of sharp pain but does note constant dull ache about the knee  Pain is worse with prolonged standing and walking as well as transitioning from a seated to standing position and first thing in the morning when getting out of bed  He denies any weakness or instability  No numbness or tingling  No fevers or chills  He does have a history of right knee DJD and recently underwent a corticosteroid injection into the right knee which did provide significant improvement  Physical Exam     /80   Ht 6' 2\" (1 88 m)   Wt 116 kg (256 lb)   BMI 32 87 kg/m²     Left knee: Skin intact  Slight varus deformity  No erythema ecchymosis or swelling  Trace effusion  No significant joint line tenderness  Range of motion is full extension and flexion to 120 degrees with pain and crepitation noted  Stable to varus and valgus stress without pain    Stable Lachman test   Negative posterior drawer test   Negative Gerald's test     Eyes: Anicteric " sclerae  ENT: Trachea midline  Lungs: Normal respiratory effort  CV: Capillary refill is less than 2 seconds  Skin: Intact without erythema  Lymph: No palpable lymphadenopathy  Neuro: Sensation is grossly intact to light touch  Psych: Mood and affect are appropriate  Data Review     I have personally reviewed pertinent films in PACS, and my interpretation follows:    X-rays left knee 3/29/2023: No acute osseous abnormality  No fracture or dislocation  Tricompartmental degenerative changes most notable in the medial and patellofemoral compartments  Past Medical History:   Diagnosis Date   • Broken humerus    • COPD (chronic obstructive pulmonary disease) ([facility] [address] )    • COVID-19 virus infection 12/21/2021   • Pneumonia        Past Surgical History:   Procedure Laterality Date   • IR CHEST TUBE PLACEMENT  5/21/2021   • MULTIPLE TOOTH EXTRACTIONS N/A 5/17/2021    Procedure: EXTRACTION TEETH MULTIPLE 1, 3, 18, 30, EXCISION OF LEFT BUCCAL FIBROMA;  Surgeon: Aleyda Perdomo DMD;  Location: [facility];  Service: Maxillofacial   • ND ECHO TRANSESOPHAG R-T 2D W/PRB IMG ACQUISJ I&R N/A 5/20/2021    Procedure: TRANSESOPHAGEAL ECHOCARDIOGRAM (KATIA);   Surgeon: Philly Ratliff MD;  Location: [facility];  Service: Cardiac Surgery   • ND REPLACEMENT MITRAL VALVE W/CARDIOPULMONARY BYP N/A 5/20/2021    Procedure: VALVE MITRAL (MVR) REPAIR with 32mm physio II annuloplasty ring;  Surgeon: Philly Ratliff MD;  [facility];  Service: Cardiac Surgery   • ND RPLCMT AORTIC VALVE OPN W/STENTLESS TISSUE VALVE N/A 5/20/2021    Procedure: REPLACEMENT VALVE AORTIC (AVR) TISSUE with 25mm montgomery inspiris tissue valve;  Surgeon: Philly Ratliff MD;  [facility];  Service: Cardiac Surgery       No Known Allergies    Current Outpatient Medications on File Prior to Visit   Medication Sig Dispense Refill   • albuterol (PROVENTIL HFA,VENTOLIN HFA) 90 mcg/act inhaler Inhale 1-2 puffs every 4 (four) hours as needed for wheezing 18 g 0   • amLODIPine (NORVASC) 5 mg tablet TAKE 1 TABLET BY MOUTH EVERY DAY 90 tablet 3   • Anoro Ellipta 62 5-25 MCG/ACT inhaler INHALE 1 PUFF BY MOUTH EVERY DAY 60 each 5   • aspirin (ECOTRIN LOW STRENGTH) 81 mg EC tablet Take 1 tablet (81 mg total) by mouth daily     • atorvastatin (LIPITOR) 40 mg tablet Take 1 tablet (40 mg total) by mouth every evening 90 tablet 3   • B Complex Vitamins (VITAMIN B COMPLEX 100 IJ) Inject as directed     • dextromethorphan-guaifenesin (MUCINEX DM)  MG per 12 hr tablet Take 1 tablet by mouth every 12 (twelve) hours (Patient taking differently: Take 1 tablet by mouth every 12 (twelve) hours as needed) 20 tablet 0   • fluticasone (Flovent HFA) 110 MCG/ACT inhaler 2 puffs po bid - Rinse mouth after use  12 g 3   • losartan (COZAAR) 50 mg tablet TAKE 1 TABLET BY MOUTH EVERY DAY 90 tablet 3   • metoprolol tartrate (LOPRESSOR) 50 mg tablet TAKE 1 TABLET (50 MG TOTAL) BY MOUTH EVERY 12 (TWELVE) HOURS 180 tablet 3   • Multiple Minerals-Vitamins (Calcium Citrate-Mag-Minerals) TABS Take by mouth     • Multiple Vitamin (MULTI-VITAMIN DAILY PO) Take 1 tablet by mouth daily     • nicotine (NICODERM CQ) 14 mg/24hr TD 24 hr patch Place 1 patch on the skin every 24 hours 28 patch 0   • nicotine (NICODERM CQ) 21 mg/24 hr TD 24 hr patch Place 1 patch on the skin every 24 hours 28 patch 0   • nicotine (NICODERM CQ) 7 mg/24hr TD 24 hr patch Place 1 patch on the skin every 24 hours 28 patch 0   • pantoprazole (PROTONIX) 40 mg tablet TAKE 1 TABLET (40 MG TOTAL) BY MOUTH AS NEEDED IN THE MORNING (HEARTBURN OR ACID REFLUX)  90 tablet 1   • sildenafil (REVATIO) 20 mg tablet 3-5 pills 1 hr before need 90 tablet 1   • Zinc 10 MG LOZG Apply to the mouth or throat       No current facility-administered medications on file prior to visit         Social History     Tobacco Use   • Smoking status: Every Day     Packs/day: 0 50     Types: Cigarettes     Start date: 45     Last attempt to quit: 2021     Years since quittin 8   • Smokeless tobacco: Never   Vaping Use   • Vaping Use: Never used   Substance Use Topics   • Alcohol use: Yes     Comment: once a month   • Drug use: No       Family History   Problem Relation Age of Onset   • Prostate cancer Father        Review of Systems     As stated in the HPI  All other systems reviewed and are negative        Large joint arthrocentesis: L knee  Procedure Details  Location: knee - L knee  Needle size: 22 G  Ultrasound guidance: no  Approach: anterolateral  Medications administered: 3 mL lidocaine 1 %; 40 mg triamcinolone acetonide 40 mg/mL    Patient tolerance: patient tolerated the procedure well with no immediate complications  Dressing:  Sterile dressing applied

## 2023-05-03 ENCOUNTER — TELEPHONE (OUTPATIENT)
Dept: FAMILY MEDICINE CLINIC | Facility: CLINIC | Age: 68
End: 2023-05-03

## 2023-05-03 NOTE — TELEPHONE ENCOUNTER
LETTER RECEIVED FROM [facility] TO NOTIFY THAT AS OF July 1,2023 PATIENT'S FLOVENT HFA INH 110MCG WILL NO LONGER BE COVERED WITHOUT PRIOR AUTHORIZATION     MOVING FORWARD, PREFERRED MEDICATIONS WILL BE PULMICORT Marva Sherrill, FLOVEN HFA

## 2023-05-04 DIAGNOSIS — K21.9 GASTROESOPHAGEAL REFLUX DISEASE WITHOUT ESOPHAGITIS: ICD-10-CM

## 2023-05-04 RX ORDER — PANTOPRAZOLE SODIUM 40 MG/1
40 TABLET, DELAYED RELEASE ORAL DAILY PRN
Qty: 90 TABLET | Refills: 1 | Status: SHIPPED | OUTPATIENT
Start: 2023-05-04

## 2023-06-05 DIAGNOSIS — I35.0 NONRHEUMATIC AORTIC VALVE STENOSIS: ICD-10-CM

## 2023-06-05 DIAGNOSIS — Z95.2 S/P MVR (MITRAL VALVE REPLACEMENT): Primary | ICD-10-CM

## 2023-06-05 DIAGNOSIS — Z95.2 S/P AVR: ICD-10-CM

## 2023-06-06 ENCOUNTER — OFFICE VISIT (OUTPATIENT)
Dept: FAMILY MEDICINE CLINIC | Facility: CLINIC | Age: 68
End: 2023-06-06
Payer: COMMERCIAL

## 2023-06-06 VITALS
TEMPERATURE: 97.8 F | DIASTOLIC BLOOD PRESSURE: 82 MMHG | HEIGHT: 74 IN | WEIGHT: 252 LBS | SYSTOLIC BLOOD PRESSURE: 128 MMHG | HEART RATE: 80 BPM | OXYGEN SATURATION: 96 % | BODY MASS INDEX: 32.34 KG/M2

## 2023-06-06 DIAGNOSIS — E66.09 CLASS 1 OBESITY DUE TO EXCESS CALORIES WITH SERIOUS COMORBIDITY AND BODY MASS INDEX (BMI) OF 32.0 TO 32.9 IN ADULT: ICD-10-CM

## 2023-06-06 DIAGNOSIS — Z98.890 S/P MVR (MITRAL VALVE REPAIR): ICD-10-CM

## 2023-06-06 DIAGNOSIS — Z12.5 SCREENING PSA (PROSTATE SPECIFIC ANTIGEN): ICD-10-CM

## 2023-06-06 DIAGNOSIS — E78.5 HYPERLIPIDEMIA, UNSPECIFIED HYPERLIPIDEMIA TYPE: ICD-10-CM

## 2023-06-06 DIAGNOSIS — I50.42 CHRONIC COMBINED SYSTOLIC (CONGESTIVE) AND DIASTOLIC (CONGESTIVE) HEART FAILURE (HCC): ICD-10-CM

## 2023-06-06 DIAGNOSIS — J43.9 PULMONARY EMPHYSEMA, UNSPECIFIED EMPHYSEMA TYPE (HCC): ICD-10-CM

## 2023-06-06 DIAGNOSIS — K21.9 GASTROESOPHAGEAL REFLUX DISEASE WITHOUT ESOPHAGITIS: ICD-10-CM

## 2023-06-06 DIAGNOSIS — F17.200 CURRENT SMOKER: ICD-10-CM

## 2023-06-06 DIAGNOSIS — Z95.2 S/P AVR: ICD-10-CM

## 2023-06-06 DIAGNOSIS — I10 ESSENTIAL HYPERTENSION: Primary | ICD-10-CM

## 2023-06-06 PROCEDURE — 99214 OFFICE O/P EST MOD 30 MIN: CPT | Performed by: FAMILY MEDICINE

## 2023-06-06 NOTE — PATIENT INSTRUCTIONS
COPD symptoms have been stable recently, does not require albuterol often, Anoro inhaler works well  He stopped Flovent few weeks ago, notes no difference off Flovent  Flovent will no longer be covered by his plan, they would cover Pulmicort  After discussion he will stay off inhaled steroid as he noted no difference, if does note increased symptoms we will plan to order Pulmicort  Still smoking 8 to 10 cigarettes daily, was unable to quit with patch, keep working on tapering down and quitting  I would like him to redo CT screening of lung, last was in 2021  Blood pressure is fine today, fluid status appears stable, doing well after AVR/MVR, he will continue to see cardiology in follow-up, stay on amlodipine 5 mg daily, aspirin 81 mg daily, atorvastatin 40 mg daily, losartan 50 mg daily, metoprolol tartrate 50 mg every 12 hours  Doing well with pantoprazole, can continue as is  [facility] April of this year with loose stools, that resolved  Has Cologuard kit at home, has not done yet, he should send that in  [facility] CBC[facility], I would like him to  fasting blood work along with urinalysis  Note TSH was okay 1 year ago    Plans to continue to work until around his birthday next year  We will see him in 6 months with routine physical     Does have arthritic knees, does see orthopedist as needed

## 2023-06-06 NOTE — PROGRESS NOTES
BMI Counseling: Body mass index is 32 35 kg/m²  The BMI is above normal  Nutrition recommendations include encouraging healthy choices of fruits and vegetables and moderation in carbohydrate intake  Rationale for BMI follow-up plan is due to patient being overweight or obese  Depression Screening and Follow-up Plan: Patient was screened for depression during today's encounter  They screened negative with a PHQ-2 score of 0       FAMILY PRACTICE OFFICE VISIT  [facility]  [address]  [address] [place], [place], [facility]      NAME: Joshua Bianchi  AGE: 70 y o  SEX: male  : 1955   MRN: [id number]    DATE: 2023  TIME: 4:08 PM    Assessment and Plan     Problem List Items Addressed This Visit     S/P MVR (mitral valve repair)    S/P AVR    GERD (gastroesophageal reflux disease)    Relevant Orders    CBC    Chronic obstructive lung disease (HCC) with chronic bronchitis    Relevant Orders    CBC    Chronic combined systolic (congestive) and diastolic (congestive) heart failure (HCC)    Relevant Orders    CBC    Comprehensive metabolic panel    Essential hypertension - Primary    Relevant Orders    Comprehensive metabolic panel    Urinalysis with microscopic    Current smoker    Hyperlipidemia    Relevant Orders    Lipid panel   Other Visit Diagnoses     Screening PSA (prostate specific antigen)        Relevant Orders    PSA, Total Screen    BMI 32              Patient Instructions   COPD symptoms have been stable recently, does not require albuterol often, Anoro inhaler works well  He stopped Flovent few weeks ago, notes no difference off Flovent  Flovent will no longer be covered by his plan, they would cover Pulmicort  After discussion he will stay off inhaled steroid as he noted no difference, if does note increased symptoms we will plan to order Pulmicort    Still smoking 8 to 10 cigarettes daily, was unable to quit with patch, keep working on tapering down and quitting  I would like him to redo CT screening of lung, last was in 2021  Blood pressure is fine today, fluid status appears stable, doing well after AVR/MVR, he will continue to see cardiology in follow-up, stay on amlodipine 5 mg daily, aspirin 81 mg daily, atorvastatin 40 mg daily, losartan 50 mg daily, metoprolol tartrate 50 mg every 12 hours  Doing well with pantoprazole, can continue as is  [facility] of this year with loose stools, that resolved  Has Cologuard kit at home, has not done yet, he should send that in  [facility] CBC[facility], I would like him to  fasting blood work along with urinalysis  Note TSH was okay 1 year ago    Plans to continue to work until around his birthday next year  We will see him in 6 months with routine physical     Does have arthritic knees, does see orthopedist as needed  Chief Complaint     Chief Complaint   Patient presents with   • Follow-up     6 m fallow up       History of Present Illness   Jetty Mom is a 70y o -year-old male who is in for a routine follow-up, he relates today he has been feeling okay, continues to work full-time, has had no chest pain, no increased shortness of breath  Still smoking 8 to 10 cigarettes daily  He is using medication as directed  Review of Systems   Review of Systems   Constitutional: Negative for appetite change, fatigue (Baseline), fever and unexpected weight change  HENT: Negative for sore throat and trouble swallowing  Respiratory: Positive for cough, shortness of breath and wheezing  Negative for chest tightness  Relates plan will no longer cover Flovent, he stopped it few weeks ago, has noted no difference, does feel he does very well with Anoro inhaler, has not required albuterol recently  Cardiovascular: Positive for leg swelling (Mild swelling when he wears knee braces as before)   Negative for chest pain and "palpitations  Gastrointestinal: Negative for abdominal pain, blood in stool, nausea and vomiting  No acid reflux     No change in bowel   Genitourinary: Negative for dysuria and hematuria  Neurological: Negative for dizziness, syncope, light-headedness and headaches  Psychiatric/Behavioral: Negative for behavioral problems and confusion  Active Problem List     Patient Active Problem List   Diagnosis   • Nonrheumatic aortic valve stenosis   • Chronic obstructive lung disease (HCC) with chronic bronchitis   • Erectile dysfunction of non-organic origin   • Hyperlipidemia   • Essential hypertension   • Rotator cuff tendinitis Left   • GERD (gastroesophageal reflux disease)   • Chronic combined systolic (congestive) and diastolic (congestive) heart failure (HCC)   • Osteoarthritis of both knees   • S/P AVR   • PFO (patent foramen ovale)   • S/P MVR (mitral valve repair)   • Lumbar degenerative disc disease   • Current smoker   • Chronic pain of right knee       Past Medical History:  Reviewed    Past Surgical History:  Reviewed    Family History:  Reviewed    Social History:  Reviewed    Objective     Vitals:    06/06/23 0957   BP: 128/82   BP Location: Left arm   Patient Position: Sitting   Cuff Size: Large   Pulse: 80   Temp: 97 8 °F (36 6 °C)   SpO2: 96%   Weight: 114 kg (252 lb)   Height: 6' 2\" (1 88 m)     Body mass index is 32 35 kg/m²  BP Readings from Last 3 Encounters:   06/06/23 128/82   03/29/23 128/80   03/27/23 146/88       Wt Readings from Last 3 Encounters:   06/06/23 114 kg (252 lb)   03/29/23 116 kg (256 lb)   11/17/22 116 kg (256 lb 6 4 oz)       Physical Exam  Constitutional:       General: He is not in acute distress  Appearance: Normal appearance  He is well-developed  He is not ill-appearing  Eyes:      General: No scleral icterus  Cardiovascular:      Rate and Rhythm: Normal rate and regular rhythm  Heart sounds: Normal heart sounds  No murmur heard    Pulmonary: " Comments: Coarse breath sounds bilateral with some scattered rhonchi, no rales  Abdominal:      Palpations: Abdomen is soft  Tenderness: There is no abdominal tenderness  Musculoskeletal:      Right lower leg: No edema  Left lower leg: No edema  Lymphadenopathy:      Cervical: No cervical adenopathy  Skin:     Coloration: Skin is not jaundiced  Neurological:      Mental Status: He is alert and oriented to person, place, and time  Mental status is at baseline  Psychiatric:         Mood and Affect: Mood normal          Behavior: Behavior normal          ALLERGIES:  No Known Allergies    Current Medications     Current Outpatient Medications   Medication Sig Dispense Refill   • albuterol (PROVENTIL HFA,VENTOLIN HFA) 90 mcg/act inhaler Inhale 1-2 puffs every 4 (four) hours as needed for wheezing 18 g 0   • amLODIPine (NORVASC) 5 mg tablet TAKE 1 TABLET BY MOUTH EVERY DAY 90 tablet 3   • Anoro Ellipta 62 5-25 MCG/ACT inhaler INHALE 1 PUFF BY MOUTH EVERY DAY 60 each 5   • aspirin (ECOTRIN LOW STRENGTH) 81 mg EC tablet Take 1 tablet (81 mg total) by mouth daily     • atorvastatin (LIPITOR) 40 mg tablet Take 1 tablet (40 mg total) by mouth every evening 90 tablet 3   • B Complex Vitamins (VITAMIN B COMPLEX 100 IJ) Inject as directed     • dextromethorphan-guaifenesin (MUCINEX DM)  MG per 12 hr tablet Take 1 tablet by mouth every 12 (twelve) hours (Patient taking differently: Take 1 tablet by mouth every 12 (twelve) hours as needed) 20 tablet 0   • losartan (COZAAR) 50 mg tablet TAKE 1 TABLET BY MOUTH EVERY DAY 90 tablet 3   • metoprolol tartrate (LOPRESSOR) 50 mg tablet TAKE 1 TABLET (50 MG TOTAL) BY MOUTH EVERY 12 (TWELVE) HOURS 180 tablet 3   • Multiple Minerals-Vitamins (Calcium Citrate-Mag-Minerals) TABS Take by mouth     • pantoprazole (PROTONIX) 40 mg tablet TAKE 1 TABLET (40 MG TOTAL) BY MOUTH AS NEEDED IN THE MORNING (HEARTBURN OR ACID REFLUX)   90 tablet 1   • sildenafil (REVATIO) 20 mg tablet 3-5 pills 1 hr before need 90 tablet 1   • Zinc 10 MG LOZG Apply to the mouth or throat       No current facility-administered medications for this visit              Orders Placed This Encounter   Procedures   • CBC   • Comprehensive metabolic panel   • Lipid panel   • Urinalysis with microscopic   • PSA, Total Screen         Bemaribel Stokes DO

## 2023-06-09 ENCOUNTER — DOCUMENTATION (OUTPATIENT)
Dept: CARDIOLOGY CLINIC | Facility: CLINIC | Age: 68
End: 2023-06-09

## 2023-06-11 DIAGNOSIS — J43.9 PULMONARY EMPHYSEMA, UNSPECIFIED EMPHYSEMA TYPE (HCC): ICD-10-CM

## 2023-06-11 RX ORDER — UMECLIDINIUM BROMIDE AND VILANTEROL TRIFENATATE 62.5; 25 UG/1; UG/1
POWDER RESPIRATORY (INHALATION)
Qty: 60 EACH | Refills: 5 | Status: SHIPPED | OUTPATIENT
Start: 2023-06-11

## 2023-06-23 DIAGNOSIS — I10 HYPERTENSION, UNSPECIFIED TYPE: ICD-10-CM

## 2023-06-23 RX ORDER — AMLODIPINE BESYLATE 5 MG/1
TABLET ORAL
Qty: 90 TABLET | Refills: 3 | Status: SHIPPED | OUTPATIENT
Start: 2023-06-23

## 2023-08-10 ENCOUNTER — HOSPITAL ENCOUNTER (OUTPATIENT)
Dept: NON INVASIVE DIAGNOSTICS | Facility: HOSPITAL | Age: 68
Discharge: HOME | End: 2023-08-10
Payer: COMMERCIAL

## 2023-08-10 VITALS
WEIGHT: 252 LBS | DIASTOLIC BLOOD PRESSURE: 84 MMHG | SYSTOLIC BLOOD PRESSURE: 136 MMHG | HEIGHT: 74 IN | HEART RATE: 76 BPM | BODY MASS INDEX: 32.34 KG/M2

## 2023-08-10 VITALS
DIASTOLIC BLOOD PRESSURE: 84 MMHG | WEIGHT: 252 LBS | SYSTOLIC BLOOD PRESSURE: 136 MMHG | HEIGHT: 74 IN | BODY MASS INDEX: 32.34 KG/M2

## 2023-08-10 DIAGNOSIS — Z98.890 S/P MVR (MITRAL VALVE REPAIR): ICD-10-CM

## 2023-08-10 DIAGNOSIS — I35.0 NONRHEUMATIC AORTIC VALVE STENOSIS: ICD-10-CM

## 2023-08-10 DIAGNOSIS — Z95.2 S/P AVR: ICD-10-CM

## 2023-08-10 DIAGNOSIS — Z95.2 S/P MVR (MITRAL VALVE REPLACEMENT): ICD-10-CM

## 2023-08-10 PROCEDURE — 93306 TTE W/DOPPLER COMPLETE: CPT

## 2023-08-11 LAB
AORTIC ROOT: 3.2 CM
AORTIC VALVE MEAN VELOCITY: 12.4 M/S
APICAL FOUR CHAMBER EJECTION FRACTION: 59 %
ASCENDING AORTA: 4 CM
AV AREA BY CONTINUOUS VTI: 2.7 CM2
AV AREA PEAK VELOCITY: 2.5 CM2
AV LVOT MEAN GRADIENT: 2 MMHG
AV LVOT PEAK GRADIENT: 4 MMHG
AV MEAN GRADIENT: 7 MMHG
AV PEAK GRADIENT: 12 MMHG
AV VALVE AREA: 2.73 CM2
AV VELOCITY RATIO: 0.6
DOP CALC AO PEAK VEL: 1.75 M/S
DOP CALC AO VTI: 35.9 CM
DOP CALC LVOT AREA: 4.15 CM2
DOP CALC LVOT CARDIAC INDEX: 3.46 L/MIN/M2
DOP CALC LVOT CARDIAC OUTPUT: 8.3 L/MIN
DOP CALC LVOT DIAMETER: 2.3 CM
DOP CALC LVOT PEAK VEL VTI: 23.56 CM
DOP CALC LVOT PEAK VEL: 1.05 M/S
DOP CALC LVOT STROKE INDEX: 42.1 ML/M2
DOP CALC LVOT STROKE VOLUME: 97.84 CM3
DOP CALC MV VTI: 46.68 CM
INTERVENTRICULAR SEPTUM IN DIASTOLE (PARASTERNAL SHORT AXIS VIEW): 1.8 CM
INTERVENTRICULAR SEPTUM: 1.8 CM (ref 0.6–1.1)
LAAS-AP2: 30 CM2
LAAS-AP4: 27.8 CM2
LEFT ATRIUM SIZE: 4.9 CM
LEFT ATRIUM VOLUME (MOD BIPLANE): 102 ML
LEFT VENTRICULAR INTERNAL DIMENSION IN DIASTOLE: 4.6 CM (ref 3.5–6)
LEFT VENTRICULAR POSTERIOR WALL IN END DIASTOLE: 1.4 CM
MV E'TISSUE VEL-LAT: 8 CM/S
MV E'TISSUE VEL-SEP: 5 CM/S
MV MEAN GRADIENT: 5 MMHG
MV PEAK A VEL: 1.2 M/S
MV PEAK E VEL: 92 CM/S
MV PEAK GRADIENT: 8 MMHG
MV VALVE AREA BY CONTINUITY EQUATION: 2.1 CM2
RA PRESSURE ESTIMATED: 10 MMHG
RIGHT ATRIAL 2D VOLUME: 87 ML
RIGHT ATRIUM AREA SYSTOLE A4C: 26.2 CM2
RIGHT VENTRICLE ID DIMENSION: 4.4 CM
RV PSP: 38 MMHG
SL CV LEFT ATRIUM LENGTH A2C: 6.9 CM
SL CV LV EF: 70
SL CV PED ECHO LEFT VENTRICLE DIASTOLIC VOLUME (MOD BIPLANE) 2D: 99 ML
TR MAX PG: 28 MMHG
TR PEAK VELOCITY: 2.7 M/S
TRICUSPID ANNULAR PLANE SYSTOLIC EXCURSION: 1.5 CM
TRICUSPID VALVE PEAK REGURGITATION VELOCITY: 2.66 M/S

## 2023-08-11 PROCEDURE — 93306 TTE W/DOPPLER COMPLETE: CPT | Performed by: STUDENT IN AN ORGANIZED HEALTH CARE EDUCATION/TRAINING PROGRAM

## 2023-08-18 ENCOUNTER — OFFICE VISIT (OUTPATIENT)
Dept: CARDIOLOGY CLINIC | Facility: CLINIC | Age: 68
End: 2023-08-18
Payer: COMMERCIAL

## 2023-08-18 VITALS
WEIGHT: 255.1 LBS | DIASTOLIC BLOOD PRESSURE: 84 MMHG | HEART RATE: 76 BPM | HEIGHT: 74 IN | SYSTOLIC BLOOD PRESSURE: 132 MMHG | BODY MASS INDEX: 32.74 KG/M2

## 2023-08-18 DIAGNOSIS — Z98.890 S/P MVR (MITRAL VALVE REPAIR): ICD-10-CM

## 2023-08-18 DIAGNOSIS — Z95.2 S/P AVR: ICD-10-CM

## 2023-08-18 DIAGNOSIS — I35.0 NONRHEUMATIC AORTIC VALVE STENOSIS: Primary | ICD-10-CM

## 2023-08-18 DIAGNOSIS — I10 ESSENTIAL HYPERTENSION: ICD-10-CM

## 2023-08-18 PROCEDURE — 93000 ELECTROCARDIOGRAM COMPLETE: CPT | Performed by: INTERNAL MEDICINE

## 2023-08-18 PROCEDURE — 99214 OFFICE O/P EST MOD 30 MIN: CPT | Performed by: INTERNAL MEDICINE

## 2023-08-18 RX ORDER — AMOXICILLIN 500 MG/1
2000 CAPSULE ORAL ONCE
Qty: 4 CAPSULE | Refills: 1 | Status: SHIPPED | OUTPATIENT
Start: 2023-08-18 | End: 2023-08-18

## 2023-08-18 NOTE — PROGRESS NOTES
Cardiology Follow Up    Cindy Alonso  [id number]  1955  [address] [place] [address] [facility]  [phone]      1. Nonrheumatic aortic valve stenosis  POCT ECG      2. S/P AVR  amoxicillin (AMOXIL) 500 mg capsule      3. S/P MVR (mitral valve repair)  amoxicillin (AMOXIL) 500 mg capsule      4. Essential hypertension            Discussion/Summary:    Status post bioprosthetic aortic valve replacement and mitral valve repair. Combined systolic and diastolic congestive heart failure. Very brief postoperative atrial fibrillation without evidence of recurrence. Nonobstructive CAD with plaque noted on the preoperative cardiac catheterization but did not require any bypass or intervention. Remains in sinus rhythm. He is without any symptoms except when he works long days. 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 Due for dental work. Aware of abx which are ordered. Volume status stable, not needing diuretics. EF is preserved. BP controlled. Had nonobstructive CAD on the catheterization so is on 40 mg of atorvastatin with a favorable lipid panel. Follow up 1 year. Previous History:    70year old man. Seen in 2019 for an evaluation of aortic stenosis, which was severe but asymptomatic. He was lost to follow up and then had seen his PCP for progressive shortness of breath, was in heart failure and sent to the hospital.    At that time in 5/2021, Was found to have progression of his aortic stenosis but also depression in LV function and moderate to severe mitral regurgitation. He was transferred to the [facility] and underwent preoperative testing. No obstructive CAD on his cardiac catheterization.       On 5/20/2021, he underwent bioprosthetic aortic valve replacement with a 25 mm valve, mitral valve repair with 32 mm ring, PFO closure but still had mild residual left-to-right passage postoperatively. Ejection fraction on the intraoperative KATIA after his surgery was in the range of 40-45%. He did have brief postoperative atrial fibrillation, but converted to sinus rhythm and was not started on systemic anticoagulation. He has done well since. Interval history:    Returns for follow up. Orthopedic issues continue to predominate. He is not able to work the long hours he used to, cardiac limitations as well as orthopedic. He has [place] paperwork we fill out, and this needs to be done every 6 months. No issues from cardiac standpoint recently. He had echo done and valve function is normal, EF is preserved. Has chipped tooth. Needs to see dentist. Abx ordered.       Patient Active Problem List   Diagnosis   • Nonrheumatic aortic valve stenosis   • Chronic obstructive lung disease (HCC) with chronic bronchitis   • Erectile dysfunction of non-organic origin   • Hyperlipidemia   • Essential hypertension   • Rotator cuff tendinitis Left   • GERD (gastroesophageal reflux disease)   • Chronic combined systolic (congestive) and diastolic (congestive) heart failure (HCC)   • Osteoarthritis of both knees   • S/P AVR   • PFO (patent foramen ovale)   • S/P MVR (mitral valve repair)   • Lumbar degenerative disc disease   • Current smoker   • Chronic pain of right knee     Past Medical History:   Diagnosis Date   • Broken humerus    • COPD (chronic obstructive pulmonary disease) ([address])    • COVID-19 virus infection 2021   • Pneumonia      Social History     Tobacco Use   • Smoking status: Every Day     Packs/day: 0.50     Types: Cigarettes     Start date:      Last attempt to quit: 2021     Years since quittin.2   • Smokeless tobacco: Never   Vaping Use   • Vaping Use: Never used   Substance Use Topics   • Alcohol use: Yes     Comment: once a month   • Drug use: No      Family History   Problem Relation Age of Onset   • Prostate cancer Father      Past Surgical History:   Procedure Laterality Date   • IR CHEST TUBE PLACEMENT  5/21/2021   • MULTIPLE TOOTH EXTRACTIONS N/A 5/17/2021    Procedure: EXTRACTION TEETH MULTIPLE 1, 3, 18, 30, EXCISION OF LEFT BUCCAL FIBROMA;  Surgeon: Delilah Judge DMD;  Location: [facility];  Service: Maxillofacial   • TN ECHO TRANSESOPHAG R-T 2D W/PRB IMG ACQUISJ I&R N/A 5/20/2021    Procedure: TRANSESOPHAGEAL ECHOCARDIOGRAM (KATIA);   Surgeon: Roni Gerber MD;  Location[facility];  Service: Cardiac Surgery   • TN REPLACEMENT MITRAL VALVE W/CARDIOPULMONARY BYP N/A 5/20/2021    Procedure: VALVE MITRAL (MVR) REPAIR with 32mm physio II annuloplasty ring;  Surgeon: Roni Gerber MD;  Location[facility];  Service: Cardiac Surgery   • TN RPLCMT AORTIC VALVE OPN W/STENTLESS TISSUE VALVE N/A 5/20/2021    Procedure: REPLACEMENT VALVE AORTIC (AVR) TISSUE with 25mm montgomery inspiris tissue valve;  Surgeon: Roni Gerber MD;  [facility];  Service: Cardiac Surgery       Current Outpatient Medications:   •  albuterol (PROVENTIL HFA,VENTOLIN HFA) 90 mcg/act inhaler, Inhale 1-2 puffs every 4 (four) hours as needed for wheezing, Disp: 18 g, Rfl: 0  •  amLODIPine (NORVASC) 5 mg tablet, TAKE 1 TABLET BY MOUTH EVERY DAY, Disp: 90 tablet, Rfl: 3  •  amoxicillin (AMOXIL) 500 mg capsule, Take 4 capsules (2,000 mg total) by mouth 1 (one) time for 1 dose Prior to dental work, Disp: 4 capsule, Rfl: 1  •  Anoro Ellipta 62.5-25 MCG/ACT inhaler, INHALE 1 PUFF BY MOUTH EVERY DAY, Disp: 60 each, Rfl: 5  •  aspirin (ECOTRIN LOW STRENGTH) 81 mg EC tablet, Take 1 tablet (81 mg total) by mouth daily, Disp: , Rfl:   •  atorvastatin (LIPITOR) 40 mg tablet, Take 1 tablet (40 mg total) by mouth every evening, Disp: 90 tablet, Rfl: 3  •  B Complex Vitamins (VITAMIN B COMPLEX 100 IJ), Inject as directed, Disp: , Rfl:   • dextromethorphan-guaifenesin (MUCINEX DM)  MG per 12 hr tablet, Take 1 tablet by mouth every 12 (twelve) hours (Patient taking differently: Take 1 tablet by mouth every 12 (twelve) hours as needed), Disp: 20 tablet, Rfl: 0  •  losartan (COZAAR) 50 mg tablet, TAKE 1 TABLET BY MOUTH EVERY DAY, Disp: 90 tablet, Rfl: 3  •  metoprolol tartrate (LOPRESSOR) 50 mg tablet, TAKE 1 TABLET (50 MG TOTAL) BY MOUTH EVERY 12 (TWELVE) HOURS, Disp: 180 tablet, Rfl: 3  •  Multiple Minerals-Vitamins (Calcium Citrate-Mag-Minerals) TABS, Take by mouth, Disp: , Rfl:   •  pantoprazole (PROTONIX) 40 mg tablet, TAKE 1 TABLET (40 MG TOTAL) BY MOUTH AS NEEDED IN THE MORNING (HEARTBURN OR ACID REFLUX). , Disp: 90 tablet, Rfl: 1  •  sildenafil (REVATIO) 20 mg tablet, 3-5 pills 1 hr before need, Disp: 90 tablet, Rfl: 1  •  Zinc 10 MG LOZG, Apply to the mouth or throat, Disp: , Rfl:   No Known Allergies    Vitals:    08/18/23 0818   BP: 132/84   BP Location: Left arm   Patient Position: Sitting   Cuff Size: Standard   Pulse: 76   Weight: 116 kg (255 lb 1.6 oz)   Height: 6' 2" (1.88 m)     Vitals:    08/18/23 0818   Weight: 116 kg (255 lb 1.6 oz)      Height: 6' 2" (188 cm)   Body mass index is 32.75 kg/m². Physical Exam:  GEN: Luis Socks appears well, alert and oriented x 3, pleasant and cooperative   HEENT: pupils equal, round, and reactive to light; extraocular muscles intact  NECK: supple, no carotid bruits   HEART: regular rhythm, + TERESA   LUNGS: clear to auscultation bilaterally; no wheezes, rales, or rhonchi   ABDOMEN: normal bowel sounds, soft, no tenderness, no distention  EXTREMITIES: peripheral pulses normal; no clubbing, cyanosis, or edema  NEURO: no focal findings   SKIN: normal without suspicious lesions on exposed skin    ROS:  Positive for shoulder pain, knee pain, difficulty sleeping, heartburn, arthritis, back pain, hearing problems, kidney stones, erectile dysfunction.   Except as noted in HPI, is otherwise reviewed in detail and a 12 point review of systems is negative. ROS reviewed and is unchanged    Labs:  Lab Results   Component Value Date     08/07/2014    K 4.4 01/18/2022     01/18/2022    CREATININE 0.81 01/18/2022    BUN 22 01/18/2022    CO2 28 01/18/2022    ALT 39 01/18/2022    AST 27 01/18/2022    INR 1.22 (H) 05/26/2021    GLUF 101 (H) 01/18/2022    HGBA1C 5.8 (H) 05/13/2021    WBC 8.89 01/18/2022    HGB 14.6 01/18/2022    HCT 44.4 01/18/2022     01/18/2022       Lab Results   Component Value Date    CHOL 209 08/07/2014     Lab Results   Component Value Date    HDL 49 05/13/2021    HDL 39 08/07/2014     Lab Results   Component Value Date    LDLCALC 28 05/13/2021    LDLCALC 126 (H) 08/07/2014     Lab Results   Component Value Date    TRIG 129 05/13/2021    TRIG 220 08/07/2014       Testing:  Echo 8/10/23  Left Ventricle: Left ventricular cavity size is normal. Wall thickness is increased. There is severe concentric hypertrophy. The left ventricular ejection fraction is 70%. Systolic function is vigorous. Wall motion is normal.  •  Right Ventricle: Right ventricular cavity size is mildly dilated. Systolic function is mildly reduced. •  Left Atrium: The atrium is moderately dilated. •  Right Atrium: The atrium is moderately dilated. •  Aortic Valve: There is a bioprosthetic valve. The prosthetic valve appears well-seated and appears to be functioning normally. There is no evidence of transvalvular regurgitation. The gradient recorded across the prosthetic aortic valve is within the expected range. The aortic valve peak velocity is 1.75 m/s. The aortic valve mean gradient is 7 mmHg. •  Mitral Valve: The valve has been repaired with an annular ring. The mitral valve mean gradient is 5mmHg. •  Tricuspid Valve: There is mild regurgitation. The right ventricular systolic pressure is mildly elevated. The estimated right ventricular systolic pressure is 34.89 mmHg. •  Aorta:  The aortic root is normal in size. The ascending aorta is mildly dilated. The ascending aorta is 4 cm. Cardiac Cath 5/2021:  CORONARY CIRCULATION:  Left main: Normal.  LAD: The vessel was large sized. Angiography showed moderate non-obstructive atherosclerosis. Circumflex: The vessel was large sized and dominant, giving rise to several OM and posterolateral branches and the PDA. There was diffuse atherosclerosis of the branch vessels. RCA: Non-dominant; normal.  KATIA 5/2021:  LEFT VENTRICLE:  The ventricle was mildly dilated. Systolic function was moderately reduced. Ejection fraction was estimated to be 45 %. There was moderate diffuse hypokinesis. Wall thickness was moderately increased. The changes were consistent with eccentric hypertrophy. Features were consistent with a pseudonormal left ventricular filling pattern, with concomitant abnormal relaxation and increased filling pressure (grade 2 diastolic dysfunction).     RIGHT VENTRICLE:  The ventricle was mildly dilated. Systolic function was moderately reduced.     LEFT ATRIUM:  The atrium was mildly dilated.     LEFT ATRIAL APPENDAGE:  No thrombus was identified.     ATRIAL SEPTUM:  There was a patent foramen ovale measuring 5 mm. Doppler evaluation was performed. There was a left-to-right shunt, in the baseline state.     RIGHT ATRIUM:  The atrium was mildly dilated.     MITRAL VALVE:  There was mild to moderate annular calcification. There was an area of image dropout at the base of the posterior leaflet of the mitral valve in the P1- P2 segment. This was possibly artifact but a perforation or sinus cannot be entirely ruled out. There appeared to be flow into but not  out of this region. There was mild to moderate regurgitation. The regurgitant jet was centrally directed.     AORTIC VALVE:  The valve was not visualized well enough to rule out a bicuspid morphology. Leaflets exhibited marked calcification and immobility. There was severe stenosis.   There was mild to moderate regurgitation. Valve mean gradient was 44 mmHg.     TRICUSPID VALVE:  There was mild regurgitation.     PERICARDIUM:  A small pericardial effusion was identified circumferential to the heart. TTE 5/2021:  LEFT VENTRICLE:  Moderate decreased left ventricular systolic function, EF 73%. Significantly decreased left ventricular global longitudinal strain, minus 7.4%. Severe concentric left ventricular hypertrophy, wall thickness 21 mm. Mildly increased left  ventricular cavity size. Grade 3 left ventricular diastolic dysfunction. Severely elevated left ventricular filling pressures.     RIGHT VENTRICLE:  Mild right ventricular enlargement with well preserved right ventricular systolic function.     LEFT ATRIUM:  Moderate left atrial enlargement.     RIGHT ATRIUM:  Mild right atrial enlargement.     MITRAL VALVE:  Moderate possibly severe mitral regurgitation. No mitral stenosis. Mitral ERO 0.5 cm2. Mitral valve regurgitant volume 85 mL. The mitral valve annulus was heavily calcified. The mitral valve leaflets were calcified but have well preserved  mobility. There was some calcification in the chordae.     AORTIC VALVE:  Heavily calcified aortic valve with severe, probably critical aortic stenosis. No significant aortic regurgitation. Aortic valve maximum velocity 5.3 M/S. Aortic valve mean gradient 81 mmHg. Aortic valve area 0.3 cm2.     TRICUSPID VALVE:  There was mild regurgitation.     AORTA:  Aortic root at the upper limits of normal to mildly enlarged at 3.8 cm     PULMONARY ARTERIES:  Moderate pulmonary hypertension. Pulmonary artery systolic pressures estimated 54 mmHg.     PERICARDIUM:  Mild pericardial effusion without hemodynamic significance. Echo 6/2019:  1. Severe concentric left ventricular hypertrophy, normal left ventricular systolic function, grade 1 diastolic dysfunction. EF around 60 percent. 2. Mild left atrial cavity enlargement. 3. Severe aortic valve stenosis.  Peak transaortic velocity is 4.2 meters/seconds, mean gradient is 45 mmHg and calculated aortic valve area is 0 0.6 cm2. No aortic valve regurgitation noted. 4. Mitral valve leaflet sclerosis, trace to mild mitral valve regurgitation noted. 5. Trace tricuspid valve regurgitation. 6. No obvious pulmonary hypertension. 7. No pericardial effusion.

## 2023-09-26 DIAGNOSIS — Z95.2 S/P AVR: ICD-10-CM

## 2023-09-26 DIAGNOSIS — I10 ESSENTIAL HYPERTENSION: ICD-10-CM

## 2023-09-26 DIAGNOSIS — Z95.2 S/P MVR (MITRAL VALVE REPLACEMENT): ICD-10-CM

## 2023-09-26 RX ORDER — METOPROLOL TARTRATE 50 MG/1
50 TABLET, FILM COATED ORAL EVERY 12 HOURS
Qty: 180 TABLET | Refills: 3 | Status: SHIPPED | OUTPATIENT
Start: 2023-09-26

## 2023-09-26 RX ORDER — LOSARTAN POTASSIUM 50 MG/1
TABLET ORAL
Qty: 90 TABLET | Refills: 3 | Status: SHIPPED | OUTPATIENT
Start: 2023-09-26

## 2023-10-09 DIAGNOSIS — I50.42 CHRONIC COMBINED SYSTOLIC (CONGESTIVE) AND DIASTOLIC (CONGESTIVE) HEART FAILURE (HCC): Primary | ICD-10-CM

## 2023-10-09 RX ORDER — FUROSEMIDE 20 MG/1
20 TABLET ORAL DAILY PRN
Qty: 30 TABLET | Refills: 5 | Status: SHIPPED | OUTPATIENT
Start: 2023-10-09

## 2023-10-12 NOTE — PROCEDURES
Procedure: Chest Tube Removal    05/23/21    Mediastinal CT x 2 d/c'd in typical fashion  Patient tolerated procedure well  No immediate complications  Site dressed with Acticoat dressing   Patient's nurse was made aware of removal      Jannis Soulier, PA-C
Procedure: Epicardial Wire Removal    05/22/21    Patient was returned to bed  EPW x 2 d/c'd in typical fashion  No immediate complications  Site dressed with dry sterile dressing  Patient and nurse aware of mandatory 1 hour bedrest protocol  Vital signs ordered Q 15 minutes for one hour as per protocol      Judie Husain PA-C
Quality 110: Preventive Care And Screening: Influenza Immunization: Influenza Immunization Administered during Influenza season
Detail Level: Detailed

## 2023-10-31 DIAGNOSIS — K21.9 GASTROESOPHAGEAL REFLUX DISEASE WITHOUT ESOPHAGITIS: ICD-10-CM

## 2023-10-31 RX ORDER — PANTOPRAZOLE SODIUM 40 MG/1
40 TABLET, DELAYED RELEASE ORAL DAILY PRN
Qty: 90 TABLET | Refills: 1 | Status: SHIPPED | OUTPATIENT
Start: 2023-10-31

## 2023-11-02 DIAGNOSIS — I50.42 CHRONIC COMBINED SYSTOLIC (CONGESTIVE) AND DIASTOLIC (CONGESTIVE) HEART FAILURE (HCC): ICD-10-CM

## 2023-11-02 RX ORDER — FUROSEMIDE 20 MG/1
20 TABLET ORAL DAILY PRN
Qty: 90 TABLET | Refills: 1 | Status: SHIPPED | OUTPATIENT
Start: 2023-11-02

## 2023-12-06 ENCOUNTER — DOCUMENTATION (OUTPATIENT)
Dept: CARDIOLOGY CLINIC | Facility: CLINIC | Age: 68
End: 2023-12-06

## 2023-12-07 NOTE — PATIENT INSTRUCTIONS
Reviewed health history along with medications. [facility], did use doxycycline along with prednisone, still with significant cough, green mucus without fevers. Can continue Anoro inhaler, uses albuterol roughly 3 times weekly as needed. He will use azithromycin. Still smoking 1/2 to 1/3 pack/day, keep working on quitting, well aware of risks associated with smoking, I would like him to do screening CT of lung, last CT of chest was in 2021. Pulmonary function testing in 2021 did show moderately severe COPD  Can use Mucinex as needed. He saw cardiology in August, history AVR/MVR, catheterization showed nonobstructive CAD, continue to control risk as can. Blood pressure is acceptable here today. Continue with medication as is. He has been using furosemide 20 mg once or twice weekly for right lower extremity swelling, call if worsens. Swelling does go down in the morning, he does use compression sleeves on knees which aggravate swelling. Plans to see orthopedist as needed regarding knees. Uses Tylenol, avoiding NSAIDs    He had not done blood work after last visit, overdue for blood work, has slip for fasting lipids, PSA screening, urinalysis, CBC along with CMP. He relates he will do that soon. Has had no breakthrough acid reflux, uses pantoprazole 40 mg daily. Immunization History   Administered Date(s) Administered    COVID-19 MODERNA VACC 0.5 ML IM 03/29/2021, 04/29/2021    INFLUENZA 10/13/2017, 10/14/2020, 10/26/2021, 11/03/2022    Influenza Quadrivalent 3 years and older 10/15/2018    Influenza, injectable, quadrivalent, preservative free 0.5 mL 10/25/2019    Influenza, seasonal, injectable 10/27/2014, 10/27/2015, 10/28/2016    Pneumococcal Conjugate 13-Valent 04/16/2019    Pneumococcal Polysaccharide PPV23 08/30/2016    Tdap 02/22/2017     Discussed Vaccines,    Pneumococcal vax is up to date  He does do yearly Flu shot.  Given today  Tdap/tetanus shot is up to date  (done every 10 yrs for superficial cuts, every 5 yrs for deep wounds)  Can do 'shingles' shot, Shingrix at pharmacy. Covid vaccine rcvd x 2 - can do booster at pharmacy  Can do RSV at pharmacy     Regarding Colon Cancer screening, his Cologuard order , I did reorder that. He is aware risks with not doing screening. Discussed pros and cons regarding Prostate Cancer screening, has order to do PSA screening. I did reorder sildenafil for him, uses that without chest pain    We discussed end of life planning, does not have a  "LIVING WILL"   "FIVE WISHES" handout was discussed and given to fill out at home -  has discussed wishes w wife    Regarding Hepatitis C Screening,  previously perfomed and was negative. Glaucoma screening is up-to-date    Discussed importance of routine exercise, healthy diet. Wt today about same as last yr    We will see him again in 6 months, sooner as needed. Does plan to retire next year around 2024    Advance Directives   What are advance directives? Advance directives are legal documents that state your wishes and plans for medical care. These plans are made ahead of time in case you lose your ability to make decisions for yourself. Advance directives can apply to any medical decision, such as the treatments you want, and if you want to donate organs. What are the types of advance directives? There are many types of advance directives, and each state has rules about how to use them. You may choose a combination of any of the following:  Living will: This is a written record of the treatment you want. You can also choose which treatments you do not want, which to limit, and which to stop at a certain time. This includes surgery, medicine, IV fluid, and tube feedings. Durable power of  for healthcare [facility]):   This is a written record that states who you want to make healthcare choices for you when you are unable to make them for yourself. This person, called a proxy, is usually a family member or a friend. You may choose more than 1 proxy. Do not resuscitate (DNR) order:  A DNR order is used in case your heart stops beating or you stop breathing. It is a request not to have certain forms of treatment, such as CPR. A DNR order may be included in other types of advance directives. Medical directive: This covers the care that you want if you are in a coma, near death, or unable to make decisions for yourself. You can list the treatments you want for each condition. Treatment may include pain medicine, surgery, blood transfusions, dialysis, IV or tube feedings, and a ventilator (breathing machine). Values history: This document has questions about your views, beliefs, and how you feel and think about life. This information can help others choose the care that you would choose. Why are advance directives important? An advance directive helps you control your care. Although spoken wishes may be used, it is better to have your wishes written down. Spoken wishes can be misunderstood, or not followed. Treatments may be given even if you do not want them. An advance directive may make it easier for your family to make difficult choices about your care.

## 2023-12-08 ENCOUNTER — OFFICE VISIT (OUTPATIENT)
Dept: FAMILY MEDICINE CLINIC | Facility: CLINIC | Age: 68
End: 2023-12-08
Payer: COMMERCIAL

## 2023-12-08 VITALS
BODY MASS INDEX: 33.01 KG/M2 | HEIGHT: 74 IN | TEMPERATURE: 99 F | WEIGHT: 257.2 LBS | OXYGEN SATURATION: 95 % | RESPIRATION RATE: 18 BRPM | SYSTOLIC BLOOD PRESSURE: 130 MMHG | HEART RATE: 63 BPM | DIASTOLIC BLOOD PRESSURE: 84 MMHG

## 2023-12-08 DIAGNOSIS — E78.5 HYPERLIPIDEMIA, UNSPECIFIED HYPERLIPIDEMIA TYPE: ICD-10-CM

## 2023-12-08 DIAGNOSIS — J20.9 CHRONIC BRONCHITIS WITH ACUTE EXACERBATION (HCC): ICD-10-CM

## 2023-12-08 DIAGNOSIS — K21.9 GASTROESOPHAGEAL REFLUX DISEASE WITHOUT ESOPHAGITIS: ICD-10-CM

## 2023-12-08 DIAGNOSIS — J42 CHRONIC BRONCHITIS WITH ACUTE EXACERBATION (HCC): ICD-10-CM

## 2023-12-08 DIAGNOSIS — N52.9 VASCULOGENIC ERECTILE DYSFUNCTION, UNSPECIFIED VASCULOGENIC ERECTILE DYSFUNCTION TYPE: ICD-10-CM

## 2023-12-08 DIAGNOSIS — Q21.12 PFO (PATENT FORAMEN OVALE): ICD-10-CM

## 2023-12-08 DIAGNOSIS — Z98.890 S/P MVR (MITRAL VALVE REPAIR): ICD-10-CM

## 2023-12-08 DIAGNOSIS — Z00.00 ENCOUNTER FOR ANNUAL PHYSICAL EXAM: Primary | ICD-10-CM

## 2023-12-08 DIAGNOSIS — I10 ESSENTIAL HYPERTENSION: ICD-10-CM

## 2023-12-08 DIAGNOSIS — Z12.11 COLON CANCER SCREENING: ICD-10-CM

## 2023-12-08 DIAGNOSIS — Z23 ENCOUNTER FOR IMMUNIZATION: ICD-10-CM

## 2023-12-08 DIAGNOSIS — Z95.2 S/P AVR: ICD-10-CM

## 2023-12-08 DIAGNOSIS — F17.200 CURRENT SMOKER: ICD-10-CM

## 2023-12-08 DIAGNOSIS — I50.42 CHRONIC COMBINED SYSTOLIC (CONGESTIVE) AND DIASTOLIC (CONGESTIVE) HEART FAILURE (HCC): ICD-10-CM

## 2023-12-08 PROCEDURE — 99397 PER PM REEVAL EST PAT 65+ YR: CPT | Performed by: FAMILY MEDICINE

## 2023-12-08 PROCEDURE — 90471 IMMUNIZATION ADMIN: CPT

## 2023-12-08 PROCEDURE — 90662 IIV NO PRSV INCREASED AG IM: CPT

## 2023-12-08 PROCEDURE — 99213 OFFICE O/P EST LOW 20 MIN: CPT | Performed by: FAMILY MEDICINE

## 2023-12-08 RX ORDER — AZITHROMYCIN 250 MG/1
TABLET, FILM COATED ORAL
Qty: 6 TABLET | Refills: 0 | Status: SHIPPED | OUTPATIENT
Start: 2023-12-08 | End: 2023-12-15

## 2023-12-08 RX ORDER — SILDENAFIL CITRATE 20 MG/1
TABLET ORAL
Qty: 90 TABLET | Refills: 1 | Status: SHIPPED | OUTPATIENT
Start: 2023-12-08

## 2023-12-08 NOTE — PROGRESS NOTES
FAMILY PRACTICE OFFICE VISIT  Branden Saucedo D.O. [address] [facility]  [address] [place] [address],       NAME: Merlin Spears  AGE: 70 y.o. SEX: male  : 1955   MRN: [id number]    DATE: 2023  TIME: 11:59 AM    Assessment and Plan     Problem List Items Addressed This Visit       Chronic combined systolic (congestive) and diastolic (congestive) heart failure (HCC)    Relevant Medications    sildenafil (REVATIO) 20 mg tablet    Chronic obstructive lung disease (HCC) with chronic bronchitis    Relevant Medications    azithromycin (ZITHROMAX) 250 mg tablet    Current smoker    Relevant Orders    CT lung screening program    Essential hypertension    GERD (gastroesophageal reflux disease)    Hyperlipidemia    PFO (patent foramen ovale)    Relevant Medications    sildenafil (REVATIO) 20 mg tablet    S/P AVR    S/P MVR (mitral valve repair)     Other Visit Diagnoses       Encounter for annual physical exam    -  Primary    Encounter for immunization        Relevant Orders    influenza vaccine, high-dose, PF 0.7 mL (FLUZONE HIGH-DOSE) (Completed)    Colon cancer screening        Relevant Orders    Cologuard    Vasculogenic erectile dysfunction        Relevant Medications    sildenafil (REVATIO) 20 mg tablet            Patient Instructions   Reviewed health history along with medications. [facility], did use doxycycline along with prednisone, still with significant cough, green mucus without fevers. Can continue Anoro inhaler, uses albuterol roughly 3 times weekly as needed. He will use azithromycin. Still smoking 1/2 to 1/3 pack/day, keep working on quitting, well aware of risks associated with smoking, I would like him to do screening CT of lung, last CT of chest was in . Pulmonary function testing in  did show moderately severe COPD  Can use Mucinex as needed.     He saw cardiology in August, history AVR/MVR, catheterization showed nonobstructive CAD, continue to control risk as can. Blood pressure is acceptable here today. Continue with medication as is. He has been using furosemide 20 mg once or twice weekly for right lower extremity swelling, call if worsens. Swelling does go down in the morning, he does use compression sleeves on knees which aggravate swelling. Plans to see orthopedist as needed regarding knees. Uses Tylenol, avoiding NSAIDs    He had not done blood work after last visit, overdue for blood work, has slip for fasting lipids, PSA screening, urinalysis, CBC along with CMP. He relates he will do that soon. Has had no breakthrough acid reflux, uses pantoprazole 40 mg daily. Immunization History   Administered Date(s) Administered    COVID-19 MODERNA VACC 0.5 ML IM 2021, 2021    INFLUENZA 10/13/2017, 10/14/2020, 10/26/2021, 2022    Influenza Quadrivalent 3 years and older 10/15/2018    Influenza, injectable, quadrivalent, preservative free 0.5 mL 10/25/2019    Influenza, seasonal, injectable 10/27/2014, 10/27/2015, 10/28/2016    Pneumococcal Conjugate 13-Valent 2019    Pneumococcal Polysaccharide PPV23 2016    Tdap 2017     Discussed Vaccines,    Pneumococcal vax is up to date  He does do yearly Flu shot. Given today  Tdap/tetanus shot is up to date  (done every 10 yrs for superficial cuts, every 5 yrs for deep wounds)  Can do 'shingles' shot, Shingrix at pharmacy. Covid vaccine rcvd x 2 - can do booster at pharmacy  Can do RSV at pharmacy     Regarding Colon Cancer screening, his Cologuard order , I did reorder that. He is aware risks with not doing screening. Discussed pros and cons regarding Prostate Cancer screening, has order to do PSA screening.   I did reorder sildenafil for him, uses that without chest pain    We discussed end of life planning, does not have a  "LIVING WILL"   "FIVE WISHES" handout was discussed and given to fill out at home -  has discussed wishes w wife    Regarding Hepatitis C Screening,  previously perfomed and was negative. Glaucoma screening is up-to-date    Discussed importance of routine exercise, healthy diet. Wt today about same as last yr    We will see him again in 6 months, sooner as needed. Does plan to retire next year around March 2024    Advance Directives   What are advance directives? Advance directives are legal documents that state your wishes and plans for medical care. These plans are made ahead of time in case you lose your ability to make decisions for yourself. Advance directives can apply to any medical decision, such as the treatments you want, and if you want to donate organs. What are the types of advance directives? There are many types of advance directives, and each state has rules about how to use them. You may choose a combination of any of the following:  Living will: This is a written record of the treatment you want. You can also choose which treatments you do not want, which to limit, and which to stop at a certain time. This includes surgery, medicine, IV fluid, and tube feedings. Durable power of  for healthcare [facility]): This is a written record that states who you want to make healthcare choices for you when you are unable to make them for yourself. This person, called a proxy, is usually a family member or a friend. You may choose more than 1 proxy. Do not resuscitate (DNR) order:  A DNR order is used in case your heart stops beating or you stop breathing. It is a request not to have certain forms of treatment, such as CPR. A DNR order may be included in other types of advance directives. Medical directive: This covers the care that you want if you are in a coma, near death, or unable to make decisions for yourself. You can list the treatments you want for each condition.  Treatment may include pain medicine, surgery, blood transfusions, dialysis, IV or tube feedings, and a ventilator (breathing machine). Values history: This document has questions about your views, beliefs, and how you feel and think about life. This information can help others choose the care that you would choose. Why are advance directives important? An advance directive helps you control your care. Although spoken wishes may be used, it is better to have your wishes written down. Spoken wishes can be misunderstood, or not followed. Treatments may be given even if you do not want them. An advance directive may make it easier for your family to make difficult choices about your care. Chief Complaint     Chief Complaint   Patient presents with    Physical Exam       History of Present Illness   Kahlil Valentine is a 70y.o.-year-old male who is in for a routine follow-up/physical, he was seen at urgent care in October with a cough, use prednisone along with doxycycline, continues with cough, green mucus, no fevers. Uses Anoro with benefit, uses albuterol roughly 3 times weekly at work. Still working, plans to retire in March    Did see cardiology in August.  He is using medication as directed, uses furosemide 20 mg once or twice weekly regarding swelling right lower extremity. Does have ongoing knee pains, sees orthopedist as needed. Unfortunately still smoking 1/3 to 1/2 pack/day      Review of Systems   Review of Systems   Constitutional:  Negative for appetite change, fatigue, fever and unexpected weight change. HENT:  Positive for congestion. Negative for sore throat and trouble swallowing. Respiratory:  Positive for cough, shortness of breath (Baseline) and wheezing. Negative for chest tightness. Cardiovascular:  Positive for leg swelling (Notes dependent edema right lower extremity, does use tight compression band on knees, swelling is down in the morning for the most part). Negative for chest pain and palpitations.    Gastrointestinal: Negative for abdominal pain, blood in stool, nausea and vomiting. No acid reflux as long as uses PPI  No change in bowel-did not do Cologuard screening   Genitourinary:  Negative for dysuria and hematuria. Neurological:  Negative for dizziness, syncope, light-headedness and headaches (Occasional, no worse than baseline). Hematological:  Does not bruise/bleed easily. Psychiatric/Behavioral:  Negative for behavioral problems and confusion. Active Problem List     Patient Active Problem List   Diagnosis    Nonrheumatic aortic valve stenosis    Chronic obstructive lung disease (HCC) with chronic bronchitis    Erectile dysfunction of non-organic origin    Hyperlipidemia    Essential hypertension    Rotator cuff tendinitis Left    GERD (gastroesophageal reflux disease)    Chronic combined systolic (congestive) and diastolic (congestive) heart failure (HCC)    Osteoarthritis of both knees    S/P AVR    PFO (patent foramen ovale)    S/P MVR (mitral valve repair)    Lumbar degenerative disc disease    Current smoker    Chronic pain of right knee       Past Medical History:  Reviewed    Past Surgical History:  Reviewed    Family History:  Reviewed    Social History:  Reviewed    Objective     Vitals:    12/08/23 1108   BP: 130/84   BP Location: Left arm   Patient Position: Sitting   Cuff Size: Large   Pulse: 63   Resp: 18   Temp: 99 °F (37.2 °C)   TempSrc: Tympanic   SpO2: 95%   Weight: 117 kg (257 lb 3.2 oz)   Height: 6' 2" (1.88 m)     Body mass index is 33.02 kg/m². BP Readings from Last 3 Encounters:   12/08/23 130/84   08/18/23 132/84   08/10/23 136/84       Wt Readings from Last 3 Encounters:   12/08/23 117 kg (257 lb 3.2 oz)   08/18/23 116 kg (255 lb 1.6 oz)   08/10/23 114 kg (252 lb)       Physical Exam  Constitutional:       General: He is not in acute distress. Appearance: Normal appearance. He is well-developed. He is not ill-appearing.    HENT:      Right Ear: Tympanic membrane normal. Left Ear: Tympanic membrane normal.      Mouth/Throat:      Pharynx: Oropharynx is clear. Eyes:      General: No scleral icterus. Cardiovascular:      Rate and Rhythm: Normal rate and regular rhythm. Heart sounds: Normal heart sounds. No murmur heard. Pulmonary:      Effort: No respiratory distress. Comments: Does have wet cough. Has some diffuse rhonchi which mostly clear after cough. No Rales  Abdominal:      Palpations: Abdomen is soft. Tenderness: There is no abdominal tenderness. Musculoskeletal:      Right lower leg: Edema (+1 pitting right lower extremity, does have tight knee bands on both legs, no swelling on left) present. Left lower leg: No edema. Lymphadenopathy:      Cervical: No cervical adenopathy. Skin:     Coloration: Skin is not jaundiced. Neurological:      Mental Status: He is alert and oriented to person, place, and time. Mental status is at baseline.    Psychiatric:         Mood and Affect: Mood normal.         Behavior: Behavior normal.         ALLERGIES:  No Known Allergies    Current Medications     Current Outpatient Medications   Medication Sig Dispense Refill    albuterol (PROVENTIL HFA,VENTOLIN HFA) 90 mcg/act inhaler Inhale 1-2 puffs every 4 (four) hours as needed for wheezing 18 g 0    amLODIPine (NORVASC) 5 mg tablet TAKE 1 TABLET BY MOUTH EVERY DAY 90 tablet 3    Anoro Ellipta 62.5-25 MCG/ACT inhaler INHALE 1 PUFF BY MOUTH EVERY DAY 60 each 5    aspirin (ECOTRIN LOW STRENGTH) 81 mg EC tablet Take 1 tablet (81 mg total) by mouth daily      atorvastatin (LIPITOR) 40 mg tablet Take 1 tablet (40 mg total) by mouth every evening 90 tablet 3    azithromycin (ZITHROMAX) 250 mg tablet 2 today, 1 daily x 4 days after 6 tablet 0    B Complex Vitamins (VITAMIN B COMPLEX 100 IJ) Inject as directed      dextromethorphan-guaifenesin (MUCINEX DM)  MG per 12 hr tablet Take 1 tablet by mouth every 12 (twelve) hours (Patient taking differently: Take 1 tablet by mouth every 12 (twelve) hours as needed) 20 tablet 0    furosemide (LASIX) 20 mg tablet TAKE 1 TABLET (20 MG TOTAL) BY MOUTH DAILY AS NEEDED (SWELLING). 90 tablet 1    losartan (COZAAR) 50 mg tablet TAKE 1 TABLET BY MOUTH EVERY DAY 90 tablet 3    metoprolol tartrate (LOPRESSOR) 50 mg tablet TAKE 1 TABLET BY MOUTH EVERY 12 HOURS. 180 tablet 3    Multiple Minerals-Vitamins (Calcium Citrate-Mag-Minerals) TABS Take by mouth      pantoprazole (PROTONIX) 40 mg tablet TAKE 1 TABLET (40 MG TOTAL) BY MOUTH AS NEEDED IN THE MORNING (HEARTBURN OR ACID REFLUX). 90 tablet 1    sildenafil (REVATIO) 20 mg tablet 3-5 pills 1 hr before need 90 tablet 1    Turmeric (QC TUMERIC COMPLEX PO) Take by mouth      Zinc 10 MG LOZG Apply to the mouth or throat       No current facility-administered medications for this visit.             Orders Placed This Encounter   Procedures    [facility]    [facility] screening program    influenza vaccine, high-dose, PF 0.7 mL (FLUZONE HIGH-DOSE)         Kat Muse, DO

## 2023-12-17 DIAGNOSIS — J43.9 PULMONARY EMPHYSEMA, UNSPECIFIED EMPHYSEMA TYPE (HCC): ICD-10-CM

## 2023-12-17 RX ORDER — UMECLIDINIUM BROMIDE AND VILANTEROL TRIFENATATE 62.5; 25 UG/1; UG/1
POWDER RESPIRATORY (INHALATION)
Qty: 60 EACH | Refills: 5 | Status: SHIPPED | OUTPATIENT
Start: 2023-12-17

## 2024-02-09 ENCOUNTER — OFFICE VISIT (OUTPATIENT)
Dept: OBGYN CLINIC | Facility: CLINIC | Age: 69
End: 2024-02-09
Payer: COMMERCIAL

## 2024-02-09 VITALS
WEIGHT: 257 LBS | DIASTOLIC BLOOD PRESSURE: 80 MMHG | HEIGHT: 74 IN | BODY MASS INDEX: 32.98 KG/M2 | SYSTOLIC BLOOD PRESSURE: 120 MMHG

## 2024-02-09 DIAGNOSIS — M17.12 PRIMARY OSTEOARTHRITIS OF LEFT KNEE: ICD-10-CM

## 2024-02-09 DIAGNOSIS — M54.16 RADICULOPATHY, LUMBAR REGION: ICD-10-CM

## 2024-02-09 DIAGNOSIS — M51.36 DDD (DEGENERATIVE DISC DISEASE), LUMBAR: Primary | ICD-10-CM

## 2024-02-09 PROCEDURE — 20610 DRAIN/INJ JOINT/BURSA W/O US: CPT | Performed by: PHYSICIAN ASSISTANT

## 2024-02-09 PROCEDURE — 99214 OFFICE O/P EST MOD 30 MIN: CPT | Performed by: PHYSICIAN ASSISTANT

## 2024-02-09 RX ORDER — LIDOCAINE HYDROCHLORIDE 10 MG/ML
2 INJECTION, SOLUTION INFILTRATION; PERINEURAL
Status: COMPLETED | OUTPATIENT
Start: 2024-02-09 | End: 2024-02-09

## 2024-02-09 RX ORDER — PREDNISONE 10 MG/1
TABLET ORAL
Qty: 42 TABLET | Refills: 0 | Status: SHIPPED | OUTPATIENT
Start: 2024-02-09

## 2024-02-09 RX ORDER — BETAMETHASONE SODIUM PHOSPHATE AND BETAMETHASONE ACETATE 3; 3 MG/ML; MG/ML
6 INJECTION, SUSPENSION INTRA-ARTICULAR; INTRALESIONAL; INTRAMUSCULAR; SOFT TISSUE
Status: COMPLETED | OUTPATIENT
Start: 2024-02-09 | End: 2024-02-09

## 2024-02-09 RX ORDER — BUPIVACAINE HYDROCHLORIDE 2.5 MG/ML
2 INJECTION, SOLUTION INFILTRATION; PERINEURAL
Status: COMPLETED | OUTPATIENT
Start: 2024-02-09 | End: 2024-02-09

## 2024-02-09 RX ADMIN — BETAMETHASONE SODIUM PHOSPHATE AND BETAMETHASONE ACETATE 6 MG: 3; 3 INJECTION, SUSPENSION INTRA-ARTICULAR; INTRALESIONAL; INTRAMUSCULAR; SOFT TISSUE at 14:30

## 2024-02-09 RX ADMIN — LIDOCAINE HYDROCHLORIDE 2 ML: 10 INJECTION, SOLUTION INFILTRATION; PERINEURAL at 14:30

## 2024-02-09 RX ADMIN — BUPIVACAINE HYDROCHLORIDE 2 ML: 2.5 INJECTION, SOLUTION INFILTRATION; PERINEURAL at 14:30

## 2024-02-09 NOTE — PROGRESS NOTES
Patient Name:  Owen Hinkle  MRN:  [id number]    Assessment & Plan     Left knee DJD.  Lumbar spine pain with bilateral collapse in setting of multilevel DDD.  Patient was offered excepted a left knee intra-articular corticosteroid injection today.  No red flags appreciated today with regards to patient's lumbar spine.  Referral placed today for physical therapy for the lumbar spine.  Prescription for prednisone taper for the low back pain as well.  Activity as tolerated.  Modification to avoid pain.  Follow-up in 6 weeks with reports of the lumbar spine.  Consider MRI if symptoms persist.  With regards to the patient's left knee advised intra-articular corticosteroid injections may be performed every 3 months as indicated for pain.  Also discussed hyaluronic acid injections as well.  Patient will call to initiate the authorization process for hyaluronic acid injections if no significant improvement is noted with intra-articular corticosteroid junction performed today.    Chief Complaint     Left knee pain.  Low back pain.    History of the Present Illness     Owen Hinkle is a 68 y.o. male who reports to the office today for evaluation of his left knee and low back.  Patient was last seen on 3/29/2023 with regards to the left knee.  At that time received a left knee intra-articular corticosteroid injection.  He noted significant improvement for approximately 1 to 2 months and then noted a gradual return of his pain.  He now notes persistent left knee pain generalized in nature.  Pain is worse with prolonged weightbearing and ambulating.  He notes associated swelling and stiffness.  He has been utilizing a compressive sleeve with mild improvement and takes Tylenol with some improvement as well.  He is unable to take NSAIDs due to his cardiac issues.  He is interested in a repeat intra-articular corticosteroid injection today.    He also notes chronic low back pain which has been ongoing for many years.  He notes  "worsening pain over the past 6 months or so.  He denies any injury or trauma.  He notes axial lumbar spine pain with radiation distally along the bilateral lower extremities to the feet.  He also notes associated numbness and tingling in this distribution as well.  Pain is worse with increased activity as well as prolonged standing and walking.  He states the pain limits his ability to ambulate long distances.  He has been taking Tylenol without lasting improvement.  He has not tried any therapy in the past for his low back.  He describes the pain as a constant ache with episodes of sharp pain.  Pain is relieved with rest.  He denies any bowel or bladder dysfunction.  No saddle paresthesias.  No prior low back injury.    Physical Exam     /80   Ht 6' 2\" (1.88 m)   Wt 117 kg (257 lb)   BMI 33.00 kg/m²     Left knee: Skin intact. Slight varus deformity. No erythema ecchymosis or swelling. Trace effusion. No significant joint line tenderness. Range of motion is full extension and flexion to 120 degrees with pain and crepitation noted. Stable to varus and valgus stress without pain. Stable Lachman test. Negative posterior drawer test. Negative Gerald's test.     Lumbar spine: No gross deformity.  No tenderness midline and paraspinal musculature.  No tenderness bilateral SI joints and piriformis musculature.  Motor intact L2-S1 bilaterally with 5 out of 5 strength.  Sensation intact but diminished slightly in the L4, L5, and S1 distributions bilaterally.  Sensation intact in the median distributions.  Negative straight leg raise bilaterally.  Negative GULSHAN test bilaterally.    Eyes: Anicteric sclerae.  ENT: Trachea midline.  Lungs: Normal respiratory effort.  CV: Capillary refill is less than 2 seconds.  Skin: Intact without erythema.  Lymph: No palpable lymphadenopathy.  Neuro: Sensation is grossly intact to light touch.  Psych: Mood and affect are appropriate.    Data Review     I have personally reviewed " pertinent films in PACS, and my interpretation follows:    X-rays lumbar spine 2/9/2024: No acute osseous abnormality.  No signs of instability.  Superior endplate compression deformity of unknown chronicity appreciated about T11.  Multilevel significant DDD and facet degenerative changes appreciated.    Past Medical History:   Diagnosis Date    Broken humerus     COPD (chronic obstructive pulmonary disease) (HCC)     COVID-19 virus infection 12/21/2021    Pneumonia        Past Surgical History:   Procedure Laterality Date    IR CHEST TUBE PLACEMENT  5/21/2021    MULTIPLE TOOTH EXTRACTIONS N/A 5/17/2021    Procedure: EXTRACTION TEETH MULTIPLE 1, 3, 18, 30, EXCISION OF LEFT BUCCAL FIBROMA;  Surgeon: Ester Fisher DMD;  Location: [facility];  Service: Maxillofacial    MN ECHO TRANSESOPHAG R-T 2D W/PRB IMG ACQUISJ I&R N/A 5/20/2021    Procedure: TRANSESOPHAGEAL ECHOCARDIOGRAM (KATIA);  Surgeon: MEDINA Glasgow MD;  Location[facility];  Service: Cardiac Surgery    MN REPLACEMENT MITRAL VALVE W/CARDIOPULMONARY BYP N/A 5/20/2021    Procedure: VALVE MITRAL (MVR) REPAIR with 32mm physio II annuloplasty ring;  Surgeon: MEDINA Glasgow MD;  Location[facility];  Service: Cardiac Surgery    MN RPLCMT AORTIC VALVE OPN W/STENTLESS TISSUE VALVE N/A 5/20/2021    Procedure: REPLACEMENT VALVE AORTIC (AVR) TISSUE with 25mm montgomery inspiris tissue valve;  Surgeon: MEDINA Glasgow MD;  [facility];  Service: Cardiac Surgery       No Known Allergies    Current Outpatient Medications on File Prior to Visit   Medication Sig Dispense Refill    albuterol (PROVENTIL HFA,VENTOLIN HFA) 90 mcg/act inhaler Inhale 1-2 puffs every 4 (four) hours as needed for wheezing 18 g 0    amLODIPine (NORVASC) 5 mg tablet TAKE 1 TABLET BY MOUTH EVERY DAY 90 tablet 3    Anoro Ellipta 62.5-25 MCG/ACT inhaler INHALE 1 PUFF BY MOUTH EVERY DAY 60 each 5    aspirin (ECOTRIN LOW STRENGTH) 81 mg EC tablet Take 1 tablet (81 mg  total) by mouth daily      atorvastatin (LIPITOR) 40 mg tablet Take 1 tablet (40 mg total) by mouth every evening 90 tablet 3    B Complex Vitamins (VITAMIN B COMPLEX 100 IJ) Inject as directed      furosemide (LASIX) 20 mg tablet TAKE 1 TABLET (20 MG TOTAL) BY MOUTH DAILY AS NEEDED (SWELLING). 90 tablet 1    losartan (COZAAR) 50 mg tablet TAKE 1 TABLET BY MOUTH EVERY DAY 90 tablet 3    metoprolol tartrate (LOPRESSOR) 50 mg tablet TAKE 1 TABLET BY MOUTH EVERY 12 HOURS. 180 tablet 3    Multiple Minerals-Vitamins (Calcium Citrate-Mag-Minerals) TABS Take by mouth      pantoprazole (PROTONIX) 40 mg tablet TAKE 1 TABLET (40 MG TOTAL) BY MOUTH AS NEEDED IN THE MORNING (HEARTBURN OR ACID REFLUX). 90 tablet 1    sildenafil (REVATIO) 20 mg tablet 3-5 pills 1 hr before need 90 tablet 1    Turmeric (QC TUMERIC COMPLEX PO) Take by mouth      Zinc 10 MG LOZG Apply to the mouth or throat      dextromethorphan-guaifenesin (MUCINEX DM)  MG per 12 hr tablet Take 1 tablet by mouth every 12 (twelve) hours (Patient not taking: Reported on 2024) 20 tablet 0     No current facility-administered medications on file prior to visit.       Social History     Tobacco Use    Smoking status: Every Day     Current packs/day: 0.00     Average packs/day: 0.5 packs/day for 32.4 years (16.2 ttl pk-yrs)     Types: Cigarettes     Start date:      Last attempt to quit: 2021     Years since quittin.7    Smokeless tobacco: Never   Vaping Use    Vaping status: Never Used   Substance Use Topics    Alcohol use: Yes     Comment: once a month    Drug use: No       Family History   Problem Relation Age of Onset    Prostate cancer Father        Review of Systems     As stated in the HPI. All other systems reviewed and are negative.      Large joint arthrocentesis: L knee  Procedure Details  Location: knee - L knee  Needle size: 22 G  Ultrasound guidance: no  Approach: anterolateral  Medications administered: 2 mL lidocaine 1 %; 6 mg  betamethasone acetate-betamethasone sodium phosphate 6 (3-3) mg/mL; 2 mL bupivacaine 0.25 %    Patient tolerance: patient tolerated the procedure well with no immediate complications  Dressing:  Sterile dressing applied

## 2024-03-25 DIAGNOSIS — Z95.2 S/P AVR: ICD-10-CM

## 2024-03-25 DIAGNOSIS — Z95.2 S/P MVR (MITRAL VALVE REPLACEMENT): ICD-10-CM

## 2024-03-25 RX ORDER — ATORVASTATIN CALCIUM 40 MG/1
40 TABLET, FILM COATED ORAL EVERY EVENING
Qty: 30 TABLET | Refills: 0 | Status: SHIPPED | OUTPATIENT
Start: 2024-03-25

## 2024-04-04 ENCOUNTER — OFFICE VISIT (OUTPATIENT)
Dept: OBGYN CLINIC | Facility: CLINIC | Age: 69
End: 2024-04-04
Payer: COMMERCIAL

## 2024-04-04 VITALS
WEIGHT: 257 LBS | SYSTOLIC BLOOD PRESSURE: 134 MMHG | HEIGHT: 74 IN | BODY MASS INDEX: 32.98 KG/M2 | DIASTOLIC BLOOD PRESSURE: 80 MMHG

## 2024-04-04 DIAGNOSIS — M54.16 RADICULOPATHY, LUMBAR REGION: ICD-10-CM

## 2024-04-04 DIAGNOSIS — M51.36 DDD (DEGENERATIVE DISC DISEASE), LUMBAR: Primary | ICD-10-CM

## 2024-04-04 PROCEDURE — 99213 OFFICE O/P EST LOW 20 MIN: CPT | Performed by: PHYSICIAN ASSISTANT

## 2024-04-04 NOTE — PROGRESS NOTES
"Patient Name:  Owen Hinkle  MRN:  [id number]    Assessment & Plan     Lumbar spine pain and bilateral lumbar radiculopathy in the setting of multilevel DDD.  Patient notes persistent symptoms despite conservative management including home exercises as well as prednisone taper.  Due to persistent symptoms an MRI of the lumbar spine will be obtained for further evaluation.  In the meantime continue activity modification to avoid pain.  Continue to ambulate with a cane to prevent falls.  Follow-up after MRI with pain management.  Referral placed today.    Chief Complaint     Follow-up lumbar spine pain    History of the Present Illness     Owen Hinkle is a 69 y.o. male who returns to the office today for follow-up regarding his lumbar spine.  He was initially seen on 2/9/2024.  Referred to physical therapy for his low back and prescribed a prednisone taper.  He returns to the office today noting no significant improvement in his symptoms.  He completed the prednisone without improvement.  He has been performing home exercises without improvement as well.  He notes persistent low back pain with radiation distally along the bilateral lower extremities to the feet.  He notes associated numbness and tingling in this distribution as well.  He states the numbness and tingling primarily originates close to the knee and radiates distally to the toes.  He notes associated weakness.  He states the pain and numbness and tingling prevents him from ambulating long distances.  He does ambulate with a cane now in order to prevent falls.  He denies any bowel or bladder dysfunction.  No saddle paresthesias.  No fevers or chills.    Physical Exam     /80   Ht 6' 2\" (1.88 m)   Wt 117 kg (257 lb)   BMI 33.00 kg/m²     Lumbar spine: No gross deformity.  No tenderness midline and paraspinal musculature.  No tenderness bilateral SI joints and bilateral piriformis musculature.  Motor intact L2-S1 bilaterally with 5 5 strength.  " Sensation intact but diminished in the L4, L5, S1 distributions bilaterally.  Sensation intact in the remaining distributions bilaterally.  Negative straight leg raise bilaterally.  Negative GULSHAN test bilaterally.    Eyes: Anicteric sclerae.  ENT: Trachea midline.  Lungs: Normal respiratory effort.  CV: Capillary refill is less than 2 seconds.  Skin: Intact without erythema.  Lymph: No palpable lymphadenopathy.  Neuro: Sensation is grossly intact to light touch.  Psych: Mood and affect are appropriate.    Data Review     I have personally reviewed pertinent films in PACS, and my interpretation follows:    X-rays lumbar spine 2/9/2024: No acute osseous abnormality. No signs of instability. Superior endplate compression deformity of unknown chronicity appreciated about T11. Multilevel significant DDD and facet degenerative changes appreciated.     Past Medical History:   Diagnosis Date    Broken humerus     COPD (chronic obstructive pulmonary disease) (HCC)     COVID-19 virus infection 12/21/2021    Pneumonia        Past Surgical History:   Procedure Laterality Date    IR CHEST TUBE PLACEMENT  5/21/2021    MULTIPLE TOOTH EXTRACTIONS N/A 5/17/2021    Procedure: EXTRACTION TEETH MULTIPLE 1, 3, 18, 30, EXCISION OF LEFT BUCCAL FIBROMA;  Surgeon: Ester Fisher DMD;  Location: [facility];  Service: Maxillofacial    NY ECHO TRANSESOPHAG R-T 2D W/PRB IMG ACQUISJ I&R N/A 5/20/2021    Procedure: TRANSESOPHAGEAL ECHOCARDIOGRAM (KATIA);  Surgeon: MEDINA Glasgow MD;  Location: [facility];  Service: Cardiac Surgery    NY REPLACEMENT MITRAL VALVE W/CARDIOPULMONARY BYP N/A 5/20/2021    Procedure: VALVE MITRAL (MVR) REPAIR with 32mm physio II annuloplasty ring;  Surgeon: MEDINA Glasgow MD;  Location: [facility];  Service: Cardiac Surgery    NY RPLCMT AORTIC VALVE OPN W/STENTLESS TISSUE VALVE N/A 5/20/2021    Procedure: REPLACEMENT VALVE AORTIC (AVR) TISSUE with 25mm montgomery inspiris tissue valve;  Surgeon: MEDINA  Corey Glasgow MD;  Location: [facility];  Service: Cardiac Surgery       No Known Allergies    Current Outpatient Medications on File Prior to Visit   Medication Sig Dispense Refill    albuterol (PROVENTIL HFA,VENTOLIN HFA) 90 mcg/act inhaler Inhale 1-2 puffs every 4 (four) hours as needed for wheezing 18 g 0    amLODIPine (NORVASC) 5 mg tablet TAKE 1 TABLET BY MOUTH EVERY DAY 90 tablet 3    Anoro Ellipta 62.5-25 MCG/ACT inhaler INHALE 1 PUFF BY MOUTH EVERY DAY 60 each 5    aspirin (ECOTRIN LOW STRENGTH) 81 mg EC tablet Take 1 tablet (81 mg total) by mouth daily      atorvastatin (LIPITOR) 40 mg tablet TAKE 1 TABLET BY MOUTH EVERY DAY IN THE EVENING 30 tablet 0    B Complex Vitamins (VITAMIN B COMPLEX 100 IJ) Inject as directed      furosemide (LASIX) 20 mg tablet TAKE 1 TABLET (20 MG TOTAL) BY MOUTH DAILY AS NEEDED (SWELLING). 90 tablet 1    losartan (COZAAR) 50 mg tablet TAKE 1 TABLET BY MOUTH EVERY DAY 90 tablet 3    metoprolol tartrate (LOPRESSOR) 50 mg tablet TAKE 1 TABLET BY MOUTH EVERY 12 HOURS. 180 tablet 3    Multiple Minerals-Vitamins (Calcium Citrate-Mag-Minerals) TABS Take by mouth      pantoprazole (PROTONIX) 40 mg tablet TAKE 1 TABLET (40 MG TOTAL) BY MOUTH AS NEEDED IN THE MORNING (HEARTBURN OR ACID REFLUX). 90 tablet 1    predniSONE 10 mg tablet Take 6 tabs days 1&2, 5 tabs days 3&4, 4 tabs days 5&6, 3 tabs days 7&8, 2 tabs days 9&10, 1 tab days 11&12 42 tablet 0    sildenafil (REVATIO) 20 mg tablet 3-5 pills 1 hr before need 90 tablet 1    Turmeric (QC TUMERIC COMPLEX PO) Take by mouth      Zinc 10 MG LOZG Apply to the mouth or throat      dextromethorphan-guaifenesin (MUCINEX DM)  MG per 12 hr tablet Take 1 tablet by mouth every 12 (twelve) hours (Patient not taking: Reported on 2/9/2024) 20 tablet 0     No current facility-administered medications on file prior to visit.       Social History     Tobacco Use    Smoking status: Every Day     Current packs/day: 0.00     Average  packs/day: 0.5 packs/day for 32.4 years (16.2 ttl pk-yrs)     Types: Cigarettes     Start date:      Last attempt to quit: 2021     Years since quittin.9    Smokeless tobacco: Never   Vaping Use    Vaping status: Never Used   Substance Use Topics    Alcohol use: Yes     Comment: once a month    Drug use: No       Family History   Problem Relation Age of Onset    Prostate cancer Father        Review of Systems     As stated in the HPI. All other systems reviewed and are negative.

## 2024-04-19 DIAGNOSIS — Z95.2 S/P MVR (MITRAL VALVE REPLACEMENT): ICD-10-CM

## 2024-04-19 DIAGNOSIS — Z95.2 S/P AVR: ICD-10-CM

## 2024-04-19 RX ORDER — ATORVASTATIN CALCIUM 40 MG/1
40 TABLET, FILM COATED ORAL EVERY EVENING
Qty: 90 TABLET | Refills: 0 | Status: SHIPPED | OUTPATIENT
Start: 2024-04-19

## 2024-05-04 DIAGNOSIS — K21.9 GASTROESOPHAGEAL REFLUX DISEASE WITHOUT ESOPHAGITIS: ICD-10-CM

## 2024-05-05 RX ORDER — PANTOPRAZOLE SODIUM 40 MG/1
40 TABLET, DELAYED RELEASE ORAL DAILY PRN
Qty: 90 TABLET | Refills: 1 | Status: SHIPPED | OUTPATIENT
Start: 2024-05-05

## 2024-05-27 DIAGNOSIS — I50.42 CHRONIC COMBINED SYSTOLIC (CONGESTIVE) AND DIASTOLIC (CONGESTIVE) HEART FAILURE (HCC): ICD-10-CM

## 2024-05-27 RX ORDER — FUROSEMIDE 20 MG/1
20 TABLET ORAL DAILY PRN
Qty: 30 TABLET | Refills: 0 | Status: SHIPPED | OUTPATIENT
Start: 2024-05-27

## 2024-06-10 DIAGNOSIS — I50.42 CHRONIC COMBINED SYSTOLIC (CONGESTIVE) AND DIASTOLIC (CONGESTIVE) HEART FAILURE (HCC): ICD-10-CM

## 2024-06-11 RX ORDER — FUROSEMIDE 20 MG/1
20 TABLET ORAL DAILY PRN
Qty: 90 TABLET | Refills: 1 | Status: SHIPPED | OUTPATIENT
Start: 2024-06-11

## 2024-06-11 NOTE — TELEPHONE ENCOUNTER
Patient needs updated blood work and request is for 90D supply, rx refill depart cannot provide 90D due to failed protocol. Please place orders and refill if appropriate. Patient also need annual appointment

## 2024-06-22 DIAGNOSIS — I10 HYPERTENSION, UNSPECIFIED TYPE: ICD-10-CM

## 2024-06-22 RX ORDER — AMLODIPINE BESYLATE 5 MG/1
TABLET ORAL
Qty: 90 TABLET | Refills: 0 | Status: SHIPPED | OUTPATIENT
Start: 2024-06-22

## 2024-07-26 DIAGNOSIS — Z95.2 S/P MVR (MITRAL VALVE REPLACEMENT): ICD-10-CM

## 2024-07-26 DIAGNOSIS — Z95.2 S/P AVR: ICD-10-CM

## 2024-07-26 RX ORDER — ATORVASTATIN CALCIUM 40 MG/1
40 TABLET, FILM COATED ORAL EVERY EVENING
Qty: 90 TABLET | Refills: 1 | Status: SHIPPED | OUTPATIENT
Start: 2024-07-26

## 2024-08-25 DIAGNOSIS — I10 ESSENTIAL HYPERTENSION: ICD-10-CM

## 2024-08-26 RX ORDER — LOSARTAN POTASSIUM 50 MG/1
TABLET ORAL
Qty: 90 TABLET | Refills: 3 | Status: SHIPPED | OUTPATIENT
Start: 2024-08-26

## 2024-09-18 DIAGNOSIS — I10 HYPERTENSION, UNSPECIFIED TYPE: ICD-10-CM

## 2024-09-18 RX ORDER — AMLODIPINE BESYLATE 5 MG/1
TABLET ORAL
Qty: 90 TABLET | Refills: 0 | Status: SHIPPED | OUTPATIENT
Start: 2024-09-18

## 2024-10-24 DIAGNOSIS — Z95.2 S/P MVR (MITRAL VALVE REPLACEMENT): ICD-10-CM

## 2024-10-24 DIAGNOSIS — Z95.2 S/P AVR: ICD-10-CM

## 2024-10-25 RX ORDER — METOPROLOL TARTRATE 50 MG
50 TABLET ORAL EVERY 12 HOURS
Qty: 180 TABLET | Refills: 0 | Status: SHIPPED | OUTPATIENT
Start: 2024-10-25

## 2024-11-17 DIAGNOSIS — I10 HYPERTENSION, UNSPECIFIED TYPE: ICD-10-CM

## 2024-11-19 RX ORDER — AMLODIPINE BESYLATE 5 MG/1
5 TABLET ORAL DAILY
Qty: 90 TABLET | Refills: 0 | Status: SHIPPED | OUTPATIENT
Start: 2024-11-19

## 2024-12-12 DIAGNOSIS — K21.9 GASTROESOPHAGEAL REFLUX DISEASE WITHOUT ESOPHAGITIS: ICD-10-CM

## 2024-12-13 RX ORDER — PANTOPRAZOLE SODIUM 40 MG/1
40 TABLET, DELAYED RELEASE ORAL DAILY PRN
Qty: 90 TABLET | Refills: 0 | OUTPATIENT
Start: 2024-12-13

## 2024-12-13 NOTE — TELEPHONE ENCOUNTER
Please call him and set up an appointment with 1 of us, he has not been seen in a year.  It looks like cardiology also has been trying to get him in.  I did not send in his pantoprazole today as I do not know how he is doing, we need to see him

## 2024-12-23 NOTE — TELEPHONE ENCOUNTER
12/23: [facility] for PT to schedule appt for Annual Physical so that we can refill his medications. Last Annual was 12/8/23.

## 2024-12-27 NOTE — TELEPHONE ENCOUNTER
12/27 12:32pm: 2nd attempt: [facility] for PT to schedule appt for Annual Physical so that we can refill his medications. Last Annual was 12/8/23. [employer] message was also sent.

## 2024-12-31 NOTE — TELEPHONE ENCOUNTER
12/31 9:05am: 3rd attempt: [facility]VM for PT to either call in or schedule migue Gutierrez his appt for Annual Physical so that we can refill his medications.

## 2025-02-08 DIAGNOSIS — Z95.2 S/P AVR: ICD-10-CM

## 2025-02-08 DIAGNOSIS — Z95.2 S/P MVR (MITRAL VALVE REPLACEMENT): ICD-10-CM

## 2025-02-10 RX ORDER — ATORVASTATIN CALCIUM 40 MG/1
40 TABLET, FILM COATED ORAL EVERY EVENING
Qty: 30 TABLET | Refills: 0 | Status: SHIPPED | OUTPATIENT
Start: 2025-02-10

## 2025-02-12 RX ORDER — METOPROLOL TARTRATE 50 MG
50 TABLET ORAL EVERY 12 HOURS
Qty: 180 TABLET | Refills: 0 | Status: SHIPPED | OUTPATIENT
Start: 2025-02-12

## 2025-02-19 DIAGNOSIS — I10 HYPERTENSION, UNSPECIFIED TYPE: ICD-10-CM

## 2025-02-19 RX ORDER — AMLODIPINE BESYLATE 5 MG/1
5 TABLET ORAL DAILY
Qty: 90 TABLET | Refills: 0 | Status: SHIPPED | OUTPATIENT
Start: 2025-02-19

## 2025-05-31 DIAGNOSIS — Z95.2 S/P MVR (MITRAL VALVE REPLACEMENT): ICD-10-CM

## 2025-05-31 DIAGNOSIS — Z95.2 S/P AVR: ICD-10-CM

## 2025-05-31 DIAGNOSIS — I10 HYPERTENSION, UNSPECIFIED TYPE: ICD-10-CM

## 2025-06-02 RX ORDER — AMLODIPINE BESYLATE 5 MG/1
5 TABLET ORAL DAILY
Qty: 90 TABLET | Refills: 0 | Status: SHIPPED | OUTPATIENT
Start: 2025-06-02

## 2025-06-02 RX ORDER — METOPROLOL TARTRATE 50 MG
50 TABLET ORAL 2 TIMES DAILY
Qty: 180 TABLET | Refills: 0 | Status: SHIPPED | OUTPATIENT
Start: 2025-06-02

## 2025-06-04 DIAGNOSIS — Z95.2 S/P AVR: ICD-10-CM

## 2025-06-04 DIAGNOSIS — Z95.2 S/P MVR (MITRAL VALVE REPLACEMENT): ICD-10-CM

## 2025-06-04 RX ORDER — ATORVASTATIN CALCIUM 40 MG/1
40 TABLET, FILM COATED ORAL EVERY EVENING
Qty: 30 TABLET | Refills: 0 | Status: CANCELLED | OUTPATIENT
Start: 2025-06-04

## 2025-06-05 ENCOUNTER — TELEPHONE (OUTPATIENT)
Dept: FAMILY MEDICINE CLINIC | Facility: CLINIC | Age: 70
End: 2025-06-05

## 2025-06-05 NOTE — TELEPHONE ENCOUNTER
Called Pt no answer LM advising CB. Please ask if he is going to be seen in our office or elsewhere. Has not been seen since 2023.

## 2025-06-06 DIAGNOSIS — Z95.2 S/P MVR (MITRAL VALVE REPLACEMENT): ICD-10-CM

## 2025-06-06 DIAGNOSIS — Z95.2 S/P AVR: ICD-10-CM

## 2025-06-06 RX ORDER — ATORVASTATIN CALCIUM 40 MG/1
40 TABLET, FILM COATED ORAL EVERY EVENING
Qty: 30 TABLET | Refills: 0 | Status: CANCELLED | OUTPATIENT
Start: 2025-06-06

## (undated) DEVICE — PLEDGET CARDIO PTFE 9.5 X 4.8 SOFT LF (6EA/PK)

## (undated) DEVICE — 40601 PROLONGED POSITIONING SYSTEM: Brand: 40601 PROLONGED POSITIONING SYSTEM

## (undated) DEVICE — SUT SILK 2-0 SH CR/8 18 IN C012D

## (undated) DEVICE — SUT PDS PLUS 1 CTB 36 IN PDPB359T

## (undated) DEVICE — DRESSING ALLEVYN LIFE HEEL 25 X 25.2CM

## (undated) DEVICE — GLOVE SRG BIOGEL ECLIPSE 6.5

## (undated) DEVICE — SUT ETHIBOND 2-0 V-5/V-5 30 IN PXX52

## (undated) DEVICE — 32 FR STRAIGHT – SOFT PVC CATHETER: Brand: PVC THORACIC CATHETERS

## (undated) DEVICE — OASIS DRAIN, SINGLE, INLINE & ATS COMPATIBLE: Brand: OASIS

## (undated) DEVICE — SUT PROLENE 5-0 C-1/C-1 36 IN 8321H

## (undated) DEVICE — RECIP.STERNUM SAW BLADE 34/7.5/0.7MM: Brand: AESCULAP

## (undated) DEVICE — SUT CHROMIC 3-0 SH 27 IN G122H

## (undated) DEVICE — INTENDED FOR TISSUE SEPARATION, AND OTHER PROCEDURES THAT REQUIRE A SHARP SURGICAL BLADE TO PUNCTURE OR CUT.: Brand: BARD-PARKER ® CARBON RIB-BACK BLADES

## (undated) DEVICE — 2000CC GUARDIAN II: Brand: GUARDIAN

## (undated) DEVICE — SUT SILK 0 CT-1 30 IN 424H

## (undated) DEVICE — GLOVE INDICATOR PI UNDERGLOVE SZ 6.5 BLUE

## (undated) DEVICE — 32 FR RIGHT ANGLE – SOFT PVC CATHETER: Brand: PVC THORACIC CATHETERS

## (undated) DEVICE — LIGHT HANDLE COVER SLEEVE DISP BLUE STELLAR

## (undated) DEVICE — SUT SILK 2-0 18 IN A185H

## (undated) DEVICE — STERILE MANDIBLE PACK: Brand: CARDINAL HEALTH

## (undated) DEVICE — GLOVE INDICATOR PI UNDERGLOVE SZ 8 BLUE

## (undated) DEVICE — BONE WAX WHITE: Brand: BONE WAX WHITE

## (undated) DEVICE — EVERGRIP INSERT SET 86MM: Brand: FOGARTY EVERGRIP

## (undated) DEVICE — CATH STRAIGHT RED RUBBER 20 FR

## (undated) DEVICE — SUCTION CATH 18 FR

## (undated) DEVICE — SILVER-COATED ANTIBACTERIAL BARRIER DRESSING: Brand: ACTICOAT SURGIC 10X25CM 5PK US

## (undated) DEVICE — CATHETER PLUG WITH CAP: Brand: DOVER

## (undated) DEVICE — SUT MONOCRYL PLUS 3-0 PS-2 27 IN MCP427H

## (undated) DEVICE — ELECTRODE BLADE E-Z CLEAN 4IN -0014A

## (undated) DEVICE — SUT ETHIBOND 2-0 SH/SH 36 IN X523H

## (undated) DEVICE — SUT SILK 2 60 IN SA8H

## (undated) DEVICE — PACK VALVE PBDS

## (undated) DEVICE — 3000CC GUARDIAN II: Brand: GUARDIAN

## (undated) DEVICE — ANTIBACTERIAL UNDYED BRAIDED (POLYGLACTIN 910), SYNTHETIC ABSORBABLE SUTURE: Brand: COATED VICRYL

## (undated) DEVICE — BLANKET HYPOTHERMIA ADULT GAYMAR

## (undated) DEVICE — PLUMEPEN PRO 10FT

## (undated) DEVICE — SUT VICRYL PLUS 1 CTB-1 36 IN VCPB947H

## (undated) DEVICE — SUT PROLENE 4-0 BB 36 IN 8581H

## (undated) DEVICE — SUT ETHIBOND 2-0 SH-1/SH-1 30 IN X763H

## (undated) DEVICE — SYRINGE 10ML LL

## (undated) DEVICE — SUT PROLENE 3-0 SH 36 IN 8522H

## (undated) DEVICE — PVC URETHRAL CATHETER: Brand: DOVER

## (undated) DEVICE — FILTER SMOKE EVAC VIROSAFE

## (undated) DEVICE — GAUZE SPONGES,16 PLY: Brand: CURITY

## (undated) DEVICE — GLOVE SRG BIOGEL ECLIPSE 8

## (undated) DEVICE — NEEDLE 25G X 1 1/2

## (undated) DEVICE — 3M™ TEGADERM™ CHG DRESSING 25/CARTON 4 CARTONS/CASE 1659: Brand: TEGADERM™

## (undated) DEVICE — THERMOFLECT BLANKET, L, 25EA                               TS THERMOFLECT BLANKET, 48" X 84", SILVER, 5/BG, 5 BG/CS NW: Brand: THERMOFLECT

## (undated) DEVICE — STERNAL WIRE

## (undated) DEVICE — TRAY FOLEY 16FR SURESTEP TEMP SENS URIMETER STAT LOK